# Patient Record
Sex: FEMALE | Race: WHITE | Employment: OTHER | URBAN - METROPOLITAN AREA
[De-identification: names, ages, dates, MRNs, and addresses within clinical notes are randomized per-mention and may not be internally consistent; named-entity substitution may affect disease eponyms.]

---

## 2017-01-20 ENCOUNTER — OFFICE VISIT (OUTPATIENT)
Dept: INTERNAL MEDICINE CLINIC | Age: 77
End: 2017-01-20

## 2017-01-20 VITALS
DIASTOLIC BLOOD PRESSURE: 82 MMHG | BODY MASS INDEX: 36.65 KG/M2 | TEMPERATURE: 97.7 F | HEART RATE: 89 BPM | SYSTOLIC BLOOD PRESSURE: 144 MMHG | WEIGHT: 220 LBS | OXYGEN SATURATION: 92 % | HEIGHT: 65 IN

## 2017-01-20 DIAGNOSIS — E78.5 HYPERLIPIDEMIA LDL GOAL <70: ICD-10-CM

## 2017-01-20 DIAGNOSIS — I27.20 PULMONARY HYPERTENSION (HCC): ICD-10-CM

## 2017-01-20 DIAGNOSIS — E66.01 MORBID OBESITY DUE TO EXCESS CALORIES (HCC): ICD-10-CM

## 2017-01-20 DIAGNOSIS — I34.0 MITRAL VALVE INSUFFICIENCY, UNSPECIFIED ETIOLOGY: ICD-10-CM

## 2017-01-20 DIAGNOSIS — M1A.0790 IDIOPATHIC CHRONIC GOUT OF FOOT WITHOUT TOPHUS, UNSPECIFIED LATERALITY: ICD-10-CM

## 2017-01-20 DIAGNOSIS — E03.9 ACQUIRED HYPOTHYROIDISM: ICD-10-CM

## 2017-01-20 DIAGNOSIS — I21.4 NSTEMI (NON-ST ELEVATED MYOCARDIAL INFARCTION) (HCC): ICD-10-CM

## 2017-01-20 DIAGNOSIS — I10 ESSENTIAL HYPERTENSION: ICD-10-CM

## 2017-01-20 DIAGNOSIS — Z76.89 ENCOUNTER TO ESTABLISH CARE WITH NEW DOCTOR: Primary | ICD-10-CM

## 2017-01-20 DIAGNOSIS — E11.65 TYPE 2 DIABETES MELLITUS WITH HYPERGLYCEMIA, WITHOUT LONG-TERM CURRENT USE OF INSULIN (HCC): ICD-10-CM

## 2017-01-20 DIAGNOSIS — I73.9 PVD (PERIPHERAL VASCULAR DISEASE) (HCC): ICD-10-CM

## 2017-01-20 DIAGNOSIS — I50.32 CHRONIC DIASTOLIC CHF (CONGESTIVE HEART FAILURE), NYHA CLASS 2 (HCC): ICD-10-CM

## 2017-01-20 LAB
A/G RATIO: 1.3 (ref 1.1–2.2)
ALBUMIN SERPL-MCNC: 3.9 G/DL (ref 3.4–5)
ALP BLD-CCNC: 137 U/L (ref 40–129)
ALT SERPL-CCNC: 16 U/L (ref 10–40)
ANION GAP SERPL CALCULATED.3IONS-SCNC: 15 MMOL/L (ref 3–16)
AST SERPL-CCNC: 12 U/L (ref 15–37)
BILIRUB SERPL-MCNC: 0.5 MG/DL (ref 0–1)
BUN BLDV-MCNC: 27 MG/DL (ref 7–20)
CALCIUM SERPL-MCNC: 9.3 MG/DL (ref 8.3–10.6)
CHLORIDE BLD-SCNC: 100 MMOL/L (ref 99–110)
CO2: 29 MMOL/L (ref 21–32)
CREAT SERPL-MCNC: 1.3 MG/DL (ref 0.6–1.2)
CREATININE URINE POCT: 173.4
GFR AFRICAN AMERICAN: 48
GFR NON-AFRICAN AMERICAN: 40
GLOBULIN: 3.1 G/DL
GLUCOSE BLD-MCNC: 219 MG/DL (ref 70–99)
HBA1C MFR BLD: 8 %
MICROALBUMIN/CREAT 24H UR: 72.8 MG/G{CREAT}
POTASSIUM SERPL-SCNC: 4.3 MMOL/L (ref 3.5–5.1)
SODIUM BLD-SCNC: 144 MMOL/L (ref 136–145)
T4 FREE: 1.8 NG/DL (ref 0.9–1.8)
TOTAL PROTEIN: 7 G/DL (ref 6.4–8.2)
TSH REFLEX FT4: 0.1 UIU/ML (ref 0.27–4.2)

## 2017-01-20 PROCEDURE — 82044 UR ALBUMIN SEMIQUANTITATIVE: CPT | Performed by: INTERNAL MEDICINE

## 2017-01-20 PROCEDURE — G8400 PT W/DXA NO RESULTS DOC: HCPCS | Performed by: INTERNAL MEDICINE

## 2017-01-20 PROCEDURE — 4040F PNEUMOC VAC/ADMIN/RCVD: CPT | Performed by: INTERNAL MEDICINE

## 2017-01-20 PROCEDURE — G0008 ADMIN INFLUENZA VIRUS VAC: HCPCS | Performed by: INTERNAL MEDICINE

## 2017-01-20 PROCEDURE — 1090F PRES/ABSN URINE INCON ASSESS: CPT | Performed by: INTERNAL MEDICINE

## 2017-01-20 PROCEDURE — 90662 IIV NO PRSV INCREASED AG IM: CPT | Performed by: INTERNAL MEDICINE

## 2017-01-20 PROCEDURE — G8484 FLU IMMUNIZE NO ADMIN: HCPCS | Performed by: INTERNAL MEDICINE

## 2017-01-20 PROCEDURE — G8419 CALC BMI OUT NRM PARAM NOF/U: HCPCS | Performed by: INTERNAL MEDICINE

## 2017-01-20 PROCEDURE — 83036 HEMOGLOBIN GLYCOSYLATED A1C: CPT | Performed by: INTERNAL MEDICINE

## 2017-01-20 PROCEDURE — 99215 OFFICE O/P EST HI 40 MIN: CPT | Performed by: INTERNAL MEDICINE

## 2017-01-20 PROCEDURE — 90670 PCV13 VACCINE IM: CPT | Performed by: INTERNAL MEDICINE

## 2017-01-20 PROCEDURE — 1123F ACP DISCUSS/DSCN MKR DOCD: CPT | Performed by: INTERNAL MEDICINE

## 2017-01-20 PROCEDURE — G0009 ADMIN PNEUMOCOCCAL VACCINE: HCPCS | Performed by: INTERNAL MEDICINE

## 2017-01-20 PROCEDURE — 1036F TOBACCO NON-USER: CPT | Performed by: INTERNAL MEDICINE

## 2017-01-20 PROCEDURE — G8427 DOCREV CUR MEDS BY ELIG CLIN: HCPCS | Performed by: INTERNAL MEDICINE

## 2017-01-20 RX ORDER — LISINOPRIL 5 MG/1
5 TABLET ORAL DAILY
Qty: 30 TABLET | Refills: 3 | Status: SHIPPED | OUTPATIENT
Start: 2017-01-20 | End: 2017-07-06 | Stop reason: SDUPTHER

## 2017-01-24 DIAGNOSIS — E03.9 ACQUIRED HYPOTHYROIDISM: Primary | ICD-10-CM

## 2017-01-24 RX ORDER — LEVOTHYROXINE SODIUM 0.12 MG/1
125 TABLET ORAL DAILY
Qty: 90 TABLET | Refills: 3 | Status: SHIPPED | OUTPATIENT
Start: 2017-01-24 | End: 2018-04-23 | Stop reason: SDUPTHER

## 2017-06-14 RX ORDER — ALLOPURINOL 100 MG/1
TABLET ORAL
Qty: 10 TABLET | Refills: 0 | Status: SHIPPED | OUTPATIENT
Start: 2017-06-14 | End: 2017-08-03 | Stop reason: SDUPTHER

## 2017-06-14 RX ORDER — GLIMEPIRIDE 4 MG/1
TABLET ORAL
Qty: 20 TABLET | Refills: 0 | Status: SHIPPED | OUTPATIENT
Start: 2017-06-14 | End: 2017-08-03 | Stop reason: SDUPTHER

## 2017-07-06 ENCOUNTER — OFFICE VISIT (OUTPATIENT)
Dept: FAMILY MEDICINE CLINIC | Age: 77
End: 2017-07-06

## 2017-07-06 VITALS
DIASTOLIC BLOOD PRESSURE: 80 MMHG | WEIGHT: 214 LBS | SYSTOLIC BLOOD PRESSURE: 140 MMHG | BODY MASS INDEX: 34.39 KG/M2 | HEART RATE: 57 BPM | HEIGHT: 66 IN

## 2017-07-06 DIAGNOSIS — E11.65 TYPE 2 DIABETES MELLITUS WITH HYPERGLYCEMIA, WITHOUT LONG-TERM CURRENT USE OF INSULIN (HCC): ICD-10-CM

## 2017-07-06 DIAGNOSIS — I21.4 NSTEMI (NON-ST ELEVATED MYOCARDIAL INFARCTION) (HCC): ICD-10-CM

## 2017-07-06 DIAGNOSIS — E78.5 HYPERLIPIDEMIA LDL GOAL <70: ICD-10-CM

## 2017-07-06 DIAGNOSIS — I50.32 CHRONIC DIASTOLIC CHF (CONGESTIVE HEART FAILURE), NYHA CLASS 2 (HCC): ICD-10-CM

## 2017-07-06 DIAGNOSIS — E03.9 ACQUIRED HYPOTHYROIDISM: ICD-10-CM

## 2017-07-06 DIAGNOSIS — I10 ESSENTIAL HYPERTENSION: Primary | ICD-10-CM

## 2017-07-06 LAB
A/G RATIO: 1.4 (ref 1.1–2.2)
ALBUMIN SERPL-MCNC: 4.1 G/DL (ref 3.4–5)
ALP BLD-CCNC: 131 U/L (ref 40–129)
ALT SERPL-CCNC: 13 U/L (ref 10–40)
ANION GAP SERPL CALCULATED.3IONS-SCNC: 17 MMOL/L (ref 3–16)
AST SERPL-CCNC: 12 U/L (ref 15–37)
BILIRUB SERPL-MCNC: 0.5 MG/DL (ref 0–1)
BUN BLDV-MCNC: 34 MG/DL (ref 7–20)
CALCIUM SERPL-MCNC: 9.3 MG/DL (ref 8.3–10.6)
CHLORIDE BLD-SCNC: 100 MMOL/L (ref 99–110)
CHOLESTEROL, TOTAL: 165 MG/DL (ref 0–199)
CO2: 30 MMOL/L (ref 21–32)
CREAT SERPL-MCNC: 1.4 MG/DL (ref 0.6–1.2)
GFR AFRICAN AMERICAN: 44
GFR NON-AFRICAN AMERICAN: 36
GLOBULIN: 2.9 G/DL
GLUCOSE BLD-MCNC: 150 MG/DL (ref 70–99)
HDLC SERPL-MCNC: 59 MG/DL (ref 40–60)
LDL CHOLESTEROL CALCULATED: 83 MG/DL
POTASSIUM SERPL-SCNC: 4.7 MMOL/L (ref 3.5–5.1)
SODIUM BLD-SCNC: 147 MMOL/L (ref 136–145)
TOTAL PROTEIN: 7 G/DL (ref 6.4–8.2)
TRIGL SERPL-MCNC: 116 MG/DL (ref 0–150)
VLDLC SERPL CALC-MCNC: 23 MG/DL

## 2017-07-06 PROCEDURE — 1036F TOBACCO NON-USER: CPT | Performed by: FAMILY MEDICINE

## 2017-07-06 PROCEDURE — 36415 COLL VENOUS BLD VENIPUNCTURE: CPT | Performed by: FAMILY MEDICINE

## 2017-07-06 PROCEDURE — 4040F PNEUMOC VAC/ADMIN/RCVD: CPT | Performed by: FAMILY MEDICINE

## 2017-07-06 PROCEDURE — G8427 DOCREV CUR MEDS BY ELIG CLIN: HCPCS | Performed by: FAMILY MEDICINE

## 2017-07-06 PROCEDURE — G8417 CALC BMI ABV UP PARAM F/U: HCPCS | Performed by: FAMILY MEDICINE

## 2017-07-06 PROCEDURE — G8400 PT W/DXA NO RESULTS DOC: HCPCS | Performed by: FAMILY MEDICINE

## 2017-07-06 PROCEDURE — 99214 OFFICE O/P EST MOD 30 MIN: CPT | Performed by: FAMILY MEDICINE

## 2017-07-06 PROCEDURE — 1090F PRES/ABSN URINE INCON ASSESS: CPT | Performed by: FAMILY MEDICINE

## 2017-07-06 PROCEDURE — 1123F ACP DISCUSS/DSCN MKR DOCD: CPT | Performed by: FAMILY MEDICINE

## 2017-07-06 RX ORDER — ALLOPURINOL 100 MG/1
TABLET ORAL
Qty: 90 TABLET | Refills: 1 | Status: SHIPPED | OUTPATIENT
Start: 2017-07-06 | End: 2018-01-29 | Stop reason: SDUPTHER

## 2017-07-06 RX ORDER — LISINOPRIL 5 MG/1
5 TABLET ORAL DAILY
Qty: 90 TABLET | Refills: 1 | Status: SHIPPED | OUTPATIENT
Start: 2017-07-06 | End: 2017-12-08 | Stop reason: SDUPTHER

## 2017-07-06 RX ORDER — GLIMEPIRIDE 4 MG/1
TABLET ORAL
Qty: 180 TABLET | Refills: 1 | Status: ON HOLD | OUTPATIENT
Start: 2017-07-06 | End: 2018-01-10 | Stop reason: ALTCHOICE

## 2017-07-06 ASSESSMENT — ENCOUNTER SYMPTOMS
DIARRHEA: 0
TROUBLE SWALLOWING: 0
WHEEZING: 0
PHOTOPHOBIA: 0
SINUS PRESSURE: 0
SHORTNESS OF BREATH: 0
BACK PAIN: 0
CONSTIPATION: 0

## 2017-07-06 ASSESSMENT — PATIENT HEALTH QUESTIONNAIRE - PHQ9
SUM OF ALL RESPONSES TO PHQ9 QUESTIONS 1 & 2: 2
SUM OF ALL RESPONSES TO PHQ QUESTIONS 1-9: 2
2. FEELING DOWN, DEPRESSED OR HOPELESS: 1
1. LITTLE INTEREST OR PLEASURE IN DOING THINGS: 1

## 2017-07-07 LAB
ESTIMATED AVERAGE GLUCOSE: 194.4 MG/DL
HBA1C MFR BLD: 8.4 %

## 2017-07-18 ENCOUNTER — OFFICE VISIT (OUTPATIENT)
Dept: CARDIOLOGY CLINIC | Age: 77
End: 2017-07-18

## 2017-07-18 VITALS
WEIGHT: 215 LBS | DIASTOLIC BLOOD PRESSURE: 60 MMHG | HEIGHT: 66 IN | OXYGEN SATURATION: 97 % | SYSTOLIC BLOOD PRESSURE: 126 MMHG | BODY MASS INDEX: 34.55 KG/M2 | HEART RATE: 78 BPM

## 2017-07-18 DIAGNOSIS — I25.10 CORONARY ARTERY DISEASE INVOLVING NATIVE CORONARY ARTERY OF NATIVE HEART WITHOUT ANGINA PECTORIS: Primary | ICD-10-CM

## 2017-07-18 DIAGNOSIS — I27.20 PULMONARY HYPERTENSION (HCC): ICD-10-CM

## 2017-07-18 DIAGNOSIS — I50.32 CHRONIC DIASTOLIC HEART FAILURE, NYHA CLASS 2 (HCC): ICD-10-CM

## 2017-07-18 DIAGNOSIS — E78.5 HYPERLIPIDEMIA LDL GOAL <70: ICD-10-CM

## 2017-07-18 PROCEDURE — 1090F PRES/ABSN URINE INCON ASSESS: CPT | Performed by: INTERNAL MEDICINE

## 2017-07-18 PROCEDURE — G8427 DOCREV CUR MEDS BY ELIG CLIN: HCPCS | Performed by: INTERNAL MEDICINE

## 2017-07-18 PROCEDURE — 99214 OFFICE O/P EST MOD 30 MIN: CPT | Performed by: INTERNAL MEDICINE

## 2017-07-18 PROCEDURE — 1036F TOBACCO NON-USER: CPT | Performed by: INTERNAL MEDICINE

## 2017-07-18 PROCEDURE — 4040F PNEUMOC VAC/ADMIN/RCVD: CPT | Performed by: INTERNAL MEDICINE

## 2017-07-18 PROCEDURE — G8598 ASA/ANTIPLAT THER USED: HCPCS | Performed by: INTERNAL MEDICINE

## 2017-07-18 PROCEDURE — 1123F ACP DISCUSS/DSCN MKR DOCD: CPT | Performed by: INTERNAL MEDICINE

## 2017-07-18 PROCEDURE — G8400 PT W/DXA NO RESULTS DOC: HCPCS | Performed by: INTERNAL MEDICINE

## 2017-07-18 PROCEDURE — G8417 CALC BMI ABV UP PARAM F/U: HCPCS | Performed by: INTERNAL MEDICINE

## 2017-09-25 ENCOUNTER — OFFICE VISIT (OUTPATIENT)
Dept: FAMILY MEDICINE CLINIC | Age: 77
End: 2017-09-25

## 2017-09-25 VITALS
DIASTOLIC BLOOD PRESSURE: 72 MMHG | HEIGHT: 67 IN | BODY MASS INDEX: 33.68 KG/M2 | TEMPERATURE: 98.6 F | WEIGHT: 214.6 LBS | SYSTOLIC BLOOD PRESSURE: 122 MMHG

## 2017-09-25 DIAGNOSIS — E03.9 ACQUIRED HYPOTHYROIDISM: Primary | ICD-10-CM

## 2017-09-25 DIAGNOSIS — I10 ESSENTIAL HYPERTENSION: ICD-10-CM

## 2017-09-25 DIAGNOSIS — E11.65 TYPE 2 DIABETES MELLITUS WITH HYPERGLYCEMIA, WITHOUT LONG-TERM CURRENT USE OF INSULIN (HCC): ICD-10-CM

## 2017-09-25 DIAGNOSIS — Z23 FLU VACCINE NEED: ICD-10-CM

## 2017-09-25 PROCEDURE — 1036F TOBACCO NON-USER: CPT | Performed by: INTERNAL MEDICINE

## 2017-09-25 PROCEDURE — G8400 PT W/DXA NO RESULTS DOC: HCPCS | Performed by: INTERNAL MEDICINE

## 2017-09-25 PROCEDURE — 99214 OFFICE O/P EST MOD 30 MIN: CPT | Performed by: INTERNAL MEDICINE

## 2017-09-25 PROCEDURE — 90662 IIV NO PRSV INCREASED AG IM: CPT | Performed by: INTERNAL MEDICINE

## 2017-09-25 PROCEDURE — 4040F PNEUMOC VAC/ADMIN/RCVD: CPT | Performed by: INTERNAL MEDICINE

## 2017-09-25 PROCEDURE — 1123F ACP DISCUSS/DSCN MKR DOCD: CPT | Performed by: INTERNAL MEDICINE

## 2017-09-25 PROCEDURE — 1090F PRES/ABSN URINE INCON ASSESS: CPT | Performed by: INTERNAL MEDICINE

## 2017-09-25 PROCEDURE — G0008 ADMIN INFLUENZA VIRUS VAC: HCPCS | Performed by: INTERNAL MEDICINE

## 2017-09-25 PROCEDURE — G8417 CALC BMI ABV UP PARAM F/U: HCPCS | Performed by: INTERNAL MEDICINE

## 2017-09-25 PROCEDURE — G8598 ASA/ANTIPLAT THER USED: HCPCS | Performed by: INTERNAL MEDICINE

## 2017-09-25 PROCEDURE — G8427 DOCREV CUR MEDS BY ELIG CLIN: HCPCS | Performed by: INTERNAL MEDICINE

## 2017-09-25 ASSESSMENT — ENCOUNTER SYMPTOMS
RHINORRHEA: 0
ABDOMINAL PAIN: 0
SHORTNESS OF BREATH: 0
SINUS PRESSURE: 0
COUGH: 0
SORE THROAT: 0
BLOOD IN STOOL: 0
WHEEZING: 0
VOMITING: 0
APNEA: 0
NAUSEA: 0
CONSTIPATION: 0
DIARRHEA: 0

## 2017-11-20 RX ORDER — CLOPIDOGREL BISULFATE 75 MG/1
TABLET ORAL
Qty: 90 TABLET | Refills: 3 | Status: ON HOLD | OUTPATIENT
Start: 2017-11-20 | End: 2018-01-13 | Stop reason: HOSPADM

## 2017-12-08 ENCOUNTER — TELEPHONE (OUTPATIENT)
Dept: FAMILY MEDICINE CLINIC | Age: 77
End: 2017-12-08

## 2017-12-08 DIAGNOSIS — E11.65 TYPE 2 DIABETES MELLITUS WITH HYPERGLYCEMIA, WITHOUT LONG-TERM CURRENT USE OF INSULIN (HCC): ICD-10-CM

## 2017-12-08 RX ORDER — CARVEDILOL 3.12 MG/1
3.12 TABLET ORAL 2 TIMES DAILY WITH MEALS
Qty: 60 TABLET | Refills: 0 | Status: SHIPPED | OUTPATIENT
Start: 2017-12-08 | End: 2018-01-31 | Stop reason: SDUPTHER

## 2017-12-08 RX ORDER — LISINOPRIL 5 MG/1
5 TABLET ORAL DAILY
Qty: 90 TABLET | Refills: 0 | Status: SHIPPED | OUTPATIENT
Start: 2017-12-08 | End: 2018-03-12 | Stop reason: SDUPTHER

## 2017-12-08 RX ORDER — FUROSEMIDE 80 MG
80 TABLET ORAL 2 TIMES DAILY
Qty: 60 TABLET | Refills: 0 | Status: ON HOLD | OUTPATIENT
Start: 2017-12-08 | End: 2018-01-13

## 2017-12-12 ENCOUNTER — OFFICE VISIT (OUTPATIENT)
Dept: FAMILY MEDICINE CLINIC | Age: 77
End: 2017-12-12

## 2017-12-12 VITALS
DIASTOLIC BLOOD PRESSURE: 74 MMHG | TEMPERATURE: 97.8 F | HEIGHT: 67 IN | WEIGHT: 216.2 LBS | SYSTOLIC BLOOD PRESSURE: 118 MMHG | BODY MASS INDEX: 33.93 KG/M2

## 2017-12-12 DIAGNOSIS — B96.89 ACUTE BACTERIAL SINUSITIS: ICD-10-CM

## 2017-12-12 DIAGNOSIS — J01.90 ACUTE BACTERIAL SINUSITIS: ICD-10-CM

## 2017-12-12 PROCEDURE — G8417 CALC BMI ABV UP PARAM F/U: HCPCS | Performed by: INTERNAL MEDICINE

## 2017-12-12 PROCEDURE — 1036F TOBACCO NON-USER: CPT | Performed by: INTERNAL MEDICINE

## 2017-12-12 PROCEDURE — 99213 OFFICE O/P EST LOW 20 MIN: CPT | Performed by: INTERNAL MEDICINE

## 2017-12-12 PROCEDURE — G8484 FLU IMMUNIZE NO ADMIN: HCPCS | Performed by: INTERNAL MEDICINE

## 2017-12-12 PROCEDURE — 1123F ACP DISCUSS/DSCN MKR DOCD: CPT | Performed by: INTERNAL MEDICINE

## 2017-12-12 PROCEDURE — 4040F PNEUMOC VAC/ADMIN/RCVD: CPT | Performed by: INTERNAL MEDICINE

## 2017-12-12 PROCEDURE — G8598 ASA/ANTIPLAT THER USED: HCPCS | Performed by: INTERNAL MEDICINE

## 2017-12-12 PROCEDURE — G8427 DOCREV CUR MEDS BY ELIG CLIN: HCPCS | Performed by: INTERNAL MEDICINE

## 2017-12-12 PROCEDURE — G8400 PT W/DXA NO RESULTS DOC: HCPCS | Performed by: INTERNAL MEDICINE

## 2017-12-12 PROCEDURE — 1090F PRES/ABSN URINE INCON ASSESS: CPT | Performed by: INTERNAL MEDICINE

## 2017-12-12 RX ORDER — CEFUROXIME AXETIL 250 MG/1
250 TABLET ORAL 2 TIMES DAILY
Qty: 20 TABLET | Refills: 0 | Status: SHIPPED | OUTPATIENT
Start: 2017-12-12 | End: 2017-12-22

## 2017-12-12 ASSESSMENT — ENCOUNTER SYMPTOMS
SHORTNESS OF BREATH: 0
SINUS PRESSURE: 1
APNEA: 0
ABDOMINAL PAIN: 0
BLOOD IN STOOL: 0
WHEEZING: 0
COUGH: 0

## 2018-01-09 PROBLEM — R07.9 CHEST PAIN: Status: ACTIVE | Noted: 2018-01-09

## 2018-01-11 PROBLEM — N18.30 CKD (CHRONIC KIDNEY DISEASE) STAGE 3, GFR 30-59 ML/MIN (HCC): Status: ACTIVE | Noted: 2018-01-11

## 2018-01-11 PROBLEM — N17.9 ACUTE KIDNEY INJURY (HCC): Status: ACTIVE | Noted: 2018-01-11

## 2018-01-15 ENCOUNTER — CARE COORDINATION (OUTPATIENT)
Dept: CASE MANAGEMENT | Age: 78
End: 2018-01-15

## 2018-01-15 DIAGNOSIS — I20.0 UNSTABLE ANGINA PECTORIS (HCC): Primary | ICD-10-CM

## 2018-01-15 PROCEDURE — 1111F DSCHRG MED/CURRENT MED MERGE: CPT | Performed by: INTERNAL MEDICINE

## 2018-01-24 ENCOUNTER — CARE COORDINATION (OUTPATIENT)
Dept: CASE MANAGEMENT | Age: 78
End: 2018-01-24

## 2018-01-24 ENCOUNTER — OFFICE VISIT (OUTPATIENT)
Dept: CARDIOLOGY CLINIC | Age: 78
End: 2018-01-24

## 2018-01-24 VITALS
SYSTOLIC BLOOD PRESSURE: 128 MMHG | BODY MASS INDEX: 35.82 KG/M2 | HEART RATE: 61 BPM | HEIGHT: 65 IN | WEIGHT: 215 LBS | OXYGEN SATURATION: 97 % | DIASTOLIC BLOOD PRESSURE: 78 MMHG

## 2018-01-24 DIAGNOSIS — I10 ESSENTIAL HYPERTENSION: ICD-10-CM

## 2018-01-24 DIAGNOSIS — E78.5 HYPERLIPIDEMIA LDL GOAL <70: ICD-10-CM

## 2018-01-24 DIAGNOSIS — N18.30 CKD (CHRONIC KIDNEY DISEASE) STAGE 3, GFR 30-59 ML/MIN (HCC): ICD-10-CM

## 2018-01-24 DIAGNOSIS — I50.32 CHRONIC DIASTOLIC HF (HEART FAILURE) (HCC): ICD-10-CM

## 2018-01-24 DIAGNOSIS — I25.10 CAD, MULTIPLE VESSEL: Primary | ICD-10-CM

## 2018-01-24 PROCEDURE — 99214 OFFICE O/P EST MOD 30 MIN: CPT | Performed by: NURSE PRACTITIONER

## 2018-01-24 PROCEDURE — G8484 FLU IMMUNIZE NO ADMIN: HCPCS | Performed by: NURSE PRACTITIONER

## 2018-01-24 PROCEDURE — G8400 PT W/DXA NO RESULTS DOC: HCPCS | Performed by: NURSE PRACTITIONER

## 2018-01-24 PROCEDURE — G8417 CALC BMI ABV UP PARAM F/U: HCPCS | Performed by: NURSE PRACTITIONER

## 2018-01-24 PROCEDURE — G8598 ASA/ANTIPLAT THER USED: HCPCS | Performed by: NURSE PRACTITIONER

## 2018-01-24 PROCEDURE — G8427 DOCREV CUR MEDS BY ELIG CLIN: HCPCS | Performed by: NURSE PRACTITIONER

## 2018-01-24 PROCEDURE — 1090F PRES/ABSN URINE INCON ASSESS: CPT | Performed by: NURSE PRACTITIONER

## 2018-01-24 PROCEDURE — 4040F PNEUMOC VAC/ADMIN/RCVD: CPT | Performed by: NURSE PRACTITIONER

## 2018-01-24 PROCEDURE — 1036F TOBACCO NON-USER: CPT | Performed by: NURSE PRACTITIONER

## 2018-01-24 PROCEDURE — 1111F DSCHRG MED/CURRENT MED MERGE: CPT | Performed by: NURSE PRACTITIONER

## 2018-01-24 PROCEDURE — 1123F ACP DISCUSS/DSCN MKR DOCD: CPT | Performed by: NURSE PRACTITIONER

## 2018-01-24 NOTE — CARE COORDINATION
[facility] Transitions Follow Up Call    2018    Patient: Terri Evans  Patient : 1940   MRN: [id number]  Reason for Admission: There are no discharge diagnoses documented for the most recent discharge. Discharge Date: 18 RARS: Risk Score: 14.75       Spoke with: no one    Care Transitions Subsequent and Final Call    Subsequent and Final Calls  Are you currently active with any services?:  Home Health  Care Transitions Interventions  Other Interventions: Follow Up: Unable to reach patient. Unable to leave message. No answer - no voicemail available.    Future Appointments  Date Time Provider Isidoro Black   2018 1:20 PM Ana Chaudhry CNP [facility]       Elma Gutierrez RN

## 2018-01-24 NOTE — PROGRESS NOTES
[address]  Office Visit    Anita Roach  1940 January 24, 2018    CC:   Chief Complaint   Patient presents with    Follow-Up from Hospital     s/p PCI/CHF, CAD, HTN, HLD, PVD    Other     no cardiac complaints     HPI:  The patient is 68 y.o. female with a past medical history significant for  CAD/prior stents, CHF, CKD and diabetes mellitus who is here for hospital follow up. Hospitalized 1/10/2018-1/14/2018 with unstable angina, CORDELL on CKD, HTN and hyperlipidemia. Cr was elevated and hydrated and once improved she was taken for cardiac cath. Angiogram on 1/12/2018 with LAD + Dg ISR with POBA and stents x 3 to the RCA. Overall feeling better and improving daily. She does not want to pursue cardiac rehab at this time. Has noted weakness and fatigue which continues to improve gradually. Walks through the house but not on a regular exercise regimen. Has chronic SOBOE. Has chronic mild edema in ankles. Denies chest pain/discomfort, orthopnea/PND, cough, palpitations, dizziness, syncope, weight change or claudication. Review of Systems:  Constitutional: Denies night sweats or fever. + fatigue/weakness  HEENT: Denies new visual changes, ringing in ears, nosebleeds. + nasal congestion at times  Respiratory: Denies new or change in SOB, PND, orthopnea or cough. Cardiovascular: see HPI  GI: Denies N/V, diarrhea, constipation, abdominal pain, change in bowel habits, melena or hematochezia  : Denies urinary frequency, urgency, incontinence, hematuria or dysuria. Skin: Denies rash, hives, or cyanosis  Musculoskeletal: Denies joint or muscle aches/pain  Neurological: Denies syncope or TIA-like symptoms.   Psychiatric: Denies anxiety, insomnia or depression     Past Medical History:   Diagnosis Date    CAD (coronary artery disease)     [facility]est[facility] [address]     DM2 (diabetes mellitus, type 2) (HCC)     HTN (hypertension)     Hypothyroidism     MI (mitral modification with use of DAPT, carvedilol, lisinopril and statin    2. Essential hypertension  -controlled  -continue medical management    3. Hyperlipidemia LDL goal <70  -continue statin    4. CKD (chronic kidney disease) stage 3, GFR 30-59 ml/min  -last Cr 1.7  -follows nephrology    5. Chronic diastolic HF (heart failure) (HCC)  -currently compensated; NYHA class II  -continue BB, ACE-I and diuretic    6. Diabetes Mellitus      Plan:  Continue ASA, Brilinta, carvedilol, furosemide, lisinopril, and prvastatin  Emphasized low-fat/low sodium diet, monitoring of daily weights, fluid restriction, worsening signs and symptoms of heart failure and when to call, and the importance of regular exercise and activity. Check CBC in 2 weeks(follow up plts given DAPT)  Emphasized smoking cessation and discussed strategies for smoking cessation (> 5 minutes of counseling given)  Refer to cardiac rehab  Approximately 30 minutes spent with pt and her daughter and > 50% of time spent on pt education and answering questions regarding her meds, condition, etc  Follow up with D. Enzweiler, CNP in 6 weeks or sooner if needed    Return in about 6 weeks (around 3/7/2018) for with D. Enzweiler, CNP. Thanks for allowing me to participate in the care of this patient.     Karoline Moreno, [address], [address] [place] [address] [address] [facility] [address], [address] [facility]  Office: [phone]  Fax: [phone]

## 2018-01-25 NOTE — COMMUNICATION BODY
HPI:  The patient is 68 y.o. female with a past medical history significant for  CAD/prior stents, CHF, CKD and diabetes mellitus who is here for hospital follow up. Hospitalized 1/10/2018-1/14/2018 with unstable angina, CORDELL on CKD, HTN and hyperlipidemia. Cr was elevated and hydrated and once improved she was taken for cardiac cath. Angiogram on 1/12/2018 with LAD + Dg ISR with POBA and stents x 3 to the RCA. Overall feeling better and improving daily. She does not want to pursue cardiac rehab at this time. Has noted weakness and fatigue which continues to improve gradually. Walks through the house but not on a regular exercise regimen. Has chronic SOBOE. Has chronic mild edema in ankles. Denies chest pain/discomfort, orthopnea/PND, cough, palpitations, dizziness, syncope, weight change or claudication. Assessment:    1. CAD, multiple vessel  -presented in 1/2018 with unstable angina: POBA LAD + Dg for ISR and stents x 3 to RCA  -LVEF 50-55%  -no recurrent angina  -continue medical management and risk factor modification with use of DAPT, carvedilol, lisinopril and statin    2. Essential hypertension  -controlled  -continue medical management    3. Hyperlipidemia LDL goal <70  -continue statin    4. CKD (chronic kidney disease) stage 3, GFR 30-59 ml/min  -last Cr 1.7  -follows nephrology    5. Chronic diastolic HF (heart failure) (HCC)  -currently compensated; NYHA class II  -continue BB, ACE-I and diuretic    6. Diabetes Mellitus    Plan:    Continue ASA, Brilinta, carvedilol, furosemide, lisinopril, and prvastatin  Emphasized low-fat/low sodium diet, monitoring of daily weights, fluid restriction, worsening signs and symptoms of heart failure and when to call, and the importance of regular exercise and activity.   Check CBC in 2 weeks(follow up plts given DAPT)  Emphasized smoking cessation and discussed strategies for smoking cessation (> 5 minutes of counseling given)  Refer to cardiac rehab  Approximately 30 minutes spent with pt and her daughter and > 50% of time spent on pt education and answering questions regarding her meds, condition, etc  Follow up with D. Enzweiler, CNP in 6 weeks or sooner if needed    Return in about 6 weeks (around 3/7/2018) for with D. Enzweiler, CNP. Thanks for allowing me to participate in the care of this patient.

## 2018-01-26 ENCOUNTER — CARE COORDINATION (OUTPATIENT)
Dept: CASE MANAGEMENT | Age: 78
End: 2018-01-26

## 2018-01-30 RX ORDER — ALLOPURINOL 100 MG/1
TABLET ORAL
Qty: 90 TABLET | Refills: 1 | Status: SHIPPED | OUTPATIENT
Start: 2018-01-30 | End: 2018-08-06 | Stop reason: SDUPTHER

## 2018-01-31 ENCOUNTER — OFFICE VISIT (OUTPATIENT)
Dept: FAMILY MEDICINE CLINIC | Age: 78
End: 2018-01-31

## 2018-01-31 VITALS
HEIGHT: 65 IN | WEIGHT: 217.4 LBS | TEMPERATURE: 97.5 F | DIASTOLIC BLOOD PRESSURE: 66 MMHG | BODY MASS INDEX: 36.22 KG/M2 | SYSTOLIC BLOOD PRESSURE: 128 MMHG

## 2018-01-31 DIAGNOSIS — I25.110 CORONARY ARTERY DISEASE INVOLVING NATIVE CORONARY ARTERY OF NATIVE HEART WITH UNSTABLE ANGINA PECTORIS (HCC): Primary | ICD-10-CM

## 2018-01-31 PROCEDURE — 1123F ACP DISCUSS/DSCN MKR DOCD: CPT | Performed by: INTERNAL MEDICINE

## 2018-01-31 PROCEDURE — 4040F PNEUMOC VAC/ADMIN/RCVD: CPT | Performed by: INTERNAL MEDICINE

## 2018-01-31 PROCEDURE — 1111F DSCHRG MED/CURRENT MED MERGE: CPT | Performed by: INTERNAL MEDICINE

## 2018-01-31 PROCEDURE — G8427 DOCREV CUR MEDS BY ELIG CLIN: HCPCS | Performed by: INTERNAL MEDICINE

## 2018-01-31 PROCEDURE — G8417 CALC BMI ABV UP PARAM F/U: HCPCS | Performed by: INTERNAL MEDICINE

## 2018-01-31 PROCEDURE — 1090F PRES/ABSN URINE INCON ASSESS: CPT | Performed by: INTERNAL MEDICINE

## 2018-01-31 PROCEDURE — 1036F TOBACCO NON-USER: CPT | Performed by: INTERNAL MEDICINE

## 2018-01-31 PROCEDURE — 99213 OFFICE O/P EST LOW 20 MIN: CPT | Performed by: INTERNAL MEDICINE

## 2018-01-31 PROCEDURE — G8598 ASA/ANTIPLAT THER USED: HCPCS | Performed by: INTERNAL MEDICINE

## 2018-01-31 PROCEDURE — G8400 PT W/DXA NO RESULTS DOC: HCPCS | Performed by: INTERNAL MEDICINE

## 2018-01-31 PROCEDURE — G8484 FLU IMMUNIZE NO ADMIN: HCPCS | Performed by: INTERNAL MEDICINE

## 2018-01-31 RX ORDER — CARVEDILOL 3.12 MG/1
3.12 TABLET ORAL 2 TIMES DAILY WITH MEALS
Qty: 60 TABLET | Refills: 5 | Status: SHIPPED | OUTPATIENT
Start: 2018-01-31 | End: 2018-03-12 | Stop reason: SDUPTHER

## 2018-01-31 ASSESSMENT — ENCOUNTER SYMPTOMS
COUGH: 0
RHINORRHEA: 0
SHORTNESS OF BREATH: 0
APNEA: 0
ABDOMINAL PAIN: 0

## 2018-01-31 NOTE — PATIENT INSTRUCTIONS
Thank you for choosing [facility]. Please bring a current list of medications to every appointment. Please contact your pharmacy for any prescription refill(s) that you are requesting.

## 2018-01-31 NOTE — PROGRESS NOTES
Subjective:      Patient ID: Sonia Ferris is a 68 y.o. female. HPI   Chief Complaint   Patient presents with    Follow-Up from Hospital     hospital follow up- chest pain. patient has consulted with CNP at her Cardiologist's office.   patient states that she is feeling better     Sonia Ferris is a 68 y.o. female with the following history as recorded in [facility]:  Patient Active Problem List    Diagnosis Date Noted    CKD (chronic kidney disease) stage 3, GFR 30-59 ml/min 01/11/2018    Acute kidney injury ([facility] [address] 01/11/2018    Unstable angina pectoris ([facility] [address]     Coronary artery disease involving native coronary artery of native heart with unstable angina pectoris ([facility] [address]     Chest pain 01/09/2018    Acute bacterial sinusitis 12/12/2017    Morbid obesity due to excess calories ([facility] [address] 01/20/2017    NSTEMI (non-ST elevated myocardial infarction) ([facility] [address] [facility]     Hyperlipidemia LDL goal <70     Essential hypertension 01/14/2016    Diabetes mellitus ([facility] [address] 10/10/2013    Hypothyroidism     PVD (peripheral vascular disease) ([facility] [address] [facility]     MI (mitral incompetence)     Pulmonary hypertension     Chronic diastolic CHF (congestive heart failure), NYHA class 2 ([facility]) 09/06/2012     Current Outpatient Prescriptions   Medication Sig Dispense Refill    allopurinol (ZYLOPRIM) 100 MG tablet Take one tablet by mouth once daily 90 tablet 1    furosemide (LASIX) 80 MG tablet Take 0.5 tablets by mouth daily (Patient taking differently: Take 80 mg by mouth daily ) 60 tablet 0    pravastatin (PRAVACHOL) 10 MG tablet Take 4 tablets by mouth daily (Patient taking differently: Take 10 mg by mouth daily ) 30 tablet 3    glimepiride (AMARYL) 4 MG tablet Take 4 mg by mouth 2 times daily (before meals)      lisinopril (PRINIVIL;ZESTRIL) 5 MG tablet Take 1 tablet by mouth daily 90 tablet 0    carvedilol (COREG) 3.125 MG tablet Take 1 tablet by mouth 2 times daily (with meals) 60 tablet 0    metFORMIN (GLUCOPHAGE) 850 MG tablet Take of breath. Cardiovascular: Negative for chest pain and palpitations. Gastrointestinal: Negative for abdominal pain. Objective:   Physical Exam   Constitutional: She appears well-developed and well-nourished. HENT:   Head: Normocephalic and atraumatic. Eyes: Conjunctivae are normal. Pupils are equal, round, and reactive to light. Cardiovascular: Normal rate. No murmur heard. Pulmonary/Chest: Effort normal and breath sounds normal. No respiratory distress. She has no wheezes. Abdominal: She exhibits no distension. There is no tenderness.        Assessment:      Chest pain resolved       Plan:      Cardiology follow up as directed

## 2018-02-28 ENCOUNTER — OFFICE VISIT (OUTPATIENT)
Dept: FAMILY MEDICINE CLINIC | Age: 78
End: 2018-02-28

## 2018-02-28 VITALS
HEIGHT: 65 IN | BODY MASS INDEX: 36.22 KG/M2 | DIASTOLIC BLOOD PRESSURE: 70 MMHG | WEIGHT: 217.4 LBS | TEMPERATURE: 98.4 F | SYSTOLIC BLOOD PRESSURE: 138 MMHG

## 2018-02-28 DIAGNOSIS — B96.89 ACUTE BACTERIAL SINUSITIS: ICD-10-CM

## 2018-02-28 DIAGNOSIS — J01.90 ACUTE BACTERIAL SINUSITIS: ICD-10-CM

## 2018-02-28 PROCEDURE — 1123F ACP DISCUSS/DSCN MKR DOCD: CPT | Performed by: INTERNAL MEDICINE

## 2018-02-28 PROCEDURE — G8400 PT W/DXA NO RESULTS DOC: HCPCS | Performed by: INTERNAL MEDICINE

## 2018-02-28 PROCEDURE — 99213 OFFICE O/P EST LOW 20 MIN: CPT | Performed by: INTERNAL MEDICINE

## 2018-02-28 PROCEDURE — G8427 DOCREV CUR MEDS BY ELIG CLIN: HCPCS | Performed by: INTERNAL MEDICINE

## 2018-02-28 PROCEDURE — 1036F TOBACCO NON-USER: CPT | Performed by: INTERNAL MEDICINE

## 2018-02-28 PROCEDURE — G8484 FLU IMMUNIZE NO ADMIN: HCPCS | Performed by: INTERNAL MEDICINE

## 2018-02-28 PROCEDURE — 4040F PNEUMOC VAC/ADMIN/RCVD: CPT | Performed by: INTERNAL MEDICINE

## 2018-02-28 PROCEDURE — G8598 ASA/ANTIPLAT THER USED: HCPCS | Performed by: INTERNAL MEDICINE

## 2018-02-28 PROCEDURE — G8417 CALC BMI ABV UP PARAM F/U: HCPCS | Performed by: INTERNAL MEDICINE

## 2018-02-28 PROCEDURE — 1090F PRES/ABSN URINE INCON ASSESS: CPT | Performed by: INTERNAL MEDICINE

## 2018-02-28 RX ORDER — CEFUROXIME AXETIL 250 MG/1
250 TABLET ORAL 2 TIMES DAILY
Qty: 20 TABLET | Refills: 0 | Status: SHIPPED | OUTPATIENT
Start: 2018-02-28 | End: 2018-03-10

## 2018-02-28 ASSESSMENT — ENCOUNTER SYMPTOMS
SHORTNESS OF BREATH: 0
RHINORRHEA: 1
COUGH: 1
SINUS PAIN: 1
APNEA: 0
ABDOMINAL DISTENTION: 0
SINUS PRESSURE: 1

## 2018-03-12 ENCOUNTER — OFFICE VISIT (OUTPATIENT)
Dept: CARDIOLOGY CLINIC | Age: 78
End: 2018-03-12

## 2018-03-12 VITALS
WEIGHT: 216 LBS | SYSTOLIC BLOOD PRESSURE: 132 MMHG | OXYGEN SATURATION: 97 % | BODY MASS INDEX: 35.99 KG/M2 | HEIGHT: 65 IN | HEART RATE: 57 BPM | DIASTOLIC BLOOD PRESSURE: 70 MMHG

## 2018-03-12 DIAGNOSIS — N18.30 CKD (CHRONIC KIDNEY DISEASE) STAGE 3, GFR 30-59 ML/MIN (HCC): ICD-10-CM

## 2018-03-12 DIAGNOSIS — I10 ESSENTIAL HYPERTENSION: ICD-10-CM

## 2018-03-12 DIAGNOSIS — I25.10 CAD, MULTIPLE VESSEL: Primary | ICD-10-CM

## 2018-03-12 DIAGNOSIS — E11.65 TYPE 2 DIABETES MELLITUS WITH HYPERGLYCEMIA, WITHOUT LONG-TERM CURRENT USE OF INSULIN (HCC): ICD-10-CM

## 2018-03-12 DIAGNOSIS — E78.5 HYPERLIPIDEMIA LDL GOAL <70: ICD-10-CM

## 2018-03-12 DIAGNOSIS — I50.32 CHRONIC DIASTOLIC HF (HEART FAILURE) (HCC): ICD-10-CM

## 2018-03-12 PROCEDURE — 4040F PNEUMOC VAC/ADMIN/RCVD: CPT | Performed by: NURSE PRACTITIONER

## 2018-03-12 PROCEDURE — 1090F PRES/ABSN URINE INCON ASSESS: CPT | Performed by: NURSE PRACTITIONER

## 2018-03-12 PROCEDURE — 99214 OFFICE O/P EST MOD 30 MIN: CPT | Performed by: NURSE PRACTITIONER

## 2018-03-12 PROCEDURE — G8482 FLU IMMUNIZE ORDER/ADMIN: HCPCS | Performed by: NURSE PRACTITIONER

## 2018-03-12 PROCEDURE — 1123F ACP DISCUSS/DSCN MKR DOCD: CPT | Performed by: NURSE PRACTITIONER

## 2018-03-12 PROCEDURE — G8598 ASA/ANTIPLAT THER USED: HCPCS | Performed by: NURSE PRACTITIONER

## 2018-03-12 PROCEDURE — 1036F TOBACCO NON-USER: CPT | Performed by: NURSE PRACTITIONER

## 2018-03-12 PROCEDURE — G8417 CALC BMI ABV UP PARAM F/U: HCPCS | Performed by: NURSE PRACTITIONER

## 2018-03-12 PROCEDURE — G8427 DOCREV CUR MEDS BY ELIG CLIN: HCPCS | Performed by: NURSE PRACTITIONER

## 2018-03-12 PROCEDURE — G8400 PT W/DXA NO RESULTS DOC: HCPCS | Performed by: NURSE PRACTITIONER

## 2018-03-12 RX ORDER — CARVEDILOL 3.12 MG/1
3.12 TABLET ORAL 2 TIMES DAILY WITH MEALS
Qty: 180 TABLET | Refills: 2 | Status: ON HOLD | OUTPATIENT
Start: 2018-03-12 | End: 2018-09-23 | Stop reason: HOSPADM

## 2018-03-12 RX ORDER — LISINOPRIL 5 MG/1
5 TABLET ORAL DAILY
Qty: 90 TABLET | Refills: 2 | Status: SHIPPED | OUTPATIENT
Start: 2018-03-12 | End: 2018-08-16 | Stop reason: SINTOL

## 2018-03-12 RX ORDER — PRAVASTATIN SODIUM 10 MG
10 TABLET ORAL DAILY
Qty: 90 TABLET | Refills: 2 | Status: SHIPPED | OUTPATIENT
Start: 2018-03-12 | End: 2018-08-22

## 2018-03-12 NOTE — PATIENT INSTRUCTIONS
Resume Pravastatin 10 mg daily    Resume Lisinopril 5 mg daily    Continue other medications    Lab work in 7-10 days: BMP to follow up kidneys (This is a nonfasting lab)

## 2018-03-12 NOTE — LETTER
[facility] HE[facility] [place] [address]  Phone: [phone]  Fax: [phone]    Werner Baldwin CNP        March 12, 2018     Eve Heller, [address] [place] [address]    Patient: Oneil Vuong  MR Number: [id number]  YOB: 1940  Date of Visit: 3/12/2018    Dear Dr. Edna Rhodes:    Thank you for the request for consultation for [facility]. HPI:  The patient is 68 y.o. female with a past medical history significant for  CAD/prior stents, CHF, CKD and diabetes mellitus who is here for hospital follow up. Hospitalized 1/10/2018-1/14/2018 with unstable angina, CORDELL on CKD, HTN and hyperlipidemia. Cr was elevated and hydrated and once improved she was taken for cardiac cath. Angiogram on 1/12/2018 with LAD + Dg ISR with POBA and stents x 3 to the RCA. Last seen in office on 1/24/2018 and no med changes made. Overall feeling well. Stopped her pravastatin d/t myalgias and muscle aches. Has had issues with myalgias on all of the statins (has tried simvastatin, crestor, atorvastatin). Has not bee taking Lisinopril. After discussing options for her cholesterol , she has agreed to resume Pravastatin at 10 mg daily. No regular exercise but has increased her activity around the house. Has chronic SOBOE which is stable and unchanged. Denies chest pain/discomfort,  orthopnea/PND, cough, palpitations, dizziness, syncope, edema , weight change or claudication. Assessment:    1. CAD, multiple vessel  -1/2018 presented with unstable angina: POBA LAD + Dg for ISR and stents x 3 to RCA  -LVEF 50-55%  -angina has been quiet  -continue medical management and risk factor modification with use of DAPT, carvedilol, lisinopril  -she is agreeable to resuming low dose statin (add low dose Pravastatin)    2. Essential hypertension  -controlled  -continue medical management    3.  Hyperlipidemia LDL goal <70 -continue statin (only able to tolerate very low dose pravastatin)  -declines PCKS9 or zetia for treatment    4. CKD (chronic kidney disease) stage 3, GFR 30-59 ml/min  -last Cr 1.7  -follows nephrology  -will resume low dose ACE-I and follow up Cr    5. Chronic diastolic HF (heart failure) (HCC)  -currently compensated; NYHA class II  -continue BB and diuretic    6. Diabetes Mellitus    Plan:    Continue ASA, Brilinta, carvedilol, furosemide  Resume low dose Pravastatin (10 mg daily) and Lisinopril 5 mg daily  Reinforced low-fat/low sodium diet, monitoring of daily weights, fluid restriction, worsening signs and symptoms of heart failure and when to call, and stressed regular exercise and activity. Remains off cigarettes  Check BMP to follow up kidney function in 7-10 days after resumption of ACE-I  Follow up with Dr. Julius Zhu or D. Enzweiler, CNP in  3 months or sooner if needed    Return in about 3 months (around 6/12/2018) for with Dr. Julius Zhu or D. Enzweiler, CNP. Thanks for allowing me to participate in the care of this patient. If you have questions, please do not hesitate to call me. I look forward to following Marylu Share along with you.     Sincerely,        ALONDRA Rangel CNP

## 2018-03-12 NOTE — PROGRESS NOTES
Diagnosis Date    CAD (coronary artery disease)     [facility]     DM2 (diabetes mellitus, type 2) ([facility])     HTN (hypertension)     Hypothyroidism     MI (mitral incompetence)     Over weight     Pulmonary HTN     PVD [facility]peripheral vascular disease) ([facility] [address]     Uterine cancer ([facility] [address]      Past Surgical History:   Procedure Laterality Date    CATARACT REMOVAL WITH IMPLANT Bilateral     CORONARY ANGIOPLASTY WITH STENT PLACEMENT Right 9/9/2015    At [address] [place] [address] WITH STENT PLACEMENT  01/2018    ILANA to RCA and POBA on ISR of LAD    HERNIA REPAIR       Family History   Problem Relation Age of Onset    Diabetes Mother     Heart Disease Father     Cancer Sister      uterine    Heart Disease Maternal Grandmother     Heart Disease Maternal Grandfather     Heart Disease Paternal Grandmother     Heart Disease Paternal Grandfather      Social History   Substance Use Topics    Smoking status: Former Smoker     Packs/day: 0.70     Years: 40.00     Types: Cigarettes     Quit date: 2/1/2004    Smokeless tobacco: Never Used    Alcohol use No       Allergies   Allergen Reactions    Morphine     Rosuvastatin      Leg cramps      Vicodin [Hydrocodone-Acetaminophen]      Current Outpatient Prescriptions   Medication Sig Dispense Refill    lisinopril (PRINIVIL;ZESTRIL) 5 MG tablet Take 1 tablet by mouth daily 90 tablet 2    pravastatin (PRAVACHOL) 10 MG tablet Take 1 tablet by mouth daily 90 tablet 2    carvedilol (COREG) 3.125 MG tablet Take 1 tablet by mouth 2 times daily (with meals) 180 tablet 2    allopurinol (ZYLOPRIM) 100 MG tablet Take one tablet by mouth once daily 90 tablet 1    ticagrelor (BRILINTA) 90 MG TABS tablet Take 1 tablet by mouth 2 times daily 60 tablet 5    furosemide (LASIX) 80 MG tablet Take 0.5 tablets by mouth daily (Patient taking differently: Take 80 mg by mouth daily ) 60 tablet 0    glimepiride (AMARYL) 4 MG tablet Take 4 mg by mouth 2

## 2018-04-18 DIAGNOSIS — E78.5 HYPERLIPIDEMIA LDL GOAL <70: ICD-10-CM

## 2018-04-18 DIAGNOSIS — N18.30 CKD (CHRONIC KIDNEY DISEASE) STAGE 3, GFR 30-59 ML/MIN (HCC): ICD-10-CM

## 2018-04-18 DIAGNOSIS — I50.32 CHRONIC DIASTOLIC HF (HEART FAILURE) (HCC): ICD-10-CM

## 2018-04-19 DIAGNOSIS — I50.32 CHRONIC DIASTOLIC CHF (CONGESTIVE HEART FAILURE), NYHA CLASS 2 (HCC): Primary | ICD-10-CM

## 2018-04-19 LAB
ANION GAP SERPL CALCULATED.3IONS-SCNC: 19 MMOL/L (ref 3–16)
BUN BLDV-MCNC: 38 MG/DL (ref 7–20)
CALCIUM SERPL-MCNC: 9.1 MG/DL (ref 8.3–10.6)
CHLORIDE BLD-SCNC: 94 MMOL/L (ref 99–110)
CO2: 26 MMOL/L (ref 21–32)
CREAT SERPL-MCNC: 1.6 MG/DL (ref 0.6–1.2)
GFR AFRICAN AMERICAN: 38
GFR NON-AFRICAN AMERICAN: 31
GLUCOSE BLD-MCNC: 185 MG/DL (ref 70–99)
POTASSIUM SERPL-SCNC: 4.6 MMOL/L (ref 3.5–5.1)
SODIUM BLD-SCNC: 139 MMOL/L (ref 136–145)

## 2018-04-19 RX ORDER — FUROSEMIDE 80 MG
80 TABLET ORAL DAILY
Qty: 30 TABLET | Refills: 2
Start: 2018-04-19 | End: 2018-05-15 | Stop reason: SDUPTHER

## 2018-04-23 DIAGNOSIS — E03.9 ACQUIRED HYPOTHYROIDISM: ICD-10-CM

## 2018-04-23 RX ORDER — LEVOTHYROXINE SODIUM 0.12 MG/1
125 TABLET ORAL DAILY
Qty: 90 TABLET | Refills: 0 | Status: SHIPPED | OUTPATIENT
Start: 2018-04-23 | End: 2018-08-06 | Stop reason: SDUPTHER

## 2018-04-23 RX ORDER — GLIMEPIRIDE 4 MG/1
4 TABLET ORAL
Qty: 180 TABLET | Refills: 0 | Status: SHIPPED | OUTPATIENT
Start: 2018-04-23 | End: 2018-08-06 | Stop reason: SDUPTHER

## 2018-04-25 RX ORDER — FUROSEMIDE 80 MG
TABLET ORAL
Qty: 60 TABLET | Refills: 0 | Status: SHIPPED | OUTPATIENT
Start: 2018-04-25 | End: 2018-08-22 | Stop reason: CLARIF

## 2018-05-14 ENCOUNTER — TELEPHONE (OUTPATIENT)
Dept: CARDIOLOGY CLINIC | Age: 78
End: 2018-05-14

## 2018-05-16 RX ORDER — FUROSEMIDE 80 MG
80 TABLET ORAL DAILY
Qty: 30 TABLET | Refills: 2 | Status: SHIPPED | OUTPATIENT
Start: 2018-05-16 | End: 2018-08-28 | Stop reason: SDUPTHER

## 2018-06-21 RX ORDER — CLOPIDOGREL BISULFATE 75 MG/1
TABLET ORAL
Qty: 38 TABLET | Refills: 3 | Status: SHIPPED | OUTPATIENT
Start: 2018-06-21 | End: 2019-01-25 | Stop reason: SDUPTHER

## 2018-08-02 DIAGNOSIS — E03.9 ACQUIRED HYPOTHYROIDISM: ICD-10-CM

## 2018-08-03 RX ORDER — GLIMEPIRIDE 4 MG/1
TABLET ORAL
Qty: 180 TABLET | Refills: 0 | OUTPATIENT
Start: 2018-08-03

## 2018-08-03 RX ORDER — ALLOPURINOL 100 MG/1
TABLET ORAL
Qty: 90 TABLET | Refills: 1 | OUTPATIENT
Start: 2018-08-03

## 2018-08-03 RX ORDER — LEVOTHYROXINE SODIUM 0.12 MG/1
TABLET ORAL
Qty: 90 TABLET | Refills: 0 | OUTPATIENT
Start: 2018-08-03

## 2018-08-06 ENCOUNTER — OFFICE VISIT (OUTPATIENT)
Dept: FAMILY MEDICINE CLINIC | Age: 78
End: 2018-08-06

## 2018-08-06 VITALS
DIASTOLIC BLOOD PRESSURE: 78 MMHG | WEIGHT: 218 LBS | BODY MASS INDEX: 36.32 KG/M2 | TEMPERATURE: 97 F | HEIGHT: 65 IN | SYSTOLIC BLOOD PRESSURE: 130 MMHG

## 2018-08-06 DIAGNOSIS — E03.9 ACQUIRED HYPOTHYROIDISM: ICD-10-CM

## 2018-08-06 DIAGNOSIS — E78.5 HYPERLIPIDEMIA LDL GOAL <70: ICD-10-CM

## 2018-08-06 DIAGNOSIS — I10 ESSENTIAL HYPERTENSION: ICD-10-CM

## 2018-08-06 DIAGNOSIS — E11.65 TYPE 2 DIABETES MELLITUS WITH HYPERGLYCEMIA, WITHOUT LONG-TERM CURRENT USE OF INSULIN (HCC): Primary | ICD-10-CM

## 2018-08-06 PROCEDURE — 1036F TOBACCO NON-USER: CPT | Performed by: INTERNAL MEDICINE

## 2018-08-06 PROCEDURE — 4040F PNEUMOC VAC/ADMIN/RCVD: CPT | Performed by: INTERNAL MEDICINE

## 2018-08-06 PROCEDURE — 99214 OFFICE O/P EST MOD 30 MIN: CPT | Performed by: INTERNAL MEDICINE

## 2018-08-06 PROCEDURE — G8417 CALC BMI ABV UP PARAM F/U: HCPCS | Performed by: INTERNAL MEDICINE

## 2018-08-06 PROCEDURE — 1090F PRES/ABSN URINE INCON ASSESS: CPT | Performed by: INTERNAL MEDICINE

## 2018-08-06 PROCEDURE — G8400 PT W/DXA NO RESULTS DOC: HCPCS | Performed by: INTERNAL MEDICINE

## 2018-08-06 PROCEDURE — G8598 ASA/ANTIPLAT THER USED: HCPCS | Performed by: INTERNAL MEDICINE

## 2018-08-06 PROCEDURE — 1123F ACP DISCUSS/DSCN MKR DOCD: CPT | Performed by: INTERNAL MEDICINE

## 2018-08-06 PROCEDURE — 1101F PT FALLS ASSESS-DOCD LE1/YR: CPT | Performed by: INTERNAL MEDICINE

## 2018-08-06 PROCEDURE — G8427 DOCREV CUR MEDS BY ELIG CLIN: HCPCS | Performed by: INTERNAL MEDICINE

## 2018-08-06 RX ORDER — ALLOPURINOL 100 MG/1
TABLET ORAL
Qty: 90 TABLET | Refills: 1 | Status: SHIPPED | OUTPATIENT
Start: 2018-08-06 | End: 2019-02-20 | Stop reason: SDUPTHER

## 2018-08-06 RX ORDER — GLIMEPIRIDE 4 MG/1
4 TABLET ORAL
Qty: 180 TABLET | Refills: 0 | Status: SHIPPED | OUTPATIENT
Start: 2018-08-06 | End: 2019-01-25 | Stop reason: SDUPTHER

## 2018-08-06 RX ORDER — LEVOTHYROXINE SODIUM 0.12 MG/1
125 TABLET ORAL DAILY
Qty: 90 TABLET | Refills: 0 | Status: SHIPPED | OUTPATIENT
Start: 2018-08-06 | End: 2018-08-28 | Stop reason: DRUGHIGH

## 2018-08-06 ASSESSMENT — PATIENT HEALTH QUESTIONNAIRE - PHQ9
SUM OF ALL RESPONSES TO PHQ9 QUESTIONS 1 & 2: 0
SUM OF ALL RESPONSES TO PHQ QUESTIONS 1-9: 0
1. LITTLE INTEREST OR PLEASURE IN DOING THINGS: 0
2. FEELING DOWN, DEPRESSED OR HOPELESS: 0

## 2018-08-06 ASSESSMENT — ENCOUNTER SYMPTOMS
SHORTNESS OF BREATH: 0
APNEA: 0
VOMITING: 0
SINUS PAIN: 0
RHINORRHEA: 0
DIARRHEA: 0
CONSTIPATION: 0
WHEEZING: 0
NAUSEA: 0
COUGH: 0
SINUS PRESSURE: 0
SORE THROAT: 0
ABDOMINAL PAIN: 0

## 2018-08-06 NOTE — PROGRESS NOTES
above stable      Plan:      Outpatient Encounter Prescriptions as of 8/6/2018   Medication Sig Dispense Refill    levothyroxine (SYNTHROID) 125 MCG tablet Take 1 tablet by mouth Daily 90 tablet 0    allopurinol (ZYLOPRIM) 100 MG tablet Take one tablet by mouth once daily 90 tablet 1    glimepiride (AMARYL) 4 MG tablet Take 1 tablet by mouth 2 times daily (before meals) 180 tablet 0    metFORMIN (GLUCOPHAGE) 850 MG tablet Take 1 tablet by mouth 2 times daily (with meals) 60 tablet 5    clopidogrel (PLAVIX) 75 MG tablet Take loading dose of 600mg (8 tabs) then one tab daily after 38 tablet 3    furosemide (LASIX) 80 MG tablet Take 1 tablet by mouth daily 30 tablet 2    furosemide (LASIX) 80 MG tablet TAKE ONE-HALF TABLET BY MOUTH ONCE DAILY 60 tablet 0    lisinopril (PRINIVIL;ZESTRIL) 5 MG tablet Take 1 tablet by mouth daily 90 tablet 2    carvedilol (COREG) 3.125 MG tablet Take 1 tablet by mouth 2 times daily (with meals) 180 tablet 2    aspirin EC 81 MG EC tablet Take 1 tablet by mouth daily 30 tablet 5    [DISCONTINUED] glimepiride (AMARYL) 4 MG tablet Take 1 tablet by mouth 2 times daily (before meals) 180 tablet 0    [DISCONTINUED] levothyroxine (SYNTHROID) 125 MCG tablet Take 1 tablet by mouth Daily 90 tablet 0    pravastatin (PRAVACHOL) 10 MG tablet Take 1 tablet by mouth daily 90 tablet 2    [DISCONTINUED] allopurinol (ZYLOPRIM) 100 MG tablet Take one tablet by mouth once daily 90 tablet 1    [DISCONTINUED] metFORMIN (GLUCOPHAGE) 850 MG tablet Take 850 mg by mouth 2 times daily (with meals)       glucose blood VI test strips (ASCENSIA AUTODISC VI;ONE TOUCH ULTRA TEST VI) strip 1 each by In Vitro route daily As needed. 100 each 3     No facility-administered encounter medications on file as of 8/6/2018.       Orders Placed This Encounter   Procedures    Lipid Panel     Standing Status:   Future     Standing Expiration Date:   8/6/2019     Order Specific Question:   Is Patient Fasting?/# of Hours     Answer:   12    AST     Standing Status:   Future     Standing Expiration Date:   8/6/2019    ALT     Standing Status:   Future     Standing Expiration Date:   8/6/2019    Hemoglobin A1C     Standing Status:   Future     Standing Expiration Date:   8/6/2019    Basic Metabolic Panel     Standing Status:   Future     Standing Expiration Date:   8/6/2019    TSH without Reflex     Standing Status:   Future     Standing Expiration Date:   8/6/2019

## 2018-08-15 LAB
ALT SERPL-CCNC: 14 U/L (ref 10–40)
ANION GAP SERPL CALCULATED.3IONS-SCNC: 15 MMOL/L (ref 3–16)
AST SERPL-CCNC: 13 U/L (ref 15–37)
BUN BLDV-MCNC: 44 MG/DL (ref 7–20)
CALCIUM SERPL-MCNC: 9.1 MG/DL (ref 8.3–10.6)
CHLORIDE BLD-SCNC: 103 MMOL/L (ref 99–110)
CHOLESTEROL, TOTAL: 208 MG/DL (ref 0–199)
CO2: 25 MMOL/L (ref 21–32)
CREAT SERPL-MCNC: 2.1 MG/DL (ref 0.6–1.2)
GFR AFRICAN AMERICAN: 28
GFR NON-AFRICAN AMERICAN: 23
GLUCOSE BLD-MCNC: 133 MG/DL (ref 70–99)
HDLC SERPL-MCNC: 57 MG/DL (ref 40–60)
LDL CHOLESTEROL CALCULATED: 129 MG/DL
POTASSIUM SERPL-SCNC: 5.6 MMOL/L (ref 3.5–5.1)
SODIUM BLD-SCNC: 143 MMOL/L (ref 136–145)
TRIGL SERPL-MCNC: 112 MG/DL (ref 0–150)
TSH SERPL DL<=0.05 MIU/L-ACNC: 18.67 UIU/ML (ref 0.27–4.2)
VLDLC SERPL CALC-MCNC: 22 MG/DL

## 2018-08-16 LAB
ESTIMATED AVERAGE GLUCOSE: 180 MG/DL
HBA1C MFR BLD: 7.9 %

## 2018-08-22 ENCOUNTER — OFFICE VISIT (OUTPATIENT)
Dept: CARDIOLOGY CLINIC | Age: 78
End: 2018-08-22

## 2018-08-22 VITALS
HEIGHT: 65 IN | OXYGEN SATURATION: 98 % | WEIGHT: 217 LBS | SYSTOLIC BLOOD PRESSURE: 132 MMHG | BODY MASS INDEX: 36.15 KG/M2 | DIASTOLIC BLOOD PRESSURE: 72 MMHG | HEART RATE: 58 BPM

## 2018-08-22 DIAGNOSIS — I10 ESSENTIAL HYPERTENSION: ICD-10-CM

## 2018-08-22 DIAGNOSIS — N18.30 CKD (CHRONIC KIDNEY DISEASE) STAGE 3, GFR 30-59 ML/MIN (HCC): ICD-10-CM

## 2018-08-22 DIAGNOSIS — E87.5 HYPERKALEMIA: ICD-10-CM

## 2018-08-22 DIAGNOSIS — I25.10 CORONARY ARTERY DISEASE INVOLVING NATIVE CORONARY ARTERY OF NATIVE HEART WITHOUT ANGINA PECTORIS: Primary | ICD-10-CM

## 2018-08-22 DIAGNOSIS — E78.5 HYPERLIPIDEMIA LDL GOAL <70: ICD-10-CM

## 2018-08-22 DIAGNOSIS — I50.32 CHRONIC DIASTOLIC HF (HEART FAILURE) (HCC): ICD-10-CM

## 2018-08-22 PROCEDURE — 1101F PT FALLS ASSESS-DOCD LE1/YR: CPT | Performed by: NURSE PRACTITIONER

## 2018-08-22 PROCEDURE — 1123F ACP DISCUSS/DSCN MKR DOCD: CPT | Performed by: NURSE PRACTITIONER

## 2018-08-22 PROCEDURE — 99214 OFFICE O/P EST MOD 30 MIN: CPT | Performed by: NURSE PRACTITIONER

## 2018-08-22 PROCEDURE — G8427 DOCREV CUR MEDS BY ELIG CLIN: HCPCS | Performed by: NURSE PRACTITIONER

## 2018-08-22 PROCEDURE — 1090F PRES/ABSN URINE INCON ASSESS: CPT | Performed by: NURSE PRACTITIONER

## 2018-08-22 PROCEDURE — G8417 CALC BMI ABV UP PARAM F/U: HCPCS | Performed by: NURSE PRACTITIONER

## 2018-08-22 PROCEDURE — 1036F TOBACCO NON-USER: CPT | Performed by: NURSE PRACTITIONER

## 2018-08-22 PROCEDURE — G8400 PT W/DXA NO RESULTS DOC: HCPCS | Performed by: NURSE PRACTITIONER

## 2018-08-22 PROCEDURE — 4040F PNEUMOC VAC/ADMIN/RCVD: CPT | Performed by: NURSE PRACTITIONER

## 2018-08-22 PROCEDURE — G8598 ASA/ANTIPLAT THER USED: HCPCS | Performed by: NURSE PRACTITIONER

## 2018-08-22 NOTE — PROGRESS NOTES
[address]  Office Visit    Ethyl Holiday  1940 August 22, 2018    CC:   Chief Complaint   Patient presents with    3 Month Follow-Up     dm,chf,cad,hld/ prt has no cardiac complaints at this time     HPI:  The patient is 66 y.o. female with a past medical history significant for  CAD/prior stents, CHF, CKD and diabetes mellitus who is here for follow up. Hospitalized 1/10/2018-1/14/2018 with unstable angina, CORDELL on CKD, HTN and hyperlipidemia. Cr was elevated and hydrated and once improved she was taken for cardiac cath. Angiogram on 1/12/2018 with LAD + Dg ISR with POBA and stents x 3 to the RCA. Last seen in office on 1/24/2018 and no med changes made. She was last seen in  The office in 3/2018 and no med changes made at that time. Here for follow up of her CAD. Recent labs performed and noted to have elevated Cr and K+ and lisinopril discontinued. Stopped  Pravastatin d/t myalgias and she does not want any other treatment. Had been contacted by Dr. Genaro Okeefe regarding her thyroid level. Chronic SOBOE which remains unchanged. Walks on occasion for exercise. Denies chest pain/discomfort, orthopnea/PND, cough, palpitations, dizziness, syncope, edema , weight change or claudication. Review of Systems:  Constitutional: Denies weakness, night sweats or fever. + fatigue  HEENT: Denies new visual changes, ringing in ears, nosebleeds. + nasal congestion   Respiratory: Denies new or change in SOB, PND, orthopnea or cough. Cardiovascular: see HPI  GI: Denies N/V, diarrhea, constipation, abdominal pain, change in bowel habits, melena or hematochezia  : Denies urinary frequency, urgency, incontinence, hematuria or dysuria. Skin: Denies rash, hives, or cyanosis  Musculoskeletal: + chronic myalgias (improved off statin)  Neurological: Denies syncope or TIA-like symptoms.   Psychiatric: Denies anxiety, insomnia or depression     Past Medical History:   Diagnosis Date    CAD (coronary artery disease)     [facility]     DM2 (diabetes mellitus, type 2) ([facility]     HTN (hypertension)     Hypothyroidism     MI (mitral incompetence)     Over weight     [facility]N [facility] [address]     PVD (peripheral vascular disease) ([facility] [address] [facility]     Uterine cancer ([facility] [address]      Past Surgical History:   Procedure Laterality Date    CATARACT REMOVAL WITH IMPLANT Bilateral     CORONARY ANGIOPLASTY WITH STENT PLACEMENT Right 9/9/2015    At [address] [place] [address] WITH STENT PLACEMENT  01/2018    ILANA to RCA and POBA on ISR of LAD    HERNIA REPAIR       Family History   Problem Relation Age of Onset    Diabetes Mother     Heart Disease Father     Cancer Sister         uterine    Heart Disease Maternal Grandmother     Heart Disease Maternal Grandfather     Heart Disease Paternal Grandmother     Heart Disease Paternal Grandfather      Social History   Substance Use Topics    Smoking status: Former Smoker     Packs/day: 0.70     Years: 40.00     Types: Cigarettes     Quit date: 2/1/2004    Smokeless tobacco: Never Used    Alcohol use No       Allergies   Allergen Reactions    Morphine     Rosuvastatin      Leg cramps      Vicodin [Hydrocodone-Acetaminophen]      Current Outpatient Prescriptions   Medication Sig Dispense Refill    levothyroxine (SYNTHROID) 125 MCG tablet Take 1 tablet by mouth Daily 90 tablet 0    allopurinol (ZYLOPRIM) 100 MG tablet Take one tablet by mouth once daily 90 tablet 1    glimepiride (AMARYL) 4 MG tablet Take 1 tablet by mouth 2 times daily (before meals) 180 tablet 0    metFORMIN (GLUCOPHAGE) 850 MG tablet Take 1 tablet by mouth 2 times daily (with meals) 60 tablet 5    clopidogrel (PLAVIX) 75 MG tablet Take loading dose of 600mg (8 tabs) then one tab daily after 38 tablet 3    furosemide (LASIX) 80 MG tablet Take 1 tablet by mouth daily 30 tablet 2    carvedilol (COREG) 3.125 MG tablet Take 1 tablet by mouth 2 times daily (with meals) 180 Cardiac cath  ANGIOGRAPHIC FINDINGS:  1. Right coronary artery comes from the right coronary cusp giving rise to right posterior descending artery and posterolateral branch, is a right dominant system with 80% in-stent restenosis of the right coronary artery stent and distally, there was an 80% stenosis present. 2.  The left main comes from the left coronary cusp giving rise to left anterior descending and left circumflex arteries, has no significant stenosis. 3.  Left anterior descending artery comes from left main, in the proximal portion has stent present which has 90% in-stent restenosis and 99%;stenosis of the ostium of the first diagonal.  4.  This left circumflex is small caliber nondominant, has 80% stenosis present. Obtuse marginal was patent. 5.  LVEDP was 15 mmHg.     INTERVENTION PERFORMED:  1. We intervened on the left anterior descending artery. We only  performed balloon angioplasty of the left anterior descending artery in-stent restenosis using a 3-mm noncompliant balloon catheter and at the ostium of the first diagonal using a 2.5-mm noncompliant balloon. We decided not to further JL the diagonal branch. 2.  We performed stent placement in the right coronary. Unfortunately, we had to place three stents. The first one in the distal was 3.0 x 10 mm, in the proximal midportion was 3.0 x 28 mm and right distal to the proximal stent was 3.0 x 8 mm secondary to missing the lesion distal to the stent. Echo 1/12/2018:  Normal left ventricle size, wall thickness and systolic function with an  estimated ejection fraction of 50-55%.   No regional wall motion abnormalities are seen.   Mild aortic valve sclerosis without any evidence of aortic stenosis. Assessment:    1.  Coronary artery disease involving native coronary artery of native heart without angina pectoris  -1/2018 presented with unstable angina: POBA LAD + Dg for ISR and stents x 3 to RCA  -LVEF 50-55%  -no recurrent

## 2018-08-22 NOTE — LETTER
[address] [place]. [address]  Phone: [phone]  Fax: [phone]    ESTELLE Waggoner CNP        August 22, 2018     [facility], [address] [address]    Patient: Hermila Sanchez  MR Number: [id number]  YOB: 1940  Date of Visit: 8/22/2018    Dear  [facility]:    Thank you for the request for consultation for [facility]. HPI:  The patient is 66 y.o. female with a past medical history significant for  CAD/prior stents, CHF, CKD and diabetes mellitus who is here for follow up. Hospitalized 1/10/2018-1/14/2018 with unstable angina, CORDELL on CKD, HTN and hyperlipidemia. Cr was elevated and hydrated and once improved she was taken for cardiac cath. Angiogram on 1/12/2018 with LAD + Dg ISR with POBA and stents x 3 to the RCA. Last seen in office on 1/24/2018 and no med changes made. She was last seen in  The office in 3/2018 and no med changes made at that time. Here for follow up of her CAD. Recent labs performed and noted to have elevated Cr and K+ and lisinopril discontinued. Stopped  Pravastatin d/t myalgias and she does not want any other treatment. Had been contacted by Dr. Michelle Johnston regarding her thyroid level. Chronic SOBOE which remains unchanged. Walks on occasion for exercise. Denies chest pain/discomfort, orthopnea/PND, cough, palpitations, dizziness, syncope, edema , weight change or claudication. Assessment:    1. Coronary artery disease involving native coronary artery of native heart without angina pectoris  -1/2018 presented with unstable angina: POBA LAD + Dg for ISR and stents x 3 to RCA  -LVEF 50-55%  -no recurrent angina  -continue medical management and risk factor modification with use of DAPT, carvedilol  -she declines statin or any other type of antihyperlipidemic agent    2.  Essential hypertension  -controlled  -continue medical management 3. Hyperlipidemia LDL goal <70  -she declines pharmacologic treatment    4. CKD (chronic kidney disease) stage 3, GFR 30-59 ml/min  -last Cr 2.1  -follows nephrology  -recently discontinued lisinopril d/t elevated K+ and Cr    5. Chronic diastolic HF (heart failure) (HCC)  -compensated; NYHA class II  -continue BB and diuretic  -low sodium diet and fluid restriction    6. Hyperkalemia  -ACE-I discontinued    Plan:    Continue ASA, plavix, carvedilol, furosemide  Will continue off Lisinopril d/t hyperkalemia and increasing Cr  Emphasized and reinforced low-fat/low sodium diet, monitoring of daily weights, fluid restriction, worsening signs and symptoms of heart failure and when to call, and stressed regular exercise and activity. Check BMP next week  Follow up with Dr. Anabelle Monsivais in 4-6 months or sooner     Return in about 4 months (around 12/22/2018) for 4-6 months with Dr. Anabelle Monsivais. Thanks for allowing me to participate in the care of this patient. If you have questions, please do not hesitate to call me. I look forward to following Ntia Pinedo along with you.     Sincerely,        ESTELLE Singh - CNP

## 2018-08-22 NOTE — PATIENT INSTRUCTIONS
Stay off Lisinopril    Continue other medications    Lab work: BMP next week (nonfasting lab)        Patient Education        Learning About Chronic Kidney Disease and Potassium  What is potassium? Potassium is a mineral. It helps keep the right mix of fluids in your body. It also helps your nerves, muscles, and heart work properly. Most people get enough potassium from the foods they eat. How does chronic kidney disease affect potassium levels? Healthy kidneys keep the right balance of minerals in your blood. This includes potassium. If you have long-term (chronic) kidney disease, it is hard for your kidneys to control the amount of potassium in your blood. You may get too much potassium. This can be harmful. In some cases, other medicines may make your body get rid of too much potassium. If this happens, you may need to take a potassium supplement. How can you manage your potassium level? You can learn how much potassium is in certain foods. Then you can control how much potassium you get in your diet. Your doctor or dietitian can help you plan a diet that gives you the right amount of potassium. There is no diet that is right for everyone. Your diet will be based on how well your kidneys are working and whether you are on dialysis. Your diet may change as your disease changes. See your doctor for regular testing. Testing helps you know when to change your diet. Your doctor or dietitian can help you do this. Changing your diet can be hard. You may have to give up many foods you like. But it is very important to make the recommended changes. They will help you stay healthy for as long as possible. What foods and products have potassium? You can control the amount of potassium in your diet if you know which foods are low or high in potassium.   Foods low in potassium  · Blueberries and raspberries  · White or brown rice, spaghetti, and macaroni  · Cucumbers, radishes, and hummus  Foods high in

## 2018-08-28 ENCOUNTER — TELEPHONE (OUTPATIENT)
Dept: FAMILY MEDICINE CLINIC | Age: 78
End: 2018-08-28

## 2018-08-28 DIAGNOSIS — E03.9 ACQUIRED HYPOTHYROIDISM: Primary | ICD-10-CM

## 2018-08-28 RX ORDER — LEVOTHYROXINE SODIUM 137 UG/1
137 TABLET ORAL DAILY
Qty: 30 TABLET | Refills: 2 | Status: SHIPPED | OUTPATIENT
Start: 2018-08-28 | End: 2018-10-10 | Stop reason: ALTCHOICE

## 2018-08-29 DIAGNOSIS — E03.9 ACQUIRED HYPOTHYROIDISM: ICD-10-CM

## 2018-08-29 LAB — TSH SERPL DL<=0.05 MIU/L-ACNC: 3.08 UIU/ML (ref 0.27–4.2)

## 2018-08-29 RX ORDER — FUROSEMIDE 80 MG
80 TABLET ORAL DAILY
Qty: 30 TABLET | Refills: 2 | Status: ON HOLD | OUTPATIENT
Start: 2018-08-29 | End: 2018-09-23 | Stop reason: HOSPADM

## 2018-09-20 ENCOUNTER — HOSPITAL ENCOUNTER (INPATIENT)
Dept: INPATIENT UNIT | Age: 78
LOS: 3 days | Discharge: HOME | DRG: 251 | End: 2018-09-23
Attending: EMERGENCY MEDICINE | Admitting: INTERNAL MEDICINE
Payer: MEDICARE

## 2018-09-20 DIAGNOSIS — M54.9 MUSCULOSKELETAL BACK PAIN: ICD-10-CM

## 2018-09-20 DIAGNOSIS — R07.9 CHEST PAIN, UNSPECIFIED TYPE: Primary | ICD-10-CM

## 2018-09-20 LAB
A/G RATIO: 1 (ref 1.1–2.2)
ALBUMIN SERPL-MCNC: 3.1 G/DL (ref 3.4–5)
ALP BLD-CCNC: 110 U/L (ref 40–129)
ALT SERPL-CCNC: 8 U/L (ref 10–40)
ANION GAP SERPL CALCULATED.3IONS-SCNC: 13 MMOL/L (ref 3–16)
AST SERPL-CCNC: 10 U/L (ref 15–37)
BASOPHILS ABSOLUTE: 0.1 K/UL (ref 0–0.2)
BASOPHILS RELATIVE PERCENT: 0.5 %
BILIRUB SERPL-MCNC: 0.4 MG/DL (ref 0–1)
BUN BLDV-MCNC: 39 MG/DL (ref 7–20)
CALCIUM SERPL-MCNC: 7.6 MG/DL (ref 8.3–10.6)
CHLORIDE BLD-SCNC: 106 MMOL/L (ref 99–110)
CO2: 22 MMOL/L (ref 21–32)
CREAT SERPL-MCNC: 1.6 MG/DL (ref 0.6–1.2)
EOSINOPHILS ABSOLUTE: 0.4 K/UL (ref 0–0.6)
EOSINOPHILS RELATIVE PERCENT: 3.2 %
GFR AFRICAN AMERICAN: 38
GFR NON-AFRICAN AMERICAN: 31
GLOBULIN: 3.2 G/DL
GLUCOSE BLD-MCNC: 146 MG/DL (ref 70–99)
GLUCOSE BLD-MCNC: 152 MG/DL (ref 70–99)
GLUCOSE BLD-MCNC: 161 MG/DL (ref 70–99)
GLUCOSE BLD-MCNC: 163 MG/DL (ref 70–99)
GLUCOSE BLD-MCNC: 172 MG/DL (ref 70–99)
HCT VFR BLD CALC: 36.4 % (ref 36–48)
HEMOGLOBIN: 12.1 G/DL (ref 12–16)
INR BLD: 1.08 (ref 0.86–1.14)
LYMPHOCYTES ABSOLUTE: 1.7 K/UL (ref 1–5.1)
LYMPHOCYTES RELATIVE PERCENT: 13.2 %
MCH RBC QN AUTO: 33.1 PG (ref 26–34)
MCHC RBC AUTO-ENTMCNC: 33.1 G/DL (ref 31–36)
MCV RBC AUTO: 99.9 FL (ref 80–100)
MONOCYTES ABSOLUTE: 1.2 K/UL (ref 0–1.3)
MONOCYTES RELATIVE PERCENT: 9.6 %
NEUTROPHILS ABSOLUTE: 9.3 K/UL (ref 1.7–7.7)
NEUTROPHILS RELATIVE PERCENT: 73.5 %
PDW BLD-RTO: 13.9 % (ref 12.4–15.4)
PERFORMED ON: ABNORMAL
PLATELET # BLD: 223 K/UL (ref 135–450)
PMV BLD AUTO: 7.4 FL (ref 5–10.5)
POTASSIUM SERPL-SCNC: 3.8 MMOL/L (ref 3.5–5.1)
PRO-BNP: 526 PG/ML (ref 0–449)
PROTHROMBIN TIME: 12.3 SEC (ref 9.8–13)
RBC # BLD: 3.65 M/UL (ref 4–5.2)
SODIUM BLD-SCNC: 141 MMOL/L (ref 136–145)
TOTAL PROTEIN: 6.3 G/DL (ref 6.4–8.2)
TROPONIN: <0.01 NG/ML
WBC # BLD: 12.7 K/UL (ref 4–11)

## 2018-09-20 PROCEDURE — 93458 L HRT ARTERY/VENTRICLE ANGIO: CPT | Performed by: INTERNAL MEDICINE

## 2018-09-20 PROCEDURE — 71045 X-RAY EXAM CHEST 1 VIEW: CPT

## 2018-09-20 PROCEDURE — 92920 PRQ TRLUML C ANGIOP 1ART&/BR: CPT | Performed by: INTERNAL MEDICINE

## 2018-09-20 PROCEDURE — C1894 INTRO/SHEATH, NON-LASER: HCPCS

## 2018-09-20 PROCEDURE — 99153 MOD SED SAME PHYS/QHP EA: CPT

## 2018-09-20 PROCEDURE — 99285 EMERGENCY DEPT VISIT HI MDM: CPT

## 2018-09-20 PROCEDURE — 9990 CHARGE CONVERSION

## 2018-09-20 PROCEDURE — C1725 CATH, TRANSLUMIN NON-LASER: HCPCS

## 2018-09-20 PROCEDURE — 83880 ASSAY OF NATRIURETIC PEPTIDE: CPT

## 2018-09-20 PROCEDURE — 36415 COLL VENOUS BLD VENIPUNCTURE: CPT

## 2018-09-20 PROCEDURE — 85025 COMPLETE CBC W/AUTO DIFF WBC: CPT

## 2018-09-20 PROCEDURE — 99152 MOD SED SAME PHYS/QHP 5/>YRS: CPT

## 2018-09-20 PROCEDURE — 80053 COMPREHEN METABOLIC PANEL: CPT

## 2018-09-20 PROCEDURE — 92920 PRQ TRLUML C ANGIOP 1ART&/BR: CPT

## 2018-09-20 PROCEDURE — 93571 IV DOP VEL&/PRESS C FLO 1ST: CPT

## 2018-09-20 PROCEDURE — 4A023N7 MEASUREMENT OF CARDIAC SAMPLING AND PRESSURE, LEFT HEART, PERCUTANEOUS APPROACH: ICD-10-PCS | Performed by: INTERNAL MEDICINE

## 2018-09-20 PROCEDURE — 93458 L HRT ARTERY/VENTRICLE ANGIO: CPT

## 2018-09-20 PROCEDURE — C1760 CLOSURE DEV, VASC: HCPCS

## 2018-09-20 PROCEDURE — 85610 PROTHROMBIN TIME: CPT

## 2018-09-20 PROCEDURE — 84484 ASSAY OF TROPONIN QUANT: CPT

## 2018-09-20 PROCEDURE — 99152 MOD SED SAME PHYS/QHP 5/>YRS: CPT | Performed by: INTERNAL MEDICINE

## 2018-09-20 PROCEDURE — 94760 N-INVAS EAR/PLS OXIMETRY 1: CPT

## 2018-09-20 PROCEDURE — C1887 CATHETER, GUIDING: HCPCS

## 2018-09-20 PROCEDURE — 02703ZZ DILATION OF CORONARY ARTERY, ONE ARTERY, PERCUTANEOUS APPROACH: ICD-10-PCS | Performed by: INTERNAL MEDICINE

## 2018-09-20 PROCEDURE — 99223 1ST HOSP IP/OBS HIGH 75: CPT | Performed by: INTERNAL MEDICINE

## 2018-09-20 PROCEDURE — 93571 IV DOP VEL&/PRESS C FLO 1ST: CPT | Performed by: INTERNAL MEDICINE

## 2018-09-20 PROCEDURE — 85347 COAGULATION TIME ACTIVATED: CPT

## 2018-09-20 PROCEDURE — C1769 GUIDE WIRE: HCPCS

## 2018-09-20 PROCEDURE — B2111ZZ FLUOROSCOPY OF MULTIPLE CORONARY ARTERIES USING LOW OSMOLAR CONTRAST: ICD-10-PCS | Performed by: INTERNAL MEDICINE

## 2018-09-20 PROCEDURE — 93005 ELECTROCARDIOGRAM TRACING: CPT

## 2018-09-20 RX ORDER — IBUPROFEN 200 MG
200 TABLET ORAL EVERY 6 HOURS PRN
Status: ON HOLD | COMMUNITY
End: 2018-09-23 | Stop reason: HOSPADM

## 2018-09-20 RX ORDER — FUROSEMIDE 40 MG/1
80 TABLET ORAL DAILY
Status: DISCONTINUED | OUTPATIENT
Start: 2018-09-21 | End: 2018-09-21

## 2018-09-20 RX ORDER — CARVEDILOL 3.12 MG/1
3.12 TABLET ORAL 2 TIMES DAILY WITH MEALS
Status: DISCONTINUED | OUTPATIENT
Start: 2018-09-20 | End: 2018-09-20

## 2018-09-20 RX ORDER — NITROGLYCERIN 0.4 MG/1
0.4 TABLET SUBLINGUAL EVERY 5 MIN PRN
Status: DISCONTINUED | OUTPATIENT
Start: 2018-09-20 | End: 2018-09-20

## 2018-09-20 RX ORDER — HYDROCODONE BITARTRATE AND ACETAMINOPHEN 5; 325 MG/1; MG/1
1 TABLET ORAL EVERY 8 HOURS PRN
Status: DISCONTINUED | OUTPATIENT
Start: 2018-09-20 | End: 2018-09-23 | Stop reason: HOSPADM

## 2018-09-20 RX ORDER — SODIUM CHLORIDE 0.9 % (FLUSH) 0.9 %
10 SYRINGE (ML) INJECTION PRN
Status: DISCONTINUED | OUTPATIENT
Start: 2018-09-20 | End: 2018-09-20

## 2018-09-20 RX ORDER — SODIUM CHLORIDE 0.9 % (FLUSH) 0.9 %
10 SYRINGE (ML) INJECTION EVERY 12 HOURS SCHEDULED
Status: DISCONTINUED | OUTPATIENT
Start: 2018-09-20 | End: 2018-09-20

## 2018-09-20 RX ORDER — NITROGLYCERIN 0.4 MG/1
0.4 TABLET SUBLINGUAL ONCE
Status: DISCONTINUED | OUTPATIENT
Start: 2018-09-20 | End: 2018-09-20

## 2018-09-20 RX ORDER — DEXTROSE MONOHYDRATE 50 MG/ML
100 INJECTION, SOLUTION INTRAVENOUS PRN
Status: DISCONTINUED | OUTPATIENT
Start: 2018-09-20 | End: 2018-09-23 | Stop reason: HOSPADM

## 2018-09-20 RX ORDER — GLIMEPIRIDE 1 MG/1
4 TABLET ORAL
Status: DISCONTINUED | OUTPATIENT
Start: 2018-09-20 | End: 2018-09-23 | Stop reason: HOSPADM

## 2018-09-20 RX ORDER — SODIUM CHLORIDE 0.9 % (FLUSH) 0.9 %
10 SYRINGE (ML) INJECTION EVERY 12 HOURS SCHEDULED
Status: DISCONTINUED | OUTPATIENT
Start: 2018-09-20 | End: 2018-09-23 | Stop reason: HOSPADM

## 2018-09-20 RX ORDER — ALLOPURINOL 100 MG/1
100 TABLET ORAL DAILY
Status: DISCONTINUED | OUTPATIENT
Start: 2018-09-21 | End: 2018-09-23 | Stop reason: HOSPADM

## 2018-09-20 RX ORDER — NICOTINE POLACRILEX 4 MG
15 LOZENGE BUCCAL PRN
Status: DISCONTINUED | OUTPATIENT
Start: 2018-09-20 | End: 2018-09-23 | Stop reason: HOSPADM

## 2018-09-20 RX ORDER — CLOPIDOGREL BISULFATE 75 MG/1
75 TABLET ORAL DAILY
Status: DISCONTINUED | OUTPATIENT
Start: 2018-09-21 | End: 2018-09-23 | Stop reason: HOSPADM

## 2018-09-20 RX ORDER — ASPIRIN 81 MG/1
81 TABLET ORAL DAILY
Status: DISCONTINUED | OUTPATIENT
Start: 2018-09-21 | End: 2018-09-23 | Stop reason: HOSPADM

## 2018-09-20 RX ORDER — SODIUM CHLORIDE 0.9 % (FLUSH) 0.9 %
10 SYRINGE (ML) INJECTION PRN
Status: DISCONTINUED | OUTPATIENT
Start: 2018-09-20 | End: 2018-09-23 | Stop reason: HOSPADM

## 2018-09-20 RX ORDER — ONDANSETRON 2 MG/ML
4 INJECTION INTRAMUSCULAR; INTRAVENOUS EVERY 6 HOURS PRN
Status: DISCONTINUED | OUTPATIENT
Start: 2018-09-20 | End: 2018-09-23 | Stop reason: HOSPADM

## 2018-09-20 RX ORDER — DEXTROSE MONOHYDRATE 25 G/50ML
12.5 INJECTION, SOLUTION INTRAVENOUS PRN
Status: DISCONTINUED | OUTPATIENT
Start: 2018-09-20 | End: 2018-09-23 | Stop reason: HOSPADM

## 2018-09-20 RX ORDER — SODIUM CHLORIDE 9 MG/ML
INJECTION, SOLUTION INTRAVENOUS CONTINUOUS
Status: DISPENSED | OUTPATIENT
Start: 2018-09-20 | End: 2018-09-21

## 2018-09-20 RX ADMIN — HYDROCODONE BITARTRATE AND ACETAMINOPHEN 1 TABLET: 5; 325 TABLET ORAL at 14:29

## 2018-09-20 RX ADMIN — NITROGLYCERIN 0.4 MG: 0.4 TABLET SUBLINGUAL at 13:52

## 2018-09-20 ASSESSMENT — ENCOUNTER SYMPTOMS
APNEA: 0
COUGH: 0
CHEST TIGHTNESS: 0
WHEEZING: 0
EYE ITCHING: 0
EYE REDNESS: 0
EYE PAIN: 0
SHORTNESS OF BREATH: 0
PHOTOPHOBIA: 0
ABDOMINAL DISTENTION: 0
EYE DISCHARGE: 0
STRIDOR: 0
CHOKING: 0

## 2018-09-20 ASSESSMENT — PAIN DESCRIPTION - LOCATION
LOCATION: CHEST;RIB CAGE;BACK
LOCATION: CHEST

## 2018-09-20 ASSESSMENT — PAIN DESCRIPTION - FREQUENCY
FREQUENCY: INTERMITTENT
FREQUENCY: CONTINUOUS

## 2018-09-20 ASSESSMENT — PAIN SCALES - GENERAL
PAINLEVEL_OUTOF10: 0
PAINLEVEL_OUTOF10: 0
PAINLEVEL_OUTOF10: 8
PAINLEVEL_OUTOF10: 0
PAINLEVEL_OUTOF10: 0
PAINLEVEL_OUTOF10: 10
PAINLEVEL_OUTOF10: 0
PAINLEVEL_OUTOF10: 10

## 2018-09-20 ASSESSMENT — PAIN SCALES - WONG BAKER: WONGBAKER_NUMERICALRESPONSE: 0

## 2018-09-20 ASSESSMENT — PAIN DESCRIPTION - PAIN TYPE
TYPE: ACUTE PAIN
TYPE: ACUTE PAIN

## 2018-09-20 ASSESSMENT — PAIN DESCRIPTION - ORIENTATION
ORIENTATION: RIGHT
ORIENTATION: RIGHT

## 2018-09-20 ASSESSMENT — PAIN DESCRIPTION - DESCRIPTORS
DESCRIPTORS: RADIATING
DESCRIPTORS: RADIATING

## 2018-09-20 ASSESSMENT — PAIN DESCRIPTION - DIRECTION: RADIATING_TOWARDS: BACK

## 2018-09-20 NOTE — ED PROVIDER NOTES
[facility]  [address] [place] [address]   Phone [phone]   PROTIME-INR    Narrative:     Performed at:  [facility]  [address] [place], [place] [address]   Phone [phone]     All other labs were within normal range or not returned as of this dictation. EMERGENCY DEPARTMENT COURSE and DIFFERENTIAL DIAGNOSIS/MDM:     Patient with history of coronary disease with multiple cardiac risk factors. Patient given aspirin and nitro. Pain relieved with nitro. Concerned there may be a form of unstable angina/ACS. Troponin and EKG reassuring. Cardiology notified. Recommended inpatient stress test.  Admit to the hospitalist.    CRITICAL CARE TIME   Total Critical Care time was 40 minutes, excluding separately reportable procedures. There was a high probability of clinically significant/life threatening deterioration in the patient's condition which required my urgent intervention. PROCEDURES:  Unless otherwise noted below, none    FINAL IMPRESSION      1.  Chest pain, unspecified type          DISPOSITION/PLAN   DISPOSITION      ADMIT    (Please note that portions of this note were completed with a voice recognition program.  Efforts were made to edit the dictations but occasionally words are mis-transcribed.)    Maxime Alamo MD (electronically signed)  Attending Emergency Physician       Maxime Alamo MD  09/20/18 0857

## 2018-09-20 NOTE — PROGRESS NOTES
Medication Reconciliation     List of medications patient is currently taking is complete. Source of information: 1. Conversation with patient and family at bedside                                       2. EPIC records      Allergies  Morphine; Rosuvastatin; and Vicodin [hydrocodone-acetaminophen]     Notes regarding home medications:   1. Patient received all of her morning home medications prior to arrival to the emergency department. 2. Patient stated that due to the chest pain today she took her baby Aspirin this morning as well as 2 regular strength aspirin and 400 mg of ibuprofen.     Amaya Martins, 2019 PharmD Candidate  9/20/2018 10:08 AM

## 2018-09-20 NOTE — ED TRIAGE NOTES
Pt is a 66year old female who presents in the ED today via [address] [place] [address] for chest pain. Pt describes the pain as being on the right side of her chest just below her breast and radiates through to her back. Pt has 4 stents placed and last one was placed 7 months ago. Pt was placed on the monitor, SV Shawn on the monitor. Breathing is uneasy and labored. Pt O2 is 96%. Pt skin is warm and dry. Pt is alert and oriented x4. Call light in reach. Daughter at bedside.

## 2018-09-20 NOTE — PRE SEDATION
Facility-Administered Medications   Medication Dose Route Frequency Provider Last Rate Last Dose    [START ON 9/21/2018] allopurinol (ZYLOPRIM) tablet 100 mg  100 mg Oral Daily Rin Zambrano MD        [START ON 9/21/2018] aspirin EC tablet 81 mg  81 mg Oral Daily Rin Zambrano MD        carvedilol (COREG) tablet 3.125 mg  3.125 mg Oral BID WC Rin Zambrano MD        [START ON 9/21/2018] clopidogrel (PLAVIX) tablet 75 mg  75 mg Oral Daily Rin Zambrano MD       Ho [START ON 9/21/2018] furosemide (LASIX) tablet 80 mg  80 mg Oral Daily Rin Zambrano MD        glimepiride (AMARYL) tablet 4 mg  4 mg Oral BID AC Rin Zambrano MD        [START ON 9/21/2018] levothyroxine (SYNTHROID) tablet 137 mcg  137 mcg Oral QAM AC Rin Zambrano MD        sodium chloride flush 0.9 % injection 10 mL  10 mL Intravenous 2 times per day Marian Santos MD        sodium chloride flush 0.9 % injection 10 mL  10 mL Intravenous PRN Rin Zambrano MD        magnesium hydroxide (MILK OF MAGNESIA) 400 MG/5ML suspension 30 mL  30 mL Oral Daily PRN Rin Zambrano MD        ondansetron (ZOFRAN) injection 4 mg  4 mg Intravenous Q6H PRN Rin Zambrano MD        enoxaparin (LOVENOX) injection 40 mg  40 mg Subcutaneous Nightly Rin Zambrano MD        [START ON 9/21/2018] influenza quadrivalent split vaccine (FLUZONE;FLUARIX;FLULAVAL;AFLURIA) injection 0.5 mL  0.5 mL Intramuscular Once Rin Zambrano MD        HYDROcodone-acetaminophen (NORCO) 5-325 MG per tablet 1 tablet  1 tablet Oral Q8H PRN Marian Santso MD   1 tablet at 09/20/18 7345           Pre-Sedation:    Pre-Sedation Documentation and Exam:  I have personally completed a history, physical exam & review of systems for this patient (see notes).     Prior History of Anesthesia Complications:   none    Modified Mallampati:  II (soft palate, uvula, fauces visible)    ASA Classification:  Class 2 - A normal

## 2018-09-20 NOTE — PROGRESS NOTES
accelerated idioventricular rhythm change from sinus rhythm  EKG  obtained dr Samantha Christiansen called  hold beta blocker  Pt has no negative  symptoms at this time

## 2018-09-20 NOTE — PROCEDURES
[address] [address]                              CARDIAC CATHETERIZATION    PATIENT NAME: Gordon Hashimoto                    :        1940  MED REC NO:   [id number]                          ROOM:       4107  ACCOUNT NO:   [de-identified]                          ADMIT DATE: 2018  PROVIDER:     Jori Frank MD    DATE OF PROCEDURE:  2018    PROCEDURE PERFORMED:  Left heart catheterization with percutaneous coronary  intervention with balloon angioplasty only of mid left circumflex artery. INDICATION FOR PROCEDURE:  Unstable angina. Informed consent obtained. PROCEDURE IN DETAIL:  The patient brought to cardiac cath laboratory after  informed consent obtained. The left groin prepped and draped in sterile  fashion. Accessed left common femoral artery, inserted a 6-Cameroonian sheath. Dilators and wires were removed. Sheath was flushed. JL4 diagnostic  catheter advanced, used to select and engage the left main coronary ostium,  performed angiography of the left system. JL4 removed, JR4 advanced, used  to select and engage the right coronary ostium, performed angiography of  the right system. JR4 used to cross the aortic valve into LV cavity,  performed pressures and hemodynamics. LV gram was not done secondary to  renal dysfunction. After the cath removed, an XB 3.5 guide catheter  advanced, used to select and engage the left main coronary ostium, advanced  a Runthrough wire into the LAD, performed fractional flow reserve in the  mid LAD which was 0.84. Wire then removed from the LAD, advanced in the  circumflex, crossed the 99% mid portion lesion, and then we advanced a  balloon angioplasty catheter, 2.0 x 13-mm balloon catheter, performed  balloon angioplasty only. Balloon catheter removed, final angiography  performed. We reduced the lesion from 99% to 30%. Wire removed.   Guide  catheter

## 2018-09-20 NOTE — PRE-PROCEDURE INSTRUCTIONS
Prep for stress test on 9/21/2018:  No caffeine or decaffeinated products after 20:00 this evening. Hold solid food after 06:00 on 9/21/18; may have water as desired / ordered.   Electronically signed by Roma Gardner RN on 9/20/2018 at 1:51 PM

## 2018-09-21 PROBLEM — I44.2 ACCELERATED VENTRICULAR RHYTHM (HCC): Status: ACTIVE | Noted: 2018-09-21

## 2018-09-21 LAB
ANION GAP SERPL CALCULATED.3IONS-SCNC: 12 MMOL/L (ref 3–16)
BASOPHILS ABSOLUTE: 0.1 K/UL (ref 0–0.2)
BASOPHILS RELATIVE PERCENT: 0.6 %
BILIRUBIN URINE: NEGATIVE
BLOOD, URINE: NEGATIVE
BUN BLDV-MCNC: 45 MG/DL (ref 7–20)
CALCIUM SERPL-MCNC: 8.6 MG/DL (ref 8.3–10.6)
CHLORIDE BLD-SCNC: 101 MMOL/L (ref 99–110)
CLARITY: CLEAR
CO2: 24 MMOL/L (ref 21–32)
COLOR: YELLOW
CREAT SERPL-MCNC: 1.7 MG/DL (ref 0.6–1.2)
EKG ATRIAL RATE: 52 BPM
EKG ATRIAL RATE: 58 BPM
EKG ATRIAL RATE: 60 BPM
EKG DIAGNOSIS: NORMAL
EKG P AXIS: 22 DEGREES
EKG P AXIS: 60 DEGREES
EKG P-R INTERVAL: 178 MS
EKG P-R INTERVAL: 180 MS
EKG Q-T INTERVAL: 440 MS
EKG Q-T INTERVAL: 460 MS
EKG Q-T INTERVAL: 464 MS
EKG QRS DURATION: 142 MS
EKG QRS DURATION: 86 MS
EKG QRS DURATION: 94 MS
EKG QTC CALCULATION (BAZETT): 455 MS
EKG QTC CALCULATION (BAZETT): 460 MS
EKG QTC CALCULATION (BAZETT): 497 MS
EKG R AXIS: -27 DEGREES
EKG R AXIS: -33 DEGREES
EKG R AXIS: -79 DEGREES
EKG T AXIS: 56 DEGREES
EKG T AXIS: 62 DEGREES
EKG T AXIS: 64 DEGREES
EKG VENTRICULAR RATE: 58 BPM
EKG VENTRICULAR RATE: 60 BPM
EKG VENTRICULAR RATE: 77 BPM
EOSINOPHILS ABSOLUTE: 0.1 K/UL (ref 0–0.6)
EOSINOPHILS RELATIVE PERCENT: 1.2 %
GFR AFRICAN AMERICAN: 35
GFR NON-AFRICAN AMERICAN: 29
GLUCOSE BLD-MCNC: 114 MG/DL (ref 70–99)
GLUCOSE BLD-MCNC: 125 MG/DL (ref 70–99)
GLUCOSE BLD-MCNC: 128 MG/DL (ref 70–99)
GLUCOSE BLD-MCNC: 149 MG/DL (ref 70–99)
GLUCOSE BLD-MCNC: 156 MG/DL (ref 70–99)
GLUCOSE URINE: NEGATIVE MG/DL
HCT VFR BLD CALC: 37.1 % (ref 36–48)
HEMOGLOBIN: 12.5 G/DL (ref 12–16)
KETONES, URINE: NEGATIVE MG/DL
LEUKOCYTE ESTERASE, URINE: NEGATIVE
LYMPHOCYTES ABSOLUTE: 1.9 K/UL (ref 1–5.1)
LYMPHOCYTES RELATIVE PERCENT: 17.4 %
MAGNESIUM: 2.2 MG/DL (ref 1.8–2.4)
MCH RBC QN AUTO: 33.2 PG (ref 26–34)
MCHC RBC AUTO-ENTMCNC: 33.5 G/DL (ref 31–36)
MCV RBC AUTO: 99 FL (ref 80–100)
MICROSCOPIC EXAMINATION: NORMAL
MONOCYTES ABSOLUTE: 1.3 K/UL (ref 0–1.3)
MONOCYTES RELATIVE PERCENT: 11.5 %
NEUTROPHILS ABSOLUTE: 7.6 K/UL (ref 1.7–7.7)
NEUTROPHILS RELATIVE PERCENT: 69.3 %
NITRITE, URINE: NEGATIVE
PDW BLD-RTO: 13.2 % (ref 12.4–15.4)
PERFORMED ON: ABNORMAL
PH UA: 5.5
PLATELET # BLD: 217 K/UL (ref 135–450)
PMV BLD AUTO: 7.3 FL (ref 5–10.5)
POC ACT LR: 264 SEC
POTASSIUM REFLEX MAGNESIUM: 4.4 MMOL/L (ref 3.5–5.1)
PROTEIN PROTEIN: 0.01 G/DL
PROTEIN PROTEIN: 11 MG/DL
PROTEIN UA: NEGATIVE MG/DL
RBC # BLD: 3.75 M/UL (ref 4–5.2)
SODIUM BLD-SCNC: 137 MMOL/L (ref 136–145)
SPECIFIC GRAVITY UA: 1.01
TROPONIN: 0.02 NG/ML
TROPONIN: <0.01 NG/ML
URINE TYPE: NORMAL
UROBILINOGEN, URINE: 0.2 E.U./DL
WBC # BLD: 11 K/UL (ref 4–11)

## 2018-09-21 PROCEDURE — 80048 BASIC METABOLIC PNL TOTAL CA: CPT

## 2018-09-21 PROCEDURE — 84484 ASSAY OF TROPONIN QUANT: CPT

## 2018-09-21 PROCEDURE — 84166 PROTEIN E-PHORESIS/URINE/CSF: CPT

## 2018-09-21 PROCEDURE — 93005 ELECTROCARDIOGRAM TRACING: CPT

## 2018-09-21 PROCEDURE — 99233 SBSQ HOSP IP/OBS HIGH 50: CPT | Performed by: NURSE PRACTITIONER

## 2018-09-21 PROCEDURE — 76770 US EXAM ABDO BACK WALL COMP: CPT

## 2018-09-21 PROCEDURE — 36415 COLL VENOUS BLD VENIPUNCTURE: CPT

## 2018-09-21 PROCEDURE — 85025 COMPLETE CBC W/AUTO DIFF WBC: CPT

## 2018-09-21 PROCEDURE — G0008 ADMIN INFLUENZA VIRUS VAC: HCPCS

## 2018-09-21 PROCEDURE — 83735 ASSAY OF MAGNESIUM: CPT

## 2018-09-21 PROCEDURE — 84156 ASSAY OF PROTEIN URINE: CPT

## 2018-09-21 PROCEDURE — 81003 URINALYSIS AUTO W/O SCOPE: CPT

## 2018-09-21 PROCEDURE — 9990 CHARGE CONVERSION

## 2018-09-21 PROCEDURE — 90686 IIV4 VACC NO PRSV 0.5 ML IM: CPT

## 2018-09-21 PROCEDURE — 94761 N-INVAS EAR/PLS OXIMETRY MLT: CPT

## 2018-09-21 PROCEDURE — 99221 1ST HOSP IP/OBS SF/LOW 40: CPT | Performed by: INTERNAL MEDICINE

## 2018-09-21 RX ORDER — SODIUM CHLORIDE 9 MG/ML
INJECTION, SOLUTION INTRAVENOUS CONTINUOUS
Status: DISCONTINUED | OUTPATIENT
Start: 2018-09-21 | End: 2018-09-22

## 2018-09-21 RX ORDER — CARVEDILOL 3.12 MG/1
3.12 TABLET ORAL 2 TIMES DAILY WITH MEALS
Status: DISCONTINUED | OUTPATIENT
Start: 2018-09-21 | End: 2018-09-21

## 2018-09-21 RX ORDER — CARVEDILOL 6.25 MG/1
6.25 TABLET ORAL 2 TIMES DAILY WITH MEALS
Status: DISCONTINUED | OUTPATIENT
Start: 2018-09-21 | End: 2018-09-22

## 2018-09-21 RX ORDER — METHOCARBAMOL 500 MG/1
500 TABLET, FILM COATED ORAL 4 TIMES DAILY PRN
Status: DISCONTINUED | OUTPATIENT
Start: 2018-09-21 | End: 2018-09-23 | Stop reason: HOSPADM

## 2018-09-21 RX ADMIN — CLOPIDOGREL BISULFATE 75 MG: 75 TABLET ORAL at 10:00

## 2018-09-21 RX ADMIN — LEVOTHYROXINE SODIUM 137 MCG: 25 TABLET ORAL at 06:22

## 2018-09-21 RX ADMIN — METHOCARBAMOL 500 MG: 500 TABLET, FILM COATED ORAL at 17:34

## 2018-09-21 RX ADMIN — FUROSEMIDE 80 MG: 40 TABLET ORAL at 10:00

## 2018-09-21 RX ADMIN — ASPIRIN 81 MG: 81 TABLET, COATED ORAL at 10:00

## 2018-09-21 RX ADMIN — SODIUM CHLORIDE: 9 INJECTION, SOLUTION INTRAVENOUS at 12:45

## 2018-09-21 RX ADMIN — HYDROCODONE BITARTRATE AND ACETAMINOPHEN 1 TABLET: 5; 325 TABLET ORAL at 01:03

## 2018-09-21 RX ADMIN — SODIUM CHLORIDE: 9 INJECTION, SOLUTION INTRAVENOUS at 22:16

## 2018-09-21 RX ADMIN — CARVEDILOL 6.25 MG: 6.25 TABLET, FILM COATED ORAL at 17:34

## 2018-09-21 RX ADMIN — GLIMEPIRIDE 4 MG: 1 TABLET ORAL at 09:59

## 2018-09-21 RX ADMIN — INSULIN LISPRO 1 UNITS: 100 INJECTION, SOLUTION INTRAVENOUS; SUBCUTANEOUS at 22:16

## 2018-09-21 RX ADMIN — SODIUM CHLORIDE: 9 INJECTION, SOLUTION INTRAVENOUS at 06:04

## 2018-09-21 RX ADMIN — Medication 10 ML: at 10:00

## 2018-09-21 RX ADMIN — GLIMEPIRIDE 4 MG: 1 TABLET ORAL at 18:57

## 2018-09-21 RX ADMIN — ALLOPURINOL 100 MG: 100 TABLET ORAL at 10:00

## 2018-09-21 RX ADMIN — HYDROCODONE BITARTRATE AND ACETAMINOPHEN 1 TABLET: 5; 325 TABLET ORAL at 23:36

## 2018-09-21 ASSESSMENT — PAIN SCALES - GENERAL
PAINLEVEL_OUTOF10: 6
PAINLEVEL_OUTOF10: 8
PAINLEVEL_OUTOF10: 0

## 2018-09-21 ASSESSMENT — PAIN SCALES - WONG BAKER
WONGBAKER_NUMERICALRESPONSE: 0
WONGBAKER_NUMERICALRESPONSE: 6

## 2018-09-21 ASSESSMENT — PAIN DESCRIPTION - PAIN TYPE: TYPE: ACUTE PAIN

## 2018-09-21 ASSESSMENT — PAIN DESCRIPTION - LOCATION: LOCATION: CHEST;BACK

## 2018-09-21 NOTE — PROGRESS NOTES
ECG 9/20/2018 (PM): personally reviewed  AIVR with RBBB; L axis deviation; bifasicular block  (prior ECG's show SR)    ECG 9/21/2018: Sinus rhythm; no ischemic changes    Angiogram 9/20/2018:  1. Left main is normal.  CHEMO 3 flow. No stenosis. 2.  Left anterior descending artery comes from the left main coronary. Mid portion has 50% stenosis with a fractional flow reserve of 0.84. Diagonal branch has a 75% stenosis. 3.  Left circumflex comes from the left main coronary. Obtuse marginal branches are patent. Mid portion has 99% stenosis. 4.  Right coronary comes from the right coronary cuff, patent stents, and is a right dominant system.     LVEDP was 20 mmHg.     In summary, balloon angioplasty of the mid left circumflex only. CXR 9/20/2018: No significant finding or interval change. Echo 1/12/2018:  Summary   Normal left ventricle size, wall thickness and systolic function with an  estimated ejection fraction of 50-55%.   No regional wall motion abnormalities are seen.   Mild aortic valve sclerosis without any evidence of aortic stenosis. Telemetry: Sinus rhythm without ectopy    Assessment/Plan:    1. Coronary artery disease of native coronary artery  -S/P POBA mid LCX (2.0 mm)  -mid LAD 50% (FFR 0.84); Dg 75% stenosis  -1/2018: POBA LAD + Dg for ISR and stents x 3 to RCA  -angina quiet  -continue medical management with DAPT  -resume BB (low dose)  -discussed statin therapy with pt: she declines at this time as she has had issues with it in the past  -risk factor modification    2. Chest pain (noncardiac)  -continues with chest pain; pleuritic and inflammatory in nature  -noncardiac pain  -defer further evaluation to Primary team    3. Chronic kidney disease  -Cr stable at 1.7  -IVF given post procedure    4. Hyperlipidemia, goal LDL < 70  -will trial low dose statin (has had leg cramps in past on Rosuvastatin)    5. Diabetes mellitus, type II  -per Primary team    6.  Essential

## 2018-09-22 PROBLEM — M54.9 MUSCULOSKELETAL BACK PAIN: Status: ACTIVE | Noted: 2018-09-22

## 2018-09-22 PROBLEM — I10 UNCONTROLLED HYPERTENSION: Status: ACTIVE | Noted: 2018-09-22

## 2018-09-22 LAB
ALBUMIN SERPL-MCNC: 3.2 G/DL (ref 3.4–5)
ALP BLD-CCNC: 102 U/L (ref 40–129)
ALT SERPL-CCNC: 7 U/L (ref 10–40)
ANION GAP SERPL CALCULATED.3IONS-SCNC: 12 MMOL/L (ref 3–16)
AST SERPL-CCNC: 9 U/L (ref 15–37)
BILIRUB SERPL-MCNC: 0.4 MG/DL (ref 0–1)
BILIRUBIN DIRECT: <0.2 MG/DL (ref 0–0.3)
BILIRUBIN, INDIRECT: ABNORMAL MG/DL (ref 0–1)
BUN BLDV-MCNC: 38 MG/DL (ref 7–20)
CALCIUM SERPL-MCNC: 8.4 MG/DL (ref 8.3–10.6)
CHLORIDE BLD-SCNC: 106 MMOL/L (ref 99–110)
CO2: 24 MMOL/L (ref 21–32)
CREAT SERPL-MCNC: 1.6 MG/DL (ref 0.6–1.2)
GFR AFRICAN AMERICAN: 38
GFR NON-AFRICAN AMERICAN: 31
GLUCOSE BLD-MCNC: 195 MG/DL (ref 70–99)
GLUCOSE BLD-MCNC: 235 MG/DL (ref 70–99)
GLUCOSE BLD-MCNC: 268 MG/DL (ref 70–99)
GLUCOSE BLD-MCNC: 75 MG/DL (ref 70–99)
GLUCOSE BLD-MCNC: 83 MG/DL (ref 70–99)
HCT VFR BLD CALC: 36.2 % (ref 36–48)
HEMOGLOBIN: 12.1 G/DL (ref 12–16)
MCH RBC QN AUTO: 33.3 PG (ref 26–34)
MCHC RBC AUTO-ENTMCNC: 33.4 G/DL (ref 31–36)
MCV RBC AUTO: 99.7 FL (ref 80–100)
PARATHYROID HORMONE INTACT: 129.2 PG/ML (ref 14–72)
PDW BLD-RTO: 13.6 % (ref 12.4–15.4)
PERFORMED ON: ABNORMAL
PERFORMED ON: NORMAL
PHOSPHORUS: 2.9 MG/DL (ref 2.5–4.9)
PLATELET # BLD: 202 K/UL (ref 135–450)
PMV BLD AUTO: 7.3 FL (ref 5–10.5)
POTASSIUM SERPL-SCNC: 4 MMOL/L (ref 3.5–5.1)
RBC # BLD: 3.63 M/UL (ref 4–5.2)
SODIUM BLD-SCNC: 142 MMOL/L (ref 136–145)
WBC # BLD: 8.5 K/UL (ref 4–11)

## 2018-09-22 PROCEDURE — 84165 PROTEIN E-PHORESIS SERUM: CPT

## 2018-09-22 PROCEDURE — 83970 ASSAY OF PARATHORMONE: CPT

## 2018-09-22 PROCEDURE — 97530 THERAPEUTIC ACTIVITIES: CPT

## 2018-09-22 PROCEDURE — 84100 ASSAY OF PHOSPHORUS: CPT

## 2018-09-22 PROCEDURE — 1200000000 HC SEMI PRIVATE

## 2018-09-22 PROCEDURE — 97116 GAIT TRAINING THERAPY: CPT

## 2018-09-22 PROCEDURE — 97535 SELF CARE MNGMENT TRAINING: CPT

## 2018-09-22 PROCEDURE — 2580000003 HC RX 258: Performed by: INTERNAL MEDICINE

## 2018-09-22 PROCEDURE — 97162 PT EVAL MOD COMPLEX 30 MIN: CPT

## 2018-09-22 PROCEDURE — 97166 OT EVAL MOD COMPLEX 45 MIN: CPT

## 2018-09-22 PROCEDURE — G8978 MOBILITY CURRENT STATUS: HCPCS

## 2018-09-22 PROCEDURE — G8979 MOBILITY GOAL STATUS: HCPCS

## 2018-09-22 PROCEDURE — 85027 COMPLETE CBC AUTOMATED: CPT

## 2018-09-22 PROCEDURE — 80048 BASIC METABOLIC PNL TOTAL CA: CPT

## 2018-09-22 PROCEDURE — 6370000000 HC RX 637 (ALT 250 FOR IP): Performed by: INTERNAL MEDICINE

## 2018-09-22 PROCEDURE — 80076 HEPATIC FUNCTION PANEL: CPT

## 2018-09-22 PROCEDURE — G8987 SELF CARE CURRENT STATUS: HCPCS

## 2018-09-22 PROCEDURE — 84155 ASSAY OF PROTEIN SERUM: CPT

## 2018-09-22 PROCEDURE — 9990 CHARGE CONVERSION

## 2018-09-22 PROCEDURE — 94760 N-INVAS EAR/PLS OXIMETRY 1: CPT

## 2018-09-22 PROCEDURE — G8988 SELF CARE GOAL STATUS: HCPCS

## 2018-09-22 PROCEDURE — 6360000002 HC RX W HCPCS: Performed by: INTERNAL MEDICINE

## 2018-09-22 RX ORDER — CARVEDILOL 12.5 MG/1
12.5 TABLET ORAL 2 TIMES DAILY WITH MEALS
Status: DISCONTINUED | OUTPATIENT
Start: 2018-09-22 | End: 2018-09-22

## 2018-09-22 RX ORDER — CARVEDILOL 6.25 MG/1
6.25 TABLET ORAL 2 TIMES DAILY WITH MEALS
Status: DISCONTINUED | OUTPATIENT
Start: 2018-09-23 | End: 2018-09-22

## 2018-09-22 RX ORDER — NIFEDIPINE 30 MG/1
30 TABLET, EXTENDED RELEASE ORAL DAILY
Status: DISCONTINUED | OUTPATIENT
Start: 2018-09-22 | End: 2018-09-23 | Stop reason: HOSPADM

## 2018-09-22 RX ORDER — HEPARIN SODIUM 5000 [USP'U]/ML
5000 INJECTION, SOLUTION INTRAVENOUS; SUBCUTANEOUS ONCE
Status: COMPLETED | OUTPATIENT
Start: 2018-09-22 | End: 2018-09-22

## 2018-09-22 RX ORDER — CARVEDILOL 3.12 MG/1
3.12 TABLET ORAL 2 TIMES DAILY WITH MEALS
Status: DISCONTINUED | OUTPATIENT
Start: 2018-09-23 | End: 2018-09-22

## 2018-09-22 RX ORDER — CARVEDILOL 6.25 MG/1
6.25 TABLET ORAL EVERY 12 HOURS
Status: DISCONTINUED | OUTPATIENT
Start: 2018-09-22 | End: 2018-09-23 | Stop reason: HOSPADM

## 2018-09-22 RX ADMIN — HEPARIN SODIUM 5000 UNITS: 5000 INJECTION INTRAVENOUS; SUBCUTANEOUS at 19:39

## 2018-09-22 RX ADMIN — NIFEDIPINE 30 MG: 30 TABLET, FILM COATED, EXTENDED RELEASE ORAL at 10:30

## 2018-09-22 RX ADMIN — CLOPIDOGREL BISULFATE 75 MG: 75 TABLET ORAL at 10:22

## 2018-09-22 RX ADMIN — HYDROCODONE BITARTRATE AND ACETAMINOPHEN 1 TABLET: 5; 325 TABLET ORAL at 20:49

## 2018-09-22 RX ADMIN — LEVOTHYROXINE SODIUM 137 MCG: 25 TABLET ORAL at 07:03

## 2018-09-22 RX ADMIN — CARVEDILOL 6.25 MG: 6.25 TABLET, FILM COATED ORAL at 11:50

## 2018-09-22 RX ADMIN — HYDROCODONE BITARTRATE AND ACETAMINOPHEN 1 TABLET: 5; 325 TABLET ORAL at 10:21

## 2018-09-22 RX ADMIN — Medication 10 ML: at 10:22

## 2018-09-22 RX ADMIN — ALLOPURINOL 100 MG: 100 TABLET ORAL at 10:22

## 2018-09-22 RX ADMIN — GLIMEPIRIDE 4 MG: 1 TABLET ORAL at 17:33

## 2018-09-22 RX ADMIN — ASPIRIN 81 MG: 81 TABLET, COATED ORAL at 10:21

## 2018-09-22 RX ADMIN — GLIMEPIRIDE 4 MG: 1 TABLET ORAL at 10:20

## 2018-09-22 RX ADMIN — CARVEDILOL 6.25 MG: 6.25 TABLET, FILM COATED ORAL at 20:49

## 2018-09-22 RX ADMIN — INSULIN LISPRO 2 UNITS: 100 INJECTION, SOLUTION INTRAVENOUS; SUBCUTANEOUS at 20:52

## 2018-09-22 RX ADMIN — Medication 10 ML: at 20:49

## 2018-09-22 ASSESSMENT — PAIN SCALES - GENERAL
PAINLEVEL_OUTOF10: 3
PAINLEVEL_OUTOF10: 7
PAINLEVEL_OUTOF10: 3
PAINLEVEL_OUTOF10: 3
PAINLEVEL_OUTOF10: 5
PAINLEVEL_OUTOF10: 7
PAINLEVEL_OUTOF10: 3

## 2018-09-22 ASSESSMENT — PAIN DESCRIPTION - DESCRIPTORS: DESCRIPTORS: CONSTANT;DISCOMFORT

## 2018-09-22 ASSESSMENT — PAIN DESCRIPTION - ONSET: ONSET: ON-GOING

## 2018-09-22 ASSESSMENT — PAIN DESCRIPTION - ORIENTATION
ORIENTATION: RIGHT
ORIENTATION: RIGHT;UPPER

## 2018-09-22 ASSESSMENT — PAIN DESCRIPTION - LOCATION
LOCATION: BACK;CHEST
LOCATION: BACK
LOCATION: BACK
LOCATION: CHEST;BACK

## 2018-09-22 ASSESSMENT — PAIN DESCRIPTION - FREQUENCY: FREQUENCY: CONTINUOUS

## 2018-09-22 ASSESSMENT — PAIN DESCRIPTION - DIRECTION: RADIATING_TOWARDS: RIGHT CHEST

## 2018-09-22 ASSESSMENT — PAIN DESCRIPTION - PROGRESSION: CLINICAL_PROGRESSION: GRADUALLY IMPROVING

## 2018-09-22 ASSESSMENT — ACTIVITIES OF DAILY LIVING (ADL): EFFECT OF PAIN ON DAILY ACTIVITIES: IMPAIRED MOBILITY

## 2018-09-22 ASSESSMENT — PAIN DESCRIPTION - PAIN TYPE
TYPE: ACUTE PAIN

## 2018-09-22 NOTE — CONSULTS
[address] [place] [address] [facility]                                   [facility]SULTATION    PATIENT NAME: Margaret Haddad                    :        1940  MED REC NO:   [id number]                          ROOM:       7732  ACCOUNT NO:   [de-identified]                          ADMIT DATE: 2018  PROVIDER:     Sujata Hernandez MD    CONSULT DATE:  2018    REASON FOR CONSULTATION:  Acute kidney injury. HISTORY OF PRESENTING ILLNESS:  She is a 49-year-old  female with  past medical history significant for diabetes mellitus type 2,  hypertension, coronary artery disease, chronic kidney disease stage IIIB,  diastolic heart failure, admitted with chest pain, shortness of breath. The patient underwent cardiac catheterization and angioplasty of left  circumflex. Postprocedure course complicated by further chest pain which  Cardiology thinks is probably atypical and also accelerated idioventricular  rhythm. EP has been consulted. Renal consultation has been called because  of a slight increase in her creatinine. At the time of consultation, the patient is still complaining of  right-sided chest pain and also has some shortness of breath and dyspnea on  exertion. Denies any fever, chills, rigors, hematuria, dysuria, or any  other urinary symptoms. Admits poor diabetes as well as blood pressure  control as an outpatient. PAST MEDICAL HISTORY:  1. Longstanding history of diabetes mellitus type 2.  2.  Hypertension. 3.  Coronary artery disease. 4.  Diastolic heart failure. 5.  Anemia of chronic disease. 6.  Peripheral vascular disease. 7.  Uterine cancer. 8.  Obesity. 9.  Hypercholesterolemia. 10.  Chronic kidney disease stage III. 11.  Hypothyroidism. PAST SURGICAL HISTORY:  1.  Status post cataract removal.  2.  Status post coronary stent placement in the past in RCA and LAD.   3.  Status post stent
0.9 % injection 10 mL 2 times per day   sodium chloride flush 0.9 % injection 10 mL PRN   magnesium hydroxide (MILK OF MAGNESIA) 400 MG/5ML suspension 30 mL Daily PRN   ondansetron (ZOFRAN) injection 4 mg Q6H PRN   enoxaparin (LOVENOX) injection 40 mg Nightly   [START ON 9/21/2018] influenza quadrivalent split vaccine (FLUZONE;FLUARIX;FLULAVAL;AFLURIA) injection 0.5 mL Once   HYDROcodone-acetaminophen (NORCO) 5-325 MG per tablet 1 tablet Q8H PRN       Allergies:  Rosuvastatin     Review of Systems:     Constitutional: negative  Eyes: negative  Ears, nose, mouth, throat, and face: negative  Respiratory: negative  Cardiovascular: positive for chest pain  Gastrointestinal: negative  Genitourinary:negative  Integument/breast: negative  Hematologic/lymphatic: negative  Musculoskeletal:negative  Neurological: negative  Behavioral/Psych: negative  Endocrine: negative  Allergic/Immunologic: negative          Objective:     PHYSICAL EXAM:      Vitals:    09/20/18 1430   BP: (!) 157/69   Pulse:    Resp:    Temp:    SpO2:     Weight: 216 lb 14.9 oz (98.4 kg)       General Appearance:  Alert, cooperative, no distress, appears stated age. Head:  Normocephalic, without obvious abnormality, atraumatic. Eyes:  Pupils equal and round. No scleral icterus. Mouth: Moist mucosa, no pharyngeal erythema. Nose: Nares normal. No drainage or sinus tenderness. Neck: Supple, symmetrical, trachea midline. No adenopathy. No tenderness/mass/nodules. No carotid bruit or elevated JVD. Lungs:   Clear to auscultation bilaterally, respirations unlabored. No wheeze, rales, or rhonchi. Chest Wall:  No tenderness or deformity. Heart:  Regular rate, S1/ S2 normal. No murmur, rub, or gallop. Abdomen:   Soft, non-tender, bowel sounds active. Musculoskeletal: No muscle wasting or digital clubbing. Extremities: Extremities normal, atraumatic. No cyanosis or edema. Pulses: 2+ radial and pedal pulses, symmetric.    Skin: No rashes or

## 2018-09-22 NOTE — PROGRESS NOTES
Occupational Therapy   Occupational Therapy Initial Assessment  See last progress note for discharge status. Date: 2018   Patient Name: Susanne Zarco  MRN: [id number]     : 1940    Date of Service: 2018    Discharge Recommendations:  Home with Home health OT, S Level 1         Patient Diagnosis(es): The encounter diagnosis was Chest pain, unspecified type. has a past medical history of CAD (coronary artery disease); CHF (congestive heart failure) ([facility] [address] DM2 (diabetes mellitus, type 2) ([facility] [address] HTN (hypertension); Hypothyroidism; MI (mitral incompetence); Over weight; [facility] PVD (peripheral vascular disease) [facility] and Uterine cancer ([facility] has a past surgical history that includes Cataract removal with implant (Bilateral); Coronary angioplasty with stent (Right, 2015); hernia repair; and Coronary angioplasty with stent (2018). Restrictions  Restrictions/Precautions  Restrictions/Precautions: Fall Risk  Position Activity Restriction  Other position/activity restrictions: IV    Subjective   General  Chart Reviewed: Yes  Additional Pertinent Hx:  cardiac stents, and 2018 RCA stent placed  Family / Caregiver Present: Yes (daughter, son-in-law)  Diagnosis: admitted chest pain, angioplasty with stent placement, and CORDELL  Subjective  Subjective: \"I'm not great\"  General Comment  Comments: Pt in recliner, agreeable to therapy.   Pain Assessment  Pain Level: 3  Pain Type: Acute pain  Pain Location: Chest, Back  Pain Orientation: middle Right  Pain Radiating Towards: back  Pain Descriptors: Radiating    Oxygen Therapy  SpO2: 94 %  O2 Device: None (Room air)  Social/Functional History  Social/Functional History  Lives With: Alone  Type of Home: House  Home Layout: One level  Home Access: Stairs to enter with rails  Entrance Stairs - Number of Steps: 4  Entrance Stairs - Rails: Both  Bathroom Shower/Tub: Walk-in shower  Bathroom Toilet: Handicap height  Bathroom

## 2018-09-22 NOTE — PROGRESS NOTES
Hospitalist Progress Note      PCP: Keaton Cowan DO    Date of Admission: 9/20/2018      Subjective: patient  seen and examined. Still c/o right upper back pain, and pain on right side of chest, worse with certain movements. Improved with pain meds, will trial warm compresses/ heating blanket tonight. Discussed with RN. Medications:  Reviewed    Infusion Medications    dextrose       Scheduled Medications    NIFEdipine  30 mg Oral Daily    carvedilol  6.25 mg Oral Q12H    allopurinol  100 mg Oral Daily    aspirin EC  81 mg Oral Daily    clopidogrel  75 mg Oral Daily    glimepiride  4 mg Oral BID AC    levothyroxine  137 mcg Oral QAM AC    sodium chloride flush  10 mL Intravenous 2 times per day    enoxaparin  40 mg Subcutaneous Nightly    insulin lispro  0-6 Units Subcutaneous TID WC    insulin lispro  0-3 Units Subcutaneous Nightly     PRN Meds: methocarbamol, sodium chloride flush, magnesium hydroxide, ondansetron, HYDROcodone 5 mg - acetaminophen, glucose, dextrose, glucagon (rDNA), dextrose      Intake/Output Summary (Last 24 hours) at 09/22/18 1811  Last data filed at 09/22/18 1138   Gross per 24 hour   Intake             1500 ml   Output             1525 ml   Net              -25 ml       Physical Exam Performed:    /67   Pulse 68   Temp 97.2 °F (36.2 °C) (Oral)   Resp 20   Ht 5' 5.5\" (1.664 m)   Wt 221 lb 12.5 oz (100.6 kg)   LMP  (LMP Unknown)   SpO2 97%   BMI 36.35 kg/m²     General appearance: No apparent distress, appears stated age and cooperative. HEENT: Pupils equal, round, and reactive to light. Conjunctivae/corneas clear. Neck: Supple, with full range of motion. No jugular venous distention. Trachea midline. Respiratory:  Normal respiratory effort. Clear to auscultation, bilaterally without Rales/Wheezes/Rhonchi. Cardiovascular: Regular rate and rhythm with normal S1/S2 without murmurs, rubs or gallops.   Abdomen: Soft, non-tender, non-distended with normal bowel sounds. Musculoskeletal: area on upper right side of back tendeer to deep palpation. No clubbing, cyanosis or edema bilaterally. Full range of motion without deformity. Skin: Skin color, texture, turgor normal.  No rashes or lesions. Neurologic:  Neurovascularly intact without any focal sensory/motor deficits. Cranial nerves: II-XII intact, grossly non-focal.  Psychiatric: Alert and oriented, thought content appropriate, normal insight  Capillary Refill: Brisk,< 3 seconds   Peripheral Pulses: +2 palpable, equal bilaterally       Labs:   Recent Labs      09/20/18   0844  09/21/18   0553  09/22/18   0505   WBC  12.7*  11.0  8.5   HGB  12.1  12.5  12.1   HCT  36.4  37.1  36.2   PLT  223  217  202     Recent Labs      09/20/18   0844  09/21/18   0553  09/22/18   0505   NA  141  137  142   K  3.8  4.4  4.0   CL  106  101  106   CO2  22  24  24   BUN  39*  45*  38*   CREATININE  1.6*  1.7*  1.6*   CALCIUM  7.6*  8.6  8.4   PHOS   --    --   2.9     Recent Labs      09/20/18   0844  09/22/18   0505   AST  10*  9*   ALT  8*  7*   BILIDIR   --   <0.2   BILITOT  0.4  0.4   ALKPHOS  110  102     Recent Labs      09/20/18   0844   INR  1.08     Recent Labs      09/20/18   2019  09/21/18   0113  09/21/18   1106   TROPONINI  <0.01  <0.01  0.02*       Urinalysis:    Lab Results   Component Value Date    NITRU Negative 09/21/2018    WBCUA 6-10 08/23/2016    RBCUA None seen 08/23/2016    BLOODU Negative 09/21/2018    SPECGRAV 1.014 09/21/2018    GLUCOSEU Negative 09/21/2018       Radiology:  US RENAL COMPLETE   Final Result   No hydronephrosis. XR CHEST PORTABLE   Final Result   No significant finding or interval change.                  Assessment/Plan:    Active Hospital Problems    Diagnosis Date Noted    Accelerated ventricular rhythm ([address] [I44.2] 09/21/2018     Priority: High    Musculoskeletal back pain [M54.9] 09/22/2018    Uncontrolled hypertension [I10] 09/22/2018    CKD (chronic kidney

## 2018-09-22 NOTE — PROGRESS NOTES
Juan Daniel Rausch MD: The pt came to the ED with c/o of L sided chest pain radiation to her back for 2 days, slightly relieved with nitro. The pt had a heart cath done 9-20-18 \"with balloon angioplasty mid circumflex, suspect chest pain noncardiac, non reproducible\" Nephrology has also been consulted for CORDELL following the heart cath and dye usage. PMHx: CAD, CHF, DM, HTN, hypothyroidism, MI, pulm HTN, PVD, uterine Ca, Cardiac stents (2015 and 2018), hernia repair  Response To Previous Treatment: Not applicable  Family / Caregiver Present: Yes (dgt, s-i-l and gr-son)  Referring Practitioner: Princess Burton MD  Referral Date : 09/21/18  Diagnosis: chest pain, CRODELL, heart cath with stent placement  Follows Commands: Within Functional Limits  Subjective  Subjective: The pt was found to be seated in the chair with family in the room. The pt was willing to work with therapy. Talked with dgt in the room and she reported that the pt could come home with her again if needed. Pain Screening  Patient Currently in Pain: No  Pain Assessment  Pain Assessment: 0-10  Pain Level: 3  Pain Type: Acute pain  Pain Location: Back; Chest  Pain Orientation: Right  Vital Signs  Patient Currently in Pain: Yes       Orientation  Orientation  Overall Orientation Status: Within Functional Limits    Social/Functional History  Social/Functional History  Lives With: Alone  Type of Home: House  Home Layout: One level  Home Access: Stairs to enter with rails  Entrance Stairs - Number of Steps: 4  Entrance Stairs - Rails: Both  Bathroom Shower/Tub: Walk-in shower  Bathroom Toilet: Handicap height  Bathroom Equipment: Built-in shower seat  Bathroom Accessibility: Walker accessible  ADL Assistance: Independent  Homemaking Assistance: Independent  Ambulation Assistance: Independent  Active :  Yes  Additional Comments: Pt gets groceries and does laundry; no recent falls     Objective     Observation/Palpation  Palpation: tender middle/lower traps Co-treatment   Time In 1055         Time Out 1151         Minutes 56         Timed Code Treatment Minutes: 41 Minutes       Electronically signed by Devorah Keenan, PT [id number] on 9/22/2018 at 12:02 PM

## 2018-09-23 VITALS
BODY MASS INDEX: 36.32 KG/M2 | SYSTOLIC BLOOD PRESSURE: 132 MMHG | DIASTOLIC BLOOD PRESSURE: 73 MMHG | HEART RATE: 64 BPM | WEIGHT: 225.97 LBS | HEIGHT: 66 IN | TEMPERATURE: 97.9 F | RESPIRATION RATE: 20 BRPM | OXYGEN SATURATION: 98 %

## 2018-09-23 LAB
GLUCOSE BLD-MCNC: 149 MG/DL (ref 70–99)
GLUCOSE BLD-MCNC: 303 MG/DL (ref 70–99)
PERFORMED ON: ABNORMAL
PERFORMED ON: ABNORMAL

## 2018-09-23 PROCEDURE — 2580000003 HC RX 258: Performed by: INTERNAL MEDICINE

## 2018-09-23 PROCEDURE — 6370000000 HC RX 637 (ALT 250 FOR IP): Performed by: INTERNAL MEDICINE

## 2018-09-23 PROCEDURE — 94760 N-INVAS EAR/PLS OXIMETRY 1: CPT

## 2018-09-23 RX ORDER — NIFEDIPINE 30 MG/1
30 TABLET, FILM COATED, EXTENDED RELEASE ORAL DAILY
Qty: 30 TABLET | Refills: 0 | Status: SHIPPED | OUTPATIENT
Start: 2018-09-24 | End: 2018-10-10 | Stop reason: SDUPTHER

## 2018-09-23 RX ORDER — CARVEDILOL 6.25 MG/1
6.25 TABLET ORAL EVERY 12 HOURS
Qty: 60 TABLET | Refills: 0 | Status: SHIPPED | OUTPATIENT
Start: 2018-09-23 | End: 2018-11-20 | Stop reason: DRUGHIGH

## 2018-09-23 RX ORDER — HYDROCODONE BITARTRATE AND ACETAMINOPHEN 5; 325 MG/1; MG/1
1 TABLET ORAL EVERY 8 HOURS PRN
Qty: 10 TABLET | Refills: 0 | Status: SHIPPED | OUTPATIENT
Start: 2018-09-23 | End: 2018-09-26

## 2018-09-23 RX ADMIN — GLIMEPIRIDE 4 MG: 1 TABLET ORAL at 09:48

## 2018-09-23 RX ADMIN — CLOPIDOGREL BISULFATE 75 MG: 75 TABLET ORAL at 09:48

## 2018-09-23 RX ADMIN — LEVOTHYROXINE SODIUM 137 MCG: 25 TABLET ORAL at 06:39

## 2018-09-23 RX ADMIN — NIFEDIPINE 30 MG: 30 TABLET, FILM COATED, EXTENDED RELEASE ORAL at 09:49

## 2018-09-23 RX ADMIN — ASPIRIN 81 MG: 81 TABLET, COATED ORAL at 09:48

## 2018-09-23 RX ADMIN — HYDROCODONE BITARTRATE AND ACETAMINOPHEN 1 TABLET: 5; 325 TABLET ORAL at 09:48

## 2018-09-23 RX ADMIN — INSULIN LISPRO 1 UNITS: 100 INJECTION, SOLUTION INTRAVENOUS; SUBCUTANEOUS at 09:46

## 2018-09-23 RX ADMIN — Medication 10 ML: at 09:47

## 2018-09-23 RX ADMIN — ALLOPURINOL 100 MG: 100 TABLET ORAL at 09:48

## 2018-09-23 RX ADMIN — INSULIN LISPRO 4 UNITS: 100 INJECTION, SOLUTION INTRAVENOUS; SUBCUTANEOUS at 11:19

## 2018-09-23 RX ADMIN — CARVEDILOL 6.25 MG: 6.25 TABLET, FILM COATED ORAL at 09:48

## 2018-09-23 ASSESSMENT — PAIN SCALES - GENERAL
PAINLEVEL_OUTOF10: 2
PAINLEVEL_OUTOF10: 7

## 2018-09-23 ASSESSMENT — PAIN DESCRIPTION - ORIENTATION: ORIENTATION: RIGHT;UPPER

## 2018-09-23 ASSESSMENT — PAIN DESCRIPTION - FREQUENCY: FREQUENCY: CONTINUOUS

## 2018-09-23 ASSESSMENT — PAIN DESCRIPTION - PAIN TYPE: TYPE: ACUTE PAIN

## 2018-09-23 ASSESSMENT — PAIN DESCRIPTION - LOCATION: LOCATION: BACK

## 2018-09-23 ASSESSMENT — ACTIVITIES OF DAILY LIVING (ADL): EFFECT OF PAIN ON DAILY ACTIVITIES: IMPAIRED MOBILITY

## 2018-09-23 ASSESSMENT — PAIN DESCRIPTION - PROGRESSION: CLINICAL_PROGRESSION: GRADUALLY IMPROVING

## 2018-09-23 ASSESSMENT — PAIN DESCRIPTION - DESCRIPTORS: DESCRIPTORS: ACHING;CONSTANT;DISCOMFORT

## 2018-09-23 ASSESSMENT — PAIN DESCRIPTION - ONSET: ONSET: ON-GOING

## 2018-09-23 NOTE — DISCHARGE SUMMARY
tablet Take 1 tablet by mouth daily  Qty: 30 tablet, Refills: 0              Details   carvedilol (COREG) 6.25 MG tablet Take 1 tablet by mouth every 12 hours  Qty: 60 tablet, Refills: 0              Details   levothyroxine (SYNTHROID) 137 MCG tablet Take 1 tablet by mouth Daily  Qty: 30 tablet, Refills: 2      allopurinol (ZYLOPRIM) 100 MG tablet Take one tablet by mouth once daily  Qty: 90 tablet, Refills: 1      glimepiride (AMARYL) 4 MG tablet Take 1 tablet by mouth 2 times daily (before meals)  Qty: 180 tablet, Refills: 0      metFORMIN (GLUCOPHAGE) 850 MG tablet Take 1 tablet by mouth 2 times daily (with meals)  Qty: 60 tablet, Refills: 5      clopidogrel (PLAVIX) 75 MG tablet Take loading dose of 600mg (8 tabs) then one tab daily after  Qty: 38 tablet, Refills: 3      aspirin EC 81 MG EC tablet Take 1 tablet by mouth daily  Qty: 30 tablet, Refills: 5      glucose blood VI test strips (ASCENSIA AUTODISC VI;ONE TOUCH ULTRA TEST VI) strip 1 each by In Vitro route daily As needed. Qty: 100 each, Refills: 3    Comments: Needs meter as well             Time Spent on discharge is more than 33 in the examination, evaluation, counseling and review of medications and discharge plan. Signed:    Kendrick Nielsen MD   9/23/2018      Thank you Alina Lau DO for the opportunity to be involved in this patient's care. If you have any questions or concerns please feel free to contact me at [phone].

## 2018-09-24 ENCOUNTER — CARE COORDINATION (OUTPATIENT)
Dept: CASE MANAGEMENT | Age: 78
End: 2018-09-24

## 2018-09-24 DIAGNOSIS — I25.119 CORONARY ARTERY DISEASE INVOLVING NATIVE CORONARY ARTERY OF NATIVE HEART WITH ANGINA PECTORIS (HCC): Primary | ICD-10-CM

## 2018-09-24 DIAGNOSIS — I44.2: Primary | ICD-10-CM

## 2018-09-24 LAB
ALBUMIN SERPL-MCNC: 2.7 G/DL (ref 3.1–4.9)
ALPHA-1-GLOBULIN: 0.3 G/DL (ref 0.2–0.4)
ALPHA-2-GLOBULIN: 0.8 G/DL (ref 0.4–1.1)
BETA GLOBULIN: 1.2 G/DL (ref 0.9–1.6)
GAMMA GLOBULIN: 1.8 G/DL (ref 0.6–1.8)
TOTAL PROTEIN: 6.7 G/DL (ref 6.4–8.2)
URINE ELECTROPHORESIS INTERP: NORMAL

## 2018-09-24 NOTE — CARE COORDINATION
weight recorded on the Heart Failure discharge paper was 225 15.5 ounces. Pt said her breathing is still not back to normal and she is concerned about the swelling getting worse because her lasix was discontinued at the hospital Pt said I know they are concerned about my Kidneys but I have always taken the lasix to keep the water off. Pt said she is still feeling tired and is worried about not making it to her grand son's wedding. We discussed her follow up appt is not until 10/10/18 with NP cardiology D. Enzweiler. I offered to call to get her appt moved up but patient said she wants to call her and will do it as soon as we hang up because she wants to discuss her lasix being stopped . Pt said her groin site is fine and there is no bleeding or drainage from site. Pt denies any chest pain today. Pt is agreeable to follow up transition calls.    Future Appointments  Date Time Provider Isidoro Black   10/10/2018 1:00 PM [address], APRN - CNP [facility]       Maria Esther Hooker RN

## 2018-09-25 ENCOUNTER — CARE COORDINATION (OUTPATIENT)
Dept: CASE MANAGEMENT | Age: 78
End: 2018-09-25

## 2018-09-26 ENCOUNTER — CARE COORDINATION (OUTPATIENT)
Dept: CASE MANAGEMENT | Age: 78
End: 2018-09-26

## 2018-09-26 NOTE — CARE COORDINATION
[facility] Transitions Follow Up Call    2018    Patient: Elvia Parish  Patient : 1940   MRN: <[id number]>  Reason for Admission: There are no discharge diagnoses documented for the most recent discharge. Discharge Date: 18 RARS: Readmission Risk Score: 14      Care Transitions Subsequent and Final Call    Subsequent and Final Calls  Care Transitions Interventions  Other Interventions: Follow Up Unable to contact No VM available.  Will follow up at another time    Future Appointments  Date Time Provider Isidoro Black   10/10/2018 1:00 PM ETSELLE Mccann - [facility]       Larry Grace RN

## 2018-09-27 ENCOUNTER — CARE COORDINATION (OUTPATIENT)
Dept: CASE MANAGEMENT | Age: 78
End: 2018-09-27

## 2018-10-01 ENCOUNTER — CARE COORDINATION (OUTPATIENT)
Dept: CARE COORDINATION | Age: 78
End: 2018-10-01

## 2018-10-10 ENCOUNTER — OFFICE VISIT (OUTPATIENT)
Dept: CARDIOLOGY CLINIC | Age: 78
End: 2018-10-10
Payer: MEDICARE

## 2018-10-10 VITALS
HEIGHT: 65 IN | DIASTOLIC BLOOD PRESSURE: 88 MMHG | BODY MASS INDEX: 35.65 KG/M2 | OXYGEN SATURATION: 99 % | HEART RATE: 56 BPM | SYSTOLIC BLOOD PRESSURE: 150 MMHG | WEIGHT: 214 LBS

## 2018-10-10 DIAGNOSIS — I10 ESSENTIAL HYPERTENSION: ICD-10-CM

## 2018-10-10 DIAGNOSIS — I50.32 CHRONIC DIASTOLIC HF (HEART FAILURE) (HCC): ICD-10-CM

## 2018-10-10 DIAGNOSIS — N18.30 CKD (CHRONIC KIDNEY DISEASE) STAGE 3, GFR 30-59 ML/MIN (HCC): ICD-10-CM

## 2018-10-10 DIAGNOSIS — I44.2: ICD-10-CM

## 2018-10-10 DIAGNOSIS — I25.10 CORONARY ARTERY DISEASE INVOLVING NATIVE CORONARY ARTERY OF NATIVE HEART WITHOUT ANGINA PECTORIS: Primary | ICD-10-CM

## 2018-10-10 DIAGNOSIS — E78.5 HYPERLIPIDEMIA LDL GOAL <70: ICD-10-CM

## 2018-10-10 PROCEDURE — G8482 FLU IMMUNIZE ORDER/ADMIN: HCPCS | Performed by: NURSE PRACTITIONER

## 2018-10-10 PROCEDURE — G8427 DOCREV CUR MEDS BY ELIG CLIN: HCPCS | Performed by: NURSE PRACTITIONER

## 2018-10-10 PROCEDURE — 1111F DSCHRG MED/CURRENT MED MERGE: CPT | Performed by: NURSE PRACTITIONER

## 2018-10-10 PROCEDURE — G8417 CALC BMI ABV UP PARAM F/U: HCPCS | Performed by: NURSE PRACTITIONER

## 2018-10-10 PROCEDURE — 4040F PNEUMOC VAC/ADMIN/RCVD: CPT | Performed by: NURSE PRACTITIONER

## 2018-10-10 PROCEDURE — 1101F PT FALLS ASSESS-DOCD LE1/YR: CPT | Performed by: NURSE PRACTITIONER

## 2018-10-10 PROCEDURE — 1123F ACP DISCUSS/DSCN MKR DOCD: CPT | Performed by: NURSE PRACTITIONER

## 2018-10-10 PROCEDURE — 1090F PRES/ABSN URINE INCON ASSESS: CPT | Performed by: NURSE PRACTITIONER

## 2018-10-10 PROCEDURE — 93000 ELECTROCARDIOGRAM COMPLETE: CPT | Performed by: NURSE PRACTITIONER

## 2018-10-10 PROCEDURE — G8598 ASA/ANTIPLAT THER USED: HCPCS | Performed by: NURSE PRACTITIONER

## 2018-10-10 PROCEDURE — 99214 OFFICE O/P EST MOD 30 MIN: CPT | Performed by: NURSE PRACTITIONER

## 2018-10-10 PROCEDURE — G8400 PT W/DXA NO RESULTS DOC: HCPCS | Performed by: NURSE PRACTITIONER

## 2018-10-10 PROCEDURE — 1036F TOBACCO NON-USER: CPT | Performed by: NURSE PRACTITIONER

## 2018-10-10 RX ORDER — FUROSEMIDE 80 MG
40 TABLET ORAL DAILY
Status: ON HOLD | COMMUNITY
End: 2018-11-22

## 2018-10-10 RX ORDER — NIFEDIPINE 30 MG/1
30 TABLET, FILM COATED, EXTENDED RELEASE ORAL DAILY
Qty: 30 TABLET | Refills: 3 | Status: SHIPPED | OUTPATIENT
Start: 2018-10-10 | End: 2018-12-12 | Stop reason: SDUPTHER

## 2018-10-12 ENCOUNTER — CARE COORDINATION (OUTPATIENT)
Dept: CARE COORDINATION | Age: 78
End: 2018-10-12

## 2018-10-12 NOTE — CARE COORDINATION
Second outreach call attempted. Unable to reach pt or leave message due to phone continually ringing. Will attempt to reach pt in next 1-2 weeks.     Charissa GENAON, RN Care Coordinator  [address] [facility]   [phone]

## 2018-10-15 DIAGNOSIS — I10 ESSENTIAL HYPERTENSION: ICD-10-CM

## 2018-10-15 DIAGNOSIS — I25.10 CORONARY ARTERY DISEASE INVOLVING NATIVE CORONARY ARTERY OF NATIVE HEART WITHOUT ANGINA PECTORIS: ICD-10-CM

## 2018-10-16 LAB
ANION GAP SERPL CALCULATED.3IONS-SCNC: 15 MMOL/L (ref 3–16)
BUN BLDV-MCNC: 33 MG/DL (ref 7–20)
CALCIUM SERPL-MCNC: 8.9 MG/DL (ref 8.3–10.6)
CHLORIDE BLD-SCNC: 104 MMOL/L (ref 99–110)
CO2: 25 MMOL/L (ref 21–32)
CREAT SERPL-MCNC: 2.3 MG/DL (ref 0.6–1.2)
GFR AFRICAN AMERICAN: 25
GFR NON-AFRICAN AMERICAN: 20
GLUCOSE BLD-MCNC: 214 MG/DL (ref 70–99)
POTASSIUM SERPL-SCNC: 5 MMOL/L (ref 3.5–5.1)
SODIUM BLD-SCNC: 144 MMOL/L (ref 136–145)

## 2018-10-23 ENCOUNTER — CARE COORDINATION (OUTPATIENT)
Dept: CARE COORDINATION | Age: 78
End: 2018-10-23

## 2018-11-08 ENCOUNTER — TELEPHONE (OUTPATIENT)
Dept: FAMILY MEDICINE CLINIC | Age: 78
End: 2018-11-08

## 2018-11-20 ENCOUNTER — APPOINTMENT (OUTPATIENT)
Dept: GENERAL RADIOLOGY | Age: 78
DRG: 291 | End: 2018-11-20
Payer: MEDICARE

## 2018-11-20 ENCOUNTER — APPOINTMENT (OUTPATIENT)
Dept: NUCLEAR MEDICINE | Age: 78
DRG: 291 | End: 2018-11-20
Payer: MEDICARE

## 2018-11-20 ENCOUNTER — HOSPITAL ENCOUNTER (INPATIENT)
Age: 78
LOS: 2 days | Discharge: HOME | DRG: 291 | End: 2018-11-22
Attending: EMERGENCY MEDICINE | Admitting: INTERNAL MEDICINE
Payer: MEDICARE

## 2018-11-20 DIAGNOSIS — R06.02 SHORTNESS OF BREATH: ICD-10-CM

## 2018-11-20 DIAGNOSIS — R09.02 HYPOXIA: ICD-10-CM

## 2018-11-20 DIAGNOSIS — I50.32 CHRONIC DIASTOLIC HF (HEART FAILURE) (HCC): ICD-10-CM

## 2018-11-20 DIAGNOSIS — R09.89 SUSPECTED PULMONARY EMBOLISM: Primary | ICD-10-CM

## 2018-11-20 PROBLEM — I50.31 ACUTE DIASTOLIC CHF (CONGESTIVE HEART FAILURE) (HCC): Status: ACTIVE | Noted: 2018-11-20

## 2018-11-20 LAB
A/G RATIO: 1.1 (ref 1.1–2.2)
ALBUMIN SERPL-MCNC: 4.2 G/DL (ref 3.4–5)
ALP BLD-CCNC: 136 U/L (ref 40–129)
ALT SERPL-CCNC: 13 U/L (ref 10–40)
ANION GAP SERPL CALCULATED.3IONS-SCNC: 14 MMOL/L (ref 3–16)
APTT: 60.2 SEC (ref 26–36)
AST SERPL-CCNC: 14 U/L (ref 15–37)
BASE EXCESS VENOUS: -2.6 MMOL/L
BILIRUB SERPL-MCNC: 0.5 MG/DL (ref 0–1)
BILIRUBIN URINE: NEGATIVE
BLOOD, URINE: ABNORMAL
BUN BLDV-MCNC: 40 MG/DL (ref 7–20)
CALCIUM SERPL-MCNC: 9 MG/DL (ref 8.3–10.6)
CARBOXYHEMOGLOBIN: 1.4 %
CHLORIDE BLD-SCNC: 102 MMOL/L (ref 99–110)
CLARITY: ABNORMAL
CO2: 20 MMOL/L (ref 21–32)
COLOR: YELLOW
CREAT SERPL-MCNC: 2 MG/DL (ref 0.6–1.2)
D DIMER: 970 NG/ML DDU (ref 0–229)
EPITHELIAL CELLS, UA: 1 /HPF (ref 0–5)
GFR AFRICAN AMERICAN: 29
GFR NON-AFRICAN AMERICAN: 24
GLOBULIN: 3.7 G/DL
GLUCOSE BLD-MCNC: 142 MG/DL (ref 70–99)
GLUCOSE BLD-MCNC: 174 MG/DL (ref 70–99)
GLUCOSE BLD-MCNC: 66 MG/DL (ref 70–99)
GLUCOSE BLD-MCNC: 81 MG/DL (ref 70–99)
GLUCOSE URINE: NEGATIVE MG/DL
HCO3 VENOUS: 22 MMOL/L (ref 23–29)
HCT VFR BLD CALC: 38.9 % (ref 36–48)
HEMOGLOBIN: 12.7 G/DL (ref 12–16)
HYALINE CASTS: 3 /LPF (ref 0–8)
KETONES, URINE: ABNORMAL MG/DL
LACTIC ACID: 0.9 MMOL/L (ref 0.4–2)
LEUKOCYTE ESTERASE, URINE: NEGATIVE
MCH RBC QN AUTO: 31.5 PG (ref 26–34)
MCHC RBC AUTO-ENTMCNC: 32.6 G/DL (ref 31–36)
MCV RBC AUTO: 96.6 FL (ref 80–100)
METHEMOGLOBIN VENOUS: 1 %
MICROSCOPIC EXAMINATION: YES
NITRITE, URINE: NEGATIVE
O2 CONTENT, VEN: 16 ML/DL
O2 SAT, VEN: 94 %
O2 THERAPY: ABNORMAL
PCO2, VEN: 40.6 MMHG (ref 40–50)
PDW BLD-RTO: 14 % (ref 12.4–15.4)
PERFORMED ON: ABNORMAL
PERFORMED ON: ABNORMAL
PERFORMED ON: NORMAL
PH UA: 5
PH VENOUS: 7.36 (ref 7.35–7.45)
PLATELET # BLD: 204 K/UL (ref 135–450)
PMV BLD AUTO: 7.8 FL (ref 5–10.5)
PO2, VEN: 73 MMHG
POTASSIUM REFLEX MAGNESIUM: 4.5 MMOL/L (ref 3.5–5.1)
PRO-BNP: 847 PG/ML (ref 0–449)
PROTEIN UA: 100 MG/DL
RBC # BLD: 4.02 M/UL (ref 4–5.2)
RBC UA: 1 /HPF (ref 0–4)
REPORT: NORMAL
RESPIRATORY PANEL PCR: NORMAL
SODIUM BLD-SCNC: 136 MMOL/L (ref 136–145)
SPECIFIC GRAVITY UA: 1.02
TCO2 CALC VENOUS: 23 MMOL/L
TOTAL PROTEIN: 7.9 G/DL (ref 6.4–8.2)
TROPONIN: <0.01 NG/ML
URINE REFLEX TO CULTURE: ABNORMAL
URINE TYPE: ABNORMAL
UROBILINOGEN, URINE: 0.2 E.U./DL
WBC # BLD: 12.5 K/UL (ref 4–11)
WBC UA: 0 /HPF (ref 0–5)

## 2018-11-20 PROCEDURE — 80053 COMPREHEN METABOLIC PANEL: CPT

## 2018-11-20 PROCEDURE — 6370000000 HC RX 637 (ALT 250 FOR IP): Performed by: INTERNAL MEDICINE

## 2018-11-20 PROCEDURE — 96375 TX/PRO/DX INJ NEW DRUG ADDON: CPT

## 2018-11-20 PROCEDURE — 87633 RESP VIRUS 12-25 TARGETS: CPT

## 2018-11-20 PROCEDURE — 71045 X-RAY EXAM CHEST 1 VIEW: CPT

## 2018-11-20 PROCEDURE — 6360000002 HC RX W HCPCS: Performed by: INTERNAL MEDICINE

## 2018-11-20 PROCEDURE — 85730 THROMBOPLASTIN TIME PARTIAL: CPT

## 2018-11-20 PROCEDURE — 36415 COLL VENOUS BLD VENIPUNCTURE: CPT

## 2018-11-20 PROCEDURE — 3430000000 HC RX DIAGNOSTIC RADIOPHARMACEUTICAL: Performed by: PHYSICIAN ASSISTANT

## 2018-11-20 PROCEDURE — A9558 XE133 XENON 10MCI: HCPCS | Performed by: PHYSICIAN ASSISTANT

## 2018-11-20 PROCEDURE — 6360000002 HC RX W HCPCS: Performed by: PHYSICIAN ASSISTANT

## 2018-11-20 PROCEDURE — 1200000000 HC SEMI PRIVATE

## 2018-11-20 PROCEDURE — 83605 ASSAY OF LACTIC ACID: CPT

## 2018-11-20 PROCEDURE — A9540 TC99M MAA: HCPCS | Performed by: PHYSICIAN ASSISTANT

## 2018-11-20 PROCEDURE — 85379 FIBRIN DEGRADATION QUANT: CPT

## 2018-11-20 PROCEDURE — 93005 ELECTROCARDIOGRAM TRACING: CPT | Performed by: PHYSICIAN ASSISTANT

## 2018-11-20 PROCEDURE — 87486 CHLMYD PNEUM DNA AMP PROBE: CPT

## 2018-11-20 PROCEDURE — 51702 INSERT TEMP BLADDER CATH: CPT

## 2018-11-20 PROCEDURE — 2700000000 HC OXYGEN THERAPY PER DAY

## 2018-11-20 PROCEDURE — 82803 BLOOD GASES ANY COMBINATION: CPT

## 2018-11-20 PROCEDURE — 85027 COMPLETE CBC AUTOMATED: CPT

## 2018-11-20 PROCEDURE — 96366 THER/PROPH/DIAG IV INF ADDON: CPT

## 2018-11-20 PROCEDURE — 87798 DETECT AGENT NOS DNA AMP: CPT

## 2018-11-20 PROCEDURE — 94760 N-INVAS EAR/PLS OXIMETRY 1: CPT

## 2018-11-20 PROCEDURE — 81001 URINALYSIS AUTO W/SCOPE: CPT

## 2018-11-20 PROCEDURE — 36600 WITHDRAWAL OF ARTERIAL BLOOD: CPT

## 2018-11-20 PROCEDURE — 84484 ASSAY OF TROPONIN QUANT: CPT

## 2018-11-20 PROCEDURE — 96365 THER/PROPH/DIAG IV INF INIT: CPT

## 2018-11-20 PROCEDURE — 87581 M.PNEUMON DNA AMP PROBE: CPT

## 2018-11-20 PROCEDURE — 83880 ASSAY OF NATRIURETIC PEPTIDE: CPT

## 2018-11-20 PROCEDURE — 78582 LUNG VENTILAT&PERFUS IMAGING: CPT

## 2018-11-20 PROCEDURE — 99285 EMERGENCY DEPT VISIT HI MDM: CPT

## 2018-11-20 RX ORDER — ONDANSETRON 2 MG/ML
4 INJECTION INTRAMUSCULAR; INTRAVENOUS EVERY 6 HOURS PRN
Status: DISCONTINUED | OUTPATIENT
Start: 2018-11-20 | End: 2018-11-22 | Stop reason: HOSPADM

## 2018-11-20 RX ORDER — NICOTINE POLACRILEX 4 MG
15 LOZENGE BUCCAL PRN
Status: DISCONTINUED | OUTPATIENT
Start: 2018-11-20 | End: 2018-11-22 | Stop reason: HOSPADM

## 2018-11-20 RX ORDER — INSULIN GLARGINE 100 [IU]/ML
10 INJECTION, SOLUTION SUBCUTANEOUS NIGHTLY
Status: DISCONTINUED | OUTPATIENT
Start: 2018-11-20 | End: 2018-11-22 | Stop reason: HOSPADM

## 2018-11-20 RX ORDER — HEPARIN SODIUM 1000 [USP'U]/ML
6000 INJECTION, SOLUTION INTRAVENOUS; SUBCUTANEOUS ONCE
Status: COMPLETED | OUTPATIENT
Start: 2018-11-20 | End: 2018-11-20

## 2018-11-20 RX ORDER — HEPARIN SODIUM 5000 [USP'U]/ML
5000 INJECTION, SOLUTION INTRAVENOUS; SUBCUTANEOUS EVERY 8 HOURS SCHEDULED
Status: DISCONTINUED | OUTPATIENT
Start: 2018-11-20 | End: 2018-11-20 | Stop reason: SDUPTHER

## 2018-11-20 RX ORDER — FUROSEMIDE 10 MG/ML
80 INJECTION INTRAMUSCULAR; INTRAVENOUS ONCE
Status: COMPLETED | OUTPATIENT
Start: 2018-11-20 | End: 2018-11-20

## 2018-11-20 RX ORDER — XENON XE-133 10 MCI/1
10 GAS RESPIRATORY (INHALATION)
Status: COMPLETED | OUTPATIENT
Start: 2018-11-20 | End: 2018-11-20

## 2018-11-20 RX ORDER — HEPARIN SODIUM 1000 [USP'U]/ML
80 INJECTION, SOLUTION INTRAVENOUS; SUBCUTANEOUS ONCE
Status: DISCONTINUED | OUTPATIENT
Start: 2018-11-20 | End: 2018-11-20

## 2018-11-20 RX ORDER — CLOPIDOGREL BISULFATE 75 MG/1
75 TABLET ORAL DAILY
Status: DISCONTINUED | OUTPATIENT
Start: 2018-11-20 | End: 2018-11-22 | Stop reason: HOSPADM

## 2018-11-20 RX ORDER — SODIUM CHLORIDE 0.9 % (FLUSH) 0.9 %
10 SYRINGE (ML) INJECTION PRN
Status: DISCONTINUED | OUTPATIENT
Start: 2018-11-20 | End: 2018-11-22 | Stop reason: HOSPADM

## 2018-11-20 RX ORDER — HEPARIN SODIUM 10000 [USP'U]/100ML
15 INJECTION, SOLUTION INTRAVENOUS CONTINUOUS
Status: DISCONTINUED | OUTPATIENT
Start: 2018-11-20 | End: 2018-11-21

## 2018-11-20 RX ORDER — NIFEDIPINE 30 MG/1
30 TABLET, EXTENDED RELEASE ORAL DAILY
Status: DISCONTINUED | OUTPATIENT
Start: 2018-11-20 | End: 2018-11-22 | Stop reason: HOSPADM

## 2018-11-20 RX ORDER — CARVEDILOL 12.5 MG/1
12.5 TABLET ORAL 2 TIMES DAILY WITH MEALS
COMMUNITY
End: 2019-03-18 | Stop reason: SDUPTHER

## 2018-11-20 RX ORDER — FUROSEMIDE 10 MG/ML
40 INJECTION INTRAMUSCULAR; INTRAVENOUS 2 TIMES DAILY
Status: DISCONTINUED | OUTPATIENT
Start: 2018-11-20 | End: 2018-11-21

## 2018-11-20 RX ORDER — DEXTROSE MONOHYDRATE 25 G/50ML
12.5 INJECTION, SOLUTION INTRAVENOUS PRN
Status: DISCONTINUED | OUTPATIENT
Start: 2018-11-20 | End: 2018-11-22 | Stop reason: HOSPADM

## 2018-11-20 RX ORDER — ALLOPURINOL 100 MG/1
100 TABLET ORAL DAILY
Status: DISCONTINUED | OUTPATIENT
Start: 2018-11-20 | End: 2018-11-22 | Stop reason: HOSPADM

## 2018-11-20 RX ORDER — ASPIRIN 81 MG/1
81 TABLET ORAL DAILY
Status: DISCONTINUED | OUTPATIENT
Start: 2018-11-20 | End: 2018-11-22 | Stop reason: HOSPADM

## 2018-11-20 RX ORDER — CARVEDILOL 6.25 MG/1
12.5 TABLET ORAL 2 TIMES DAILY WITH MEALS
Status: DISCONTINUED | OUTPATIENT
Start: 2018-11-20 | End: 2018-11-22 | Stop reason: HOSPADM

## 2018-11-20 RX ORDER — SODIUM CHLORIDE 0.9 % (FLUSH) 0.9 %
10 SYRINGE (ML) INJECTION EVERY 12 HOURS SCHEDULED
Status: DISCONTINUED | OUTPATIENT
Start: 2018-11-20 | End: 2018-11-22 | Stop reason: HOSPADM

## 2018-11-20 RX ORDER — DEXTROSE MONOHYDRATE 50 MG/ML
100 INJECTION, SOLUTION INTRAVENOUS PRN
Status: DISCONTINUED | OUTPATIENT
Start: 2018-11-20 | End: 2018-11-22 | Stop reason: HOSPADM

## 2018-11-20 RX ADMIN — ALLOPURINOL 100 MG: 100 TABLET ORAL at 17:07

## 2018-11-20 RX ADMIN — NIFEDIPINE 30 MG: 30 TABLET, FILM COATED, EXTENDED RELEASE ORAL at 17:07

## 2018-11-20 RX ADMIN — ASPIRIN 81 MG: 81 TABLET, COATED ORAL at 17:07

## 2018-11-20 RX ADMIN — FUROSEMIDE 40 MG: 10 INJECTION, SOLUTION INTRAMUSCULAR; INTRAVENOUS at 17:07

## 2018-11-20 RX ADMIN — CARVEDILOL 12.5 MG: 6.25 TABLET, FILM COATED ORAL at 17:07

## 2018-11-20 RX ADMIN — HEPARIN SODIUM 13.5 ML/HR: 10000 INJECTION, SOLUTION INTRAVENOUS at 12:43

## 2018-11-20 RX ADMIN — XENON XE-133 10 MILLICURIE: 10 GAS RESPIRATORY (INHALATION) at 10:03

## 2018-11-20 RX ADMIN — HEPARIN SODIUM 6000 UNITS: 1000 INJECTION INTRAVENOUS; SUBCUTANEOUS at 12:42

## 2018-11-20 RX ADMIN — INSULIN LISPRO 1 UNITS: 100 INJECTION, SOLUTION INTRAVENOUS; SUBCUTANEOUS at 22:51

## 2018-11-20 RX ADMIN — FUROSEMIDE 80 MG: 10 INJECTION, SOLUTION INTRAMUSCULAR; INTRAVENOUS at 07:50

## 2018-11-20 RX ADMIN — CLOPIDOGREL BISULFATE 75 MG: 75 TABLET ORAL at 17:07

## 2018-11-20 RX ADMIN — INSULIN GLARGINE 10 UNITS: 100 INJECTION, SOLUTION SUBCUTANEOUS at 22:49

## 2018-11-20 RX ADMIN — Medication 6 MILLICURIE: at 10:03

## 2018-11-20 ASSESSMENT — PAIN SCALES - GENERAL
PAINLEVEL_OUTOF10: 0
PAINLEVEL_OUTOF10: 0

## 2018-11-20 NOTE — FLOWSHEET NOTE
4 Eyes Skin Assessment     The patient is being assess for  Admission    I agree that 2 RN's have performed a thorough Head to Toe Skin Assessment on the patient. ALL assessment sites listed below have been assessed. Areas assessed by both nurses:   [x]   Head, Face, and Ears   [x]   Shoulders, Back, and Chest  [x]   Arms, Elbows, and Hands   [x]   Coccyx, Sacrum, and IschIum  [x]   Legs, Feet, and Heels        Does the Patient have Skin Breakdown?   No         Marcus Prevention initiated:  NA   Wound Care Orders initiated:  NA      [facility] nurse consulted for Pressure Injury (Stage 3,4, Unstageable, DTI, NWPT, and Complex wounds), New and Established Ostomies:  NA      Nurse 1 eSignature: Electronically signed by Jackelyn Medina RN on 11/20/18 at 5:24 PM    **SHARE this note so that the co-signing nurse is able to place an eSignature**    Nurse 2 eSignature: {Esignature:[id number]}

## 2018-11-20 NOTE — H&P
Hospital Medicine History & Physical      PCP: Pat Cee DO    Date of Admission: 11/20/2018    Date of Service: Pt seen/examined on 11/20/18 and Admitted to Inpatient     Chief Complaint:  SOB    History Of Present Illness: The patient is a 66 y.o. female who presents to [facility] with shortness of breath. Patient states the shortness breath started this past Saturday. She was gotten progressively worse as time is gone on. She says she feels better when she sits up and when she stops exerting herself. Patient admits to recently decreasing her Lasix dose from 80 mg a day to 40 mg a day. She also notes increasing weight gain. Her last known weight was 214 pounds and today she weighs 222 pounds. All the emergency department a VQ scan was performed which showed intermediate probability for pulmonary embolism. She was started on a heparin drip. We are waiting a d-dimer that I ordered over 2 hours ago. Past Medical History:        Diagnosis Date    CAD (coronary artery disease)     [facility]     DM2 (diabetes mellitus, type 2) [facility]     HTN (hypertension)     Hypothyroidism     MI (mitral incompetence)     Over weight     Pulmonary HTN ([address]     PVD (peripheral vascular disease) ([address]     Uterine cancer ([address]        Past Surgical History:        Procedure Laterality Date    CATARACT REMOVAL WITH IMPLANT Bilateral     CORONARY ANGIOPLASTY WITH STENT PLACEMENT Right 9/9/2015    At [facility] WITH STENT PLACEMENT  01/2018    ILANA to RCA and POBA on ISR of LAD    HERNIA REPAIR         Medications Prior to Admission:    Prior to Admission medications    Medication Sig Start Date End Date Taking?  Authorizing Provider   carvedilol (COREG) 12.5 MG tablet Take 12.5 mg by mouth 2 times daily (with meals)

## 2018-11-20 NOTE — ED PROVIDER NOTES
[address]  eMERGENCY dEPARTMENT eNCOUnter      Pt Name: Laura Hurtado  MRN: [id number]  [place] 1940  Date of evaluation: 11/20/2018  Provider: Nando Lacey, [address] [place] [address]       Chief Complaint   Patient presents with    Shortness of Breath     pt came in with daughter for sob pt recently had her lasix cut in half because of kidney function         HISTORY OF PRESENT ILLNESS  (Location/Symptom, Timing/Onset, Context/Setting, Quality, Duration, Modifying Factors, Severity.)   Laura Hurtado is a 66 y.o. female who presents to the emergency department With a complaint of worsening shortness of breath. Patient says she has chronic kidney disease, was hospitalized here around the end of September, and she also has a history of heart failure. She says she had her Lasix dose reduced at that time, along with some minor medication changes, and she thinks she is not tolerating it well, especially according to the patient's daughter at bedside. Patient says her shortness of breath has been especially bad in the last couple of days, this morning it just felt like she couldn't breathe. Denies any significant cough. Denies chest pain. She says she does not use supplemental oxygen at home. Denies abdominal pain or nausea. Denies lightheadedness, headache, focal weakness or numbness. She says she takes Plavix regularly. No other complaints. Nursing Notes were reviewed and I agree. REVIEW OF SYSTEMS    (2-9 systems for level 4, 10 or more for level 5)     Constitutional:  Negative for fever, chills, appetite change, fatigue and unexpected weight change. HENT:  Negative for congestion, ear pain, facial swelling, rhinorrhea, sinus pressure, sneezing, sore throat and trouble swallowing. Eyes:  Negative for photophobia, pain and visual disturbance. Respiratory:  Positive shortness of breath. Negative for cough, wheezing and stridor.     Cardiovascular:  Negative for chest attending physician Dr. Donny Loving evaluated the patient personally and agrees with this plan of care. PROCEDURES:  None    FINAL IMPRESSION      1. Suspected pulmonary embolism    2. Shortness of breath    3.  Hypoxia          DISPOSITION/PLAN   DISPOSITION Admitted 11/20/2018 12:34:44 PM      PATIENT REFERRED TO:  Jina Jackman DO  [address] [facility] [address]  [phone]            DISCHARGE MEDICATIONS:  New Prescriptions    No medications on file       (Please note that portions of this note were completed with a voice recognition program.  Efforts were made to edit the dictations but occasionally words are mis-transcribed.)    Charli Ramirez, [id number] [place] [address]  11/20/18 1564

## 2018-11-21 LAB
ANION GAP SERPL CALCULATED.3IONS-SCNC: 14 MMOL/L (ref 3–16)
APTT: 30.1 SEC (ref 26–36)
APTT: 43 SEC (ref 26–36)
APTT: 55.9 SEC (ref 26–36)
BASE EXCESS ARTERIAL: 1.4 MMOL/L (ref -3–3)
BUN BLDV-MCNC: 39 MG/DL (ref 7–20)
CALCIUM SERPL-MCNC: 9.1 MG/DL (ref 8.3–10.6)
CARBOXYHEMOGLOBIN ARTERIAL: 1.5 % (ref 0–1.5)
CHLORIDE BLD-SCNC: 102 MMOL/L (ref 99–110)
CO2: 25 MMOL/L (ref 21–32)
CREAT SERPL-MCNC: 1.9 MG/DL (ref 0.6–1.2)
GFR AFRICAN AMERICAN: 31
GFR NON-AFRICAN AMERICAN: 26
GLUCOSE BLD-MCNC: 109 MG/DL (ref 70–99)
GLUCOSE BLD-MCNC: 114 MG/DL (ref 70–99)
GLUCOSE BLD-MCNC: 146 MG/DL (ref 70–99)
GLUCOSE BLD-MCNC: 86 MG/DL (ref 70–99)
GLUCOSE BLD-MCNC: 87 MG/DL (ref 70–99)
HCO3 ARTERIAL: 25.3 MMOL/L (ref 21–29)
HEMOGLOBIN, ART, EXTENDED: 12.1 G/DL (ref 12–16)
LV EF: 50 %
LVEF MODALITY: NORMAL
MAGNESIUM: 2.3 MG/DL (ref 1.8–2.4)
METHEMOGLOBIN ARTERIAL: 1.2 %
O2 CONTENT ARTERIAL: 16 ML/DL
O2 SAT, ARTERIAL: 97.5 %
O2 THERAPY: NORMAL
PCO2 ARTERIAL: 39.6 MMHG (ref 35–45)
PERFORMED ON: ABNORMAL
PERFORMED ON: NORMAL
PH ARTERIAL: 7.42 (ref 7.35–7.45)
PO2 ARTERIAL: 82.1 MMHG (ref 75–108)
POTASSIUM SERPL-SCNC: 4 MMOL/L (ref 3.5–5.1)
SODIUM BLD-SCNC: 141 MMOL/L (ref 136–145)
TCO2 ARTERIAL: 26.5 MMOL/L

## 2018-11-21 PROCEDURE — 93306 TTE W/DOPPLER COMPLETE: CPT

## 2018-11-21 PROCEDURE — 36415 COLL VENOUS BLD VENIPUNCTURE: CPT

## 2018-11-21 PROCEDURE — 99223 1ST HOSP IP/OBS HIGH 75: CPT | Performed by: INTERNAL MEDICINE

## 2018-11-21 PROCEDURE — 83735 ASSAY OF MAGNESIUM: CPT

## 2018-11-21 PROCEDURE — 51702 INSERT TEMP BLADDER CATH: CPT

## 2018-11-21 PROCEDURE — 2580000003 HC RX 258: Performed by: INTERNAL MEDICINE

## 2018-11-21 PROCEDURE — 94640 AIRWAY INHALATION TREATMENT: CPT

## 2018-11-21 PROCEDURE — 6370000000 HC RX 637 (ALT 250 FOR IP): Performed by: HOSPITALIST

## 2018-11-21 PROCEDURE — 93010 ELECTROCARDIOGRAM REPORT: CPT | Performed by: INTERNAL MEDICINE

## 2018-11-21 PROCEDURE — 6370000000 HC RX 637 (ALT 250 FOR IP): Performed by: INTERNAL MEDICINE

## 2018-11-21 PROCEDURE — 94760 N-INVAS EAR/PLS OXIMETRY 1: CPT

## 2018-11-21 PROCEDURE — 85730 THROMBOPLASTIN TIME PARTIAL: CPT

## 2018-11-21 PROCEDURE — 6360000002 HC RX W HCPCS: Performed by: INTERNAL MEDICINE

## 2018-11-21 PROCEDURE — 1200000000 HC SEMI PRIVATE

## 2018-11-21 PROCEDURE — 80048 BASIC METABOLIC PNL TOTAL CA: CPT

## 2018-11-21 PROCEDURE — 94664 DEMO&/EVAL PT USE INHALER: CPT

## 2018-11-21 PROCEDURE — 2700000000 HC OXYGEN THERAPY PER DAY

## 2018-11-21 PROCEDURE — 6360000002 HC RX W HCPCS: Performed by: PHYSICIAN ASSISTANT

## 2018-11-21 RX ORDER — FUROSEMIDE 10 MG/ML
60 INJECTION INTRAMUSCULAR; INTRAVENOUS 2 TIMES DAILY
Status: DISCONTINUED | OUTPATIENT
Start: 2018-11-21 | End: 2018-11-22 | Stop reason: HOSPADM

## 2018-11-21 RX ORDER — IPRATROPIUM BROMIDE AND ALBUTEROL SULFATE 2.5; .5 MG/3ML; MG/3ML
1 SOLUTION RESPIRATORY (INHALATION)
Status: DISCONTINUED | OUTPATIENT
Start: 2018-11-21 | End: 2018-11-22 | Stop reason: HOSPADM

## 2018-11-21 RX ORDER — HEPARIN SODIUM 1000 [USP'U]/ML
3000 INJECTION, SOLUTION INTRAVENOUS; SUBCUTANEOUS ONCE
Status: COMPLETED | OUTPATIENT
Start: 2018-11-21 | End: 2018-11-21

## 2018-11-21 RX ADMIN — ALLOPURINOL 100 MG: 100 TABLET ORAL at 09:00

## 2018-11-21 RX ADMIN — CARVEDILOL 12.5 MG: 6.25 TABLET, FILM COATED ORAL at 09:00

## 2018-11-21 RX ADMIN — HEPARIN SODIUM 3000 UNITS: 1000 INJECTION INTRAVENOUS; SUBCUTANEOUS at 02:33

## 2018-11-21 RX ADMIN — INSULIN LISPRO 1 UNITS: 100 INJECTION, SOLUTION INTRAVENOUS; SUBCUTANEOUS at 12:16

## 2018-11-21 RX ADMIN — FUROSEMIDE 40 MG: 10 INJECTION, SOLUTION INTRAMUSCULAR; INTRAVENOUS at 09:00

## 2018-11-21 RX ADMIN — CARVEDILOL 12.5 MG: 6.25 TABLET, FILM COATED ORAL at 17:45

## 2018-11-21 RX ADMIN — CLOPIDOGREL BISULFATE 75 MG: 75 TABLET ORAL at 09:00

## 2018-11-21 RX ADMIN — FUROSEMIDE 60 MG: 10 INJECTION, SOLUTION INTRAMUSCULAR; INTRAVENOUS at 17:45

## 2018-11-21 RX ADMIN — ENOXAPARIN SODIUM 30 MG: 30 INJECTION SUBCUTANEOUS at 20:47

## 2018-11-21 RX ADMIN — HEPARIN SODIUM 15 ML/HR: 10000 INJECTION, SOLUTION INTRAVENOUS at 11:15

## 2018-11-21 RX ADMIN — NIFEDIPINE 30 MG: 30 TABLET, FILM COATED, EXTENDED RELEASE ORAL at 09:00

## 2018-11-21 RX ADMIN — Medication 10 ML: at 20:44

## 2018-11-21 RX ADMIN — ASPIRIN 81 MG: 81 TABLET, COATED ORAL at 09:00

## 2018-11-21 RX ADMIN — IPRATROPIUM BROMIDE AND ALBUTEROL SULFATE 1 AMPULE: .5; 3 SOLUTION RESPIRATORY (INHALATION) at 11:58

## 2018-11-21 RX ADMIN — IPRATROPIUM BROMIDE AND ALBUTEROL SULFATE 1 AMPULE: .5; 3 SOLUTION RESPIRATORY (INHALATION) at 20:16

## 2018-11-21 RX ADMIN — INSULIN GLARGINE 10 UNITS: 100 INJECTION, SOLUTION SUBCUTANEOUS at 20:47

## 2018-11-21 RX ADMIN — IPRATROPIUM BROMIDE AND ALBUTEROL SULFATE 1 AMPULE: .5; 3 SOLUTION RESPIRATORY (INHALATION) at 09:10

## 2018-11-21 ASSESSMENT — PAIN SCALES - GENERAL: PAINLEVEL_OUTOF10: 0

## 2018-11-21 NOTE — PROGRESS NOTES
Clinical Pharmacy Note  Heparin Dosing       Lab Results   Component Value Date    APTT 43.0 11/21/2018     Lab Results   Component Value Date    HGB 12.7 11/20/2018    HCT 38.9 11/20/2018     11/20/2018    INR 1.08 09/20/2018       Current Infusion Rate: 13.5 mL/hr    Plan:  Bolus: 3000 units  Rate: 15 mL/hr  Next aPTT: 0730 11/21/18    Pharmacy will continue to monitor and adjust based on aPTT results.

## 2018-11-21 NOTE — CONSULTS
Facility-Administered Medications: ipratropium-albuterol (DUONEB) nebulizer solution 1 ampule, 1 ampule, Inhalation, Q4H WA  perflutren lipid microspheres (DEFINITY) injection 1.65 mg, 1.5 mL, Intravenous, ONCE PRN  furosemide (LASIX) injection 60 mg, 60 mg, Intravenous, BID  heparin 25,000 units in dextrose 5% 250 mL infusion, 15 mL/hr, Intravenous, Continuous  allopurinol (ZYLOPRIM) tablet 100 mg, 100 mg, Oral, Daily  aspirin EC tablet 81 mg, 81 mg, Oral, Daily  carvedilol (COREG) tablet 12.5 mg, 12.5 mg, Oral, BID WC  clopidogrel (PLAVIX) tablet 75 mg, 75 mg, Oral, Daily  NIFEdipine (PROCARDIA XL) extended release tablet 30 mg, 30 mg, Oral, Daily  sodium chloride flush 0.9 % injection 10 mL, 10 mL, Intravenous, 2 times per day  sodium chloride flush 0.9 % injection 10 mL, 10 mL, Intravenous, PRN  ondansetron (ZOFRAN) injection 4 mg, 4 mg, Intravenous, Q6H PRN  insulin glargine (LANTUS) injection vial 10 Units, 10 Units, Subcutaneous, Nightly  insulin lispro (HUMALOG) injection vial 0-6 Units, 0-6 Units, Subcutaneous, TID WC  insulin lispro (HUMALOG) injection vial 0-3 Units, 0-3 Units, Subcutaneous, Nightly  glucose (GLUTOSE) 40 % oral gel 15 g, 15 g, Oral, PRN  dextrose 50 % solution 12.5 g, 12.5 g, Intravenous, PRN  glucagon (rDNA) injection 1 mg, 1 mg, Intramuscular, PRN  dextrose 5 % solution, 100 mL/hr, Intravenous, PRN      Labs:   Recent Labs      11/20/18   0707   WBC  12.5*   HGB  12.7   HCT  38.9   PLT  204     Recent Labs      11/20/18   0707  11/21/18   0733   NA  136  141   K  4.5  4.0   CO2  20*  25   BUN  40*  39*   CREATININE  2.0*  1.9*   GLUCOSE  174*  86     Recent Labs      11/21/18   0017  11/21/18   0733   APTT  43.0*  55.9*     Recent Labs      11/20/18   0707   TROPONINI  <0.01     Lab Results   Component Value Date    HDL 57 08/15/2018    HDL 47 03/18/2010    LDLCALC 129 08/15/2018    TRIG 112 08/15/2018     Recent Labs      11/20/18   0707   AST  14*   ALT  13   LABALBU  4.2         EKG: Probably sinus rhythm    1/14/2018: Cardiac cath  1.  Right coronary artery comes from the right coronary cusp giving rise to right posterior descending artery and posterolateral branch, is a right dominant system with 80% in-stent restenosis of the right coronary artery stent and distally, there was an 80% stenosis present. 2.  The left main comes from the left coronary cusp giving rise to left anterior descending and left circumflex arteries, has no significant stenosis. 3.  Left anterior descending artery comes from left main, in the proximal portion has stent present which has 90% in-stent restenosis and 99%;stenosis of the ostium of the first diagonal.  4.  This left circumflex is small caliber nondominant, has 80% stenosis present.  Obtuse marginal was patent. 5.  LVEDP was 15 mmHg.     INTERVENTION PERFORMED:  1.  We intervened on the left anterior descending artery.  We only  performed balloon angioplasty of the left anterior descending artery in-stent restenosis using a 3-mm noncompliant balloon catheter and at the ostium of the first diagonal using a 2.5-mm noncompliant balloon.  We decided not to further JL the diagonal branch.   2.  We performed stent placement in the right coronary.  Unfortunately, we had to place three stents. Nickie Turpin first one in the distal was 3.0 x 10 mm, in the proximal midportion was 3.0 x 28 mm and right distal to the proximal stent was 3.0 x 8 mm secondary to missing the lesion distal to the stent.     Echo 1/12/2018:  Normal left ventricle size, wall thickness and systolic function with an  estimated ejection fraction of 50-55%.   No regional wall motion abnormalities are seen.   Mild aortic valve sclerosis without any evidence of aortic stenosis.         Assessment and plan:      Acute on Chronic Diastolic HF  Also has CKD  Patient's most likely has fluid overload versus diastolic heart failure  Would treat with IV Lasix 60 mg twice a day    Elevated D dimer and intermediate risk V/Q scan. Hx more consistent with CHF  Had orthopnea the night before admission  Is better with diuresis  Has hx of CHF & CKI     Coronary artery disease involving native coronary artery of native heart without angina pectoris  1/2018 presented with unstable angina: POBA LAD + Dg for ISR and stents x 3 to RCA-LVEF 50-55%  no recurrent angina  continue medical management and risk factor modification with use of DAPT, carvedilol  she declines statin or any other type of antihyperlipidemic agent     Essential hypertension  controlled  continue medical management     Hyperlipidemia LDL goal <70  she declines pharmacologic treatment     CKD (chronic kidney disease) stage 3, GFR 30-59 ml/min  last Cr 2.1  follows nephrology  recently discontinued lisinopril d/t elevated K+ and Cr          Will MAK  11/21/2018

## 2018-11-21 NOTE — PROGRESS NOTES
Clinical Pharmacy Note  Heparin Dosing       Lab Results   Component Value Date    APTT 55.9 11/21/2018     Lab Results   Component Value Date    HGB 12.7 11/20/2018    HCT 38.9 11/20/2018     11/20/2018    INR 1.08 09/20/2018       Current Infusion Rate: 15 mL/hr    Plan:  Bolus: none  Rate: 15 mL/hr  Next aPTT: 1330 11-21    Pharmacy will continue to monitor and adjust based on aPTT results.   Nicole Mantilla RPh 11/21/2018 9:07 AM

## 2018-11-21 NOTE — CONSULTS
11/20/2018 07:07 AM    HGB 12.7 11/20/2018 07:07 AM    HCT 38.9 11/20/2018 07:07 AM     11/20/2018 07:07 AM       Assessment/Plan:  Reviewed old records and labs.     1) CORDELL on CKD   - baseline 09/22/18 Cr 1.6   - supposed to follow-up with Dr. Fozia Bullard   - slightly worse, so likely secondary to CRS   -     2) ACDHF   - O > I   - I would split the difference and discharge patient with lasix 60 mg PO qd

## 2018-11-21 NOTE — PLAN OF CARE
Problem: Falls - Risk of:  Goal: Will remain free from falls  Will remain free from falls   Outcome: Ongoing  Pt free from falls this shift. Fall precautions in place at all times. Call light within reach. Pt able to and agreeable to contact for safety appropriately.         Problem: OXYGENATION/RESPIRATORY FUNCTION  Goal: Patient will maintain patent airway  Outcome: Ongoing      Problem: FLUID AND ELECTROLYTE IMBALANCE  Goal: Fluid and electrolyte balance are achieved/maintained  Outcome: Ongoing      Problem: ACTIVITY INTOLERANCE/IMPAIRED MOBILITY  Goal: Mobility/activity is maintained at optimum level for patient  Outcome: Ongoing

## 2018-11-21 NOTE — PROGRESS NOTES
Increased SOB noted with wheezing noted. Pt states she has used albuterol  At home for SOB. N. O. Per Dr Mikel Justin Q 4 hours PRN for SOB.

## 2018-11-22 VITALS
HEART RATE: 77 BPM | RESPIRATION RATE: 18 BRPM | WEIGHT: 213.19 LBS | SYSTOLIC BLOOD PRESSURE: 147 MMHG | OXYGEN SATURATION: 95 % | TEMPERATURE: 98.2 F | HEIGHT: 65 IN | BODY MASS INDEX: 35.52 KG/M2 | DIASTOLIC BLOOD PRESSURE: 70 MMHG

## 2018-11-22 LAB
ANION GAP SERPL CALCULATED.3IONS-SCNC: 14 MMOL/L (ref 3–16)
BUN BLDV-MCNC: 47 MG/DL (ref 7–20)
CALCIUM SERPL-MCNC: 8.9 MG/DL (ref 8.3–10.6)
CHLORIDE BLD-SCNC: 103 MMOL/L (ref 99–110)
CO2: 25 MMOL/L (ref 21–32)
CREAT SERPL-MCNC: 2 MG/DL (ref 0.6–1.2)
GFR AFRICAN AMERICAN: 29
GFR NON-AFRICAN AMERICAN: 24
GLUCOSE BLD-MCNC: 159 MG/DL (ref 70–99)
GLUCOSE BLD-MCNC: 87 MG/DL (ref 70–99)
GLUCOSE BLD-MCNC: 88 MG/DL (ref 70–99)
MAGNESIUM: 2.3 MG/DL (ref 1.8–2.4)
PERFORMED ON: ABNORMAL
PERFORMED ON: NORMAL
POTASSIUM SERPL-SCNC: 3.9 MMOL/L (ref 3.5–5.1)
PRO-BNP: 803 PG/ML (ref 0–449)
SODIUM BLD-SCNC: 142 MMOL/L (ref 136–145)

## 2018-11-22 PROCEDURE — 83880 ASSAY OF NATRIURETIC PEPTIDE: CPT

## 2018-11-22 PROCEDURE — 83735 ASSAY OF MAGNESIUM: CPT

## 2018-11-22 PROCEDURE — 36415 COLL VENOUS BLD VENIPUNCTURE: CPT

## 2018-11-22 PROCEDURE — 80048 BASIC METABOLIC PNL TOTAL CA: CPT

## 2018-11-22 PROCEDURE — 6360000002 HC RX W HCPCS: Performed by: INTERNAL MEDICINE

## 2018-11-22 PROCEDURE — 6370000000 HC RX 637 (ALT 250 FOR IP): Performed by: INTERNAL MEDICINE

## 2018-11-22 PROCEDURE — 2580000003 HC RX 258: Performed by: INTERNAL MEDICINE

## 2018-11-22 PROCEDURE — 94760 N-INVAS EAR/PLS OXIMETRY 1: CPT

## 2018-11-22 PROCEDURE — 94640 AIRWAY INHALATION TREATMENT: CPT

## 2018-11-22 PROCEDURE — 6370000000 HC RX 637 (ALT 250 FOR IP): Performed by: HOSPITALIST

## 2018-11-22 RX ORDER — FUROSEMIDE 20 MG/1
60 TABLET ORAL DAILY
Qty: 90 TABLET | Refills: 2 | Status: SHIPPED | OUTPATIENT
Start: 2018-11-22 | End: 2018-11-22

## 2018-11-22 RX ORDER — FUROSEMIDE 20 MG/1
60 TABLET ORAL DAILY
Qty: 90 TABLET | Refills: 2 | Status: SHIPPED | OUTPATIENT
Start: 2018-11-22 | End: 2019-03-18 | Stop reason: SDUPTHER

## 2018-11-22 RX ADMIN — NIFEDIPINE 30 MG: 30 TABLET, FILM COATED, EXTENDED RELEASE ORAL at 08:41

## 2018-11-22 RX ADMIN — CLOPIDOGREL BISULFATE 75 MG: 75 TABLET ORAL at 08:41

## 2018-11-22 RX ADMIN — ALLOPURINOL 100 MG: 100 TABLET ORAL at 08:41

## 2018-11-22 RX ADMIN — ASPIRIN 81 MG: 81 TABLET, COATED ORAL at 08:41

## 2018-11-22 RX ADMIN — FUROSEMIDE 60 MG: 10 INJECTION, SOLUTION INTRAMUSCULAR; INTRAVENOUS at 08:41

## 2018-11-22 RX ADMIN — Medication 10 ML: at 08:41

## 2018-11-22 RX ADMIN — INSULIN LISPRO 1 UNITS: 100 INJECTION, SOLUTION INTRAVENOUS; SUBCUTANEOUS at 12:54

## 2018-11-22 RX ADMIN — CARVEDILOL 12.5 MG: 6.25 TABLET, FILM COATED ORAL at 08:40

## 2018-11-22 RX ADMIN — IPRATROPIUM BROMIDE AND ALBUTEROL SULFATE 1 AMPULE: .5; 3 SOLUTION RESPIRATORY (INHALATION) at 16:01

## 2018-11-22 RX ADMIN — IPRATROPIUM BROMIDE AND ALBUTEROL SULFATE 1 AMPULE: .5; 3 SOLUTION RESPIRATORY (INHALATION) at 07:59

## 2018-11-22 NOTE — DISCHARGE INSTR - COC
filed at 18 1336   Gross per 24 hour   Intake              900 ml   Output             3250 ml   Net            -2350 ml     I/O last 3 completed shifts:   In: 900 [P.O.:900]  Out: 0 [Urine:3250]    Safety Concerns:     [address] Safety Concerns:755474580}    Impairments/Disabilities:      [address] Impairments/Disabilities:151040055}    Nutrition Therapy:  Current Nutrition Therapy:   [address] [facility] Diet List:670903822}    Routes of Feeding: {CHP DME Other Feedings:236781440}  Liquids: {Slp liquid thickness:37418}  Daily Fluid Restriction: {CHP DME Yes amt example:085256094}  Last Modified Barium Swallow with Video (Video Swallowing Test): {Done Not Done ZJXS:979497902}    Treatments at the Time of Hospital Discharge:   Respiratory Treatments: ***  Oxygen Therapy:  {Therapy; copd oxygen:36716}  Ventilator:    { CC Vent LCGM:299005556}    Rehab Therapies: {THERAPEUTIC INTERVENTION:9750486656}  Weight Bearing Status/Restrictions: [address]  Weight Bearin}  Other Medical Equipment (for information only, NOT a DME order):  {EQUIPMENT:322583732}  Other Treatments: ***    Patient's personal belongings (please select all that are sent with patient):  {P DME Belongings:670062744}    RN SIGNATURE:  {Esignature:162585364}    CASE MANAGEMENT/SOCIAL WORK SECTION    Inpatient Status Date: ***    Readmission Risk Assessment Score:  Readmission Risk              Risk of Unplanned Readmission:        16           Discharging to Facility/ Agency   · Name:   · Address:  · Phone:  · Fax:    Dialysis Facility (if applicable)   · Name:  · Address:  · Dialysis Schedule:  · Phone:  · Fax:    / signature: {Esignature:[id number]}    PHYSICIAN SECTION    Prognosis: {Prognosis:2637577460}    Condition at Discharge: [address] Patient Condition:343096114}    Rehab Potential (if transferring to Rehab): {Prognosis:6710165109}    Recommended Labs or Other Treatments After Discharge: ***    Physician Certification: I

## 2018-11-23 ENCOUNTER — CARE COORDINATION (OUTPATIENT)
Dept: CASE MANAGEMENT | Age: 78
End: 2018-11-23

## 2018-11-23 ENCOUNTER — TELEPHONE (OUTPATIENT)
Dept: PHARMACY | Facility: CLINIC | Age: 78
End: 2018-11-23

## 2018-11-23 DIAGNOSIS — I50.32 CHRONIC DIASTOLIC CHF (CONGESTIVE HEART FAILURE), NYHA CLASS 2 (HCC): Primary | ICD-10-CM

## 2018-11-23 PROCEDURE — 1111F DSCHRG MED/CURRENT MED MERGE: CPT

## 2018-11-23 NOTE — PROGRESS NOTES
Patient was able to urinate after removal of melvin catheter without any difficulty. Patient given verbal and written discharged instructions including follow-up appointments, med changes, and when to call the doctor. Patient verbalized understanding of instructions. Patient being discharged via wheelchair with all personal belongings with family member.

## 2018-11-27 ENCOUNTER — CARE COORDINATION (OUTPATIENT)
Dept: CASE MANAGEMENT | Age: 78
End: 2018-11-27

## 2018-11-27 NOTE — CARE COORDINATION
[facility] Transitions Follow Up Call    2018    Patient: Jesús Rogers  Patient : 1940   MRN: [id number]  Reason for Admission: There are no discharge diagnoses documented for the most recent discharge. Discharge Date: 18 RARS: Readmission Risk Score: 16       Spoke with: [address] Paramjit [facility] Transitions Subsequent and Final Call    Subsequent and Final Calls  Do you have any ongoing symptoms?:  No  Do you have any questions related to your medications?:  No  Do you currently have any active services?:  No  Are you currently active with any services?:  Home Health  Do you have any needs or concerns that I can assist you with?:  No  Identified Barriers:  None  Care Transitions Interventions  Other Interventions: Follow Up: Pt does not have any home care services and is not in need of that support at this time. Pt said her weight is still the same at #210. Pt denies any swelling in her feet or ankles and said her breathing is at its baseline and says she is getting around the house fine without any increase in shortness of breath. Pt has a follow up tomorrow and has a ride there. Pt says she has no questions or needs today and was appreciative of the call today.    Future Appointments  Date Time Provider Isidoro Black   2018 11:30 AM [facility]   2018 12:40 PM Suzette Bartholomew, APRN - CNP [facility]       Esther Jiménez RN

## 2018-11-28 ENCOUNTER — OFFICE VISIT (OUTPATIENT)
Dept: FAMILY MEDICINE CLINIC | Age: 78
End: 2018-11-28
Payer: MEDICARE

## 2018-11-28 VITALS
WEIGHT: 214.8 LBS | HEIGHT: 65 IN | DIASTOLIC BLOOD PRESSURE: 70 MMHG | SYSTOLIC BLOOD PRESSURE: 118 MMHG | BODY MASS INDEX: 35.79 KG/M2 | TEMPERATURE: 98 F

## 2018-11-28 DIAGNOSIS — I50.32 CHRONIC DIASTOLIC CHF (CONGESTIVE HEART FAILURE), NYHA CLASS 2 (HCC): Primary | ICD-10-CM

## 2018-11-28 PROCEDURE — G8598 ASA/ANTIPLAT THER USED: HCPCS | Performed by: INTERNAL MEDICINE

## 2018-11-28 PROCEDURE — 1090F PRES/ABSN URINE INCON ASSESS: CPT | Performed by: INTERNAL MEDICINE

## 2018-11-28 PROCEDURE — 1101F PT FALLS ASSESS-DOCD LE1/YR: CPT | Performed by: INTERNAL MEDICINE

## 2018-11-28 PROCEDURE — 1036F TOBACCO NON-USER: CPT | Performed by: INTERNAL MEDICINE

## 2018-11-28 PROCEDURE — G8510 SCR DEP NEG, NO PLAN REQD: HCPCS | Performed by: INTERNAL MEDICINE

## 2018-11-28 PROCEDURE — 1111F DSCHRG MED/CURRENT MED MERGE: CPT | Performed by: INTERNAL MEDICINE

## 2018-11-28 PROCEDURE — G8427 DOCREV CUR MEDS BY ELIG CLIN: HCPCS | Performed by: INTERNAL MEDICINE

## 2018-11-28 PROCEDURE — 4040F PNEUMOC VAC/ADMIN/RCVD: CPT | Performed by: INTERNAL MEDICINE

## 2018-11-28 PROCEDURE — G8482 FLU IMMUNIZE ORDER/ADMIN: HCPCS | Performed by: INTERNAL MEDICINE

## 2018-11-28 PROCEDURE — G8400 PT W/DXA NO RESULTS DOC: HCPCS | Performed by: INTERNAL MEDICINE

## 2018-11-28 PROCEDURE — 99213 OFFICE O/P EST LOW 20 MIN: CPT | Performed by: INTERNAL MEDICINE

## 2018-11-28 PROCEDURE — G8417 CALC BMI ABV UP PARAM F/U: HCPCS | Performed by: INTERNAL MEDICINE

## 2018-11-28 PROCEDURE — 1123F ACP DISCUSS/DSCN MKR DOCD: CPT | Performed by: INTERNAL MEDICINE

## 2018-11-28 ASSESSMENT — ENCOUNTER SYMPTOMS
SHORTNESS OF BREATH: 0
ABDOMINAL PAIN: 0
COUGH: 0
RHINORRHEA: 0
WHEEZING: 0
APNEA: 0
CONSTIPATION: 0
BLOOD IN STOOL: 0

## 2018-11-28 ASSESSMENT — PATIENT HEALTH QUESTIONNAIRE - PHQ9
2. FEELING DOWN, DEPRESSED OR HOPELESS: 0
SUM OF ALL RESPONSES TO PHQ QUESTIONS 1-9: 0
SUM OF ALL RESPONSES TO PHQ QUESTIONS 1-9: 0
SUM OF ALL RESPONSES TO PHQ9 QUESTIONS 1 & 2: 0
1. LITTLE INTEREST OR PLEASURE IN DOING THINGS: 0

## 2018-11-28 NOTE — PATIENT INSTRUCTIONS
Thank you for choosing [facility]. Please bring a current list of medications to every appointment. Please contact your pharmacy for any prescription refill(s) that you are requesting.

## 2018-11-28 NOTE — PROGRESS NOTES
Genitourinary: Negative for dysuria and frequency. Objective:   Physical Exam   Constitutional: She appears well-developed and well-nourished. HENT:   Head: Normocephalic and atraumatic. Right Ear: External ear normal.   Left Ear: External ear normal.   Eyes: Pupils are equal, round, and reactive to light. EOM are normal. Right eye exhibits no discharge. Left eye exhibits no discharge. Neck: No tracheal deviation present. No thyromegaly present. Cardiovascular: Normal rate. No murmur heard. Pulmonary/Chest: No respiratory distress. She has no wheezes. She has no rales. Abdominal: She exhibits no distension and no mass. There is no tenderness. There is no guarding. Assessment:        Diagnosis Orders   1.  Chronic diastolic CHF (congestive heart failure), NYHA class 2 (HCC)             Plan:      Cont present [facility] DO ISAC

## 2018-12-04 ENCOUNTER — CARE COORDINATION (OUTPATIENT)
Dept: CASE MANAGEMENT | Age: 78
End: 2018-12-04

## 2018-12-04 NOTE — CARE COORDINATION
[facility] Transitions Follow Up Call    2018    Patient: Juliane Night  Patient : 1940   MRN: [id number]  Reason for Admission: There are no discharge diagnoses documented for the most recent discharge. Discharge Date: 18 RARS: Readmission Risk Score: 16           Care Transitions Subsequent and Final Call    Subsequent and Final Calls  Care Transitions Interventions  Other Interventions: Follow Up : Final transition call attempted today, phone rang for an extended period of time without an answer, unable to leave a message. Will sign off for care transitions at this time. Pt had follow up visit with pcp last week.    Future Appointments  Date Time Provider Isidoro Black   2018 12:40 PM ESTELLE Ley - CNP [facility]       Cathy Santos RN

## 2018-12-12 ENCOUNTER — OFFICE VISIT (OUTPATIENT)
Dept: CARDIOLOGY CLINIC | Age: 78
End: 2018-12-12
Payer: MEDICARE

## 2018-12-12 VITALS
BODY MASS INDEX: 35.16 KG/M2 | WEIGHT: 211 LBS | OXYGEN SATURATION: 96 % | SYSTOLIC BLOOD PRESSURE: 136 MMHG | HEIGHT: 65 IN | DIASTOLIC BLOOD PRESSURE: 84 MMHG | HEART RATE: 60 BPM

## 2018-12-12 DIAGNOSIS — I10 ESSENTIAL HYPERTENSION: ICD-10-CM

## 2018-12-12 DIAGNOSIS — I25.10 CORONARY ARTERY DISEASE INVOLVING NATIVE CORONARY ARTERY OF NATIVE HEART WITHOUT ANGINA PECTORIS: ICD-10-CM

## 2018-12-12 DIAGNOSIS — E78.5 HYPERLIPIDEMIA LDL GOAL <70: ICD-10-CM

## 2018-12-12 DIAGNOSIS — N18.30 CKD (CHRONIC KIDNEY DISEASE) STAGE 3, GFR 30-59 ML/MIN (HCC): ICD-10-CM

## 2018-12-12 DIAGNOSIS — I50.32 CHRONIC DIASTOLIC HF (HEART FAILURE) (HCC): Primary | ICD-10-CM

## 2018-12-12 LAB
EKG ATRIAL RATE: 74 BPM
EKG DIAGNOSIS: NORMAL
EKG Q-T INTERVAL: 412 MS
EKG QRS DURATION: 78 MS
EKG QTC CALCULATION (BAZETT): 451 MS
EKG R AXIS: -22 DEGREES
EKG T AXIS: 80 DEGREES
EKG VENTRICULAR RATE: 72 BPM

## 2018-12-12 PROCEDURE — 1101F PT FALLS ASSESS-DOCD LE1/YR: CPT | Performed by: NURSE PRACTITIONER

## 2018-12-12 PROCEDURE — 99214 OFFICE O/P EST MOD 30 MIN: CPT | Performed by: NURSE PRACTITIONER

## 2018-12-12 PROCEDURE — 1036F TOBACCO NON-USER: CPT | Performed by: NURSE PRACTITIONER

## 2018-12-12 PROCEDURE — G8482 FLU IMMUNIZE ORDER/ADMIN: HCPCS | Performed by: NURSE PRACTITIONER

## 2018-12-12 PROCEDURE — G8598 ASA/ANTIPLAT THER USED: HCPCS | Performed by: NURSE PRACTITIONER

## 2018-12-12 PROCEDURE — G8417 CALC BMI ABV UP PARAM F/U: HCPCS | Performed by: NURSE PRACTITIONER

## 2018-12-12 PROCEDURE — 1123F ACP DISCUSS/DSCN MKR DOCD: CPT | Performed by: NURSE PRACTITIONER

## 2018-12-12 PROCEDURE — G8427 DOCREV CUR MEDS BY ELIG CLIN: HCPCS | Performed by: NURSE PRACTITIONER

## 2018-12-12 PROCEDURE — 4040F PNEUMOC VAC/ADMIN/RCVD: CPT | Performed by: NURSE PRACTITIONER

## 2018-12-12 PROCEDURE — 1090F PRES/ABSN URINE INCON ASSESS: CPT | Performed by: NURSE PRACTITIONER

## 2018-12-12 PROCEDURE — G8400 PT W/DXA NO RESULTS DOC: HCPCS | Performed by: NURSE PRACTITIONER

## 2018-12-12 PROCEDURE — 1111F DSCHRG MED/CURRENT MED MERGE: CPT | Performed by: NURSE PRACTITIONER

## 2018-12-12 RX ORDER — LEVOTHYROXINE SODIUM 137 UG/1
137 TABLET ORAL DAILY
COMMUNITY
End: 2018-12-18 | Stop reason: SDUPTHER

## 2018-12-12 RX ORDER — LEVOTHYROXINE SODIUM 137 UG/1
TABLET ORAL
Qty: 30 TABLET | Refills: 2 | Status: SHIPPED | OUTPATIENT
Start: 2018-12-12 | End: 2019-04-17 | Stop reason: SDUPTHER

## 2018-12-12 RX ORDER — NIFEDIPINE 30 MG/1
30 TABLET, FILM COATED, EXTENDED RELEASE ORAL DAILY
Qty: 90 TABLET | Refills: 2 | Status: SHIPPED | OUTPATIENT
Start: 2018-12-12 | End: 2019-03-18 | Stop reason: SDUPTHER

## 2018-12-12 NOTE — PROGRESS NOTES
hematuria or dysuria. Skin: Denies rash, hives, or cyanosis  Musculoskeletal: Denies joint or muscle aches/pain  Neurological: Denies syncope or TIA-like symptoms.   Psychiatric: Denies anxiety, insomnia or depression     Past Medical History:   Diagnosis Date    CAD (coronary artery disease)     [facility]est[facility]) [facility])     DM2 (diabetes mellitus, type 2) ([facility])     HTN (hypertension)     Hypothyroidism     MI (mitral incompetence)     Over weight     [facility]     PVD (peripheral vascular disease) ([facility]     Uterine cancer ([facility]      Past Surgical History:   Procedure Laterality Date    CATARACT REMOVAL WITH IMPLANT Bilateral     CORONARY ANGIOPLASTY WITH STENT PLACEMENT Right 9/9/2015    At [facility] WITH STENT PLACEMENT  01/2018    ILANA to RCA and POBA on ISR of LAD    HERNIA REPAIR       Family History   Problem Relation Age of Onset    Diabetes Mother     Heart Disease Father     Cancer Sister         uterine    Heart Disease Maternal Grandmother     Heart Disease Maternal Grandfather     Heart Disease Paternal Grandmother     Heart Disease Paternal Grandfather      Social History   Substance Use Topics    Smoking status: Former Smoker     Packs/day: 0.70     Years: 40.00     Types: Cigarettes     Quit date: 2/1/2004    Smokeless tobacco: Never Used    Alcohol use No       Allergies   Allergen Reactions    Rosuvastatin      Leg cramps       Current Outpatient Prescriptions   Medication Sig Dispense Refill    levothyroxine (SYNTHROID) 137 MCG tablet Take 137 mcg by mouth Daily      furosemide (LASIX) 20 MG tablet Take 3 tablets by mouth daily 90 tablet 2    carvedilol (COREG) 12.5 MG tablet Take 12.5 mg by mouth 2 times daily (with meals)      NIFEdipine (ADALAT CC) 30 MG extended release tablet Take 1 tablet by mouth daily 30 tablet 3    allopurinol (ZYLOPRIM) 100 MG tablet Take one tablet by mouth once daily 90 tablet 1    glimepiride 25 11/22/2018    BUN 47 11/22/2018    CREATININE 2.0 11/22/2018    GLUCOSE 87 11/22/2018    CALCIUM 8.9 11/22/2018      Lab Results   Component Value Date    WBC 12.5 (H) 11/20/2018    HGB 12.7 11/20/2018    HCT 38.9 11/20/2018    MCV 96.6 11/20/2018     11/20/2018     Echo 11/21/2018:  Suboptimal image quality.   Left ventricular cavity size is normal.   Normal left ventricular wall thickness.   Regional wall motion abnormalities cannot be determined on this study.   Ejection fraction is visually estimated to be 50%.   Indeterminate diastolic function.   Mitral valve leaflets appear mildly thickened.   Mild mitral annular calcification.   No evidence of mitral regurgitation nor stenosis.   Aortic valve leaflets not well visualized.   No evidence of aortic valve regurgitation.   Normal right ventricular size and function.   TAPSE 1.9 cm    ECG 9/20/2018 (PM):   AIVR with RBBB; L axis deviation; bifasicular block  (prior ECG's show SR)     Angiogram 9/20/2018:  1.  Left main is normal.  CHEMO 3 flow.  No stenosis. 2.  Left anterior descending artery comes from the left main coronary.  Mid portion has 50% stenosis with a fractional flow reserve of 0.84.  Diagonal branch has a 75% stenosis. 3.  Left circumflex comes from the left main coronary.  Obtuse marginal branches are patent.  Mid portion has 99% stenosis. 4.  Right coronary comes from the right coronary cuff, patent stents, and is a right dominant system.     LVEDP was 20 mmHg.     In summary, balloon angioplasty of the mid left circumflex only. 1/14/2018 Cardiac cath/PCI:  POBA of ISR to LAD and ostium of Dg; ILANA x 3 to RCA     Echo 1/12/2018:  Summary  Normal left ventricle size, wall thickness and systolic function with an  estimated ejection fraction of 50-55%. No regional wall motion abnormalities are seen. Mild aortic valve sclerosis without any evidence of aortic stenosis. Assessment:    1.  Chronic diastolic HF (heart failure)

## 2018-12-18 ENCOUNTER — OFFICE VISIT (OUTPATIENT)
Dept: FAMILY MEDICINE CLINIC | Age: 78
End: 2018-12-18
Payer: MEDICARE

## 2018-12-18 VITALS
TEMPERATURE: 98.3 F | DIASTOLIC BLOOD PRESSURE: 72 MMHG | WEIGHT: 207.2 LBS | BODY MASS INDEX: 34.52 KG/M2 | HEIGHT: 65 IN | SYSTOLIC BLOOD PRESSURE: 126 MMHG

## 2018-12-18 DIAGNOSIS — J01.90 ACUTE BACTERIAL SINUSITIS: ICD-10-CM

## 2018-12-18 DIAGNOSIS — B96.89 ACUTE BACTERIAL SINUSITIS: ICD-10-CM

## 2018-12-18 PROCEDURE — G8400 PT W/DXA NO RESULTS DOC: HCPCS | Performed by: INTERNAL MEDICINE

## 2018-12-18 PROCEDURE — G8598 ASA/ANTIPLAT THER USED: HCPCS | Performed by: INTERNAL MEDICINE

## 2018-12-18 PROCEDURE — 1111F DSCHRG MED/CURRENT MED MERGE: CPT | Performed by: INTERNAL MEDICINE

## 2018-12-18 PROCEDURE — 1101F PT FALLS ASSESS-DOCD LE1/YR: CPT | Performed by: INTERNAL MEDICINE

## 2018-12-18 PROCEDURE — G8482 FLU IMMUNIZE ORDER/ADMIN: HCPCS | Performed by: INTERNAL MEDICINE

## 2018-12-18 PROCEDURE — G8427 DOCREV CUR MEDS BY ELIG CLIN: HCPCS | Performed by: INTERNAL MEDICINE

## 2018-12-18 PROCEDURE — G8417 CALC BMI ABV UP PARAM F/U: HCPCS | Performed by: INTERNAL MEDICINE

## 2018-12-18 PROCEDURE — 4040F PNEUMOC VAC/ADMIN/RCVD: CPT | Performed by: INTERNAL MEDICINE

## 2018-12-18 PROCEDURE — 99213 OFFICE O/P EST LOW 20 MIN: CPT | Performed by: INTERNAL MEDICINE

## 2018-12-18 PROCEDURE — 1090F PRES/ABSN URINE INCON ASSESS: CPT | Performed by: INTERNAL MEDICINE

## 2018-12-18 PROCEDURE — 1036F TOBACCO NON-USER: CPT | Performed by: INTERNAL MEDICINE

## 2018-12-18 PROCEDURE — 1123F ACP DISCUSS/DSCN MKR DOCD: CPT | Performed by: INTERNAL MEDICINE

## 2018-12-18 RX ORDER — CEFUROXIME AXETIL 250 MG/1
250 TABLET ORAL 2 TIMES DAILY
Qty: 20 TABLET | Refills: 0 | Status: SHIPPED | OUTPATIENT
Start: 2018-12-18 | End: 2018-12-28

## 2018-12-18 ASSESSMENT — PATIENT HEALTH QUESTIONNAIRE - PHQ9
1. LITTLE INTEREST OR PLEASURE IN DOING THINGS: 0
SUM OF ALL RESPONSES TO PHQ QUESTIONS 1-9: 0
SUM OF ALL RESPONSES TO PHQ9 QUESTIONS 1 & 2: 0
2. FEELING DOWN, DEPRESSED OR HOPELESS: 0
SUM OF ALL RESPONSES TO PHQ QUESTIONS 1-9: 0

## 2018-12-18 ASSESSMENT — ENCOUNTER SYMPTOMS
ABDOMINAL PAIN: 0
COUGH: 1
SINUS PRESSURE: 1
SINUS PAIN: 1
APNEA: 0

## 2018-12-18 NOTE — PROGRESS NOTES
Subjective:      Patient ID: Daina Menendez is a 66 y.o. female. HPI   Chief Complaint   Patient presents with    Sinusitis     patient c/o sore throat, head congestion, sinus drainage, productive cough since 12/13/2018.   patient denies fever     Daina Menendez is a 66 y.o. female with the following history as recorded in [facility]:  Patient Active Problem List    Diagnosis Date Noted    Accelerated ventricular rhythm ([facility] 09/21/2018     Priority: High    Acute diastolic CHF (congestive heart failure) ([facility] 11/20/2018    Musculoskeletal back pain 09/22/2018    Uncontrolled hypertension 09/22/2018    Unstable angina ([facility])     Acute bacterial sinusitis 02/28/2018    CKD (chronic kidney disease) stage 3, GFR 30-59 ml/min ([facility]) 01/11/2018    Acute kidney injury ([facility] 01/11/2018    Unstable angina pectoris ([facility]     Coronary artery disease involving native coronary artery of native heart with unstable angina pectoris ([facility]     Chest pain 01/09/2018    Acute bacterial sinusitis 12/12/2017    NSTEMI (non-ST elevated myocardial infarction) ([facility]     Hyperlipidemia LDL goal <70     Essential hypertension 01/14/2016    Diabetes mellitus ([facility] 10/10/2013    Hypothyroidism     MI (mitral incompetence)     Pulmonary hypertension ([facility])     Chronic diastolic CHF (congestive heart failure), NYHA class 2 ([facility]) 09/06/2012     Current Outpatient Prescriptions   Medication Sig Dispense Refill    levothyroxine (SYNTHROID) 137 MCG tablet TAKE 1 TABLET BY MOUTH ONCE DAILY 30 tablet 2    NIFEdipine (ADALAT CC) 30 MG extended release tablet Take 1 tablet by mouth daily 90 tablet 2    furosemide (LASIX) 20 MG tablet Take 3 tablets by mouth daily 90 tablet 2    carvedilol (COREG) 12.5 MG tablet Take 12.5 mg by mouth 2 times daily (with meals)      allopurinol (ZYLOPRIM) 100 MG tablet Take one tablet by mouth once daily 90 tablet 1    glimepiride (AMARYL) 4 MG tablet Take 1 tablet by mouth 2 times daily (before

## 2019-01-25 RX ORDER — GLIMEPIRIDE 4 MG/1
TABLET ORAL
Qty: 180 TABLET | Refills: 0 | Status: SHIPPED | OUTPATIENT
Start: 2019-01-25 | End: 2019-05-13 | Stop reason: SDUPTHER

## 2019-01-28 RX ORDER — CLOPIDOGREL BISULFATE 75 MG/1
75 TABLET ORAL DAILY
Qty: 90 TABLET | Refills: 3 | Status: SHIPPED | OUTPATIENT
Start: 2019-01-28 | End: 2019-03-18 | Stop reason: SDUPTHER

## 2019-02-04 ENCOUNTER — HOSPITAL ENCOUNTER (EMERGENCY)
Age: 79
Discharge: HOME | End: 2019-02-04
Attending: EMERGENCY MEDICINE
Payer: MEDICARE

## 2019-02-04 ENCOUNTER — APPOINTMENT (OUTPATIENT)
Dept: GENERAL RADIOLOGY | Age: 79
End: 2019-02-04
Payer: MEDICARE

## 2019-02-04 VITALS
OXYGEN SATURATION: 99 % | TEMPERATURE: 98 F | HEIGHT: 66 IN | RESPIRATION RATE: 19 BRPM | HEART RATE: 52 BPM | DIASTOLIC BLOOD PRESSURE: 68 MMHG | BODY MASS INDEX: 33.41 KG/M2 | SYSTOLIC BLOOD PRESSURE: 162 MMHG | WEIGHT: 207.89 LBS

## 2019-02-04 DIAGNOSIS — S63.92XA SPRAIN OF LEFT HAND, INITIAL ENCOUNTER: Primary | ICD-10-CM

## 2019-02-04 PROCEDURE — 73130 X-RAY EXAM OF HAND: CPT

## 2019-02-04 PROCEDURE — 99283 EMERGENCY DEPT VISIT LOW MDM: CPT

## 2019-02-04 ASSESSMENT — PAIN DESCRIPTION - LOCATION: LOCATION: HAND

## 2019-02-04 ASSESSMENT — ENCOUNTER SYMPTOMS
NAUSEA: 0
DIARRHEA: 0
VOMITING: 0
EYE PAIN: 0
SORE THROAT: 0
RHINORRHEA: 0
SHORTNESS OF BREATH: 0
WHEEZING: 0
BACK PAIN: 0
COUGH: 0
EYE DISCHARGE: 0
ABDOMINAL PAIN: 0

## 2019-02-04 ASSESSMENT — PAIN SCALES - GENERAL: PAINLEVEL_OUTOF10: 1

## 2019-02-04 ASSESSMENT — PAIN DESCRIPTION - ORIENTATION: ORIENTATION: LEFT

## 2019-02-20 ENCOUNTER — TELEPHONE (OUTPATIENT)
Dept: FAMILY MEDICINE CLINIC | Age: 79
End: 2019-02-20

## 2019-02-20 RX ORDER — ALLOPURINOL 100 MG/1
TABLET ORAL
Qty: 90 TABLET | Refills: 1 | Status: SHIPPED | OUTPATIENT
Start: 2019-02-20 | End: 2019-08-29 | Stop reason: SDUPTHER

## 2019-03-18 ENCOUNTER — OFFICE VISIT (OUTPATIENT)
Dept: CARDIOLOGY CLINIC | Age: 79
End: 2019-03-18
Payer: MEDICARE

## 2019-03-18 VITALS
WEIGHT: 207 LBS | OXYGEN SATURATION: 94 % | DIASTOLIC BLOOD PRESSURE: 64 MMHG | BODY MASS INDEX: 34.49 KG/M2 | HEART RATE: 54 BPM | HEIGHT: 65 IN | SYSTOLIC BLOOD PRESSURE: 138 MMHG

## 2019-03-18 DIAGNOSIS — I10 ESSENTIAL HYPERTENSION: ICD-10-CM

## 2019-03-18 DIAGNOSIS — E78.5 HYPERLIPIDEMIA LDL GOAL <70: ICD-10-CM

## 2019-03-18 DIAGNOSIS — I25.10 CORONARY ARTERY DISEASE INVOLVING NATIVE CORONARY ARTERY OF NATIVE HEART WITHOUT ANGINA PECTORIS: ICD-10-CM

## 2019-03-18 DIAGNOSIS — I50.32 CHRONIC DIASTOLIC HF (HEART FAILURE) (HCC): Primary | ICD-10-CM

## 2019-03-18 DIAGNOSIS — N18.30 CKD (CHRONIC KIDNEY DISEASE) STAGE 3, GFR 30-59 ML/MIN (HCC): ICD-10-CM

## 2019-03-18 PROCEDURE — 4040F PNEUMOC VAC/ADMIN/RCVD: CPT | Performed by: NURSE PRACTITIONER

## 2019-03-18 PROCEDURE — G8400 PT W/DXA NO RESULTS DOC: HCPCS | Performed by: NURSE PRACTITIONER

## 2019-03-18 PROCEDURE — G8598 ASA/ANTIPLAT THER USED: HCPCS | Performed by: NURSE PRACTITIONER

## 2019-03-18 PROCEDURE — G8482 FLU IMMUNIZE ORDER/ADMIN: HCPCS | Performed by: NURSE PRACTITIONER

## 2019-03-18 PROCEDURE — G8417 CALC BMI ABV UP PARAM F/U: HCPCS | Performed by: NURSE PRACTITIONER

## 2019-03-18 PROCEDURE — 1036F TOBACCO NON-USER: CPT | Performed by: NURSE PRACTITIONER

## 2019-03-18 PROCEDURE — 1123F ACP DISCUSS/DSCN MKR DOCD: CPT | Performed by: NURSE PRACTITIONER

## 2019-03-18 PROCEDURE — 1090F PRES/ABSN URINE INCON ASSESS: CPT | Performed by: NURSE PRACTITIONER

## 2019-03-18 PROCEDURE — G8427 DOCREV CUR MEDS BY ELIG CLIN: HCPCS | Performed by: NURSE PRACTITIONER

## 2019-03-18 PROCEDURE — 99214 OFFICE O/P EST MOD 30 MIN: CPT | Performed by: NURSE PRACTITIONER

## 2019-03-18 PROCEDURE — 1101F PT FALLS ASSESS-DOCD LE1/YR: CPT | Performed by: NURSE PRACTITIONER

## 2019-03-18 RX ORDER — CLOPIDOGREL BISULFATE 75 MG/1
75 TABLET ORAL DAILY
Qty: 90 TABLET | Refills: 1 | Status: SHIPPED | OUTPATIENT
Start: 2019-03-18 | End: 2019-11-04 | Stop reason: SDUPTHER

## 2019-03-18 RX ORDER — CARVEDILOL 12.5 MG/1
12.5 TABLET ORAL 2 TIMES DAILY WITH MEALS
Qty: 180 TABLET | Refills: 1 | Status: SHIPPED | OUTPATIENT
Start: 2019-03-18 | End: 2019-11-10 | Stop reason: SDUPTHER

## 2019-03-18 RX ORDER — FUROSEMIDE 20 MG/1
60 TABLET ORAL DAILY
Qty: 270 TABLET | Refills: 1 | Status: ON HOLD | OUTPATIENT
Start: 2019-03-18 | End: 2019-04-15 | Stop reason: SDUPTHER

## 2019-03-18 RX ORDER — NIFEDIPINE 30 MG/1
30 TABLET, FILM COATED, EXTENDED RELEASE ORAL DAILY
Qty: 90 TABLET | Refills: 1 | Status: SHIPPED
Start: 2019-03-18 | End: 2020-05-11 | Stop reason: SDUPTHER

## 2019-04-11 ENCOUNTER — APPOINTMENT (OUTPATIENT)
Dept: GENERAL RADIOLOGY | Age: 79
DRG: 291 | End: 2019-04-11
Payer: MEDICARE

## 2019-04-11 ENCOUNTER — HOSPITAL ENCOUNTER (INPATIENT)
Age: 79
LOS: 4 days | Discharge: HOME | DRG: 291 | End: 2019-04-15
Attending: EMERGENCY MEDICINE | Admitting: INTERNAL MEDICINE
Payer: MEDICARE

## 2019-04-11 DIAGNOSIS — N17.9 ACUTE KIDNEY INJURY (HCC): ICD-10-CM

## 2019-04-11 DIAGNOSIS — R09.02 HYPOXIA: ICD-10-CM

## 2019-04-11 DIAGNOSIS — J81.0 ACUTE PULMONARY EDEMA (HCC): Primary | ICD-10-CM

## 2019-04-11 DIAGNOSIS — I50.32 CHRONIC DIASTOLIC HF (HEART FAILURE) (HCC): ICD-10-CM

## 2019-04-11 DIAGNOSIS — N18.9 CHRONIC KIDNEY DISEASE, UNSPECIFIED CKD STAGE: ICD-10-CM

## 2019-04-11 PROBLEM — I50.9 ADMITTED FOR ACUTE CONGESTIVE HEART FAILURE (HCC): Status: ACTIVE | Noted: 2019-04-11

## 2019-04-11 PROBLEM — I50.30 DIASTOLIC CHF (HCC): Status: ACTIVE | Noted: 2019-04-11

## 2019-04-11 LAB
A/G RATIO: 0.9 (ref 1.1–2.2)
ALBUMIN SERPL-MCNC: 3.7 G/DL (ref 3.4–5)
ALP BLD-CCNC: 136 U/L (ref 40–129)
ALT SERPL-CCNC: 9 U/L (ref 10–40)
ANION GAP SERPL CALCULATED.3IONS-SCNC: 13 MMOL/L (ref 3–16)
AST SERPL-CCNC: 9 U/L (ref 15–37)
BASOPHILS ABSOLUTE: 0.1 K/UL (ref 0–0.2)
BASOPHILS RELATIVE PERCENT: 0.8 %
BILIRUB SERPL-MCNC: 0.6 MG/DL (ref 0–1)
BUN BLDV-MCNC: 36 MG/DL (ref 7–20)
CALCIUM SERPL-MCNC: 9.2 MG/DL (ref 8.3–10.6)
CHLORIDE BLD-SCNC: 103 MMOL/L (ref 99–110)
CO2: 25 MMOL/L (ref 21–32)
CREAT SERPL-MCNC: 1.8 MG/DL (ref 0.6–1.2)
EOSINOPHILS ABSOLUTE: 0.1 K/UL (ref 0–0.6)
EOSINOPHILS RELATIVE PERCENT: 1 %
GFR AFRICAN AMERICAN: 33
GFR NON-AFRICAN AMERICAN: 27
GLOBULIN: 3.9 G/DL
GLUCOSE BLD-MCNC: 167 MG/DL (ref 70–99)
HCT VFR BLD CALC: 38.8 % (ref 36–48)
HEMOGLOBIN: 12.6 G/DL (ref 12–16)
LYMPHOCYTES ABSOLUTE: 1.4 K/UL (ref 1–5.1)
LYMPHOCYTES RELATIVE PERCENT: 12.6 %
MCH RBC QN AUTO: 30.3 PG (ref 26–34)
MCHC RBC AUTO-ENTMCNC: 32.5 G/DL (ref 31–36)
MCV RBC AUTO: 93.3 FL (ref 80–100)
MONOCYTES ABSOLUTE: 1.2 K/UL (ref 0–1.3)
MONOCYTES RELATIVE PERCENT: 10.7 %
NEUTROPHILS ABSOLUTE: 8.2 K/UL (ref 1.7–7.7)
NEUTROPHILS RELATIVE PERCENT: 74.9 %
PDW BLD-RTO: 14.3 % (ref 12.4–15.4)
PLATELET # BLD: 242 K/UL (ref 135–450)
PMV BLD AUTO: 7.7 FL (ref 5–10.5)
POTASSIUM REFLEX MAGNESIUM: 4.4 MMOL/L (ref 3.5–5.1)
PRO-BNP: 1468 PG/ML (ref 0–449)
RBC # BLD: 4.16 M/UL (ref 4–5.2)
SODIUM BLD-SCNC: 141 MMOL/L (ref 136–145)
TOTAL PROTEIN: 7.6 G/DL (ref 6.4–8.2)
TROPONIN: <0.01 NG/ML
TROPONIN: <0.01 NG/ML
WBC # BLD: 11 K/UL (ref 4–11)

## 2019-04-11 PROCEDURE — 36415 COLL VENOUS BLD VENIPUNCTURE: CPT

## 2019-04-11 PROCEDURE — 1200000000 HC SEMI PRIVATE

## 2019-04-11 PROCEDURE — 96374 THER/PROPH/DIAG INJ IV PUSH: CPT

## 2019-04-11 PROCEDURE — 83880 ASSAY OF NATRIURETIC PEPTIDE: CPT

## 2019-04-11 PROCEDURE — G0378 HOSPITAL OBSERVATION PER HR: HCPCS

## 2019-04-11 PROCEDURE — 93005 ELECTROCARDIOGRAM TRACING: CPT | Performed by: EMERGENCY MEDICINE

## 2019-04-11 PROCEDURE — 84484 ASSAY OF TROPONIN QUANT: CPT

## 2019-04-11 PROCEDURE — 6370000000 HC RX 637 (ALT 250 FOR IP): Performed by: EMERGENCY MEDICINE

## 2019-04-11 PROCEDURE — 6360000002 HC RX W HCPCS: Performed by: EMERGENCY MEDICINE

## 2019-04-11 PROCEDURE — 99285 EMERGENCY DEPT VISIT HI MDM: CPT

## 2019-04-11 PROCEDURE — 2580000003 HC RX 258: Performed by: INTERNAL MEDICINE

## 2019-04-11 PROCEDURE — 93010 ELECTROCARDIOGRAM REPORT: CPT | Performed by: INTERNAL MEDICINE

## 2019-04-11 PROCEDURE — 85025 COMPLETE CBC W/AUTO DIFF WBC: CPT

## 2019-04-11 PROCEDURE — 6360000002 HC RX W HCPCS: Performed by: INTERNAL MEDICINE

## 2019-04-11 PROCEDURE — 71045 X-RAY EXAM CHEST 1 VIEW: CPT

## 2019-04-11 PROCEDURE — 80053 COMPREHEN METABOLIC PANEL: CPT

## 2019-04-11 RX ORDER — CLOPIDOGREL BISULFATE 75 MG/1
75 TABLET ORAL DAILY
Status: DISCONTINUED | OUTPATIENT
Start: 2019-04-12 | End: 2019-04-15 | Stop reason: HOSPADM

## 2019-04-11 RX ORDER — LEVOTHYROXINE SODIUM 137 UG/1
1 TABLET ORAL DAILY
Status: DISCONTINUED | OUTPATIENT
Start: 2019-04-11 | End: 2019-04-11 | Stop reason: RX

## 2019-04-11 RX ORDER — SODIUM CHLORIDE 0.9 % (FLUSH) 0.9 %
10 SYRINGE (ML) INJECTION EVERY 12 HOURS SCHEDULED
Status: DISCONTINUED | OUTPATIENT
Start: 2019-04-11 | End: 2019-04-15 | Stop reason: HOSPADM

## 2019-04-11 RX ORDER — ASPIRIN 81 MG/1
81 TABLET ORAL DAILY
Status: DISCONTINUED | OUTPATIENT
Start: 2019-04-12 | End: 2019-04-15 | Stop reason: HOSPADM

## 2019-04-11 RX ORDER — CARVEDILOL 12.5 MG/1
12.5 TABLET ORAL 2 TIMES DAILY WITH MEALS
Status: DISCONTINUED | OUTPATIENT
Start: 2019-04-11 | End: 2019-04-15 | Stop reason: HOSPADM

## 2019-04-11 RX ORDER — IPRATROPIUM BROMIDE AND ALBUTEROL SULFATE 2.5; .5 MG/3ML; MG/3ML
1 SOLUTION RESPIRATORY (INHALATION) ONCE
Status: COMPLETED | OUTPATIENT
Start: 2019-04-11 | End: 2019-04-11

## 2019-04-11 RX ORDER — SODIUM CHLORIDE 0.9 % (FLUSH) 0.9 %
10 SYRINGE (ML) INJECTION PRN
Status: DISCONTINUED | OUTPATIENT
Start: 2019-04-11 | End: 2019-04-15 | Stop reason: HOSPADM

## 2019-04-11 RX ORDER — ACETAMINOPHEN 325 MG/1
650 TABLET ORAL EVERY 4 HOURS PRN
Status: DISCONTINUED | OUTPATIENT
Start: 2019-04-11 | End: 2019-04-15 | Stop reason: HOSPADM

## 2019-04-11 RX ORDER — HEPARIN SODIUM 5000 [USP'U]/ML
5000 INJECTION, SOLUTION INTRAVENOUS; SUBCUTANEOUS EVERY 8 HOURS SCHEDULED
Status: DISCONTINUED | OUTPATIENT
Start: 2019-04-11 | End: 2019-04-15 | Stop reason: HOSPADM

## 2019-04-11 RX ORDER — LEVOTHYROXINE SODIUM 112 UG/1
112 TABLET ORAL DAILY
Status: DISCONTINUED | OUTPATIENT
Start: 2019-04-12 | End: 2019-04-15 | Stop reason: HOSPADM

## 2019-04-11 RX ORDER — FUROSEMIDE 10 MG/ML
80 INJECTION INTRAMUSCULAR; INTRAVENOUS ONCE
Status: COMPLETED | OUTPATIENT
Start: 2019-04-11 | End: 2019-04-11

## 2019-04-11 RX ORDER — LEVOTHYROXINE SODIUM 0.03 MG/1
25 TABLET ORAL DAILY
Status: DISCONTINUED | OUTPATIENT
Start: 2019-04-12 | End: 2019-04-15 | Stop reason: HOSPADM

## 2019-04-11 RX ORDER — FUROSEMIDE 10 MG/ML
60 INJECTION INTRAMUSCULAR; INTRAVENOUS 2 TIMES DAILY
Status: DISCONTINUED | OUTPATIENT
Start: 2019-04-11 | End: 2019-04-13

## 2019-04-11 RX ORDER — ONDANSETRON 2 MG/ML
4 INJECTION INTRAMUSCULAR; INTRAVENOUS EVERY 6 HOURS PRN
Status: DISCONTINUED | OUTPATIENT
Start: 2019-04-11 | End: 2019-04-15 | Stop reason: HOSPADM

## 2019-04-11 RX ORDER — NIFEDIPINE 30 MG/1
30 TABLET, EXTENDED RELEASE ORAL DAILY
Status: DISCONTINUED | OUTPATIENT
Start: 2019-04-12 | End: 2019-04-15 | Stop reason: HOSPADM

## 2019-04-11 RX ADMIN — FUROSEMIDE 80 MG: 10 INJECTION, SOLUTION INTRAMUSCULAR; INTRAVENOUS at 12:17

## 2019-04-11 RX ADMIN — Medication 10 ML: at 20:28

## 2019-04-11 RX ADMIN — HEPARIN SODIUM 5000 UNITS: 5000 INJECTION INTRAVENOUS; SUBCUTANEOUS at 20:28

## 2019-04-11 RX ADMIN — FUROSEMIDE 60 MG: 10 INJECTION, SOLUTION INTRAMUSCULAR; INTRAVENOUS at 20:27

## 2019-04-11 RX ADMIN — IPRATROPIUM BROMIDE AND ALBUTEROL SULFATE 1 AMPULE: .5; 3 SOLUTION RESPIRATORY (INHALATION) at 12:09

## 2019-04-11 RX ADMIN — NITROGLYCERIN 2 INCH: 20 OINTMENT TOPICAL at 12:16

## 2019-04-11 ASSESSMENT — PAIN SCALES - GENERAL
PAINLEVEL_OUTOF10: 0

## 2019-04-11 NOTE — ED NOTES
Fall risk screening completed. Fall risk bracelet applied to patient. Non-skid socks provided and placed on patient. The fall risk is indicated using  fall sign . Based on score, a bed alarm was not indicated. The call light is within the patient's reach, and instructions for use were provided. The bed is in the lowest position with wheels locked. The patient has been advised to notify staff, using the call light, if there is a need to get up or use restroom. The patient verbalized understanding of safety precautions and how to contact staff for assistance.         Natasha Maloney RN  04/11/19 [id number]

## 2019-04-11 NOTE — ED NOTES
Transfer center contacted , spoke with Bienvenido Swann.  And she said room is dirty and will be ready in 1 hour     Nevaeh Leonardo RN  04/11/19 0163

## 2019-04-11 NOTE — ED NOTES
Patient placed on 2 liters of Oxygen per nasal cannula due to SpO2 staying at 89 % while she is asleep. Respiration is easy and full , denies c/o, no chest pain no SHORTNESS OF BREATH.  Awaits transport     Masoud Sampson RN  04/11/19 5776

## 2019-04-11 NOTE — H&P
Hospital Medicine History & Physical      PCP: Kari Valadez DO    Date of Admission: 4/11/2019    Date of Service: Pt seen/examined on 4/11 and Admitted to Inpatient with expected LOS greater than two midnights due to medical therapy. Chief Complaint:  Dyspnea      History Of Present Illness:     66 y.o. female with PMH of diastolic CHF, CKD, pulmonary htn, CAD, DM, htn, hypothyroidism, PVD who presents with dyspnea. Has been having progressively worsening symptoms over the past 3-4 days. Dyspnea exacerbated with exertion. Denies chest pain, fever, chills, cough, LE calf pain or erythema, gross bleeding. Lasix medication recently titrated down as outpt from lasix 80 bid to 60 qday for worsening Cr. Past Medical History:          Diagnosis Date    CAD (coronary artery disease)     CHF (congestive heart failure) (HCC)     DM2 (diabetes mellitus, type 2) (HCC)     HTN (hypertension)     Hypothyroidism     MI (mitral incompetence)     Over weight     Pulmonary HTN ([address]     PVD (peripheral vascular disease) ([address]     Uterine cancer ([address]        Past Surgical History:          Procedure Laterality Date    CATARACT REMOVAL WITH IMPLANT Bilateral     CORONARY ANGIOPLASTY WITH STENT PLACEMENT Right 9/9/2015    At [facility] WITH STENT PLACEMENT  01/2018    ILANA to RCA and POBA on ISR of LAD    HERNIA REPAIR      HYSTERECTOMY         Medications Prior to Admission:      Prior to Admission medications    Medication Sig Start Date End Date Taking?  Authorizing Provider   NIFEdipine (ADALAT CC) 30 MG extended release tablet Take 1 tablet by mouth daily 3/18/19  Yes Angle Fails, APRN - CNP   furosemide (LASIX) 20 MG tablet Take 3 tablets by mouth daily 3/18/19  Yes Angle Fails, APRN - CNP   clopidogrel (PLAVIX) 75 MG tablet Take 1 tablet by mouth daily 3/18/19  Yes Noemi Emmanuel, APRN - CNP   carvedilol (COREG) 12.5 MG tablet Take 1 tablet by mouth 2 times daily (with meals) 3/18/19  Yes Noemi Corrigan APRN - CNP   allopurinol (ZYLOPRIM) 100 MG tablet Take one tablet by mouth once daily 2/20/19  Yes Marlee Grooms, DO   glimepiride (AMARYL) 4 MG tablet TAKE 1 TABLET BY MOUTH TWICE DAILY (BEFORE MEALS) 1/25/19  Yes Marlee Grooms, DO   levothyroxine (SYNTHROID) 137 MCG tablet TAKE 1 TABLET BY MOUTH ONCE DAILY 12/12/18  Yes Marlee Grooms, DO   aspirin EC 81 MG EC tablet Take 1 tablet by mouth daily 8/25/16  Yes Rustam Saab MD   glucose blood VI test strips (ASCENSIA AUTODISC VI;ONE TOUCH ULTRA TEST VI) strip 1 each by In Vitro route daily As needed. 7/6/17   Brayan Hall MD       Allergies:  Hydrocodone-acetaminophen; Rosuvastatin; and Morphine    Social History:      TOBACCO:   reports that she quit smoking about 15 years ago. Her smoking use included cigarettes. She has a 28.00 pack-year smoking history. She has never used smokeless tobacco.  ETOH:   reports that she does not drink alcohol. Family History:          Problem Relation Age of Onset    Diabetes Mother     Heart Disease Father     Cancer Sister         uterine    Heart Disease Maternal Grandmother     Heart Disease Maternal Grandfather     Heart Disease Paternal Grandmother     Heart Disease Paternal Grandfather        REVIEW OF SYSTEMS:   Pertinent positives as noted in the HPI. All other systems reviewed and negative. PHYSICAL EXAM:    BP (!) 152/67   Pulse 52   Temp 97.9 °F (36.6 °C) (Oral)   Resp 18   Ht 5' 5\" (1.651 m)   Wt 216 lb 14.9 oz (98.4 kg)   LMP  (LMP Unknown)   SpO2 93%   BMI 36.10 kg/m²     Gen/overall appearance: Not in acute distress. Alert. Head: Normocephalic, atraumatic  Eyes: EOMI, no scleral icterus  CVS: regular rate and rhythm, Normal S1S2  Pulm: Diminished, Clear to auscultation bilaterally. No crackles/wheezes  Gastrointestinal: Soft, nontender, nondistended, no guarding or rebound  Extremities: trace LE edema.  No erythema or warmth  Neuro: No gross focal deficits noted  Skin: Warm, dry    Labs:     Recent Labs     04/11/19  1210   WBC 11.0   HGB 12.6   HCT 38.8        Recent Labs     04/11/19  1210      K 4.4      CO2 25   BUN 36*   CREATININE 1.8*   CALCIUM 9.2     Recent Labs     04/11/19  1210   AST 9*   ALT 9*   BILITOT 0.6   ALKPHOS 136*     No results for input(s): INR in the last 72 hours. Recent Labs     04/11/19  1210   TROPONINI <0.01       Urinalysis:      Lab Results   Component Value Date    NITRU Negative 11/20/2018    WBCUA 0 11/20/2018    RBCUA 1 11/20/2018    BLOODU TRACE 11/20/2018    SPECGRAV 1.018 11/20/2018    GLUCOSEU Negative 11/20/2018         ASSESSMENT:    Active Hospital Problems    Diagnosis Date Noted    Diastolic CHF ([facility] [B47.64] 04/11/2019    Admitted for acute congestive heart failure ([facility] [I50.9] 04/11/2019    Chest pain [R07.9] 01/09/2018     Acute decompensated diastolic CHF exacerbation  - lasix 60 IV BID for now  - ECHO  - monitor strict ins/outs  - daily wts  - trend lytes and monitor Cr closely  - O2 per protocol  - CHF protocol    Chronic kidney dz. Baseline cr appears 1.5-2.0  - monitor Cr closely with diuresis    PMH of pulmonary htn, CAD, DM, htn, hypothyroidism, PVD    DVT Prophylaxis: heparin  Diet: DIET RENAL;  Code Status: Full Code    Dispo - Inpt, 2-3 days       Alla Woodall MD    Thank you Tan Dominguez DO for the opportunity to be involved in this patient's care.

## 2019-04-11 NOTE — ED NOTES
Admitted to [address] [place] [address] [address] per [facility], patient is awake, alert and oriented, denies pain. SHORTNESS OF BREATH only with exertion, pleasant and cooperative. Voided per [facility] with standby assist prior to transfer. Saline lock to right ac intact. Remains on cardiac monitor Atrial Fib rates of 50-80.  Denies chest pain , denies c/o     Danya Rosen RN  04/11/19 [id number]

## 2019-04-11 NOTE — ED PROVIDER NOTES
[facility] Emergency Department      Pt Name: Magdalene Mace  MRN: [id number]  [place] 1940  Date of evaluation: 4/11/2019  Provider: Estelita Aranda MD  CHIEF COMPLAINT  Chief Complaint   Patient presents with    Shortness of Breath     H/O CHF, ONSET OF SX. 2 DAYS AGO     HPI  Magdalene Mace is a 66 y.o. female who presents because of difficulty breathing. Symptoms have been worsening for 2-3 days. She has a history of congestive heart failure. She says because her kidneys started to fail, her doctor's decreased her daily Lasix dose in September. She feels like this might have caught up with her. Her urination is decreased. She denies any chest pain. She does not believe her weight is significantly increased, but she does have increased edema in her legs. She denies any cough or fever. She also has a history of pulmonary hypertension and coronary artery disease. REVIEW OF SYSTEMS:  No fever, no dysuria, no abdominal pain, leg swelling Pertinent positives and negatives as per the HPI. All other review of systems reviewed and negative. Nursing notes reviewed. PAST MEDICAL HISTORY  Past Medical History:   Diagnosis Date    CAD (coronary artery disease)     [facility]) [facility]     DM2 (diabetes mellitus, type 2) ([facility])     HTN (hypertension)     Hypothyroidism     MI (mitral incompetence)     Over weight     Pulmonary HTN ([address]     PVD (peripheral vascular disease) ([address]     Uterine cancer ([address]      SURGICAL HISTORY  Past Surgical History:   Procedure Laterality Date    CATARACT REMOVAL WITH IMPLANT Bilateral     CORONARY ANGIOPLASTY WITH STENT PLACEMENT Right 9/9/2015    At [address] WITH STENT PLACEMENT  01/2018    ILANA to RCA and POBA on ISR of LAD    HERNIA REPAIR      HYSTERECTOMY       MEDICATIONS:  No current facility-administered medications on file prior to encounter.       Current Outpatient Medications on File Prior to Encounter Medication Sig Dispense Refill    NIFEdipine (ADALAT CC) 30 MG extended release tablet Take 1 tablet by mouth daily 90 tablet 1    furosemide (LASIX) 20 MG tablet Take 3 tablets by mouth daily 270 tablet 1    clopidogrel (PLAVIX) 75 MG tablet Take 1 tablet by mouth daily 90 tablet 1    carvedilol (COREG) 12.5 MG tablet Take 1 tablet by mouth 2 times daily (with meals) 180 tablet 1    allopurinol (ZYLOPRIM) 100 MG tablet Take one tablet by mouth once daily 90 tablet 1    glimepiride (AMARYL) 4 MG tablet TAKE 1 TABLET BY MOUTH TWICE DAILY (BEFORE MEALS) 180 tablet 0    levothyroxine (SYNTHROID) 137 MCG tablet TAKE 1 TABLET BY MOUTH ONCE DAILY 30 tablet 2    aspirin EC 81 MG EC tablet Take 1 tablet by mouth daily 30 tablet 5    glucose blood VI test strips (ASCENSIA AUTODISC VI;ONE TOUCH ULTRA TEST VI) strip 1 each by In Vitro route daily As needed.  100 each 3     ALLERGIES  Hydrocodone-acetaminophen; Rosuvastatin; and Morphine  FAMILY HISTORY:  Family History   Problem Relation Age of Onset    Diabetes Mother     Heart Disease Father     Cancer Sister         uterine    Heart Disease Maternal Grandmother     Heart Disease Maternal Grandfather     Heart Disease Paternal Grandmother     Heart Disease Paternal Grandfather      SOCIAL HISTORY:  Social History     Tobacco Use    Smoking status: Former Smoker     Packs/day: 0.70     Years: 40.00     Pack years: 28.00     Types: Cigarettes     Last attempt to quit: 2/1/2004     Years since quitting: 15.2    Smokeless tobacco: Never Used   Substance Use Topics    Alcohol use: No    Drug use: No     IMMUNIZATIONS:    Immunization History   Administered Date(s) Administered    Influenza Virus Vaccine 10/10/2013    Influenza, High Dose (Fluzone 65 yrs and older) 10/10/2014, 01/14/2016, 01/20/2017, 09/25/2017    Influenza, Genetta Kiang, 6 mo and older, IM, PF (Flulaval, Fluarix) 09/21/2018    Pneumococcal 13-valent Conjugate (Isidro Miners) 01/20/2017 [facility]  [address] [facility] [facility]   Phone [phone]   TROPONIN    Narrative:     Performed at:  [address] [place] [address]  [address] [facility]   Phone [phone]     EKG:  Read by me in the absence of a cardiologist shows:  Atrial fibrillation, rate 58, QRS duration normal, axis -24°, nonspecific ST-T wave abnormality, prior EKG not available    RADIOLOGY:  Plain x-rays were viewed by me: Xr Chest Portable    Result Date: 4/11/2019  EXAMINATION: SINGLE XRAY VIEW OF THE CHEST 4/11/2019 12:11 pm COMPARISON: November 20, 2018 HISTORY: ORDERING SYSTEM PROVIDED HISTORY: sob TECHNOLOGIST PROVIDED HISTORY: Reason for exam:->sob Ordering Physician Provided Reason for Exam: SOB for a few days-leg swelling Acuity: Acute Type of Exam: Initial FINDINGS: Cardiac silhouette is normal in size. Lungs appear clear. No acute bony abnormality. No acute findings. ED COURSE:    Medications administered:  Medications   furosemide (LASIX) injection 80 mg (80 mg Intravenous Given 4/11/19 1217)   ipratropium-albuterol (DUONEB) nebulizer solution 1 ampule (1 ampule Inhalation Given 4/11/19 1209)   nitroglycerin (NITRO-BID) 2 % ointment 2 inch (2 inches Topical Given 4/11/19 1216)     Vitals:    04/11/19 1421 04/11/19 1422 04/11/19 1430 04/11/19 1500   BP:   (!) 152/61 (!) 151/63   Pulse: 56 56 52 57   Resp: 28 29 27 27   Temp:       TempSrc:       SpO2: 91% 91% (!) 89% 90%   Weight:       Height:         PROCEDURES:  None    CRITICAL CARE:  Total critical care time of 31 minutes (excludes any time for procedures). This includes but not limited to vital sign monitoring, medication, clinical response to medications, interpretation of diagnostics, review of nursing notes, pertinent record review, discussions about patient condition, consultation time, documentation time.   Critical care due to the patient's respiratory distress. CONSULTATIONS:  Dr. Cinthia Mullen: Josefa Rodney is a 66 y.o. female who presented because of breathing difficulty. She has clinical findings and history strongly suggestive of CHF exacerbation with hypoxia. X-ray is not currently showing pulmonary edema, but this is high in the differential.  She has chronic kidney disease so I will not perform a CT chest with contrast and other advanced imaging not available at this facility. She received a dose of topical nitroglycerin but she had subsequent hypotension so this was promptly removed from her chest wall and her blood pressures have been improved. I discussed the case in full with the admitting physician. Differential Diagnosis: pneumonia, pneumothorax, PE, ACS, CHF, aspiration, other    FINAL IMPRESSION:    1. Acute pulmonary edema (HCC)    2. Chronic kidney disease, unspecified CKD stage    3. Hypoxia      (Please note that I used voice recognition software to generate this note.   Occasionally words are mistranscribed despite my efforts to edit errors.)        Tommy King MD  04/11/19 4467

## 2019-04-11 NOTE — ED NOTES
Voided 150 ml per BSC, tolerated well.  Breathing a lot easier     Tisha Camacho, TRICE  04/11/19 4328

## 2019-04-11 NOTE — ED NOTES
BP 83/63. NTG removed. Patient requesting to go to BR. Voided 150 ml per bedpan. States \"feeling a little better\". MD at bedside to eval due to low BP.  Patient remain alert and oriented, pleasant and cooperative     Adebayo Garcia RN  04/11/19 5887

## 2019-04-11 NOTE — ED NOTES
Transfer Center called for follow up on transport, spoke with Ariana Xie. , she will initiate transport call     Cinda Horton RN  04/11/19 0822

## 2019-04-11 NOTE — ED TRIAGE NOTES
Drove herself to ED for shortness of breath x 2-3 days. Denies chest pain,noted  Bilateral lower extremity edema. + pedal pulses. Alert and oriented. Placed on cardiac monitor,and SpO2. Respiration is 36 /minute, able to speak in short phrases, gasping for breath with ambulation. Skin is warm and dry, color appropriate for ethnicity. 12 lead ekg done. now has IV access. Lungs with wheezing and scattered inspiratory rales.  MD alerted of patient arrival

## 2019-04-11 NOTE — ED NOTES
Voided 200 ml per bedside commode, tolerated well. SpO2 dips down to 89% with exertion.  Lungs with no wheezing, heard inspiratory rales both bases     Samantha Vickers RN  04/11/19 4806

## 2019-04-12 LAB
ANION GAP SERPL CALCULATED.3IONS-SCNC: 10 MMOL/L (ref 3–16)
BUN BLDV-MCNC: 37 MG/DL (ref 7–20)
CALCIUM SERPL-MCNC: 8.7 MG/DL (ref 8.3–10.6)
CHLORIDE BLD-SCNC: 100 MMOL/L (ref 99–110)
CHOLESTEROL, TOTAL: 168 MG/DL (ref 0–199)
CO2: 28 MMOL/L (ref 21–32)
CREAT SERPL-MCNC: 1.8 MG/DL (ref 0.6–1.2)
EKG ATRIAL RATE: 55 BPM
EKG DIAGNOSIS: NORMAL
EKG P-R INTERVAL: 180 MS
EKG Q-T INTERVAL: 468 MS
EKG QRS DURATION: 78 MS
EKG QTC CALCULATION (BAZETT): 459 MS
EKG R AXIS: -24 DEGREES
EKG T AXIS: -23 DEGREES
EKG VENTRICULAR RATE: 58 BPM
GFR AFRICAN AMERICAN: 33
GFR NON-AFRICAN AMERICAN: 27
GLUCOSE BLD-MCNC: 98 MG/DL (ref 70–99)
HDLC SERPL-MCNC: 54 MG/DL (ref 40–60)
LDL CHOLESTEROL CALCULATED: 96 MG/DL
LV EF: 53 %
LVEF MODALITY: NORMAL
MAGNESIUM: 2 MG/DL (ref 1.8–2.4)
POTASSIUM SERPL-SCNC: 4 MMOL/L (ref 3.5–5.1)
SODIUM BLD-SCNC: 138 MMOL/L (ref 136–145)
TRIGL SERPL-MCNC: 88 MG/DL (ref 0–150)
TROPONIN: <0.01 NG/ML
VLDLC SERPL CALC-MCNC: 18 MG/DL

## 2019-04-12 PROCEDURE — G0378 HOSPITAL OBSERVATION PER HR: HCPCS

## 2019-04-12 PROCEDURE — C8929 TTE W OR WO FOL WCON,DOPPLER: HCPCS

## 2019-04-12 PROCEDURE — 80048 BASIC METABOLIC PNL TOTAL CA: CPT

## 2019-04-12 PROCEDURE — 36415 COLL VENOUS BLD VENIPUNCTURE: CPT

## 2019-04-12 PROCEDURE — 6360000002 HC RX W HCPCS: Performed by: INTERNAL MEDICINE

## 2019-04-12 PROCEDURE — 6370000000 HC RX 637 (ALT 250 FOR IP): Performed by: INTERNAL MEDICINE

## 2019-04-12 PROCEDURE — 2580000003 HC RX 258: Performed by: INTERNAL MEDICINE

## 2019-04-12 PROCEDURE — 1200000000 HC SEMI PRIVATE

## 2019-04-12 PROCEDURE — 83036 HEMOGLOBIN GLYCOSYLATED A1C: CPT

## 2019-04-12 PROCEDURE — 83735 ASSAY OF MAGNESIUM: CPT

## 2019-04-12 PROCEDURE — 80061 LIPID PANEL: CPT

## 2019-04-12 PROCEDURE — 6360000004 HC RX CONTRAST MEDICATION: Performed by: INTERNAL MEDICINE

## 2019-04-12 RX ADMIN — HEPARIN SODIUM 5000 UNITS: 5000 INJECTION INTRAVENOUS; SUBCUTANEOUS at 05:11

## 2019-04-12 RX ADMIN — CARVEDILOL 12.5 MG: 12.5 TABLET, FILM COATED ORAL at 17:26

## 2019-04-12 RX ADMIN — PERFLUTREN 1.5 ML: 6.52 INJECTION, SUSPENSION INTRAVENOUS at 14:24

## 2019-04-12 RX ADMIN — HEPARIN SODIUM 5000 UNITS: 5000 INJECTION INTRAVENOUS; SUBCUTANEOUS at 21:27

## 2019-04-12 RX ADMIN — HEPARIN SODIUM 5000 UNITS: 5000 INJECTION INTRAVENOUS; SUBCUTANEOUS at 15:03

## 2019-04-12 RX ADMIN — CARVEDILOL 12.5 MG: 12.5 TABLET, FILM COATED ORAL at 08:20

## 2019-04-12 RX ADMIN — LEVOTHYROXINE SODIUM 25 MCG: 25 TABLET ORAL at 05:11

## 2019-04-12 RX ADMIN — ASPIRIN 81 MG: 81 TABLET, COATED ORAL at 08:20

## 2019-04-12 RX ADMIN — CLOPIDOGREL BISULFATE 75 MG: 75 TABLET ORAL at 08:20

## 2019-04-12 RX ADMIN — FUROSEMIDE 60 MG: 10 INJECTION, SOLUTION INTRAMUSCULAR; INTRAVENOUS at 17:27

## 2019-04-12 RX ADMIN — LEVOTHYROXINE SODIUM 112 MCG: 0.11 TABLET ORAL at 05:10

## 2019-04-12 RX ADMIN — NIFEDIPINE 30 MG: 30 TABLET, FILM COATED, EXTENDED RELEASE ORAL at 08:20

## 2019-04-12 RX ADMIN — FUROSEMIDE 60 MG: 10 INJECTION, SOLUTION INTRAMUSCULAR; INTRAVENOUS at 08:20

## 2019-04-12 RX ADMIN — Medication 10 ML: at 21:27

## 2019-04-12 RX ADMIN — Medication 10 ML: at 08:21

## 2019-04-12 ASSESSMENT — PAIN SCALES - GENERAL
PAINLEVEL_OUTOF10: 0

## 2019-04-12 NOTE — PLAN OF CARE
Problem: Falls - Risk of:  Goal: Will remain free from falls  Description  Will remain free from falls  Outcome: Ongoing  Goal: Absence of physical injury  Description  Absence of physical injury  Outcome: Ongoing     Problem: OXYGENATION/RESPIRATORY FUNCTION  Goal: Patient will maintain patent airway  4/12/2019 1054 by Pily Castillo RN  Outcome: Ongoing  4/12/2019 0029 by Kenny Salter RN  Outcome: Ongoing  Goal: Patient will achieve/maintain normal respiratory rate/effort  Description  Respiratory rate and effort will be within normal limits for the patient  4/12/2019 1054 by Pily Castillo RN  Outcome: Ongoing  4/12/2019 0029 by Kenny Salter RN  Outcome: Ongoing     Problem: HEMODYNAMIC STATUS  Goal: Patient has stable vital signs and fluid balance  4/12/2019 1054 by Pily Castillo RN  Outcome: Ongoing  4/12/2019 0029 by Kenny Salter RN  Outcome: Ongoing     Problem: FLUID AND ELECTROLYTE IMBALANCE  Goal: Fluid and electrolyte balance are achieved/maintained  4/12/2019 1054 by Pily Castillo RN  Outcome: Ongoing  4/12/2019 0029 by Kenny Salter RN  Outcome: Ongoing     Problem: ACTIVITY INTOLERANCE/IMPAIRED MOBILITY  Goal: Mobility/activity is maintained at optimum level for patient  4/12/2019 1054 by Pily Castillo RN  Outcome: Ongoing  4/12/2019 0029 by Kenny Salter RN  Outcome: Ongoing

## 2019-04-12 NOTE — PROGRESS NOTES
Pt alert and oriented. Morning medications given. Denies pain at this time. Resting in bed with call light in reach.

## 2019-04-12 NOTE — PLAN OF CARE
Problem: OXYGENATION/RESPIRATORY FUNCTION  Goal: Patient will maintain patent airway  Outcome: Ongoing  Goal: Patient will achieve/maintain normal respiratory rate/effort  Description  Respiratory rate and effort will be within normal limits for the patient  Outcome: Ongoing     Problem: HEMODYNAMIC STATUS  Goal: Patient has stable vital signs and fluid balance  Outcome: Ongoing     Problem: FLUID AND ELECTROLYTE IMBALANCE  Goal: Fluid and electrolyte balance are achieved/maintained  Outcome: Ongoing     Problem: ACTIVITY INTOLERANCE/IMPAIRED MOBILITY  Goal: Mobility/activity is maintained at optimum level for patient  Outcome: Ongoing

## 2019-04-12 NOTE — PROGRESS NOTES
04/11/19  1810 04/11/19  2359   TROPONINI <0.01 <0.01 <0.01       Assessment/Plan:    Active Hospital Problems    Diagnosis Date Noted    Diastolic CHF ([facility] [V40.16] 04/11/2019    Admitted for acute congestive heart failure ([facility] [I50.9] 04/11/2019    Chest pain [R07.9] 01/09/2018     Acute decompensated diastolic CHF exacerbation  - lasix 60 IV BID for now  - ECHO pending  - monitor strict ins/outs  - daily wts  - trend lytes and monitor Cr closely  - O2 per protocol  - CHF protocol     Chronic kidney dz.   Baseline cr appears 1.5-2.0  - monitor Cr closely with diuresis     PMH of pulmonary htn, CAD, DM, htn, hypothyroidism, PVD     DVT Prophylaxis: heparin  Diet: DIET RENAL; Daily Fluid Restriction: 1500 ml  Code Status: Full Code    Dispo - INpt 1-3 days    Shabana Mathew MD

## 2019-04-12 NOTE — CONSULTS
Nutrition Education    Type and Reason for Visit: Initial, Consult(heart failure education)    Patient stated that she does not follow any special diet at home. Agreed to meet with this writer to discuss nutrition therapy. Recall revealed higher sodium choices both at home & at restaurants. Renal modification new to pt per feedback. Rationale as well as principles of renal diet discussed. Encouraged pt to watch higher potassium choices with examples given. Encouraged pt to limit protein to good quality with examples of serving size suggested. Pt has had education on CHF in 2018 per documentation and it was believed at that time that compliance would be limited. Reinforced the rationale as well as the principles of a low sodium, fluid modified diet. Discussed dining out & what healthier options would be. Comprehended per feedback. Pt very appreciative of session. Praise given for improved blood sugar control with last A1C at 7.9. Time 10 minutes. · Verbally reviewed following information with Patient:   · Written educational materials provided. · Contact name and number provided. · Refer to Patient Education activity for more details.     Electronically signed by Josue Rizzo RD, LD on 4/12/19 at 3:18 PM    Contact Number: [phone]

## 2019-04-12 NOTE — CARE COORDINATION
INITIAL CASE MANAGEMENT ASSESSMENT    Reviewed chart, met with patient to assess possible discharge needs. Explained Case Management role/services. Living Situation: lives alone, has 4 steps to enter her home    ADLs: independent     DME: none    PT/OT Recs: not currently ordered, will need evals when medically atable     Active Services: none     Transportation: drives     Medications: confirmed that she has medicare as primary and [address] [facility] as supplement, rx are covered and obtained at [facility]    PCP: confirmed Hermann Garcia      HD/PD: n/a    PLAN/COMMENTS:   Requested PT and OT evals when able    SW/CM provided contact information for patient or family to call with any questions. SW/CM will follow and assist as needed.     Electronically signed by Cristobal Garcia RN on 4/12/2019 at 4:26 PM

## 2019-04-13 LAB
ANION GAP SERPL CALCULATED.3IONS-SCNC: 13 MMOL/L (ref 3–16)
BUN BLDV-MCNC: 40 MG/DL (ref 7–20)
CALCIUM SERPL-MCNC: 8.7 MG/DL (ref 8.3–10.6)
CHLORIDE BLD-SCNC: 101 MMOL/L (ref 99–110)
CO2: 27 MMOL/L (ref 21–32)
CREAT SERPL-MCNC: 1.8 MG/DL (ref 0.6–1.2)
GFR AFRICAN AMERICAN: 33
GFR NON-AFRICAN AMERICAN: 27
GLUCOSE BLD-MCNC: 147 MG/DL (ref 70–99)
GLUCOSE BLD-MCNC: 153 MG/DL (ref 70–99)
GLUCOSE BLD-MCNC: 93 MG/DL (ref 70–99)
MAGNESIUM: 2 MG/DL (ref 1.8–2.4)
PERFORMED ON: ABNORMAL
PERFORMED ON: ABNORMAL
POTASSIUM SERPL-SCNC: 3.7 MMOL/L (ref 3.5–5.1)
PRO-BNP: 1066 PG/ML (ref 0–449)
REPORT: NORMAL
RESPIRATORY PANEL PCR: NORMAL
SODIUM BLD-SCNC: 141 MMOL/L (ref 136–145)

## 2019-04-13 PROCEDURE — 87798 DETECT AGENT NOS DNA AMP: CPT

## 2019-04-13 PROCEDURE — 80048 BASIC METABOLIC PNL TOTAL CA: CPT

## 2019-04-13 PROCEDURE — 6370000000 HC RX 637 (ALT 250 FOR IP): Performed by: INTERNAL MEDICINE

## 2019-04-13 PROCEDURE — 6360000002 HC RX W HCPCS: Performed by: INTERNAL MEDICINE

## 2019-04-13 PROCEDURE — 6370000000 HC RX 637 (ALT 250 FOR IP): Performed by: HOSPITALIST

## 2019-04-13 PROCEDURE — 97535 SELF CARE MNGMENT TRAINING: CPT

## 2019-04-13 PROCEDURE — 97166 OT EVAL MOD COMPLEX 45 MIN: CPT

## 2019-04-13 PROCEDURE — 83735 ASSAY OF MAGNESIUM: CPT

## 2019-04-13 PROCEDURE — 97530 THERAPEUTIC ACTIVITIES: CPT

## 2019-04-13 PROCEDURE — 6360000002 HC RX W HCPCS: Performed by: HOSPITALIST

## 2019-04-13 PROCEDURE — 97161 PT EVAL LOW COMPLEX 20 MIN: CPT

## 2019-04-13 PROCEDURE — 83880 ASSAY OF NATRIURETIC PEPTIDE: CPT

## 2019-04-13 PROCEDURE — 94760 N-INVAS EAR/PLS OXIMETRY 1: CPT

## 2019-04-13 PROCEDURE — 1200000000 HC SEMI PRIVATE

## 2019-04-13 PROCEDURE — 87581 M.PNEUMON DNA AMP PROBE: CPT

## 2019-04-13 PROCEDURE — 2580000003 HC RX 258: Performed by: INTERNAL MEDICINE

## 2019-04-13 PROCEDURE — 36415 COLL VENOUS BLD VENIPUNCTURE: CPT

## 2019-04-13 PROCEDURE — 87486 CHLMYD PNEUM DNA AMP PROBE: CPT

## 2019-04-13 PROCEDURE — 87633 RESP VIRUS 12-25 TARGETS: CPT

## 2019-04-13 RX ORDER — NICOTINE POLACRILEX 4 MG
15 LOZENGE BUCCAL PRN
Status: DISCONTINUED | OUTPATIENT
Start: 2019-04-13 | End: 2019-04-15 | Stop reason: HOSPADM

## 2019-04-13 RX ORDER — DEXTROSE MONOHYDRATE 50 MG/ML
100 INJECTION, SOLUTION INTRAVENOUS PRN
Status: DISCONTINUED | OUTPATIENT
Start: 2019-04-13 | End: 2019-04-15 | Stop reason: HOSPADM

## 2019-04-13 RX ORDER — DEXTROSE MONOHYDRATE 25 G/50ML
12.5 INJECTION, SOLUTION INTRAVENOUS PRN
Status: DISCONTINUED | OUTPATIENT
Start: 2019-04-13 | End: 2019-04-15 | Stop reason: HOSPADM

## 2019-04-13 RX ORDER — FUROSEMIDE 10 MG/ML
40 INJECTION INTRAMUSCULAR; INTRAVENOUS 2 TIMES DAILY
Status: COMPLETED | OUTPATIENT
Start: 2019-04-13 | End: 2019-04-13

## 2019-04-13 RX ADMIN — ASPIRIN 81 MG: 81 TABLET, COATED ORAL at 09:15

## 2019-04-13 RX ADMIN — HEPARIN SODIUM 5000 UNITS: 5000 INJECTION INTRAVENOUS; SUBCUTANEOUS at 15:46

## 2019-04-13 RX ADMIN — LEVOTHYROXINE SODIUM 25 MCG: 25 TABLET ORAL at 06:04

## 2019-04-13 RX ADMIN — CARVEDILOL 12.5 MG: 12.5 TABLET, FILM COATED ORAL at 16:37

## 2019-04-13 RX ADMIN — CARVEDILOL 12.5 MG: 12.5 TABLET, FILM COATED ORAL at 09:16

## 2019-04-13 RX ADMIN — HEPARIN SODIUM 5000 UNITS: 5000 INJECTION INTRAVENOUS; SUBCUTANEOUS at 06:04

## 2019-04-13 RX ADMIN — LEVOTHYROXINE SODIUM 112 MCG: 0.11 TABLET ORAL at 06:04

## 2019-04-13 RX ADMIN — FUROSEMIDE 60 MG: 10 INJECTION, SOLUTION INTRAMUSCULAR; INTRAVENOUS at 09:16

## 2019-04-13 RX ADMIN — Medication 10 ML: at 21:26

## 2019-04-13 RX ADMIN — FUROSEMIDE 40 MG: 10 INJECTION, SOLUTION INTRAMUSCULAR; INTRAVENOUS at 18:09

## 2019-04-13 RX ADMIN — NIFEDIPINE 30 MG: 30 TABLET, FILM COATED, EXTENDED RELEASE ORAL at 09:15

## 2019-04-13 RX ADMIN — CLOPIDOGREL BISULFATE 75 MG: 75 TABLET ORAL at 09:16

## 2019-04-13 RX ADMIN — HEPARIN SODIUM 5000 UNITS: 5000 INJECTION INTRAVENOUS; SUBCUTANEOUS at 21:23

## 2019-04-13 RX ADMIN — Medication 10 ML: at 09:21

## 2019-04-13 RX ADMIN — INSULIN LISPRO 1 UNITS: 100 INJECTION, SOLUTION INTRAVENOUS; SUBCUTANEOUS at 21:23

## 2019-04-13 ASSESSMENT — PAIN SCALES - GENERAL: PAINLEVEL_OUTOF10: 0

## 2019-04-13 NOTE — PLAN OF CARE
Problem: Falls - Risk of:  Goal: Will remain free from falls  Description  Will remain free from falls  Outcome: Ongoing  Goal: Absence of physical injury  Description  Absence of physical injury  Outcome: Ongoing     Problem: OXYGENATION/RESPIRATORY FUNCTION  Goal: Patient will maintain patent airway  4/12/2019 1054 by James Salazar RN  Outcome: Ongoing  4/12/2019 0029 by Kade Hernandez RN  Outcome: Ongoing  Goal: Patient will achieve/maintain normal respiratory rate/effort  Description  Respiratory rate and effort will be within normal limits for the patient  4/12/2019 1054 by James Salazar RN  Outcome: Ongoing  4/12/2019 0029 by Kade Hernandez RN  Outcome: Ongoing     Problem: HEMODYNAMIC STATUS  Goal: Patient has stable vital signs and fluid balance  4/12/2019 1054 by James Salazar RN  Outcome: Ongoing  4/12/2019 0029 by Kade Hernandez RN  Outcome: Ongoing     Problem: FLUID AND ELECTROLYTE IMBALANCE  Goal: Fluid and electrolyte balance are achieved/maintained  4/12/2019 1054 by James Salazar RN  Outcome: Ongoing  4/12/2019 0029 by Kade Hernandez RN  Outcome: Ongoing     Problem: ACTIVITY INTOLERANCE/IMPAIRED MOBILITY  Goal: Mobility/activity is maintained at optimum level for patient  4/12/2019 1054 by James Salazar RN  Outcome: Ongoing  4/12/2019 0029 by Kade Hernandez RN  Outcome: Ongoing

## 2019-04-13 NOTE — PROGRESS NOTES
Pt awake alert oriented resting in bed denies pain denies shortness of breath at rest no needs a t this time will continue to monitor.

## 2019-04-13 NOTE — PROGRESS NOTES
Hospitalist Progress Note    CC:     Shortness of Breath (H/O CHF, ONSET OF SX. 2 DAYS AGO)      Admit date: 4/11/2019  Days in hospital:  2    Subjective:     Patient reports that she is a much better since admission. Denies any cough or shortness of breath. ROS:   Review of Systems   Constitutional: Negative for chills and fever. Respiratory: Negative for cough and shortness of breath. Cardiovascular: Negative for chest pain and palpitations. Gastrointestinal: Negative for abdominal pain, constipation and diarrhea. Genitourinary: Negative for dysuria and urgency. Neurological: Negative for headaches. Objective:    /61   Pulse 53   Temp 98.1 °F (36.7 °C) (Oral)   Resp 17   Ht 5' 5\" (1.651 m)   Wt 205 lb 7.5 oz (93.2 kg)   LMP  (LMP Unknown)   SpO2 90%   BMI 34.19 kg/m²     Gen: alert, NAD  HEENT: NC/AT, moist mucous membranes, no oropharyngeal erythema or exudate  Neck: supple, trachea midline, no anterior cervical or SC LAD  Heart: Normal s1/s2, RRR, no murmurs, gallops, or rubs. Lungs: Clear to auscultation bilaterally, no wheezing, no rales, no rhonchi, no use of accessory muscles  Abd: bowel sounds present, soft, nondistended, nontender  Extrem: No clubbing, cyanosis, trace edema  Psych: A & O x3  , affect appropriate  Neuro: grossly intact, moves all four extremities spontaneously.       Medications:  Scheduled Meds:   furosemide  40 mg Intravenous BID    sodium chloride flush  10 mL Intravenous 2 times per day    heparin (porcine)  5,000 Units Subcutaneous 3 times per day    aspirin EC  81 mg Oral Daily    carvedilol  12.5 mg Oral BID WC    clopidogrel  75 mg Oral Daily    NIFEdipine  30 mg Oral Daily    levothyroxine  112 mcg Oral Daily    And    levothyroxine  25 mcg Oral Daily       PRN Meds:  sodium chloride flush, ondansetron, acetaminophen    IV:        Intake/Output Summary (Last 24 hours) at 4/13/2019 1304  Last data filed at 4/13/2019 1027  Gross per 24 hour   Intake 900 ml   Output 2000 ml   Net -1100 ml       Results:  CBC:   Recent Labs     04/11/19  1210   WBC 11.0   HGB 12.6   HCT 38.8   MCV 93.3        BMP:   Recent Labs     04/11/19  1210 04/12/19  0912 04/13/19  0811    138 141   K 4.4 4.0 3.7    100 101   CO2 25 28 27   BUN 36* 37* 40*   CREATININE 1.8* 1.8* 1.8*     Mag: No results for input(s): MAG in the last 72 hours. Phos:   Lab Results   Component Value Date    PHOS 2.9 09/22/2018     No components found for: GLU    LIVER PROFILE:   Recent Labs     04/11/19  1210   AST 9*   ALT 9*   BILITOT 0.6   ALKPHOS 136*     PT/INR: No results for input(s): PROTIME, INR in the last 72 hours. APTT: No results for input(s): APTT in the last 72 hours. UA:No results for input(s): NITRITE, COLORU, PHUR, LABCAST, WBCUA, RBCUA, MUCUS, TRICHOMONAS, YEAST, BACTERIA, CLARITYU, SPECGRAV, LEUKOCYTESUR, UROBILINOGEN, BILIRUBINUR, BLOODU, GLUCOSEU, AMORPHOUS in the last 72 hours. Invalid input(s): [place] [employer] input(s): ABG  Lab Results   Component Value Date    CALCIUM 8.7 04/13/2019    PHOS 2.9 09/22/2018       Assessment and Plan:      Acute on chronic diastolic CHF:  Patient is feeling much better. Weight down from 216-->205  Net Negative 3.6L  Continue with IV Lasix for one more day and switch to by mouth tomorrow. Strict input and output monitoring  Low-salt diet  Fluid restriction  Continue with carvedilol. Not on a ACEI/ARBS secondary to CKD      Hypothyroidism  Continue Synthroid    Hypertension  Continue with nifedipine    . Coronary disease  Continue with aspirin, Plavix, and beta blocker    Chronic kidney disease:  Creatinine is at baseline: 1.5-2.   Avoid nephrotoxic medications  She follows with nephrology as an outpatient    Type 2 diabetes mellitus  Hold glimepiride  Start Sliding scale insulin while in the hospital    Code status:  Full code  DVT prophylaxis: Heparin  Disposition: Pending clinical improvement.     Electronically signed by Leigh Reynolds MD on 4/13/2019 at 1:04 PM

## 2019-04-13 NOTE — PROGRESS NOTES
Occupational Therapy   Occupational Therapy Initial Assessment  Date: 2019   Patient Name: Patrick Perkins  MRN: [id number]     : 1940    Date of Service: 2019    Discharge Recommendations:  Home with assist PRN, Home with Home health OT, S Level 1, Patient would benefit from continued therapy after discharge     Patrick Perkins scored a 19/24 on the [address] form. Current research shows that an AM-PAC score of 18 or greater is typically associated with a discharge to the patient's home setting. Based on the patients AM-PAC score and their current ADL deficits, it is recommended that the patient have 2-3 sessions per week of Occupational Therapy at d/c to increase the patients independence. Assessment   Performance deficits / Impairments: Decreased functional mobility ; Decreased ADL status; Decreased balance;Decreased endurance;Decreased high-level IADLs  Assessment: Pt is a 66 y.o. female admitted with CHF. At baseline, pt lives alone and independent ADLs, IADLs, and fxl mobility. Pt currently functioning below baseline d/t decreased endurance and decreased fxl mobility. Anticipate pt would require overall CGA/min A for ADLs. Will cont to see on acute to address the above limitations and maximize pt's independence. Anticipate pt will be safe to return home with assist prn and home OT. Prognosis: Good  Decision Making: Medium Complexity  History: see above  Exam: decreased ADL status, endurance, balance/fxl mobility  Assistance / Modification: anticipate overall CGA/min A ADLs  Patient Education: Pt educated on the role of OT, POC, safety/transfers  REQUIRES OT FOLLOW UP: Yes  Activity Tolerance  Activity Tolerance: Patient limited by fatigue  Safety Devices  Safety Devices in place: Yes  Type of devices: Chair alarm in place;Call light within reach; Left in chair;Nurse notified;Gait belt;Patient at risk for falls(TRICE Goldsmith notified)           Patient Diagnosis(es): The primary encounter diagnosis was Acute pulmonary edema ([facility] Diagnoses of Chronic kidney disease, unspecified CKD stage and Hypoxia were also pertinent to this visit. has a past medical history of CAD (coronary artery disease), CHF (congestive heart failure) ([facility] DM2 (diabetes mellitus, type 2) ([facility] HTN (hypertension), Hypothyroidism, MI (mitral incompetence), Over weight, Pulmonary HTN ([facility] PVD (peripheral vascular disease) ([facility] and Uterine cancer ([facility] has a past surgical history that includes Cataract removal with implant (Bilateral); Coronary angioplasty with stent (Right, 9/9/2015); hernia repair; Coronary angioplasty with stent (01/2018); and Hysterectomy. Restrictions  Restrictions/Precautions  Restrictions/Precautions: Fall Risk    Subjective   General  Chart Reviewed: Yes  Additional Pertinent Hx: Pt is a 66 y.o. female admitted with Acute on chronic diastolic CHF. PMHx includes: CAD, CHF, DM II, HTN, hypothyroidism, HTN, PVD, charbel cataract removal with implant, hysterectomy  Family / Caregiver Present: No  Referring Practitioner: Inder Alvarez  Diagnosis: Acute on chronic diastolic CHF  Subjective  Subjective: Pt met b/s for OT eval/tx with PT. Pt supine in bed on arrival, agreeable to participate in therapy. Pt denies pain.      Social/Functional History  Social/Functional History  Lives With: Alone  Type of Home: House  Home Layout: One level(pt doesn't use basement)  Home Access: Stairs to enter with rails  Entrance Stairs - Number of Steps: 4  Entrance Stairs - Rails: Both  Bathroom Shower/Tub: Walk-in shower  Bathroom Toilet: Handicap height  Bathroom Equipment: Built-in shower seat, Hand-held shower  Bathroom Accessibility: Walker accessible  Home Equipment: Rolling walker  ADL Assistance: Independent  Homemaking Assistance: Independent(pt does own cooking, cleaning, laundry, grocery shopping)  Ambulation Assistance: Independent  Transfer Assistance: Independent  Active : Yes  Occupation: Retired  Additional Comments: Pt denies recent falls. Objective   Vision: Impaired  Vision Exceptions: Wears glasses for reading  Hearing: Exceptions to [facility]  Hearing Exceptions: Hard of hearing/hearing concerns; No hearing aid      Orientation  Overall Orientation Status: Within Functional Limits  Orientation Level: Oriented to person;Oriented to time;Oriented to place;Oriented to situation     Balance  Sitting Balance: Stand by assistance  Standing Balance: Contact guard assistance  Standing Balance  Functional Mobility  Functional - Mobility Device: No device  Activity: To/from bathroom; Other  Assist Level: Contact guard assistance  Functional Mobility Comments: Pt completed fxl mobility to/from BR with no AD and CGA. Pt slow, guarded, reaching for items around room for balance. ADL  Feeding: Independent  Grooming: Stand by assistance(standing at sink to wash hands)  LE Dressing: Contact guard assistance(SBA to don/doff socks, anticipate CGA for complete LB dressing)  Toileting: Contact guard assistance  Additional Comments: Anticipate pt is supervision seated UB dressing/bathing, min A LB bathing based on ROM/strength, balance, endurance.       Bed mobility  Supine to Sit: Contact guard assistance     Transfers  Sit to stand: Contact guard assistance  Stand to sit: Contact guard assistance   Toilet Transfers  Toilet - Technique: Ambulating  Equipment Used: Grab bars  Toilet Transfer: Contact guard assistance    Vision - Basic Assessment  Prior Vision: Wears glasses only for reading  Visual History: Cataracts;Surgical intervention(pt reports had cataracts removed)     Cognition  Overall Cognitive Status: WFL     Sensation  Overall Sensation Status: Impaired(pt reports burning sensation on bottoms of charbel feet d/t peripheral neuropathy)       LUE AROM (degrees)  LUE AROM : WFL  RUE AROM (degrees)  RUE AROM : WFL     LUE Strength  Gross LUE Strength: WFL  RUE Strength  Gross RUE Strength: [facility]                   Plan   Plan  Times per week: 3-5  Times per day: Daily  Current Treatment Recommendations: Balance Training, Functional Mobility Training, Endurance Training, Self-Care / ADL, Home Management Training, Safety Education & Training         OutComes Score    How much help for putting on and taking off regular lower body clothing?: A Little  How much help for Bathing?: A Little  How much help for Toileting?: A Little  How much help for putting on and taking off regular upper body clothing?: A Little  How much help for taking care of personal grooming?: A Little  How much help for eating meals?: None  AM-PAC Inpatient Daily Activity Raw Score: 19  AM-PAC Inpatient ADL T-Scale Score : 40.22  ADL Inpatient CMS 0-100% Score: 42.8  ADL Inpatient CMS G-Code Modifier : CK                                               AM-PAC Score        AM-PAC Inpatient Daily Activity Raw Score: 19  AM-PAC Inpatient ADL T-Scale Score : 40.22  ADL Inpatient CMS 0-100% Score: 42.8  ADL Inpatient CMS G-Code Modifier : CK    Goals  Short term goals  Time Frame for Short term goals: Prior to d/c:  Short term goal 1: Pt will bathe with mod I. Short term goal 2: Pt will dress with mod I. Short term goal 3: Pt will toilet with mod I. Short term goal 4: Pt will complete fxl mobility and fxl transfers to/from ADL surfaces with mod I using AD prn. Short term goal 5: Pt will increase activity tolerance to achieve the above goals. Long term goals  Time Frame for Long term goals : STG=LTG   Patient Goals   Patient goals : to return home. Therapy Time   Individual Concurrent Group Co-treatment   Time In 5470         Time Out 1330         Minutes 25         Timed Code Treatment Minutes: 10 Minutes     This note to serve as OT d/c summary if pt is d/c-ed prior to next therapy session.     Ange Gunn, OTR/L [id number]

## 2019-04-13 NOTE — PROGRESS NOTES
PT  Barriers to Learning: Fatigue  REQUIRES PT FOLLOW UP: Yes  Activity Tolerance  Activity Tolerance: Patient limited by fatigue;Patient limited by endurance; Patient Tolerated treatment well       Patient Diagnosis(es): The primary encounter diagnosis was Acute pulmonary edema ([address] Diagnoses of Chronic kidney disease, unspecified CKD stage and Hypoxia were also pertinent to this visit. has a past medical history of CAD (coronary artery disease), CHF (congestive heart failure) ([address] DM2 (diabetes mellitus, type 2) ([address] HTN (hypertension), Hypothyroidism, MI (mitral incompetence), Over weight, Pulmonary HTN ([address] PVD (peripheral vascular disease) ([address] and Uterine cancer ([address] has a past surgical history that includes Cataract removal with implant (Bilateral); Coronary angioplasty with stent (Right, 9/9/2015); hernia repair; Coronary angioplasty with stent (01/2018); and Hysterectomy. Restrictions  Restrictions/Precautions  Restrictions/Precautions: Fall Risk     Vision/Hearing  Vision: Impaired  Vision Exceptions: Wears glasses for reading  Hearing: Exceptions to [facility]  Hearing Exceptions: Hard of hearing/hearing concerns; No hearing aid       Subjective  General  Chart Reviewed: Yes  Additional Pertinent Hx: Per Dr. Raya Zaman, 4/11, \"78 y.o. female with PMH of diastolic CHF, CKD, pulmonary htn, CAD, DM, htn, hypothyroidism, PVD who presents with dyspnea. Has been having progressively worsening symptoms over the past 3-4 days. \"      Response To Previous Treatment: Not applicable  Referring Practitioner: Dr. Jose Morrow  Referral Date : 04/13/19  Diagnosis: CHF; CKD  Follows Commands: Within Functional Limits  Subjective  Subjective: Pt c/o fatigue, but is agreeable to therapy evaluation.      Orientation  Orientation  Overall Orientation Status: Within Functional Limits     Social/Functional History  Social/Functional History  Lives With: Alone  Type of Home: House  Home Layout: One level(pt doesn't use basement)  Home Access: Stairs to enter with rails  Entrance Stairs - Number of Steps: 4  Entrance Stairs - Rails: Both  Bathroom Shower/Tub: Walk-in shower  Bathroom Toilet: Handicap height  Bathroom Equipment: Built-in shower seat, Hand-held shower  Bathroom Accessibility: Walker accessible  Home Equipment: Rolling walker  ADL Assistance: Independent  Homemaking Assistance: Independent(pt does own cooking, cleaning, laundry, grocery shopping)  Ambulation Assistance: Independent  Transfer Assistance: Independent  Active : Yes  Occupation: Retired  Additional Comments: Pt denies recent falls. Objective    AROM RLE (degrees)  RLE AROM: WFL  RLE General AROM: Hip Flex, Knee Flex/Ext, and Ankle PF/DF WFL  AROM LLE (degrees)  LLE AROM : WFL  LLE General AROM: Hip Flex, Knee Flex/Ext, and Ankle PF/DF WFL  AROM RUE (degrees)  RUE AROM : WFL  RUE General AROM: Shoulder FLex, Elbow Flex/Ext WFL  AROM LUE (degrees)  LUE AROM : WFL  LUE General AROM: Shoulder FLex, Elbow Flex/Ext WFL    Strength RLE  Strength RLE: WFL  Comment: Hip FLex, knee Ext, and Ankle DF grossly 4/5  Strength LLE  Strength LLE: WFL  Comment: Hip FLex, knee Ext, and Ankle DF grossly 4/5  Strength RUE  Strength RUE: WFL  Strength LUE  Strength LUE: WFL     Tone RLE  RLE Tone: Normotonic  Tone LLE  LLE Tone: Normotonic  Motor Control  Gross Motor?: WFL     Sensation  Overall Sensation Status: WFL     Bed mobility  Supine to Sit: Contact guard assistance     Transfers  Sit to Stand: Contact guard assistance  Stand to sit: Contact guard assistance     Ambulation  Ambulation?: Yes  Ambulation 1  Surface: level tile  Device: No Device  Assistance: Contact guard assistance  Quality of Gait: Pt ambulated short distance in room and holland without AD support, HHA, moving slowly with decreased step length, moderate instability without AD, no overt LOB.   Distance: 10' x 2, 60'    Stairs/Curb  Stairs?: No     Balance  Comments: Pt able to complete toilet transfer with CGA. Plan   Plan  Times per week: 2-3x  Specific instructions for Next Treatment: Progress endurance; improve balance  Current Treatment Recommendations: Balance Training, Functional Mobility Training, Transfer Training, Endurance Training, Gait Training, Neuromuscular Re-education, Patient/Caregiver Education & Training, Equipment Evaluation, Education, & procurement, Home Exercise Program, Safety Education & Training  Safety Devices  Type of devices: All fall risk precautions in place, Call light within reach, Chair alarm in place, Gait belt, Left in chair, Nurse notified  Restraints  Initially in place: No    AM-PAC Score  AM-PAC Inpatient Mobility Raw Score : 18  AM-PAC Inpatient T-Scale Score : 43.63  Mobility Inpatient CMS 0-100% Score: 46.58  Mobility Inpatient CMS G-Code Modifier : CK          Goals  Short term goals  Time Frame for Short term goals: In 2-3 days pt will perform  Short term goal 1: Bed Mobility Mod I  Short term goal 2: Transfers Mod I  Short term goal 3: Ambulation 48' with RW, Mod I  Long term goals  Time Frame for Long term goals : LTG = STG  Patient Goals   Patient goals : To return home as soon as possible       Therapy Time   Individual Concurrent Group Co-treatment   Time In       1305   Time Out       1330   Minutes       25   Timed Code Treatment Minutes: 10 Minutes   Evaluation time: 15 min.     Trell Mohr PT    Electronically signed by Trell Mohr PT [id number] on 4/13/2019 at 1:38 PM

## 2019-04-13 NOTE — PLAN OF CARE
Pt remained free from falls and injury during shift. Pt expresses no needs and states she is feeling much better today.

## 2019-04-14 LAB
ANION GAP SERPL CALCULATED.3IONS-SCNC: 14 MMOL/L (ref 3–16)
ANION GAP SERPL CALCULATED.3IONS-SCNC: 14 MMOL/L (ref 3–16)
BASOPHILS ABSOLUTE: 0.1 K/UL (ref 0–0.2)
BASOPHILS ABSOLUTE: 0.1 K/UL (ref 0–0.2)
BASOPHILS RELATIVE PERCENT: 0.6 %
BASOPHILS RELATIVE PERCENT: 0.8 %
BUN BLDV-MCNC: 46 MG/DL (ref 7–20)
BUN BLDV-MCNC: 46 MG/DL (ref 7–20)
CALCIUM SERPL-MCNC: 8.8 MG/DL (ref 8.3–10.6)
CALCIUM SERPL-MCNC: 8.9 MG/DL (ref 8.3–10.6)
CHLORIDE BLD-SCNC: 100 MMOL/L (ref 99–110)
CHLORIDE BLD-SCNC: 101 MMOL/L (ref 99–110)
CO2: 29 MMOL/L (ref 21–32)
CO2: 29 MMOL/L (ref 21–32)
CREAT SERPL-MCNC: 1.8 MG/DL (ref 0.6–1.2)
CREAT SERPL-MCNC: 1.8 MG/DL (ref 0.6–1.2)
EOSINOPHILS ABSOLUTE: 0.5 K/UL (ref 0–0.6)
EOSINOPHILS ABSOLUTE: 0.5 K/UL (ref 0–0.6)
EOSINOPHILS RELATIVE PERCENT: 5.8 %
EOSINOPHILS RELATIVE PERCENT: 6 %
ESTIMATED AVERAGE GLUCOSE: 162.8 MG/DL
GFR AFRICAN AMERICAN: 33
GFR AFRICAN AMERICAN: 33
GFR NON-AFRICAN AMERICAN: 27
GFR NON-AFRICAN AMERICAN: 27
GLUCOSE BLD-MCNC: 100 MG/DL (ref 70–99)
GLUCOSE BLD-MCNC: 108 MG/DL (ref 70–99)
GLUCOSE BLD-MCNC: 108 MG/DL (ref 70–99)
GLUCOSE BLD-MCNC: 159 MG/DL (ref 70–99)
GLUCOSE BLD-MCNC: 282 MG/DL (ref 70–99)
GLUCOSE BLD-MCNC: 82 MG/DL (ref 70–99)
HBA1C MFR BLD: 7.3 %
HCT VFR BLD CALC: 40.7 % (ref 36–48)
HCT VFR BLD CALC: 41 % (ref 36–48)
HEMOGLOBIN: 13.5 G/DL (ref 12–16)
HEMOGLOBIN: 13.5 G/DL (ref 12–16)
LYMPHOCYTES ABSOLUTE: 2.2 K/UL (ref 1–5.1)
LYMPHOCYTES ABSOLUTE: 2.2 K/UL (ref 1–5.1)
LYMPHOCYTES RELATIVE PERCENT: 27 %
LYMPHOCYTES RELATIVE PERCENT: 27.7 %
MAGNESIUM: 2.2 MG/DL (ref 1.8–2.4)
MCH RBC QN AUTO: 31.1 PG (ref 26–34)
MCH RBC QN AUTO: 31.3 PG (ref 26–34)
MCHC RBC AUTO-ENTMCNC: 33 G/DL (ref 31–36)
MCHC RBC AUTO-ENTMCNC: 33.2 G/DL (ref 31–36)
MCV RBC AUTO: 94.2 FL (ref 80–100)
MCV RBC AUTO: 94.3 FL (ref 80–100)
MONOCYTES ABSOLUTE: 0.6 K/UL (ref 0–1.3)
MONOCYTES ABSOLUTE: 0.6 K/UL (ref 0–1.3)
MONOCYTES RELATIVE PERCENT: 7.8 %
MONOCYTES RELATIVE PERCENT: 8.2 %
NEUTROPHILS ABSOLUTE: 4.6 K/UL (ref 1.7–7.7)
NEUTROPHILS ABSOLUTE: 4.7 K/UL (ref 1.7–7.7)
NEUTROPHILS RELATIVE PERCENT: 57.5 %
NEUTROPHILS RELATIVE PERCENT: 58.6 %
PDW BLD-RTO: 14.2 % (ref 12.4–15.4)
PDW BLD-RTO: 14.4 % (ref 12.4–15.4)
PERFORMED ON: ABNORMAL
PERFORMED ON: NORMAL
PLATELET # BLD: 280 K/UL (ref 135–450)
PLATELET # BLD: 286 K/UL (ref 135–450)
PMV BLD AUTO: 7.4 FL (ref 5–10.5)
PMV BLD AUTO: 7.4 FL (ref 5–10.5)
POTASSIUM REFLEX MAGNESIUM: 3.9 MMOL/L (ref 3.5–5.1)
POTASSIUM SERPL-SCNC: 3.9 MMOL/L (ref 3.5–5.1)
POTASSIUM SERPL-SCNC: 3.9 MMOL/L (ref 3.5–5.1)
RBC # BLD: 4.31 M/UL (ref 4–5.2)
RBC # BLD: 4.35 M/UL (ref 4–5.2)
SODIUM BLD-SCNC: 143 MMOL/L (ref 136–145)
SODIUM BLD-SCNC: 144 MMOL/L (ref 136–145)
WBC # BLD: 8 K/UL (ref 4–11)
WBC # BLD: 8.1 K/UL (ref 4–11)

## 2019-04-14 PROCEDURE — 36415 COLL VENOUS BLD VENIPUNCTURE: CPT

## 2019-04-14 PROCEDURE — 6370000000 HC RX 637 (ALT 250 FOR IP): Performed by: INTERNAL MEDICINE

## 2019-04-14 PROCEDURE — 1200000000 HC SEMI PRIVATE

## 2019-04-14 PROCEDURE — 6360000002 HC RX W HCPCS: Performed by: INTERNAL MEDICINE

## 2019-04-14 PROCEDURE — 6370000000 HC RX 637 (ALT 250 FOR IP): Performed by: HOSPITALIST

## 2019-04-14 PROCEDURE — 83735 ASSAY OF MAGNESIUM: CPT

## 2019-04-14 PROCEDURE — 85025 COMPLETE CBC W/AUTO DIFF WBC: CPT

## 2019-04-14 PROCEDURE — 6360000002 HC RX W HCPCS: Performed by: HOSPITALIST

## 2019-04-14 PROCEDURE — 2580000003 HC RX 258: Performed by: INTERNAL MEDICINE

## 2019-04-14 PROCEDURE — 94760 N-INVAS EAR/PLS OXIMETRY 1: CPT

## 2019-04-14 PROCEDURE — 80048 BASIC METABOLIC PNL TOTAL CA: CPT

## 2019-04-14 RX ORDER — FUROSEMIDE 10 MG/ML
40 INJECTION INTRAMUSCULAR; INTRAVENOUS 2 TIMES DAILY
Status: COMPLETED | OUTPATIENT
Start: 2019-04-14 | End: 2019-04-14

## 2019-04-14 RX ADMIN — HEPARIN SODIUM 5000 UNITS: 5000 INJECTION INTRAVENOUS; SUBCUTANEOUS at 13:55

## 2019-04-14 RX ADMIN — HEPARIN SODIUM 5000 UNITS: 5000 INJECTION INTRAVENOUS; SUBCUTANEOUS at 20:17

## 2019-04-14 RX ADMIN — HEPARIN SODIUM 5000 UNITS: 5000 INJECTION INTRAVENOUS; SUBCUTANEOUS at 06:08

## 2019-04-14 RX ADMIN — ASPIRIN 81 MG: 81 TABLET, COATED ORAL at 08:54

## 2019-04-14 RX ADMIN — CARVEDILOL 12.5 MG: 12.5 TABLET, FILM COATED ORAL at 08:54

## 2019-04-14 RX ADMIN — CLOPIDOGREL BISULFATE 75 MG: 75 TABLET ORAL at 08:54

## 2019-04-14 RX ADMIN — INSULIN LISPRO 1 UNITS: 100 INJECTION, SOLUTION INTRAVENOUS; SUBCUTANEOUS at 20:17

## 2019-04-14 RX ADMIN — LEVOTHYROXINE SODIUM 112 MCG: 0.11 TABLET ORAL at 06:08

## 2019-04-14 RX ADMIN — FUROSEMIDE 40 MG: 10 INJECTION, SOLUTION INTRAMUSCULAR; INTRAVENOUS at 11:08

## 2019-04-14 RX ADMIN — NIFEDIPINE 30 MG: 30 TABLET, FILM COATED, EXTENDED RELEASE ORAL at 08:54

## 2019-04-14 RX ADMIN — INSULIN LISPRO 3 UNITS: 100 INJECTION, SOLUTION INTRAVENOUS; SUBCUTANEOUS at 13:55

## 2019-04-14 RX ADMIN — LEVOTHYROXINE SODIUM 25 MCG: 25 TABLET ORAL at 06:08

## 2019-04-14 RX ADMIN — Medication 10 ML: at 20:17

## 2019-04-14 RX ADMIN — Medication 10 ML: at 08:56

## 2019-04-14 RX ADMIN — CARVEDILOL 12.5 MG: 12.5 TABLET, FILM COATED ORAL at 17:34

## 2019-04-14 ASSESSMENT — PAIN SCALES - GENERAL: PAINLEVEL_OUTOF10: 0

## 2019-04-14 NOTE — PROGRESS NOTES
Hospitalist Progress Note    CC:     Shortness of Breath (H/O CHF, ONSET OF SX. 2 DAYS AGO)      Admit date: 4/11/2019  Days in hospital:  3    Subjective:     She was feeling short of breath on ambulation. No acute events. No other complaints. ROS:   Review of Systems   Constitutional: Negative for chills and fever. Cardiovascular: Negative for chest pain and palpitations. Gastrointestinal: Negative for abdominal pain, constipation and diarrhea. Genitourinary: Negative for dysuria and urgency. Neurological: Negative for headaches. Objective:    BP (!) 145/68   Pulse 50   Temp 97.6 °F (36.4 °C) (Oral)   Resp 16   Ht 5' 5\" (1.651 m)   Wt 206 lb 5.6 oz (93.6 kg)   LMP  (LMP Unknown)   SpO2 93%   BMI 34.34 kg/m²     Gen: alert, NAD  HEENT: NC/AT, moist mucous membranes, no oropharyngeal erythema or exudate  Neck: supple, trachea midline, no anterior cervical or SC LAD  Heart: Normal s1/s2, RRR, no murmurs, gallops, or rubs. Lungs: Bibasilar crackles, no use of accessory muscles  Abd: bowel sounds present, soft, nondistended, none tender  Extrem: No clubbing, cyanosis, no edema  Psych: A & O x3 , affect appropriate  Neuro: grossly intact, moves all four extremities spontaneously.       Medications:  Scheduled Meds:   insulin lispro  0-6 Units Subcutaneous TID WC    insulin lispro  0-3 Units Subcutaneous Nightly    sodium chloride flush  10 mL Intravenous 2 times per day    heparin (porcine)  5,000 Units Subcutaneous 3 times per day    aspirin EC  81 mg Oral Daily    carvedilol  12.5 mg Oral BID WC    clopidogrel  75 mg Oral Daily    NIFEdipine  30 mg Oral Daily    levothyroxine  112 mcg Oral Daily    And    levothyroxine  25 mcg Oral Daily       PRN Meds:  glucose, dextrose, glucagon (rDNA), dextrose, sodium chloride flush, ondansetron, acetaminophen    IV:   dextrose           Intake/Output Summary (Last 24 hours) at 4/14/2019 1326  Last data filed at 4/14/2019 1114  Gross per 24 hour   Intake 600 ml   Output 200 ml   Net 400 ml       Results:  CBC:   Recent Labs     04/14/19  0857   WBC 8.1  8.0   HGB 13.5  13.5   HCT 40.7  41.0   MCV 94.2  94.3     286     BMP:   Recent Labs     04/12/19  0912 04/13/19  0811 04/14/19  0857    141 144  143   K 4.0 3.7 3.9  3.9  3.9    101 101  100   CO2 28 27 29  29   BUN 37* 40* 46*  46*   CREATININE 1.8* 1.8* 1.8*  1.8*     Mag: No results for input(s): MAG in the last 72 hours. Phos:   Lab Results   Component Value Date    PHOS 2.9 09/22/2018     No components found for: GLU    LIVER PROFILE: No results for input(s): AST, ALT, LIPASE, BILIDIR, BILITOT, ALKPHOS in the last 72 hours. Invalid input(s): AMYLASE,  ALB  PT/INR: No results for input(s): PROTIME, INR in the last 72 hours. APTT: No results for input(s): APTT in the last 72 hours. UA:No results for input(s): NITRITE, COLORU, PHUR, LABCAST, WBCUA, RBCUA, MUCUS, TRICHOMONAS, YEAST, BACTERIA, CLARITYU, SPECGRAV, LEUKOCYTESUR, UROBILINOGEN, BILIRUBINUR, BLOODU, GLUCOSEU, AMORPHOUS in the last 72 hours. Invalid input(s): Aneudy Quintana input(s): ABG  Lab Results   Component Value Date    CALCIUM 8.9 04/14/2019    CALCIUM 8.8 04/14/2019    PHOS 2.9 09/22/2018       Assessment and Plan:    Acute on chronic diastolic CHF:  Patient is feeling much better. Weight down from 216-->205-->206  Will continue with IV Lasix for one more day  Strict input and output monitoring  Low-salt diet  Fluid restriction  Continue with carvedilol. Not on a ACEI/ARBS secondary to CKD        Hypothyroidism  Continue Synthroid     Hypertension  Continue with nifedipine     .  Coronary disease  Continue with aspirin, Plavix, and beta blocker     Chronic kidney disease:  Creatinine is at baseline: 1.5-2.   Avoid nephrotoxic medications  She follows with nephrology as an outpatient     Type 2 diabetes mellitus  Hold glimepiride  Ct Sliding scale insulin while in the hospital     Code status:  Full code  DVT prophylaxis: Heparin  Disposition: Pending clinical improvement.     Electronically signed by Uday Ford MD on 4/14/2019 at 1:26 PM

## 2019-04-15 VITALS
SYSTOLIC BLOOD PRESSURE: 137 MMHG | HEART RATE: 50 BPM | BODY MASS INDEX: 34.16 KG/M2 | WEIGHT: 205.03 LBS | DIASTOLIC BLOOD PRESSURE: 62 MMHG | HEIGHT: 65 IN | TEMPERATURE: 97.4 F | RESPIRATION RATE: 16 BRPM | OXYGEN SATURATION: 96 %

## 2019-04-15 PROBLEM — R07.9 CHEST PAIN: Status: RESOLVED | Noted: 2018-01-09 | Resolved: 2019-04-15

## 2019-04-15 LAB
ANION GAP SERPL CALCULATED.3IONS-SCNC: 11 MMOL/L (ref 3–16)
ANION GAP SERPL CALCULATED.3IONS-SCNC: 13 MMOL/L (ref 3–16)
BASOPHILS ABSOLUTE: 0.1 K/UL (ref 0–0.2)
BASOPHILS RELATIVE PERCENT: 0.7 %
BUN BLDV-MCNC: 45 MG/DL (ref 7–20)
BUN BLDV-MCNC: 46 MG/DL (ref 7–20)
CALCIUM SERPL-MCNC: 9 MG/DL (ref 8.3–10.6)
CALCIUM SERPL-MCNC: 9 MG/DL (ref 8.3–10.6)
CHLORIDE BLD-SCNC: 102 MMOL/L (ref 99–110)
CHLORIDE BLD-SCNC: 102 MMOL/L (ref 99–110)
CO2: 28 MMOL/L (ref 21–32)
CO2: 28 MMOL/L (ref 21–32)
CREAT SERPL-MCNC: 1.9 MG/DL (ref 0.6–1.2)
CREAT SERPL-MCNC: 1.9 MG/DL (ref 0.6–1.2)
EOSINOPHILS ABSOLUTE: 0.6 K/UL (ref 0–0.6)
EOSINOPHILS RELATIVE PERCENT: 6.7 %
GFR AFRICAN AMERICAN: 31
GFR AFRICAN AMERICAN: 31
GFR NON-AFRICAN AMERICAN: 26
GFR NON-AFRICAN AMERICAN: 26
GLUCOSE BLD-MCNC: 103 MG/DL (ref 70–99)
GLUCOSE BLD-MCNC: 103 MG/DL (ref 70–99)
GLUCOSE BLD-MCNC: 171 MG/DL (ref 70–99)
GLUCOSE BLD-MCNC: 98 MG/DL (ref 70–99)
HCT VFR BLD CALC: 38.2 % (ref 36–48)
HEMOGLOBIN: 12.6 G/DL (ref 12–16)
LYMPHOCYTES ABSOLUTE: 2.1 K/UL (ref 1–5.1)
LYMPHOCYTES RELATIVE PERCENT: 25 %
MAGNESIUM: 2.3 MG/DL (ref 1.8–2.4)
MCH RBC QN AUTO: 30.9 PG (ref 26–34)
MCHC RBC AUTO-ENTMCNC: 33 G/DL (ref 31–36)
MCV RBC AUTO: 93.6 FL (ref 80–100)
MONOCYTES ABSOLUTE: 0.9 K/UL (ref 0–1.3)
MONOCYTES RELATIVE PERCENT: 10.7 %
NEUTROPHILS ABSOLUTE: 4.8 K/UL (ref 1.7–7.7)
NEUTROPHILS RELATIVE PERCENT: 56.9 %
PDW BLD-RTO: 14.6 % (ref 12.4–15.4)
PERFORMED ON: ABNORMAL
PERFORMED ON: NORMAL
PLATELET # BLD: 264 K/UL (ref 135–450)
PMV BLD AUTO: 7.3 FL (ref 5–10.5)
POTASSIUM REFLEX MAGNESIUM: 3.7 MMOL/L (ref 3.5–5.1)
POTASSIUM SERPL-SCNC: 3.7 MMOL/L (ref 3.5–5.1)
PRO-BNP: 643 PG/ML (ref 0–449)
RBC # BLD: 4.08 M/UL (ref 4–5.2)
SODIUM BLD-SCNC: 141 MMOL/L (ref 136–145)
SODIUM BLD-SCNC: 143 MMOL/L (ref 136–145)
WBC # BLD: 8.4 K/UL (ref 4–11)

## 2019-04-15 PROCEDURE — 80048 BASIC METABOLIC PNL TOTAL CA: CPT

## 2019-04-15 PROCEDURE — 6370000000 HC RX 637 (ALT 250 FOR IP): Performed by: INTERNAL MEDICINE

## 2019-04-15 PROCEDURE — 97530 THERAPEUTIC ACTIVITIES: CPT

## 2019-04-15 PROCEDURE — 6360000002 HC RX W HCPCS: Performed by: INTERNAL MEDICINE

## 2019-04-15 PROCEDURE — 36415 COLL VENOUS BLD VENIPUNCTURE: CPT

## 2019-04-15 PROCEDURE — 83880 ASSAY OF NATRIURETIC PEPTIDE: CPT

## 2019-04-15 PROCEDURE — 94760 N-INVAS EAR/PLS OXIMETRY 1: CPT

## 2019-04-15 PROCEDURE — 2580000003 HC RX 258: Performed by: INTERNAL MEDICINE

## 2019-04-15 PROCEDURE — 97116 GAIT TRAINING THERAPY: CPT

## 2019-04-15 PROCEDURE — 83735 ASSAY OF MAGNESIUM: CPT

## 2019-04-15 PROCEDURE — 85025 COMPLETE CBC W/AUTO DIFF WBC: CPT

## 2019-04-15 PROCEDURE — 6370000000 HC RX 637 (ALT 250 FOR IP): Performed by: HOSPITALIST

## 2019-04-15 RX ORDER — FUROSEMIDE 20 MG/1
TABLET ORAL
Qty: 270 TABLET | Refills: 1
Start: 2019-04-15 | End: 2019-10-14

## 2019-04-15 RX ORDER — FUROSEMIDE 20 MG/1
20 TABLET ORAL 3 TIMES DAILY
Status: DISCONTINUED | OUTPATIENT
Start: 2019-04-15 | End: 2019-04-15

## 2019-04-15 RX ORDER — FUROSEMIDE 40 MG/1
40 TABLET ORAL 2 TIMES DAILY
Status: DISCONTINUED | OUTPATIENT
Start: 2019-04-15 | End: 2019-04-15 | Stop reason: HOSPADM

## 2019-04-15 RX ADMIN — INSULIN LISPRO 1 UNITS: 100 INJECTION, SOLUTION INTRAVENOUS; SUBCUTANEOUS at 12:47

## 2019-04-15 RX ADMIN — LEVOTHYROXINE SODIUM 25 MCG: 25 TABLET ORAL at 06:49

## 2019-04-15 RX ADMIN — HEPARIN SODIUM 5000 UNITS: 5000 INJECTION INTRAVENOUS; SUBCUTANEOUS at 06:49

## 2019-04-15 RX ADMIN — NIFEDIPINE 30 MG: 30 TABLET, FILM COATED, EXTENDED RELEASE ORAL at 09:01

## 2019-04-15 RX ADMIN — HEPARIN SODIUM 5000 UNITS: 5000 INJECTION INTRAVENOUS; SUBCUTANEOUS at 12:48

## 2019-04-15 RX ADMIN — LEVOTHYROXINE SODIUM 112 MCG: 0.11 TABLET ORAL at 06:49

## 2019-04-15 RX ADMIN — Medication 10 ML: at 09:01

## 2019-04-15 RX ADMIN — CARVEDILOL 12.5 MG: 12.5 TABLET, FILM COATED ORAL at 09:01

## 2019-04-15 RX ADMIN — ASPIRIN 81 MG: 81 TABLET, COATED ORAL at 09:01

## 2019-04-15 RX ADMIN — FUROSEMIDE 40 MG: 40 TABLET ORAL at 09:01

## 2019-04-15 RX ADMIN — CLOPIDOGREL BISULFATE 75 MG: 75 TABLET ORAL at 09:01

## 2019-04-15 ASSESSMENT — PAIN SCALES - GENERAL: PAINLEVEL_OUTOF10: 0

## 2019-04-15 NOTE — DISCHARGE INSTR - COC
Continuity of Care Form    Patient Name: Magdalene Mace   :  1940  MRN:  [id number]    Admit date:  2019  Discharge date:  ***    Code Status Order: Full Code   Advance Directives:   Advance Care Flowsheet Documentation     Date/Time Healthcare Directive Type of Healthcare Directive Copy in [address] Agent's Name Healthcare Agent's Phone Number    19 1801  No, patient does not have an advance directive for healthcare treatment -- -- -- -- --          Admitting Physician:  Baron Alannah MD  PCP: Zulema Walters DO    Discharging Nurse: [facility] Unit/Room#: E7F-4472/3838-21  Discharging Unit Phone Number: ***    Emergency Contact:   Extended Emergency Contact Information  Primary Emergency Contact: Olga Ball  Address: [facility] [address] Phone: [phone]  Relation: Child  Secondary Emergency Contact: [address] Phone: [phone]  Relation: Child    Past Surgical History:  Past Surgical History:   Procedure Laterality Date    CATARACT REMOVAL WITH IMPLANT Bilateral     CORONARY ANGIOPLASTY WITH STENT PLACEMENT Right 2015    At [address]  2018    ILANA to RCA and POBA on ISR of LAD    HERNIA REPAIR      HYSTERECTOMY         Immunization History:   Immunization History   Administered Date(s) Administered    Influenza Virus Vaccine 10/10/2013    Influenza, High Dose (Fluzone 65 yrs and older) 10/10/2014, 2016, 2017, 2017    Influenza, [place], 6 mo and older, IM, PF (Flulaval, Fluarix) 2018    Pneumococcal 13-valent Conjugate (Opal Billow) 2017       Active Problems:  Patient Active Problem List   Diagnosis Code    Hypothyroidism E03.9    MI (mitral incompetence) I34.0    Pulmonary hypertension (HCC) I27.20    Chronic diastolic CHF (congestive heart failure), NYHA class 2 ([facility]) I50.32    Diabetes mellitus ([facility] [address] E11.9    Essential hypertension I10    NSTEMI (non-ST elevated myocardial infarction) ([facility]) I21.4    Hyperlipidemia LDL goal <70 E78.5    Acute bacterial sinusitis J01.90, B96.89    Coronary artery disease involving native coronary artery of native heart with unstable angina pectoris ([facility]) I25.110    Unstable angina pectoris ([facility]) I20.0    CKD (chronic kidney disease) stage 3, GFR 30-59 ml/min ([facility]) N18.3    Acute kidney injury ([facility]) N17.9    Acute bacterial sinusitis J01.90, B96.89    Unstable angina ([facility]) I20.0    Accelerated ventricular rhythm ([facility]) I44.2    Musculoskeletal back pain M54.9    Uncontrolled hypertension I10    Acute diastolic CHF (congestive heart failure) ([facility]) I50.31    Acute bacterial sinusitis J01.90, Y40.68    Diastolic CHF ([facility]) J94.02    Admitted for acute congestive heart failure ([facility]) I50.9       Isolation/Infection:   Isolation          No Isolation            Nurse Assessment:  Last Vital Signs: BP (!) 164/68   Pulse 52   Temp 97.6 °F (36.4 °C) (Oral)   Resp 16   Ht 5' 5\" (1.651 m)   Wt 205 lb 0.4 oz (93 kg)   LMP  (LMP Unknown)   SpO2 94%   BMI 34.12 kg/m²     Last documented pain score (0-10 scale): Pain Level: 0  Last Weight:   Wt Readings from Last 1 Encounters:   04/15/19 205 lb 0.4 oz (93 kg)     Mental Status:  {IP PT MENTAL STATUS:20030}    IV Access:  {[facility] IV ACCESS:448087660}    Nursing Mobility/ADLs:  Walking   {[facility] DME XSUC:804920126}  Transfer  {[facility] DME RNXP:103134411}  Bathing  {[facility] DME YXIY:496853484}  Dressing  {[facility] DME JFJA:073654875}  Toileting  {[facility] DME YJGU:663967722}  Feeding  {[facility] DME CGNS:124258526}  Med Admin  {[facility] DME NRFS:817992343}  Med Delivery   {[facility] MED Delivery:203263411}    Wound Care Documentation and Therapy:        Elimination:  Continence:   · Bowel: {YES / TQ:49320}  · Bladder: {YES / CK:98806}  Urinary Catheter: {Urinary Catheter:345629309} After Discharge:   PT  OT  RN  BMP on 0/51    Physician Certification: I certify the above information and transfer of Feliciano Mina  is necessary for the continuing treatment of the diagnosis listed and that she requires [address] for less 30 days.      Update Admission H&P: No change in H&P    PHYSICIAN SIGNATURE:  Electronically signed by Oniel Lugo MD on 4/15/19 at 9:40 AM

## 2019-04-15 NOTE — PLAN OF CARE
Problem: Falls - Risk of:  Goal: Will remain free from falls  Description  Will remain free from falls  4/15/2019 0515 by Mary Jane Cisse RN  Outcome: Ongoing    Problem: OXYGENATION/RESPIRATORY FUNCTION  Goal: Patient will maintain patent airway  4/15/2019 0515 by Mary Jane Cisse RN  Outcome: Ongoing     Problem: HEMODYNAMIC STATUS  Goal: Patient has stable vital signs and fluid balance  4/15/2019 0515 by Mary Jane Cisse RN  Outcome: Ongoing

## 2019-04-15 NOTE — CONSULTS
HF RN consult received from Dr Jaiden Thompson as part of HF order set. Chart reviewed. Patient presented to ED with c/o SOB x 2 days. She reports her renal function has been worsening and diuretic dosage has been adjusted. She endorses increased edema in legs and + HUMPHREY. She follows with [facility] and was last seen in office on 3/18/19 by Tye Dodson NP. Josse Mckee noted chronic SOB and chronic ankle edema. She is not on ACE or ARB secondary to  CKD. She was felt to be compensated with weight at 207#. ProBNP on admission was 1468 with Cr 1.8. Weight 216# on admission. She has been treated with IV bolus Lasix. Sticky note was placed to hospitalists asking for consideration for cardiology and renal consults as HF / diuretics is primarily managed by nephrology but neither service was consulted. Patient has diuresed 3.7 liters to date and weight is down 11#. HF measures have been implemented: daily weights, I/O, and fluid / sodium restricted diet. Patient has received >60 mins of HF education from bedside staff. HF instructions are on AVS.  Follow up is set for Thurs April 18 at 9:40am with Tye Dodson NP at [facility]. Pharmacy notified of need for discharge med rec.

## 2019-04-15 NOTE — PROGRESS NOTES
Pharmacy Heart Failure Medication Reconciliation Note    Pt discharged from [facility] today after admission for heart failure. Franchesca Shearer has a diagnosis of diastolic heart failure (last EF = 50-55% on 4/11/19). Patient taking an ACEI / ARB / Entresto:  No: CKD - HFpEF      Patient taking a BETA BLOCKER:  Yes  Patient taking a LOOP DIURETIC: Yes  Patient taking a ALDOSTERONE RECEPTOR ANTAGONIST: No: HFpEF     Corrections to discharge medications include:  none    Discharge Medications:         Medication List        CHANGE how you take these medications      furosemide 20 MG tablet  Commonly known as:  LASIX  40 mg in the morning, and 20 mg at night. What changed:    how much to take  how to take this  when to take this  additional instructions            CONTINUE taking these medications      allopurinol 100 MG tablet  Commonly known as:  ZYLOPRIM  Take one tablet by mouth once daily     aspirin EC 81 MG EC tablet  Take 1 tablet by mouth daily     blood glucose test strips strip  Commonly known as:  ASCENSIA AUTODISC VI;ONE TOUCH ULTRA TEST VI  1 each by In Vitro route daily As needed.      carvedilol 12.5 MG tablet  Commonly known as:  COREG  Take 1 tablet by mouth 2 times daily (with meals)     clopidogrel 75 MG tablet  Commonly known as:  PLAVIX  Take 1 tablet by mouth daily     glimepiride 4 MG tablet  Commonly known as:  AMARYL  TAKE 1 TABLET BY MOUTH TWICE DAILY (BEFORE MEALS)     levothyroxine 137 MCG tablet  Commonly known as:  SYNTHROID  TAKE 1 TABLET BY MOUTH ONCE DAILY     NIFEdipine 30 MG extended release tablet  Commonly known as:  ADALAT CC  Take 1 tablet by mouth daily               Where to Get Your Medications        Information about where to get these medications is not yet available    Ask your nurse or doctor about these medications  furosemide 20 MG tablet         Kyara Helton, PharmD  Heart Failure Discharge Medication Reconciliation Program  [phone]

## 2019-04-15 NOTE — PROGRESS NOTES
Physical Therapy    Daily Treatment Note    [facility]: [id number]  Room Number: H7B-5304/5980-90    ASSESSMENT: Pt is a 66 y.o. F. admitted 4/11 for CHF exacerbation, CKD. Today she ambulated 150' twice with a walker and modified independence; she also went up/down 4 steps with a hand rail and supervision steadily. She denies any concerns about funcitoning at home and reports being accustomed to taking rest breaks. Pt appears safe for returning home without home PT. I passed on some activity guidelines for her (see end of note). Discharge Recommendations: Home with assist PRN  Equipment Needs: Equipment Needed: No    Admission Date: 4/11/2019 11:39 AM    Additional Pertinent Hx: Per Dr. Joanna See, 4/11, \"78 y.o. female with PMH of diastolic CHF, CKD, pulmonary htn, CAD, DM, htn, hypothyroidism, PVD who presents with dyspnea. Has been having progressively worsening symptoms over the past 3-4 days. \"      Diagnosis: CHF; CKD  Restrictions/Precautions: Fall Risk       PMHx:  has a past medical history of CAD (coronary artery disease), CHF (congestive heart failure) ([address] DM2 (diabetes mellitus, type 2) ([address] HTN (hypertension), Hypothyroidism, MI (mitral incompetence), Over weight, Pulmonary HTN ([address] PVD (peripheral vascular disease) ([address] and Uterine cancer ([facility] [address]   PSgHx:   Past Surgical History:   Procedure Laterality Date    CATARACT REMOVAL WITH IMPLANT Bilateral     CORONARY ANGIOPLASTY WITH STENT PLACEMENT Right 9/9/2015    At [facility] WITH STENT PLACEMENT  01/2018    ILANA to RCA and POBA on ISR of LAD    HERNIA REPAIR      HYSTERECTOMY         Home Environment / Functional History  Social/Functional History  Lives With: Alone  Type of Home: House  Home Layout: One level(pt doesn't use basement)  Home Access: Stairs to enter with rails  Entrance Stairs - Number of Steps: 4  Entrance Stairs - Rails: Both  Bathroom Shower/Tub: Walk-in shower  Bathroom Toilet: Handicap height  Bathroom Equipment: Built-in shower seat, Hand-held shower  Bathroom Accessibility: Walker accessible  Home Equipment: Rolling walker  ADL Assistance: Independent  Homemaking Assistance: Independent(pt does own cooking, cleaning, laundry, grocery shopping)  Ambulation Assistance: Independent  Transfer Assistance: Independent  Active : Yes  Occupation: Retired  Additional Comments: Pt denies recent falls. Restrictions  Restrictions/Precautions: Fall Risk             SUBJECTIVE: Pt willing to demonstrate her independence; feels she does not need home PT. Pain: Patient Currently in Pain: Denies      OBJECTIVE:    Transfers  Sit to Stand: Independent  Stand to sit: Independent    Ambulation  Ambulation  Ambulation?: Yes  Ambulation 1  Surface: level tile  Device: Rolling Walker  Assistance: Modified Independent  Quality of Gait: wide base of support, steady with adequate foot clearance, slight HUMPHREY with ambulation  Distance: 2 x 150'  Comments: seated rest break after first trial; then to stairs and back to room    Stairs  Stairs/Curb  Stairs?: Yes(up/down 4 steps with hand rail and non-reciprocal pattern)    Neuro/balance  Balance  Sitting - Static: Good  Standing - Static: Good  Comments: Pt able to complete toilet transfer with CGA. Patient Education: Goals of PT       Safety Devices: Type of devices: All fall risk precautions in place, Call light within reach, Chair alarm in place, Gait belt, Left in chair, Nurse notified      ASSESSMENT:   Pt is a 66 y.o. F. admitted 4/11 for CHF exacerbation, CKD. Today she ambulated 150' twice with a walker and modified independence; she also went up/down 4 steps with a hand rail and supervision steadily. She denies any concerns about funcitoning at home and reports being accustomed to taking rest breaks. Pt appears safe for returning home without home PT.  I passed on some activity guidelines for her (see end of progress your duration as you reduce frequency (EXAMPLE: within 4 weeks progress to walking for 20 minutes roughly every three hours - about 5 walks in a 16 hour day)  - Pitfalls to avoid:  o Avoid sitting/lying down for too long - keep to your interval training and when you feel too tired to exert for full interval get up and at least walk around the room once. o Avoid exerting for long durations such that you feel the need for a day of sitting still to recover (example - going grocery shopping for an hour and then going to the park for an hour). B) TALK TEST: use this test while walking during your interval training  1) If you can talk and sing, then you probably are not exerting yourself enough. Consider exerting yourself either a little faster or a bit longer. 2) If you cannot talk, then you probably are exerting yourself too much. Sit down to take a rest break at least long enough to be able to talk conversationally. C) PURSED LIP BREATHING: breathing in through your nose and out through puckered (pursed) lips during activity. This helps your lungs gets lots of good oxygen to you.     1) Breathe in through your nose for 2 seconds   2) Breathe out through your lips for 4 seconds

## 2019-04-15 NOTE — PROGRESS NOTES
Patient with discharge orders. IV removed from right arm with no complications. Discharge instructions reviewed and patient voiced understanding.   Transport escorted patient down to lobby at 1400

## 2019-04-15 NOTE — DISCHARGE SUMMARY
Hospitalist Discharge Summary    Patient ID:  Vin Duran  [id number]  41 y.o.  1940    Admit date: 4/11/2019    Discharge date: 4/15/2019    Disposition: home    Admission Diagnoses:   Patient Active Problem List   Diagnosis    Hypothyroidism    MI (mitral incompetence)    Pulmonary [facility] ([facility]    Chronic diastolic CHF (congestive heart failure), NYHA class 2 ([facility]    Diabetes mellitus ([facility]    Essential hypertension    NSTEMI (non-ST elevated myocardial infarction) ([facility]    Hyperlipidemia LDL goal <70    Acute bacterial sinusitis    Coronary artery disease involving native coronary artery of native heart with unstable angina pectoris ([facility]    Unstable angina pectoris ([facility])    CKD (chronic kidney disease) stage 3, GFR 30-59 ml/min ([facility])    Acute kidney injury ([facility]    Acute bacterial sinusitis    Unstable angina ([facility]    Accelerated ventricular rhythm ([facility])    Musculoskeletal back pain    Uncontrolled hypertension    Acute diastolic CHF (congestive heart failure) ([facility])    Acute bacterial sinusitis    [facility]    Admitted for acute congestive heart failure ([facility]       Discharge Diagnoses: Active Problems:    Diastolic CHF ([facility]    Admitted for acute congestive heart failure ([facility]  Resolved Problems:    Chest pain      Code Status:  Full Code    Condition:  Stable    Discharge Diet: Diet:  DIET LOW SODIUM 2 GM; Carb Control: 5 carb choices (75 gms)/meal; 1500 ml    PCP to do list:          Hospital Course:     Patient presented to the hospital with shortness of breath. She was found to have acute on chronic diastolic CHF. She was treated with IV Lasix. She is being discharged to follow-up with her cardiologist and nephrology as an outpatient. Acute on chronic diastolic CHF:  Patient is feeling much better. Weight down from 216-->205  Lasix  Low-salt diet  Fluid restriction  Continue with carvedilol.   Not on a ACEI/ARBS secondary to CKD        Hypothyroidism  Continue Synthroid     Hypertension  Continue with nifedipine     .  Coronary disease  Continue with aspirin, Plavix, and beta blocker     Chronic kidney disease:  Creatinine is at baseline: 1.5-2. Avoid nephrotoxic medications  She follows with nephrology as an outpatient  BMP in 2 days     Type 2 diabetes mellitus  Continue with home meds          Discharge Medications:   Current Discharge Medication List        Current Discharge Medication List      CONTINUE these medications which have CHANGED    Details   furosemide (LASIX) 20 MG tablet 40 mg in the morning, and 20 mg at night. Qty: 270 tablet, Refills: 1    Associated Diagnoses: Chronic diastolic HF (heart failure) ([address]           Current Discharge Medication List      CONTINUE these medications which have NOT CHANGED    Details   NIFEdipine (ADALAT CC) 30 MG extended release tablet Take 1 tablet by mouth daily  Qty: 90 tablet, Refills: 1    Associated Diagnoses: Essential hypertension      clopidogrel (PLAVIX) 75 MG tablet Take 1 tablet by mouth daily  Qty: 90 tablet, Refills: 1      carvedilol (COREG) 12.5 MG tablet Take 1 tablet by mouth 2 times daily (with meals)  Qty: 180 tablet, Refills: 1      allopurinol (ZYLOPRIM) 100 MG tablet Take one tablet by mouth once daily  Qty: 90 tablet, Refills: 1      glimepiride (AMARYL) 4 MG tablet TAKE 1 TABLET BY MOUTH TWICE DAILY (BEFORE MEALS)  Qty: 180 tablet, Refills: 0      levothyroxine (SYNTHROID) 137 MCG tablet TAKE 1 TABLET BY MOUTH ONCE DAILY  Qty: 30 tablet, Refills: 2    Comments: Please consider 90 day supplies to promote better adherence      aspirin EC 81 MG EC tablet Take 1 tablet by mouth daily  Qty: 30 tablet, Refills: 5      glucose blood VI test strips (ASCENSIA AUTODISC VI;ONE TOUCH ULTRA TEST VI) strip 1 each by In Vitro route daily As needed.   Qty: 100 each, Refills: 3    Comments: Needs meter as well           Current Discharge Medication List              Procedures: Assessment on Discharge: Stable, improved     Discharge ROS:  A complete review of systems was asked and negative except for negative    Discharge Exam:  /62   Pulse 50   Temp 97.4 °F (36.3 °C) (Oral)   Resp 16   Ht 5' 5\" (1.651 m)   Wt 205 lb 0.4 oz (93 kg)   LMP  (LMP Unknown)   SpO2 96%   BMI 34.12 kg/m²     Gen: NAD  HEENT: NC/AT, moist mucous membranes, no oropharyngeal erythema or exudate  Neck: supple, trachea midline, no anterior cervical or SC LAD  Heart:  Normal s1/s2, RRR, no murmurs, gallops, or rubs. no leg edema  Lungs:  CTA bilaterally, no wheeze,no rales or rhonchi, no use of accessory muscles  Abd: bowel sounds present, soft, nontender, nondistended, no masses  Extrem:  No clubbing, cyanosis,  no edema  Skin: no lesion or masses  Psych:  A & O x3  Neuro: grossly intact, moves all four extremities    Pertinent Studies During Hospital Stay:  Radiology:  Xr Chest Portable    Result Date: 4/11/2019  EXAMINATION: SINGLE XRAY VIEW OF THE CHEST 4/11/2019 12:11 pm COMPARISON: November 20, 2018 HISTORY: ORDERING SYSTEM PROVIDED HISTORY: sob TECHNOLOGIST PROVIDED HISTORY: Reason for exam:->sob Ordering Physician Provided Reason for Exam: SOB for a few days-leg swelling Acuity: Acute Type of Exam: Initial FINDINGS: Cardiac silhouette is normal in size. Lungs appear clear. No acute bony abnormality. No acute findings.            Last Labs on Discharge:     Recent Results (from the past 24 hour(s))   POCT Glucose    Collection Time: 04/14/19  4:44 PM   Result Value Ref Range    POC Glucose 82 70 - 99 mg/dl    Performed on ACCU-CHEK    POCT Glucose    Collection Time: 04/14/19  8:12 PM   Result Value Ref Range    POC Glucose 159 (H) 70 - 99 mg/dl    Performed on ACCU-CHEK    Basic Metabolic Panel    Collection Time: 04/15/19  5:46 AM   Result Value Ref Range    Sodium 143 136 - 145 mmol/L    Potassium 3.7 3.5 - 5.1 mmol/L    Chloride 102 99 - 110 mmol/L    CO2 28 21 - 32 mmol/L    Anion Gap 13 3 - 16    Glucose 103 (H) 70 - 99 mg/dL    BUN 45 (H) 7 - 20 mg/dL    CREATININE 1.9 (H) 0.6 - 1.2 mg/dL    GFR Non-African American 26 (A) >60    GFR  31 (A) >60    Calcium 9.0 8.3 - 10.6 mg/dL   Magnesium    Collection Time: 04/15/19  5:46 AM   Result Value Ref Range    Magnesium 2.30 1.80 - 2.40 mg/dL   Brain Natriuretic Peptide    Collection Time: 04/15/19  5:46 AM   Result Value Ref Range    Pro- (H) 0 - 449 pg/mL   CBC Auto Differential    Collection Time: 04/15/19  5:46 AM   Result Value Ref Range    WBC 8.4 4.0 - 11.0 K/uL    RBC 4.08 4.00 - 5.20 M/uL    Hemoglobin 12.6 12.0 - 16.0 g/dL    Hematocrit 38.2 36.0 - 48.0 %    MCV 93.6 80.0 - 100.0 fL    MCH 30.9 26.0 - 34.0 pg    MCHC 33.0 31.0 - 36.0 g/dL    RDW 14.6 12.4 - 15.4 %    Platelets 537 435 - 985 K/uL    MPV 7.3 5.0 - 10.5 fL    Neutrophils % 56.9 %    Lymphocytes % 25.0 %    Monocytes % 10.7 %    Eosinophils % 6.7 %    Basophils % 0.7 %    Neutrophils # 4.8 1.7 - 7.7 K/uL    Lymphocytes # 2.1 1.0 - 5.1 K/uL    Monocytes # 0.9 0.0 - 1.3 K/uL    Eosinophils # 0.6 0.0 - 0.6 K/uL    Basophils # 0.1 0.0 - 0.2 K/uL   Basic Metabolic Panel w/ Reflex to MG    Collection Time: 04/15/19  5:47 AM   Result Value Ref Range    Sodium 141 136 - 145 mmol/L    Potassium reflex Magnesium 3.7 3.5 - 5.1 mmol/L    Chloride 102 99 - 110 mmol/L    CO2 28 21 - 32 mmol/L    Anion Gap 11 3 - 16    Glucose 103 (H) 70 - 99 mg/dL    BUN 46 (H) 7 - 20 mg/dL    CREATININE 1.9 (H) 0.6 - 1.2 mg/dL    GFR Non-African American 26 (A) >60    GFR  31 (A) >60    Calcium 9.0 8.3 - 10.6 mg/dL   POCT Glucose    Collection Time: 04/15/19  7:50 AM   Result Value Ref Range    POC Glucose 98 70 - 99 mg/dl    Performed on ACCU-CHEK    POCT Glucose    Collection Time: 04/15/19 11:55 AM   Result Value Ref Range    POC Glucose 171 (H) 70 - 99 mg/dl    Performed on ACCU-CHEK          Follow up: with Amanda Sanchez, DO    Note that over 30 minutes was spent in preparing discharge papers, discussing discharge with patient, medication review, etc.    Thank you Guillermina Lopez DO for the opportunity to be involved in this patient's care. If you have any questions or concerns please feel free to contact me at [phone].     Electronically signed by Oniel Lugo MD on 4/15/2019 at 1:22 PM

## 2019-04-15 NOTE — CARE COORDINATION
Presented IMM. Discussed HHC at [facility]. Pt requested to wait to see if PT/OT today continue to feel that [address] is needed as she feels much better. Evals indicate that she is safe to return home without [address]. PT [facility].    Electronically signed by Tess Garces RN on 4/15/2019 at 12:18 PM

## 2019-04-16 ENCOUNTER — CARE COORDINATION (OUTPATIENT)
Dept: CASE MANAGEMENT | Age: 79
End: 2019-04-16

## 2019-04-16 ENCOUNTER — TELEPHONE (OUTPATIENT)
Dept: PHARMACY | Facility: CLINIC | Age: 79
End: 2019-04-16

## 2019-04-16 DIAGNOSIS — I50.32 CHRONIC DIASTOLIC CHF (CONGESTIVE HEART FAILURE), NYHA CLASS 2 (HCC): Primary | ICD-10-CM

## 2019-04-16 PROCEDURE — 1111F DSCHRG MED/CURRENT MED MERGE: CPT

## 2019-04-16 NOTE — TELEPHONE ENCOUNTER
CLINICAL PHARMACY NOTE  Post-Discharge Transitions of Care ([facility])    Non-face-to-face services provided:  Assessment and support for treatment adherence and medication management-medication reconciliation    Subjective/Objective:  Eric Officer is a 66 y.o. female. Patient was discharged from [facility] on 4/15/19 with a diagnosis of CHF exacerbation. Patient outreach to review discharge medications and provide medication review and management. Spoke with patient    Allergies   Allergen Reactions    Hydrocodone-Acetaminophen      vomiting    Rosuvastatin      Leg cramps      Morphine Nausea And Vomiting       Discharge Medications (as per discharging medication list): You told us you were taking these medications at home, but the amount or how often you take this medication has CHANGED   furosemide (LASIX) 20 MG tablet  · Pt reports this is how she was taking this medication PTA. Reviewed low salt diet, fluid restriction and importance of weighing daily and to call cardiologist at the first sign of increased swelling/weight gain. Instructed pt NOT to take extra furosemide unless instructed by MD.  40 mg in the morning, and 20 mg at night. These are medications you told us you were taking at home, CONTINUE taking them after you leave the hospital   Medication Sig    NIFEdipine (ADALAT CC) 30 MG extended release tablet Take 1 tablet by mouth daily    clopidogrel (PLAVIX) 75 MG tablet Take 1 tablet by mouth daily    carvedilol (COREG) 12.5 MG tablet Take 1 tablet by mouth 2 times daily (with meals)    allopurinol (ZYLOPRIM) 100 MG tablet Take one tablet by mouth once daily    glimepiride (AMARYL) 4 MG tablet TAKE 1 TABLET BY MOUTH TWICE DAILY (BEFORE MEALS)    levothyroxine (SYNTHROID) 137 MCG tablet TAKE 1 TABLET BY MOUTH ONCE DAILY    aspirin EC 81 MG EC tablet Take 1 tablet by mouth daily     Meds to Beds:NA    Estimated Creatinine Clearance: 28 mL/min (A) (based on SCr of 1.9 mg/dL (H)).

## 2019-04-16 NOTE — CARE COORDINATION
[address] [facility] Transitions Initial Follow Up Call    Call within 2 business days of discharge: Yes    Patient: Kelly Love Patient : 1940   MRN: [id number]  Reason for Admission:   Discharge Date: 4/15/19 RARS: Readmission Risk Score: 19      Last Discharge [facility]       Complaint Diagnosis Description Type Department Provider    19   Pre-admit (Canceled)      19 Shortness of Breath Acute pulmonary edema ([address] . .. ED to Hosp-Admission (Discharged) (ADMITTED) Vu Rider MD; Lorie CARD Spoke with: Stacie Mckenzie (patient)    Facility: [facility]    Non-face-to-face services provided:  Scheduled appointment with PCP-2019  Obtained and reviewed discharge summary and/or continuity of care documents    Care Transitions 24 Hour Call    Do you have any ongoing symptoms?:  No  Do you have a copy of your discharge instructions?:  Yes  Do you have all of your prescriptions and are they filled?:  Yes  Have you been contacted by a [facility] Pharmacist?:  Yes  Have you scheduled your follow up appointment?:  No  Were you discharged with any Home Care or Post Acute Services:  No  Post Acute Services:  Home Health ([facility] )  Patient DME:  Brendon Ibarra, Other  Other Patient DME:  she has a walker at home but does not use it - she is independent with ambulation  Do you have support at home?:  Alone  Do you feel like you have everything you need to keep you well at home?:  Yes  Are you an active caregiver in your home?:  No  Care Transitions Interventions         Follow Up: Spoke with Katelin Gonzalez. She states she is doing good. Katelin Gonzalez states she received a copy of her discharge instructions and they were reviewed with her prior to her discharge. Katelin Gonzalez spoke with the pharmacy staff earlier today for med rec. She has a follow up with cardiac.  Writer offered assistance in scheduling with her PCP - Katelin Gonzalez accepted - hospital follow up(transitional care) appt scheduled with PCP for 04/17/2019. Yesy Mooney did not weigh herself today. She denies any SOB or chest pain. Yesy Mooney states she continues with some edema in her feet - but much better than when she came to the hospital. She states she follows a low sodium/no added salt diet & daily fluid restrictions. Yesy Mooney states she does not check her BG level on a daily basis. Discussed role of CTC. Yesy Mooney is agreeable to follow up calls from [address] [place] [address]. She denies any needs today. Yesy Mooney took writer's contact info.   Future Appointments   Date Time Provider Isidoro Black   4/17/2019  2:00 PM Juan Manuel Chung [facility]   4/18/2019  9:40 AM ESTELLE Lowry - CNP [facility]   9/13/2019 12:45 PM MD Paloma Hector [facility]       Tip Hall RN

## 2019-04-17 ENCOUNTER — OFFICE VISIT (OUTPATIENT)
Dept: FAMILY MEDICINE CLINIC | Age: 79
End: 2019-04-17
Payer: MEDICARE

## 2019-04-17 VITALS
BODY MASS INDEX: 34.42 KG/M2 | SYSTOLIC BLOOD PRESSURE: 138 MMHG | WEIGHT: 206.6 LBS | DIASTOLIC BLOOD PRESSURE: 66 MMHG | HEIGHT: 65 IN

## 2019-04-17 DIAGNOSIS — E03.9 ACQUIRED HYPOTHYROIDISM: ICD-10-CM

## 2019-04-17 DIAGNOSIS — I50.32 CHRONIC DIASTOLIC CONGESTIVE HEART FAILURE (HCC): Primary | ICD-10-CM

## 2019-04-17 DIAGNOSIS — Z23 NEED FOR PNEUMOCOCCAL VACCINATION: ICD-10-CM

## 2019-04-17 PROCEDURE — G8417 CALC BMI ABV UP PARAM F/U: HCPCS | Performed by: INTERNAL MEDICINE

## 2019-04-17 PROCEDURE — 90732 PPSV23 VACC 2 YRS+ SUBQ/IM: CPT | Performed by: INTERNAL MEDICINE

## 2019-04-17 PROCEDURE — G8598 ASA/ANTIPLAT THER USED: HCPCS | Performed by: INTERNAL MEDICINE

## 2019-04-17 PROCEDURE — G8400 PT W/DXA NO RESULTS DOC: HCPCS | Performed by: INTERNAL MEDICINE

## 2019-04-17 PROCEDURE — 99213 OFFICE O/P EST LOW 20 MIN: CPT | Performed by: INTERNAL MEDICINE

## 2019-04-17 PROCEDURE — G8427 DOCREV CUR MEDS BY ELIG CLIN: HCPCS | Performed by: INTERNAL MEDICINE

## 2019-04-17 PROCEDURE — 1123F ACP DISCUSS/DSCN MKR DOCD: CPT | Performed by: INTERNAL MEDICINE

## 2019-04-17 PROCEDURE — G8510 SCR DEP NEG, NO PLAN REQD: HCPCS | Performed by: INTERNAL MEDICINE

## 2019-04-17 PROCEDURE — 1111F DSCHRG MED/CURRENT MED MERGE: CPT | Performed by: INTERNAL MEDICINE

## 2019-04-17 PROCEDURE — G0009 ADMIN PNEUMOCOCCAL VACCINE: HCPCS | Performed by: INTERNAL MEDICINE

## 2019-04-17 PROCEDURE — 4040F PNEUMOC VAC/ADMIN/RCVD: CPT | Performed by: INTERNAL MEDICINE

## 2019-04-17 PROCEDURE — 1090F PRES/ABSN URINE INCON ASSESS: CPT | Performed by: INTERNAL MEDICINE

## 2019-04-17 PROCEDURE — 1036F TOBACCO NON-USER: CPT | Performed by: INTERNAL MEDICINE

## 2019-04-17 RX ORDER — LEVOTHYROXINE SODIUM 137 UG/1
TABLET ORAL
Qty: 30 TABLET | Refills: 2 | Status: SHIPPED | OUTPATIENT
Start: 2019-04-17 | End: 2019-08-02 | Stop reason: SDUPTHER

## 2019-04-17 ASSESSMENT — ENCOUNTER SYMPTOMS
ABDOMINAL PAIN: 0
APNEA: 0
COUGH: 0

## 2019-04-17 ASSESSMENT — PATIENT HEALTH QUESTIONNAIRE - PHQ9
1. LITTLE INTEREST OR PLEASURE IN DOING THINGS: 0
SUM OF ALL RESPONSES TO PHQ9 QUESTIONS 1 & 2: 0
SUM OF ALL RESPONSES TO PHQ QUESTIONS 1-9: 0
2. FEELING DOWN, DEPRESSED OR HOPELESS: 0
SUM OF ALL RESPONSES TO PHQ QUESTIONS 1-9: 0

## 2019-04-17 NOTE — PROGRESS NOTES
Immunization History   Administered Date(s) Administered    Influenza Virus Vaccine 10/10/2013    Influenza, High Dose (Fluzone 65 yrs and older) 10/10/2014, 01/14/2016, 01/20/2017, 09/25/2017    Influenza, Milderd Mons, 6 mo and older, IM, PF (Flulaval, Fluarix) 09/21/2018    Pneumococcal 13-valent Conjugate (Jacquie Beers) 01/20/2017

## 2019-04-17 NOTE — PROGRESS NOTES
Subjective:      Patient ID: Glo Bingham is a 66 y.o. female. HPI   Chief Complaint   Patient presents with    Follow-Up from Hospital     hospital follow up- acute pulmonary edema, chronic kidney disease. patient request Rx refills: Lasix and Synthroid   patient sees both  A cardiologist and a nephrologist  Glo Bingham is a 66 y.o. female with the following history as recorded in EpicCare:  Patient Active Problem List    Diagnosis Date Noted    Accelerated ventricular rhythm ([facility] 09/21/2018     Priority: High    Diastolic CHF ([address] 63/43/0340    Admitted for acute congestive heart failure ([address] 04/11/2019    Acute bacterial sinusitis 12/18/2018    Acute diastolic CHF (congestive heart failure) ([address] 11/20/2018    Musculoskeletal back pain 09/22/2018    Uncontrolled hypertension 09/22/2018    Unstable angina ([address]     Acute bacterial sinusitis 02/28/2018    CKD (chronic kidney disease) stage 3, GFR 30-59 ml/min ([address] 01/11/2018    Acute kidney injury ([address] 01/11/2018    Unstable angina pectoris ([address]     Coronary artery disease involving native coronary artery of native heart with unstable angina pectoris ([address]     Acute bacterial sinusitis 12/12/2017    NSTEMI (non-ST elevated myocardial infarction) ([address]     Hyperlipidemia LDL goal <70     Essential hypertension 01/14/2016    Diabetes mellitus ([address] 10/10/2013    Hypothyroidism     MI (mitral incompetence)     Pulmonary hypertension (HCC)     Chronic diastolic CHF (congestive heart failure), NYHA class 2 ([address] 09/06/2012     Current Outpatient Medications   Medication Sig Dispense Refill    furosemide (LASIX) 20 MG tablet 40 mg in the morning, and 20 mg at night.  270 tablet 1    NIFEdipine (ADALAT CC) 30 MG extended release tablet Take 1 tablet by mouth daily 90 tablet 1    clopidogrel (PLAVIX) 75 MG tablet Take 1 tablet by mouth daily 90 tablet 1    carvedilol (COREG) 12.5 MG tablet Take 1 tablet by mouth 2 times daily chills, diaphoresis and fatigue. HENT: Negative for postnasal drip. Eyes: Negative for visual disturbance. Respiratory: Negative for apnea and cough. Cardiovascular: Negative for chest pain and palpitations. Gastrointestinal: Negative for abdominal pain. Genitourinary: Negative for dysuria. Objective:   Physical Exam   Constitutional: She appears well-developed and well-nourished. HENT:   Head: Normocephalic and atraumatic. Right Ear: External ear normal.   Left Ear: External ear normal.   Eyes: Pupils are equal, round, and reactive to light. EOM are normal.   Cardiovascular: Normal rate and regular rhythm. No murmur heard. Pulmonary/Chest: No stridor. No respiratory distress. She has no wheezes. Abdominal: She exhibits no distension and no mass. There is no tenderness. There is no guarding. Assessment:       Diagnosis Orders   1. Chronic diastolic congestive heart failure ([address]     2. Need for pneumococcal vaccination     3. Acquired hypothyroidism           Plan:      Outpatient Encounter Medications as of 4/17/2019   Medication Sig Dispense Refill    levothyroxine (SYNTHROID) 137 MCG tablet TAKE 1 TABLET BY MOUTH ONCE DAILY 30 tablet 2    furosemide (LASIX) 20 MG tablet 40 mg in the morning, and 20 mg at night. 270 tablet 1    NIFEdipine (ADALAT CC) 30 MG extended release tablet Take 1 tablet by mouth daily 90 tablet 1    clopidogrel (PLAVIX) 75 MG tablet Take 1 tablet by mouth daily 90 tablet 1    carvedilol (COREG) 12.5 MG tablet Take 1 tablet by mouth 2 times daily (with meals) 180 tablet 1    allopurinol (ZYLOPRIM) 100 MG tablet Take one tablet by mouth once daily 90 tablet 1    glimepiride (AMARYL) 4 MG tablet TAKE 1 TABLET BY MOUTH TWICE DAILY (BEFORE MEALS) 180 tablet 0    glucose blood VI test strips (ASCENSIA AUTODISC VI;ONE TOUCH ULTRA TEST VI) strip 1 each by In Vitro route daily As needed.  100 each 3    aspirin EC 81 MG EC tablet Take 1 tablet by mouth daily 30 tablet 5    [DISCONTINUED] levothyroxine (SYNTHROID) 137 MCG tablet TAKE 1 TABLET BY MOUTH ONCE DAILY 30 tablet 2     No facility-administered encounter medications on file as of 4/17/2019.       Orders Placed This Encounter   Procedures    PNEUMOVAX 23 subcutaneous/IM (Pneumococcal polysaccharide vaccine 23-valent >= 3yo)           Vidal CROOKS,

## 2019-04-17 NOTE — PATIENT INSTRUCTIONS
Thank you for choosing [facility]. Please bring a current list of medications to every appointment. Please contact your pharmacy for any prescription refill(s) that you are requesting.

## 2019-04-23 ENCOUNTER — OFFICE VISIT (OUTPATIENT)
Dept: CARDIOLOGY CLINIC | Age: 79
End: 2019-04-23
Payer: MEDICARE

## 2019-04-23 VITALS
WEIGHT: 207 LBS | BODY MASS INDEX: 34.49 KG/M2 | HEIGHT: 65 IN | HEART RATE: 58 BPM | DIASTOLIC BLOOD PRESSURE: 70 MMHG | SYSTOLIC BLOOD PRESSURE: 136 MMHG | OXYGEN SATURATION: 98 %

## 2019-04-23 DIAGNOSIS — I25.10 CORONARY ARTERY DISEASE INVOLVING NATIVE CORONARY ARTERY OF NATIVE HEART WITHOUT ANGINA PECTORIS: ICD-10-CM

## 2019-04-23 DIAGNOSIS — R60.0 BILATERAL LEG EDEMA: ICD-10-CM

## 2019-04-23 DIAGNOSIS — E78.5 HYPERLIPIDEMIA LDL GOAL <70: ICD-10-CM

## 2019-04-23 DIAGNOSIS — I10 ESSENTIAL HYPERTENSION: ICD-10-CM

## 2019-04-23 DIAGNOSIS — I50.32 CHRONIC DIASTOLIC HF (HEART FAILURE) (HCC): Primary | ICD-10-CM

## 2019-04-23 PROCEDURE — 1090F PRES/ABSN URINE INCON ASSESS: CPT | Performed by: NURSE PRACTITIONER

## 2019-04-23 PROCEDURE — G8400 PT W/DXA NO RESULTS DOC: HCPCS | Performed by: NURSE PRACTITIONER

## 2019-04-23 PROCEDURE — 1111F DSCHRG MED/CURRENT MED MERGE: CPT | Performed by: NURSE PRACTITIONER

## 2019-04-23 PROCEDURE — 4040F PNEUMOC VAC/ADMIN/RCVD: CPT | Performed by: NURSE PRACTITIONER

## 2019-04-23 PROCEDURE — G8598 ASA/ANTIPLAT THER USED: HCPCS | Performed by: NURSE PRACTITIONER

## 2019-04-23 PROCEDURE — 1123F ACP DISCUSS/DSCN MKR DOCD: CPT | Performed by: NURSE PRACTITIONER

## 2019-04-23 PROCEDURE — 99214 OFFICE O/P EST MOD 30 MIN: CPT | Performed by: NURSE PRACTITIONER

## 2019-04-23 PROCEDURE — G8427 DOCREV CUR MEDS BY ELIG CLIN: HCPCS | Performed by: NURSE PRACTITIONER

## 2019-04-23 PROCEDURE — G8417 CALC BMI ABV UP PARAM F/U: HCPCS | Performed by: NURSE PRACTITIONER

## 2019-04-23 PROCEDURE — 1036F TOBACCO NON-USER: CPT | Performed by: NURSE PRACTITIONER

## 2019-04-23 NOTE — PATIENT INSTRUCTIONS
Continue same medications    Take extra Furosemide 20 mg tablet if increased swelling , worsening shortness of breath or weight gain > 3#. Call office if you need extra dose of diuretic more than 2-3 days.

## 2019-04-23 NOTE — LETTER
[facility] [address] [place]  Phone: [phone]  Fax: [phone]    ESTELLE Beck CNP        April 23, 2019     Antonio Feliz, [address] [address]    Patient: Nigel Aguilar  MR Number: [id number]  YOB: 1940  Date of Visit: 4/23/2019    Dear Dr. Antonio Feliz:    Thank you for the request for consultation for [facility]. If you have questions, please do not hesitate to call me. I look forward to following Franchesca Shearer along with you.     Sincerely,        ESTELLE Beck CNP

## 2019-04-23 NOTE — PROGRESS NOTES
[address]  Office Visit    Tatum Lizama  1940 April 23, 2019    CC:   Chief Complaint   Patient presents with    Coronary Artery Disease    Congestive Heart Failure    Other     no cardiac complaints     HPI:  The patient is 66 y.o. female with a past medical history significant for CAD/prior PCI, HTN, pulmonary HTN, PVD, DHF, hyperlipidemia, CKD, stage III and diabetes mellitus who is here for hospital follow up. She presented with chest pain and was admitted at [facility] from 9/20/2018-9/23/2018 with unstable angina and uncontrolled HTN. She was admitted at [facility] again from 11/20/2018-11/22/2018 with SOB and dx with acute respiratory failure with hypoxia and acute on chronic diastolic HF. She was diuresed and her Cr increased (seen by nephrology) . She was seen last in the office in 3/2019 and no medication changes were made. She was admitted from 4/11/2019-4/15/2019 with dyspnea and CHF. Not seen by cardiology. Diuresed and discharged to follow up here. Overall feeling better since discharge. Feels she is gaining strength daily. Home weight: stable and ranging 207-208#. Monitoring sodium and fluid intake closely. Weight loss 9# with diuresis during last admission in April 2019. Dry weight ~ 207#. SOBOE much improved; has some chronically with extended activity. Denies chest pain/discomfort, orthopnea/PND, cough, palpitations, dizziness, syncope or claudication. Has chronic edema in her feet (but overall stable). Review of Systems:  Constitutional: Denies weakness, night sweats or fever. + improving fatigue  HEENT: Denies new visual changes, ringing in ears, nosebleeds, nasal congestion. + chronic sinus drainage  Respiratory: + SOBOE @ baseline.  No orthopnea/PND  Cardiovascular: see HPI  GI: Denies N/V, diarrhea, constipation, abdominal pain, change in bowel habits, melena or hematochezia  : Denies urinary frequency, urgency, incontinence, hematuria or dysuria. Skin: Denies rash, hives, or cyanosis  Musculoskeletal: Denies joint or muscle aches/pain;  + chronic edema in ankles @ baseline (goes down overnight)  Neurological: Denies syncope or TIA-like symptoms. Psychiatric: Denies anxiety, insomnia or depression     Past Medical History:   Diagnosis Date    CAD (coronary artery disease)     [facility]congestive [facility] failure) [facility]     DM2 (diabetes mellitus, type 2) ([facility])     HTN (hypertension)     Hypothyroidism     MI (mitral incompetence)     Over weight     [facility]     PVD [facility]peripheral vascular disease) [facility]     Uterine cancer ([facility])      Past Surgical History:   Procedure Laterality Date    CATARACT REMOVAL WITH IMPLANT Bilateral     CORONARY ANGIOPLASTY WITH STENT PLACEMENT Right 9/9/2015    At [facility] WITH STENT PLACEMENT  01/2018    ILANA to RCA and POBA on ISR of LAD    HERNIA REPAIR      HYSTERECTOMY       Family History   Problem Relation Age of Onset    Diabetes Mother     Heart Disease Father     Cancer Sister         uterine    Heart Disease Maternal Grandmother     Heart Disease Maternal Grandfather     Heart Disease Paternal Grandmother     Heart Disease Paternal Grandfather      Social History     Tobacco Use    Smoking status: Former Smoker     Packs/day: 0.70     Years: 40.00     Pack years: 28.00     Types: Cigarettes     Last attempt to quit: 2/1/2004     Years since quitting: 15.2    Smokeless tobacco: Never Used   Substance Use Topics    Alcohol use: No    Drug use: No       Allergies   Allergen Reactions    Hydrocodone-Acetaminophen      vomiting    Rosuvastatin      Leg cramps      Morphine Nausea And Vomiting     Current Outpatient Medications   Medication Sig Dispense Refill    levothyroxine (SYNTHROID) 137 MCG tablet TAKE 1 TABLET BY MOUTH ONCE DAILY 30 tablet 2    furosemide (LASIX) 20 MG tablet 40 mg in the morning, and 20 mg at night.  270 tablet 1    NIFEdipine (ADALAT CC) 30 MG extended release tablet Take 1 tablet by mouth daily 90 tablet 1    clopidogrel (PLAVIX) 75 MG tablet Take 1 tablet by mouth daily 90 tablet 1    carvedilol (COREG) 12.5 MG tablet Take 1 tablet by mouth 2 times daily (with meals) 180 tablet 1    allopurinol (ZYLOPRIM) 100 MG tablet Take one tablet by mouth once daily 90 tablet 1    glimepiride (AMARYL) 4 MG tablet TAKE 1 TABLET BY MOUTH TWICE DAILY (BEFORE MEALS) 180 tablet 0    glucose blood VI test strips (ASCENSIA AUTODISC VI;ONE TOUCH ULTRA TEST VI) strip 1 each by In Vitro route daily As needed. 100 each 3    aspirin EC 81 MG EC tablet Take 1 tablet by mouth daily 30 tablet 5     No current facility-administered medications for this visit. Physical Exam:   /70 (Site: Left Upper Arm, Position: Sitting)   Pulse 58 Comment: manual count of pulse  Ht 5' 5\" (1.651 m)   Wt 207 lb (93.9 kg)   LMP  (LMP Unknown)   SpO2 98%   BMI 34.45 kg/m²   Wt Readings from Last 2 Encounters:   04/23/19 207 lb (93.9 kg)   04/17/19 206 lb 9.6 oz (93.7 kg)     Constitutional: She is alert and oriented x 4. She appears well-developed and well-nourished. In no acute distress. Quite talkative today  HEENT: Normocephalic and atraumatic. Sclerae anicteric. No xanthelasmas. EOM's intact. Neck: Neck supple. No JVD present. Carotids without bruits. No thyromegaly present. No lymphadenopathy  Cardiovascular: regular rhythm; bradycardic rate, normal S1 and S2; soft systolic murmur  Pulmonary/Chest: Effort normal.Lungs clear to auscultation. No rales/wheezes/rhonchi. Chest wall nontender. Abdominal: soft, nontender, nondistended. + bowel sounds; no hepatomegaly    Extremities: Trace bilateral ankle edema. No clubbing or cyanosis. Pulses  2+ radial/DP/PT bilaterally. Cap refill brisk. Neurological: No focal deficit. Skin: Skin is warm and dry. Psychiatric: She has a normal mood and affect.  Her speech is normal and behavior is normal.     Lab Review:   Lab Results   Component Value Date    TRIG 88 04/12/2019    HDL 54 04/12/2019    HDL 47 03/18/2010    LDLCALC 96 04/12/2019    LABVLDL 18 04/12/2019     Lab Results   Component Value Date     04/15/2019    K 3.7 04/15/2019     04/15/2019    CO2 28 04/15/2019    BUN 46 04/15/2019    CREATININE 1.9 04/15/2019    GLUCOSE 103 04/15/2019    CALCIUM 9.0 04/15/2019      Lab Results   Component Value Date    WBC 8.4 04/15/2019    HGB 12.6 04/15/2019    HCT 38.2 04/15/2019    MCV 93.6 04/15/2019     04/15/2019     ECG 4/11/2019: Sinus bradycardia with HR 58    Echo 4/11/2019:  Left ventricular cavity size is normal.   There is mild concentric left ventricular hypertrophy.   Ejection fraction is visually estimated to be 50-55%.   Normal right ventricular size. Echo 11/21/2018:  Suboptimal image quality.   Left ventricular cavity size is normal.   Normal left ventricular wall thickness.   Regional wall motion abnormalities cannot be determined on this study.   Ejection fraction is visually estimated to be 50%.   Indeterminate diastolic function.   Mitral valve leaflets appear mildly thickened.   Mild mitral annular calcification.   No evidence of mitral regurgitation nor stenosis.   Aortic valve leaflets not well visualized.   No evidence of aortic valve regurgitation.   Normal right ventricular size and function.   TAPSE 1.9 cm    Angiogram 9/20/2018:  1.  Left main is normal.  CHEMO 3 flow.  No stenosis. 2.  Left anterior descending artery comes from the left main coronary.  Mid portion has 50% stenosis with a fractional flow reserve of 0.84.  Diagonal branch has a 75% stenosis. 3.  Left circumflex comes from the left main coronary.  Obtuse marginal branches are patent.  Mid portion has 99% stenosis. 4.  Right coronary comes from the right coronary cuff, patent stents, and is a right dominant system.    LVEDP was 20 mmHg.   In summary, balloon angioplasty of the mid left circumflex only.    1/14/2018 Cardiac cath/PCI:  POBA of ISR to LAD and ostium of Dg; ILANA x 3 to RCA     Assessment:    1. Chronic diastolic HF (heart failure) ([address]  -last hospitalized in 4/2019  -compensated at present and NYHA class II-III  -continue BB and diuretic   -no ACE-I/ARB/aldactone d/t elevated Cr    2. Coronary artery disease involving native coronary artery of native heart without angina pectoris  -9/2018: S/P POBA mid LCX (2.0 mm); mid LAD 50% (FFR 0.84); Dg 75% stenosis   -1/2018: POBA LAD + Dg for ISR and stents x 3 to RCA  -no recurrent angina  -continue DAPT and carvedilol  -declines statin therapy    3. Essential hypertension  -controlled  -continue medical management (on multiple antihypertensives)    4. Hyperlipidemia, goal LDL < 70  -intolerant to statins  -LDL 96 on most recent lipids in 4/2019  -declines any type cholesterol lowering medications    5. Bilateral leg edema  -improved  -suspect secondary to CCB  -low sodium diet, compression hose/wraps and elevation    6. CKD (chronic kidney disease) stage 3, GFR 30-59 ml/min ([facility])  -Cr ranges 1.6-2.0; last Cr 1.9 (4/2019)  -followed by Dr. Shantal Gore  -tolerating diuretic    7. Diabetes Mellitus, type II  -followed by Primary MD    8. Hypothyroidism  -on replacement  -followed by PCP      Plan:  Continue carvedilol, nifedipine, lasix plavix, ASA   Reinforced and discussed  low-fat/low sodium diet, monitoring of daily weights, fluid restriction, worsening signs and symptoms of heart failure and when to call, and the importance of regular exercise and activity. Labs from 4/1/22019- and 4/15/2019 reviewed  Discussed taking extra 20 mg Furosemide tablet if increased edema, SOB or weight gain > 3#. Instructions on when to call office reviewed with pt and she verbalized understanding. Follow up with Dr. Huong Granados in September 2019 as scheduled or sooner if needed. Return for as scheduled in September 2019 with Dr. Huong Granados.      Thanks for allowing me to participate in the care of this patient.       Vishal France, [address] [facility], [address] [place] [address] [id number], [address] [facility]  Office: [phone]  Fax: [phone]

## 2019-05-06 ENCOUNTER — CARE COORDINATION (OUTPATIENT)
Dept: CASE MANAGEMENT | Age: 79
End: 2019-05-06

## 2019-05-06 NOTE — CARE COORDINATION
[address] Transitions Follow Up Call    2019    Patient: Pritesh Kirk  Patient : 1940   MRN: [id number]  Reason for Admission:   Discharge Date: 4/15/19 RARS: Readmission Risk Score: 19         Spoke with: no one    Care Transitions Subsequent and Final Call    Subsequent and Final Calls  Are you currently active with any services?:  Home Health  Care Transitions Interventions  Other Interventions: Follow Up: Final CTC outreach attempt. Unable to reach patient. Unable to leave message. No answer - no voicemail.   Future Appointments   Date Time Provider Isidoro Black   2019 12:45 PM MD Paloma Burkett RN

## 2019-05-13 RX ORDER — GLIMEPIRIDE 4 MG/1
TABLET ORAL
Qty: 180 TABLET | Refills: 2 | Status: SHIPPED | OUTPATIENT
Start: 2019-05-13 | End: 2019-05-17 | Stop reason: SDUPTHER

## 2019-05-17 RX ORDER — GLIMEPIRIDE 4 MG/1
TABLET ORAL
Qty: 180 TABLET | Refills: 2 | Status: SHIPPED | OUTPATIENT
Start: 2019-05-17 | End: 2020-02-18

## 2019-07-01 DIAGNOSIS — E03.9 ACQUIRED HYPOTHYROIDISM: ICD-10-CM

## 2019-07-01 RX ORDER — LEVOTHYROXINE SODIUM 0.12 MG/1
TABLET ORAL
Qty: 90 TABLET | Refills: 0 | Status: SHIPPED | OUTPATIENT
Start: 2019-07-01 | End: 2019-08-23 | Stop reason: DRUGHIGH

## 2019-08-02 RX ORDER — LEVOTHYROXINE SODIUM 137 UG/1
TABLET ORAL
Qty: 30 TABLET | Refills: 2 | Status: SHIPPED | OUTPATIENT
Start: 2019-08-02 | End: 2019-11-07 | Stop reason: SDUPTHER

## 2019-08-23 ENCOUNTER — OFFICE VISIT (OUTPATIENT)
Dept: FAMILY MEDICINE CLINIC | Age: 79
End: 2019-08-23
Payer: MEDICARE

## 2019-08-23 VITALS
SYSTOLIC BLOOD PRESSURE: 118 MMHG | TEMPERATURE: 97.6 F | BODY MASS INDEX: 34.85 KG/M2 | HEIGHT: 65 IN | DIASTOLIC BLOOD PRESSURE: 74 MMHG | WEIGHT: 209.2 LBS

## 2019-08-23 DIAGNOSIS — J01.90 ACUTE BACTERIAL SINUSITIS: Primary | ICD-10-CM

## 2019-08-23 DIAGNOSIS — B96.89 ACUTE BACTERIAL SINUSITIS: Primary | ICD-10-CM

## 2019-08-23 PROCEDURE — G8417 CALC BMI ABV UP PARAM F/U: HCPCS | Performed by: INTERNAL MEDICINE

## 2019-08-23 PROCEDURE — 1036F TOBACCO NON-USER: CPT | Performed by: INTERNAL MEDICINE

## 2019-08-23 PROCEDURE — 4040F PNEUMOC VAC/ADMIN/RCVD: CPT | Performed by: INTERNAL MEDICINE

## 2019-08-23 PROCEDURE — 99213 OFFICE O/P EST LOW 20 MIN: CPT | Performed by: INTERNAL MEDICINE

## 2019-08-23 PROCEDURE — 1090F PRES/ABSN URINE INCON ASSESS: CPT | Performed by: INTERNAL MEDICINE

## 2019-08-23 PROCEDURE — G8400 PT W/DXA NO RESULTS DOC: HCPCS | Performed by: INTERNAL MEDICINE

## 2019-08-23 PROCEDURE — G8598 ASA/ANTIPLAT THER USED: HCPCS | Performed by: INTERNAL MEDICINE

## 2019-08-23 PROCEDURE — 1123F ACP DISCUSS/DSCN MKR DOCD: CPT | Performed by: INTERNAL MEDICINE

## 2019-08-23 PROCEDURE — G8427 DOCREV CUR MEDS BY ELIG CLIN: HCPCS | Performed by: INTERNAL MEDICINE

## 2019-08-23 RX ORDER — CEFUROXIME AXETIL 250 MG/1
250 TABLET ORAL 2 TIMES DAILY
Qty: 20 TABLET | Refills: 0 | Status: SHIPPED | OUTPATIENT
Start: 2019-08-23 | End: 2019-09-02

## 2019-08-23 ASSESSMENT — ENCOUNTER SYMPTOMS
SINUS PAIN: 1
SORE THROAT: 1
ABDOMINAL PAIN: 0
COUGH: 1

## 2019-08-23 NOTE — PATIENT INSTRUCTIONS
Thank you for choosing [facility]. Please bring a current list of medications to every appointment. Please contact your pharmacy for any prescription refill(s) that you are requesting.

## 2019-08-30 ENCOUNTER — TELEPHONE (OUTPATIENT)
Dept: FAMILY MEDICINE CLINIC | Age: 79
End: 2019-08-30

## 2019-08-30 RX ORDER — ALLOPURINOL 100 MG/1
TABLET ORAL
Qty: 90 TABLET | Refills: 0 | Status: SHIPPED | OUTPATIENT
Start: 2019-08-30 | End: 2019-12-06 | Stop reason: SDUPTHER

## 2019-09-13 ENCOUNTER — OFFICE VISIT (OUTPATIENT)
Dept: CARDIOLOGY CLINIC | Age: 79
End: 2019-09-13
Payer: MEDICARE

## 2019-09-13 VITALS
OXYGEN SATURATION: 96 % | WEIGHT: 207 LBS | BODY MASS INDEX: 34.49 KG/M2 | SYSTOLIC BLOOD PRESSURE: 120 MMHG | HEIGHT: 65 IN | DIASTOLIC BLOOD PRESSURE: 70 MMHG | HEART RATE: 51 BPM

## 2019-09-13 DIAGNOSIS — I10 ESSENTIAL HYPERTENSION: Primary | ICD-10-CM

## 2019-09-13 DIAGNOSIS — I50.32 CHRONIC DIASTOLIC CHF (CONGESTIVE HEART FAILURE), NYHA CLASS 2 (HCC): ICD-10-CM

## 2019-09-13 DIAGNOSIS — I25.10 CORONARY ARTERY DISEASE INVOLVING NATIVE CORONARY ARTERY OF NATIVE HEART WITHOUT ANGINA PECTORIS: ICD-10-CM

## 2019-09-13 DIAGNOSIS — E78.5 HYPERLIPIDEMIA LDL GOAL <70: ICD-10-CM

## 2019-09-13 PROCEDURE — 4040F PNEUMOC VAC/ADMIN/RCVD: CPT | Performed by: INTERNAL MEDICINE

## 2019-09-13 PROCEDURE — 1123F ACP DISCUSS/DSCN MKR DOCD: CPT | Performed by: INTERNAL MEDICINE

## 2019-09-13 PROCEDURE — 1090F PRES/ABSN URINE INCON ASSESS: CPT | Performed by: INTERNAL MEDICINE

## 2019-09-13 PROCEDURE — G8427 DOCREV CUR MEDS BY ELIG CLIN: HCPCS | Performed by: INTERNAL MEDICINE

## 2019-09-13 PROCEDURE — 1036F TOBACCO NON-USER: CPT | Performed by: INTERNAL MEDICINE

## 2019-09-13 PROCEDURE — G8400 PT W/DXA NO RESULTS DOC: HCPCS | Performed by: INTERNAL MEDICINE

## 2019-09-13 PROCEDURE — 93000 ELECTROCARDIOGRAM COMPLETE: CPT | Performed by: INTERNAL MEDICINE

## 2019-09-13 PROCEDURE — 99213 OFFICE O/P EST LOW 20 MIN: CPT | Performed by: INTERNAL MEDICINE

## 2019-09-13 PROCEDURE — G8417 CALC BMI ABV UP PARAM F/U: HCPCS | Performed by: INTERNAL MEDICINE

## 2019-09-13 PROCEDURE — G8598 ASA/ANTIPLAT THER USED: HCPCS | Performed by: INTERNAL MEDICINE

## 2019-09-13 NOTE — PROGRESS NOTES
tablet by mouth daily 90 tablet 1    carvedilol (COREG) 12.5 MG tablet Take 1 tablet by mouth 2 times daily (with meals) 180 tablet 1    glucose blood VI test strips (ASCENSIA AUTODISC VI;ONE TOUCH ULTRA TEST VI) strip 1 each by In Vitro route daily As needed. 100 each 3    aspirin EC 81 MG EC tablet Take 1 tablet by mouth daily 30 tablet 5     No current facility-administered medications for this visit. Objective:     /70   Pulse 51   Ht 5' 5\" (1.651 m)   Wt 207 lb (93.9 kg) Comment: with shoes  LMP  (LMP Unknown)   SpO2 96%   BMI 34.45 kg/m²    Physical Exam:  General:  Awake, alert, NAD  Skin:  Warm and dry  Neck:  JVD<8, no carotid bruits  Chest:  Clear to auscultation, no wheezes/rhonchi/rales  Cardiovascular:  RRR, normal S1/S2, no M/R/G  Abdomen:  Soft, nontender, +bowel sounds  Extremities:  No edema  Pulses: 2+ bilat carotid    2+ bilat radial, no hematoma at right wrist    2+ bilat femoral      Lab Results   Component Value Date    CHOL 168 04/12/2019    HDL 54 04/12/2019    HDL 47 03/18/2010    TRIG 88 04/12/2019        LDL   96      Procedures:     Cath 8/24/16  1. Right dominant coronary arterial system with serial stents in the mid  right coronary artery. The more distal right coronary artery stent has 25%  restenosis. There is a focal 60% restenosis in the more proximal longer  stent. This is a dominant vessel. In the left system there is a 30% focal  osteal stenosis in the left main. In the left anterior descending artery  proximally there is an 80% stenosis that was intervened upon with a 3 mm x 23  mm Xience drug-eluting stent post dilated to 3.12 mm. Further in the  circumflex system the distal circumflex is a smaller vessel approximately 1.5  mm in diameter with a 99% focal stenosis. 2. Normal left ventricular systolic function with LV ejection fraction of  60%. 3. Normal left ventricular end-diastolic pressure at 13 mmHg.   4. No gradient across the aortic valve on pull back to suggest aortic  stenosis. Cath 1/10/18 (Dr. Lisa Haji)   1. We intervened on the left anterior descending artery. We only  performed balloon angioplasty of the left anterior descending artery  in-stent restenosis using a 3-mm noncompliant balloon catheter and at the  ostium of the first diagonal using a 2.5-mm noncompliant balloon. We  decided not to further JL the diagonal branch. 2.  We performed stent placement in the right coronary. Unfortunately, we  had to place three stents. The first one in the distal was 3.0 x 10 mm, in  the proximal midportion was 3.0 x 28 mm and right distal to the proximal  stent was 3.0 x 8 mm secondary to missing the lesion distal to the stent. Cath 9/20/18 (Dr. Lisa Haji)   1. Left main is normal.  CHEMO 3 flow. No stenosis. 2.  Left anterior descending artery comes from the left main coronary. Mid  portion has 50% stenosis with a fractional flow reserve of 0.84. Diagonal  branch has a 75% stenosis. 3.  Left circumflex comes from the left main coronary. Obtuse marginal  branches are patent. Mid portion has 99% stenosis. 4.  Right coronary comes from the right coronary cuff, patent stents, and  is a right dominant system. LVEDP was 20 mmHg. In summary, balloon angioplasty of the mid left circumflex only. Imaging:     Echo 10/2012  Technically difficult study. Left ventricle: The cavity   size was normal. There was mild concentric hypertrophy.   Systolic function was normal. The estimated ejection   fraction was in the range of 60% to 65%. Wall motion was   normal; there were no regional wall motion abnormalities.   Features are consistent with a pseudonormal left   ventricular filling pattern, with concomitant abnormal   relaxation and increased filling pressure (grade 2   diastolic dysfunction). Echo 1/12/18  Normal left ventricle size, wall thickness and systolic function with an  estimated ejection fraction of 50-55%.   No regional wall motion

## 2019-10-10 DIAGNOSIS — I50.32 CHRONIC DIASTOLIC HF (HEART FAILURE) (HCC): ICD-10-CM

## 2019-10-11 RX ORDER — FUROSEMIDE 20 MG/1
TABLET ORAL
Qty: 270 TABLET | Refills: 2 | Status: SHIPPED | OUTPATIENT
Start: 2019-10-11 | End: 2019-10-14

## 2019-10-12 DIAGNOSIS — I50.32 CHRONIC DIASTOLIC HF (HEART FAILURE) (HCC): ICD-10-CM

## 2019-10-14 RX ORDER — FUROSEMIDE 20 MG/1
TABLET ORAL
Qty: 270 TABLET | Refills: 1 | Status: SHIPPED
Start: 2019-10-14 | End: 2020-05-11

## 2019-11-04 RX ORDER — CLOPIDOGREL BISULFATE 75 MG/1
TABLET ORAL
Qty: 90 TABLET | Refills: 5 | Status: SHIPPED | OUTPATIENT
Start: 2019-11-04 | End: 2020-11-23

## 2019-11-08 RX ORDER — LEVOTHYROXINE SODIUM 137 UG/1
TABLET ORAL
Qty: 90 TABLET | Refills: 1 | Status: SHIPPED | OUTPATIENT
Start: 2019-11-08 | End: 2020-04-27

## 2019-11-11 RX ORDER — CARVEDILOL 12.5 MG/1
TABLET ORAL
Qty: 180 TABLET | Refills: 0 | Status: SHIPPED | OUTPATIENT
Start: 2019-11-11 | End: 2020-02-26

## 2019-12-06 RX ORDER — ALLOPURINOL 100 MG/1
TABLET ORAL
Qty: 90 TABLET | Refills: 0 | Status: SHIPPED | OUTPATIENT
Start: 2019-12-06 | End: 2020-02-26

## 2020-01-14 RX ORDER — NIFEDIPINE 30 MG/1
TABLET, EXTENDED RELEASE ORAL
Qty: 90 TABLET | Refills: 1 | Status: SHIPPED | OUTPATIENT
Start: 2020-01-14 | End: 2020-05-20 | Stop reason: SDUPTHER

## 2020-02-18 RX ORDER — GLIMEPIRIDE 4 MG/1
TABLET ORAL
Qty: 180 TABLET | Refills: 1 | Status: SHIPPED | OUTPATIENT
Start: 2020-02-18 | End: 2020-11-20

## 2020-02-26 RX ORDER — CARVEDILOL 12.5 MG/1
TABLET ORAL
Qty: 180 TABLET | Refills: 3 | Status: SHIPPED | OUTPATIENT
Start: 2020-02-26 | End: 2020-10-29 | Stop reason: ALTCHOICE

## 2020-02-26 RX ORDER — ALLOPURINOL 100 MG/1
TABLET ORAL
Qty: 90 TABLET | Refills: 0 | Status: SHIPPED | OUTPATIENT
Start: 2020-02-26 | End: 2020-06-05

## 2020-03-24 ENCOUNTER — APPOINTMENT (OUTPATIENT)
Dept: CT IMAGING | Age: 80
DRG: 189 | End: 2020-03-24
Payer: MEDICARE

## 2020-03-24 ENCOUNTER — APPOINTMENT (OUTPATIENT)
Dept: GENERAL RADIOLOGY | Age: 80
DRG: 189 | End: 2020-03-24
Payer: MEDICARE

## 2020-03-24 ENCOUNTER — HOSPITAL ENCOUNTER (INPATIENT)
Age: 80
LOS: 1 days | Discharge: HOME | DRG: 189 | End: 2020-03-26
Attending: INTERNAL MEDICINE | Admitting: INTERNAL MEDICINE
Payer: MEDICARE

## 2020-03-24 LAB
A/G RATIO: 1 (ref 1.1–2.2)
ALBUMIN SERPL-MCNC: 4 G/DL (ref 3.4–5)
ALP BLD-CCNC: 145 U/L (ref 40–129)
ALT SERPL-CCNC: 12 U/L (ref 10–40)
ANION GAP SERPL CALCULATED.3IONS-SCNC: 10 MMOL/L (ref 3–16)
AST SERPL-CCNC: 11 U/L (ref 15–37)
BACTERIA: ABNORMAL /HPF
BASOPHILS ABSOLUTE: 0.1 K/UL (ref 0–0.2)
BASOPHILS RELATIVE PERCENT: 0.5 %
BILIRUB SERPL-MCNC: 0.3 MG/DL (ref 0–1)
BILIRUBIN URINE: NEGATIVE
BLOOD, URINE: NEGATIVE
BUN BLDV-MCNC: 35 MG/DL (ref 7–20)
CALCIUM SERPL-MCNC: 9.5 MG/DL (ref 8.3–10.6)
CHLORIDE BLD-SCNC: 94 MMOL/L (ref 99–110)
CLARITY: CLEAR
CO2: 27 MMOL/L (ref 21–32)
COLOR: YELLOW
CREAT SERPL-MCNC: 2.2 MG/DL (ref 0.6–1.2)
EOSINOPHILS ABSOLUTE: 0.1 K/UL (ref 0–0.6)
EOSINOPHILS RELATIVE PERCENT: 1 %
EPITHELIAL CELLS, UA: 1 /HPF (ref 0–5)
GFR AFRICAN AMERICAN: 26
GFR NON-AFRICAN AMERICAN: 21
GLOBULIN: 3.9 G/DL
GLUCOSE BLD-MCNC: 228 MG/DL (ref 70–99)
GLUCOSE URINE: NEGATIVE MG/DL
HCT VFR BLD CALC: 44.9 % (ref 36–48)
HEMOGLOBIN: 14.8 G/DL (ref 12–16)
HYALINE CASTS: 3 /LPF (ref 0–8)
KETONES, URINE: NEGATIVE MG/DL
LEUKOCYTE ESTERASE, URINE: NEGATIVE
LIPASE: 25 U/L (ref 13–60)
LYMPHOCYTES ABSOLUTE: 1.3 K/UL (ref 1–5.1)
LYMPHOCYTES RELATIVE PERCENT: 11.6 %
MCH RBC QN AUTO: 32.1 PG (ref 26–34)
MCHC RBC AUTO-ENTMCNC: 32.9 G/DL (ref 31–36)
MCV RBC AUTO: 97.6 FL (ref 80–100)
MICROSCOPIC EXAMINATION: YES
MONOCYTES ABSOLUTE: 0.4 K/UL (ref 0–1.3)
MONOCYTES RELATIVE PERCENT: 3.7 %
NEUTROPHILS ABSOLUTE: 9.3 K/UL (ref 1.7–7.7)
NEUTROPHILS RELATIVE PERCENT: 83.2 %
NITRITE, URINE: NEGATIVE
PDW BLD-RTO: 14.5 % (ref 12.4–15.4)
PH UA: 7 (ref 5–8)
PLATELET # BLD: 205 K/UL (ref 135–450)
PMV BLD AUTO: 7.6 FL (ref 5–10.5)
POTASSIUM REFLEX MAGNESIUM: 4.6 MMOL/L (ref 3.5–5.1)
PROTEIN UA: >=300 MG/DL
RBC # BLD: 4.6 M/UL (ref 4–5.2)
RBC UA: 1 /HPF (ref 0–4)
SODIUM BLD-SCNC: 131 MMOL/L (ref 136–145)
SPECIFIC GRAVITY UA: 1.01 (ref 1–1.03)
TOTAL PROTEIN: 7.9 G/DL (ref 6.4–8.2)
TROPONIN: <0.01 NG/ML
URINE REFLEX TO CULTURE: ABNORMAL
URINE TYPE: ABNORMAL
UROBILINOGEN, URINE: 0.2 E.U./DL
WBC # BLD: 11.2 K/UL (ref 4–11)
WBC UA: 3 /HPF (ref 0–5)

## 2020-03-24 PROCEDURE — 96374 THER/PROPH/DIAG INJ IV PUSH: CPT

## 2020-03-24 PROCEDURE — 71046 X-RAY EXAM CHEST 2 VIEWS: CPT

## 2020-03-24 PROCEDURE — 83690 ASSAY OF LIPASE: CPT

## 2020-03-24 PROCEDURE — 74176 CT ABD & PELVIS W/O CONTRAST: CPT

## 2020-03-24 PROCEDURE — 80053 COMPREHEN METABOLIC PANEL: CPT

## 2020-03-24 PROCEDURE — 6370000000 HC RX 637 (ALT 250 FOR IP): Performed by: PHYSICIAN ASSISTANT

## 2020-03-24 PROCEDURE — 99285 EMERGENCY DEPT VISIT HI MDM: CPT

## 2020-03-24 PROCEDURE — 2580000003 HC RX 258: Performed by: PHYSICIAN ASSISTANT

## 2020-03-24 PROCEDURE — 85025 COMPLETE CBC W/AUTO DIFF WBC: CPT

## 2020-03-24 PROCEDURE — 93005 ELECTROCARDIOGRAM TRACING: CPT | Performed by: PHYSICIAN ASSISTANT

## 2020-03-24 PROCEDURE — 84484 ASSAY OF TROPONIN QUANT: CPT

## 2020-03-24 PROCEDURE — 36415 COLL VENOUS BLD VENIPUNCTURE: CPT

## 2020-03-24 PROCEDURE — 6360000002 HC RX W HCPCS: Performed by: PHYSICIAN ASSISTANT

## 2020-03-24 PROCEDURE — 81001 URINALYSIS AUTO W/SCOPE: CPT

## 2020-03-24 RX ORDER — ONDANSETRON 2 MG/ML
4 INJECTION INTRAMUSCULAR; INTRAVENOUS ONCE
Status: COMPLETED | OUTPATIENT
Start: 2020-03-24 | End: 2020-03-24

## 2020-03-24 RX ORDER — DICYCLOMINE HYDROCHLORIDE 10 MG/1
10 CAPSULE ORAL ONCE
Status: COMPLETED | OUTPATIENT
Start: 2020-03-24 | End: 2020-03-24

## 2020-03-24 RX ORDER — 0.9 % SODIUM CHLORIDE 0.9 %
1000 INTRAVENOUS SOLUTION INTRAVENOUS ONCE
Status: COMPLETED | OUTPATIENT
Start: 2020-03-24 | End: 2020-03-25

## 2020-03-24 RX ADMIN — ONDANSETRON 4 MG: 2 INJECTION INTRAMUSCULAR; INTRAVENOUS at 22:30

## 2020-03-24 RX ADMIN — DICYCLOMINE HYDROCHLORIDE 10 MG: 10 CAPSULE ORAL at 22:46

## 2020-03-24 RX ADMIN — SODIUM CHLORIDE 1000 ML: 9 INJECTION, SOLUTION INTRAVENOUS at 22:45

## 2020-03-24 ASSESSMENT — PAIN DESCRIPTION - LOCATION: LOCATION: ABDOMEN

## 2020-03-24 ASSESSMENT — PAIN DESCRIPTION - DESCRIPTORS: DESCRIPTORS: ACHING

## 2020-03-24 ASSESSMENT — PAIN - FUNCTIONAL ASSESSMENT: PAIN_FUNCTIONAL_ASSESSMENT: ACTIVITIES ARE NOT PREVENTED

## 2020-03-24 ASSESSMENT — PAIN DESCRIPTION - FREQUENCY: FREQUENCY: CONTINUOUS

## 2020-03-24 ASSESSMENT — PAIN DESCRIPTION - PROGRESSION: CLINICAL_PROGRESSION: NOT CHANGED

## 2020-03-24 ASSESSMENT — PAIN SCALES - GENERAL: PAINLEVEL_OUTOF10: 4

## 2020-03-24 ASSESSMENT — PAIN DESCRIPTION - PAIN TYPE: TYPE: ACUTE PAIN

## 2020-03-25 PROBLEM — B96.89 ACUTE BACTERIAL SINUSITIS: Status: RESOLVED | Noted: 2018-02-28 | Resolved: 2020-03-24

## 2020-03-25 PROBLEM — R07.89 NON-CARDIAC CHEST PAIN: Status: ACTIVE | Noted: 2020-03-25

## 2020-03-25 PROBLEM — J96.91 RESPIRATORY FAILURE WITH HYPOXIA (HCC): Status: ACTIVE | Noted: 2020-03-25

## 2020-03-25 PROBLEM — J01.90 ACUTE BACTERIAL SINUSITIS: Status: RESOLVED | Noted: 2017-12-12 | Resolved: 2020-03-24

## 2020-03-25 PROBLEM — I10 ESSENTIAL HYPERTENSION: Status: ACTIVE | Noted: 2020-03-25

## 2020-03-25 PROBLEM — B96.89 ACUTE BACTERIAL SINUSITIS: Status: RESOLVED | Noted: 2017-12-12 | Resolved: 2020-03-24

## 2020-03-25 PROBLEM — N19 RENAL FAILURE: Status: ACTIVE | Noted: 2020-03-25

## 2020-03-25 PROBLEM — R10.9 ABDOMINAL PAIN: Status: ACTIVE | Noted: 2020-03-25

## 2020-03-25 PROBLEM — R11.2 NAUSEA AND VOMITING: Status: ACTIVE | Noted: 2020-03-25

## 2020-03-25 PROBLEM — R09.02 HYPOXIA: Status: ACTIVE | Noted: 2020-03-25

## 2020-03-25 PROBLEM — K43.9 VENTRAL HERNIA: Status: ACTIVE | Noted: 2020-03-25

## 2020-03-25 PROBLEM — R73.9 HYPERGLYCEMIA: Status: ACTIVE | Noted: 2020-03-25

## 2020-03-25 PROBLEM — R19.7 DIARRHEA: Status: ACTIVE | Noted: 2020-03-25

## 2020-03-25 PROBLEM — J01.90 ACUTE BACTERIAL SINUSITIS: Status: RESOLVED | Noted: 2018-02-28 | Resolved: 2020-03-24

## 2020-03-25 LAB
ANION GAP SERPL CALCULATED.3IONS-SCNC: 15 MMOL/L (ref 3–16)
BASOPHILS ABSOLUTE: 0.1 K/UL (ref 0–0.2)
BASOPHILS RELATIVE PERCENT: 0.6 %
BUN BLDV-MCNC: 37 MG/DL (ref 7–20)
CALCIUM SERPL-MCNC: 8.7 MG/DL (ref 8.3–10.6)
CHLORIDE BLD-SCNC: 102 MMOL/L (ref 99–110)
CO2: 24 MMOL/L (ref 21–32)
CREAT SERPL-MCNC: 2.1 MG/DL (ref 0.6–1.2)
EKG ATRIAL RATE: 58 BPM
EKG DIAGNOSIS: NORMAL
EKG P-R INTERVAL: 168 MS
EKG Q-T INTERVAL: 454 MS
EKG QRS DURATION: 92 MS
EKG QTC CALCULATION (BAZETT): 445 MS
EKG R AXIS: -31 DEGREES
EKG T AXIS: 52 DEGREES
EKG VENTRICULAR RATE: 58 BPM
EOSINOPHILS ABSOLUTE: 0 K/UL (ref 0–0.6)
EOSINOPHILS RELATIVE PERCENT: 0.2 %
ESTIMATED AVERAGE GLUCOSE: 151.3 MG/DL
GFR AFRICAN AMERICAN: 27
GFR NON-AFRICAN AMERICAN: 23
GLUCOSE BLD-MCNC: 108 MG/DL (ref 70–99)
GLUCOSE BLD-MCNC: 139 MG/DL (ref 70–99)
GLUCOSE BLD-MCNC: 140 MG/DL (ref 70–99)
GLUCOSE BLD-MCNC: 155 MG/DL (ref 70–99)
GLUCOSE BLD-MCNC: 189 MG/DL (ref 70–99)
HBA1C MFR BLD: 6.9 %
HCT VFR BLD CALC: 42.9 % (ref 36–48)
HEMOGLOBIN: 13.9 G/DL (ref 12–16)
LYMPHOCYTES ABSOLUTE: 1.7 K/UL (ref 1–5.1)
LYMPHOCYTES RELATIVE PERCENT: 13.3 %
MCH RBC QN AUTO: 32.2 PG (ref 26–34)
MCHC RBC AUTO-ENTMCNC: 32.4 G/DL (ref 31–36)
MCV RBC AUTO: 99.5 FL (ref 80–100)
MONOCYTES ABSOLUTE: 0.8 K/UL (ref 0–1.3)
MONOCYTES RELATIVE PERCENT: 6.5 %
NEUTROPHILS ABSOLUTE: 10.2 K/UL (ref 1.7–7.7)
NEUTROPHILS RELATIVE PERCENT: 79.4 %
PDW BLD-RTO: 14.6 % (ref 12.4–15.4)
PERFORMED ON: ABNORMAL
PLATELET # BLD: 180 K/UL (ref 135–450)
PMV BLD AUTO: 7.8 FL (ref 5–10.5)
POTASSIUM REFLEX MAGNESIUM: 5 MMOL/L (ref 3.5–5.1)
PRO-BNP: 992 PG/ML (ref 0–449)
RBC # BLD: 4.32 M/UL (ref 4–5.2)
SODIUM BLD-SCNC: 141 MMOL/L (ref 136–145)
WBC # BLD: 12.8 K/UL (ref 4–11)

## 2020-03-25 PROCEDURE — 83036 HEMOGLOBIN GLYCOSYLATED A1C: CPT

## 2020-03-25 PROCEDURE — 6370000000 HC RX 637 (ALT 250 FOR IP): Performed by: INTERNAL MEDICINE

## 2020-03-25 PROCEDURE — 36415 COLL VENOUS BLD VENIPUNCTURE: CPT

## 2020-03-25 PROCEDURE — 83880 ASSAY OF NATRIURETIC PEPTIDE: CPT

## 2020-03-25 PROCEDURE — 93010 ELECTROCARDIOGRAM REPORT: CPT | Performed by: INTERNAL MEDICINE

## 2020-03-25 PROCEDURE — 80048 BASIC METABOLIC PNL TOTAL CA: CPT

## 2020-03-25 PROCEDURE — 1200000000 HC SEMI PRIVATE

## 2020-03-25 PROCEDURE — 6360000002 HC RX W HCPCS: Performed by: INTERNAL MEDICINE

## 2020-03-25 PROCEDURE — 85025 COMPLETE CBC W/AUTO DIFF WBC: CPT

## 2020-03-25 PROCEDURE — 94760 N-INVAS EAR/PLS OXIMETRY 1: CPT

## 2020-03-25 PROCEDURE — 99223 1ST HOSP IP/OBS HIGH 75: CPT | Performed by: INTERNAL MEDICINE

## 2020-03-25 PROCEDURE — 2580000003 HC RX 258: Performed by: INTERNAL MEDICINE

## 2020-03-25 PROCEDURE — 2700000000 HC OXYGEN THERAPY PER DAY

## 2020-03-25 PROCEDURE — 94150 VITAL CAPACITY TEST: CPT

## 2020-03-25 RX ORDER — HEPARIN SODIUM 5000 [USP'U]/ML
5000 INJECTION, SOLUTION INTRAVENOUS; SUBCUTANEOUS EVERY 8 HOURS SCHEDULED
Status: DISCONTINUED | OUTPATIENT
Start: 2020-03-25 | End: 2020-03-26 | Stop reason: HOSPADM

## 2020-03-25 RX ORDER — ONDANSETRON 2 MG/ML
4 INJECTION INTRAMUSCULAR; INTRAVENOUS EVERY 4 HOURS PRN
Status: DISCONTINUED | OUTPATIENT
Start: 2020-03-25 | End: 2020-03-26 | Stop reason: HOSPADM

## 2020-03-25 RX ORDER — CARVEDILOL 12.5 MG/1
12.5 TABLET ORAL 2 TIMES DAILY WITH MEALS
Status: DISCONTINUED | OUTPATIENT
Start: 2020-03-25 | End: 2020-03-26 | Stop reason: HOSPADM

## 2020-03-25 RX ORDER — CARVEDILOL 12.5 MG/1
12.5 TABLET ORAL 2 TIMES DAILY WITH MEALS
Status: ON HOLD | COMMUNITY
End: 2022-07-22 | Stop reason: SDUPTHER

## 2020-03-25 RX ORDER — DEXTROSE MONOHYDRATE 50 MG/ML
100 INJECTION, SOLUTION INTRAVENOUS PRN
Status: DISCONTINUED | OUTPATIENT
Start: 2020-03-25 | End: 2020-03-26 | Stop reason: HOSPADM

## 2020-03-25 RX ORDER — CLOPIDOGREL BISULFATE 75 MG/1
75 TABLET ORAL DAILY
COMMUNITY
End: 2022-07-13 | Stop reason: SDUPTHER

## 2020-03-25 RX ORDER — SODIUM CHLORIDE 0.9 % (FLUSH) 0.9 %
10 SYRINGE (ML) INJECTION PRN
Status: DISCONTINUED | OUTPATIENT
Start: 2020-03-25 | End: 2020-03-26 | Stop reason: HOSPADM

## 2020-03-25 RX ORDER — HYDRALAZINE HYDROCHLORIDE 20 MG/ML
10 INJECTION INTRAMUSCULAR; INTRAVENOUS EVERY 4 HOURS PRN
Status: DISCONTINUED | OUTPATIENT
Start: 2020-03-25 | End: 2020-03-26 | Stop reason: HOSPADM

## 2020-03-25 RX ORDER — NICOTINE POLACRILEX 4 MG
15 LOZENGE BUCCAL PRN
Status: DISCONTINUED | OUTPATIENT
Start: 2020-03-25 | End: 2020-03-26 | Stop reason: HOSPADM

## 2020-03-25 RX ORDER — GLIMEPIRIDE 4 MG/1
8 TABLET ORAL
COMMUNITY
End: 2022-07-19

## 2020-03-25 RX ORDER — SODIUM CHLORIDE 9 MG/ML
INJECTION, SOLUTION INTRAVENOUS CONTINUOUS
Status: DISCONTINUED | OUTPATIENT
Start: 2020-03-25 | End: 2020-03-25

## 2020-03-25 RX ORDER — FUROSEMIDE 20 MG/1
60 TABLET ORAL DAILY
COMMUNITY
End: 2022-07-19

## 2020-03-25 RX ORDER — FAMOTIDINE 20 MG/1
20 TABLET, FILM COATED ORAL 2 TIMES DAILY
Status: DISCONTINUED | OUTPATIENT
Start: 2020-03-25 | End: 2020-03-25 | Stop reason: DRUGHIGH

## 2020-03-25 RX ORDER — SODIUM CHLORIDE 0.9 % (FLUSH) 0.9 %
10 SYRINGE (ML) INJECTION EVERY 12 HOURS SCHEDULED
Status: DISCONTINUED | OUTPATIENT
Start: 2020-03-25 | End: 2020-03-26 | Stop reason: HOSPADM

## 2020-03-25 RX ORDER — NIFEDIPINE 30 MG/1
30 TABLET, FILM COATED, EXTENDED RELEASE ORAL DAILY
COMMUNITY
End: 2022-07-19

## 2020-03-25 RX ORDER — LEVOTHYROXINE SODIUM 137 UG/1
137 TABLET ORAL DAILY
COMMUNITY
End: 2022-03-11 | Stop reason: DRUGHIGH

## 2020-03-25 RX ORDER — ALLOPURINOL 100 MG/1
100 TABLET ORAL DAILY
COMMUNITY
End: 2022-07-13 | Stop reason: SDUPTHER

## 2020-03-25 RX ORDER — POLYETHYLENE GLYCOL 3350 17 G/17G
17 POWDER, FOR SOLUTION ORAL DAILY PRN
Status: DISCONTINUED | OUTPATIENT
Start: 2020-03-25 | End: 2020-03-26 | Stop reason: HOSPADM

## 2020-03-25 RX ORDER — DEXTROSE MONOHYDRATE 25 G/50ML
12.5 INJECTION, SOLUTION INTRAVENOUS PRN
Status: DISCONTINUED | OUTPATIENT
Start: 2020-03-25 | End: 2020-03-26 | Stop reason: HOSPADM

## 2020-03-25 RX ORDER — ACETAMINOPHEN 325 MG/1
650 TABLET ORAL EVERY 6 HOURS PRN
Status: DISCONTINUED | OUTPATIENT
Start: 2020-03-25 | End: 2020-03-26 | Stop reason: HOSPADM

## 2020-03-25 RX ORDER — FAMOTIDINE 20 MG/1
20 TABLET, FILM COATED ORAL DAILY
Status: DISCONTINUED | OUTPATIENT
Start: 2020-03-25 | End: 2020-03-26 | Stop reason: HOSPADM

## 2020-03-25 RX ADMIN — FAMOTIDINE 20 MG: 20 TABLET, FILM COATED ORAL at 09:48

## 2020-03-25 RX ADMIN — SODIUM CHLORIDE: 9 INJECTION, SOLUTION INTRAVENOUS at 03:34

## 2020-03-25 RX ADMIN — INSULIN LISPRO 2 UNITS: 100 INJECTION, SOLUTION INTRAVENOUS; SUBCUTANEOUS at 11:56

## 2020-03-25 RX ADMIN — HEPARIN SODIUM 5000 UNITS: 5000 INJECTION INTRAVENOUS; SUBCUTANEOUS at 13:15

## 2020-03-25 RX ADMIN — INSULIN LISPRO 1 UNITS: 100 INJECTION, SOLUTION INTRAVENOUS; SUBCUTANEOUS at 21:33

## 2020-03-25 RX ADMIN — HEPARIN SODIUM 5000 UNITS: 5000 INJECTION INTRAVENOUS; SUBCUTANEOUS at 21:34

## 2020-03-25 RX ADMIN — CARVEDILOL 12.5 MG: 12.5 TABLET, FILM COATED ORAL at 16:59

## 2020-03-25 RX ADMIN — Medication 10 ML: at 03:35

## 2020-03-25 RX ADMIN — HEPARIN SODIUM 5000 UNITS: 5000 INJECTION INTRAVENOUS; SUBCUTANEOUS at 07:03

## 2020-03-25 RX ADMIN — Medication 10 ML: at 21:34

## 2020-03-25 RX ADMIN — HYDRALAZINE HYDROCHLORIDE 10 MG: 20 INJECTION INTRAMUSCULAR; INTRAVENOUS at 17:02

## 2020-03-25 RX ADMIN — FUROSEMIDE 60 MG: 40 TABLET ORAL at 13:19

## 2020-03-25 ASSESSMENT — PAIN DESCRIPTION - LOCATION
LOCATION: ABDOMEN
LOCATION: ABDOMEN

## 2020-03-25 ASSESSMENT — PAIN SCALES - GENERAL
PAINLEVEL_OUTOF10: 0
PAINLEVEL_OUTOF10: 3
PAINLEVEL_OUTOF10: 2
PAINLEVEL_OUTOF10: 0

## 2020-03-25 ASSESSMENT — PAIN SCALES - WONG BAKER
WONGBAKER_NUMERICALRESPONSE: 0

## 2020-03-25 ASSESSMENT — PAIN DESCRIPTION - DESCRIPTORS: DESCRIPTORS: ACHING

## 2020-03-25 ASSESSMENT — PAIN - FUNCTIONAL ASSESSMENT: PAIN_FUNCTIONAL_ASSESSMENT: ACTIVITIES ARE NOT PREVENTED

## 2020-03-25 ASSESSMENT — PAIN DESCRIPTION - PAIN TYPE
TYPE: ACUTE PAIN
TYPE: ACUTE PAIN

## 2020-03-25 ASSESSMENT — PAIN DESCRIPTION - FREQUENCY: FREQUENCY: CONTINUOUS

## 2020-03-25 NOTE — ED PROVIDER NOTES
**EVALUATED BY ADVANCED PRACTICE PROVIDER**        [facility]  EMERGENCY DEPARTMENT ENCOUNTER      Pt Name: Albin Vizcarra  ZXZ:[id number]  [place] 1940  Date of evaluation: 3/24/2020  Provider: Hanna Whyte PA-C      Chief Complaint:    Chief Complaint   Patient presents with    Emesis     Patient started having all s/s after 3 pm today    Diarrhea    Shortness of Breath    Chest Pain       Nursing Notes, Past Medical Hx, Past Surgical Hx, Social Hx, Allergies, and Family Hx were all reviewed and agreed with or any disagreements were addressed in the HPI.    HPI:  (Location, Duration, Timing, Severity, Quality, Assoc Sx, Context, Modifying factors)  This is a  78 y.o. female complaint of abdominal pain nausea vomiting diarrhea since 3:00 this afternoon. Also shortness of breath. She denies fever, no chest pain, no back pain, no extremity weakness. No headaches, no other complaints. Denies blood in her stool        PastMedical/Surgical History:  History reviewed. No pertinent past medical history. No past surgical history on file. Medications:  Discharge Medication List as of 3/26/2020  2:31 PM      CONTINUE these medications which have NOT CHANGED    Details   allopurinol (ZYLOPRIM) 100 MG tablet Take 100 mg by mouth dailyHistorical Med      carvedilol (COREG) 12.5 MG tablet Take 12.5 mg by mouth 2 times daily (with meals)Historical Med      clopidogrel (PLAVIX) 75 MG tablet Take 75 mg by mouth dailyHistorical Med      furosemide (LASIX) 20 MG tablet Take 60 mg by mouth dailyHistorical Med      glimepiride (AMARYL) 4 MG tablet Take 8 mg by mouth every morning (before breakfast) Historical Med      levothyroxine (SYNTHROID) 137 MCG tablet Take 137 mcg by mouth DailyHistorical Med      NIFEdipine (ADALAT CC) 30 MG extended release tablet Take 30 mg by mouth dailyHistorical Med               Review of Systems:  Review of Systems   Constitutional: Negative for chills and fever.    HENT: Negative for congestion, facial swelling and sore throat. Eyes: Negative for discharge and redness. Respiratory: Positive for shortness of breath. Negative for apnea and choking. Cardiovascular: Negative for chest pain. Gastrointestinal: Positive for abdominal pain, diarrhea, nausea and vomiting. Genitourinary: Negative for dysuria. Musculoskeletal: Negative for back pain, neck pain and neck stiffness. Neurological: Negative for dizziness, tremors, seizures, weakness and headaches. All other systems reviewed and are negative. Positives and Pertinent negatives as per HPI. Except as noted above in the ROS, problem specific ROS was completed and is negative. Physical Exam:  Physical Exam  Vitals signs and nursing note reviewed. Constitutional:       Appearance: She is well-developed. She is not diaphoretic. HENT:      Head: Normocephalic and atraumatic. Nose: Nose normal.      Mouth/Throat:      Mouth: Mucous membranes are moist.      Pharynx: Oropharynx is clear. Uvula midline. No pharyngeal swelling, oropharyngeal exudate, posterior oropharyngeal erythema or uvula swelling. Eyes:      General:         Right eye: No discharge. Left eye: No discharge. Extraocular Movements: Extraocular movements intact. Conjunctiva/sclera: Conjunctivae normal.      Pupils: Pupils are equal, round, and reactive to light. Neck:      Musculoskeletal: Normal range of motion and neck supple. Cardiovascular:      Rate and Rhythm: Normal rate and regular rhythm. Heart sounds: Normal heart sounds. No murmur. No friction rub. No gallop. Pulmonary:      Effort: Pulmonary effort is normal. No respiratory distress. Breath sounds: Normal breath sounds. No wheezing or rales. Chest:      Chest wall: No tenderness. Abdominal:      General: Abdomen is flat. Bowel sounds are normal. There is no distension. Palpations: Abdomen is soft. There is no mass. Tenderness:  There components within normal limits    Narrative:     Performed at:  [facility]  [address], [place] [employer]   Phone [phone]   BASIC METABOLIC PANEL W/ REFLEX TO MG FOR LOW K - Abnormal; Notable for the following components:    Glucose 189 (*)     BUN 37 (*)     CREATININE 2.1 (*)     GFR Non- 23 (*)     GFR  27 (*)     All other components within normal limits    Narrative:     Performed at:  [place] [address], [place] [employer]   Phone [phone]   CBC WITH AUTO DIFFERENTIAL - Abnormal; Notable for the following components:    WBC 12.8 (*)     Neutrophils Absolute 10.2 (*)     All other components within normal limits    Narrative:     Performed at:  [address] [facility] [employer]   Phone [phone]   BRAIN NATRIURETIC PEPTIDE - Abnormal; Notable for the following components:    Pro- (*)     All other components within normal limits    Narrative:     Performed at:  [place] [address], [place] [employer] [address]   Phone [phone]   BASIC METABOLIC PANEL W/ REFLEX TO MG FOR LOW K - Abnormal; Notable for the following components:    BUN 33 (*)     CREATININE 1.7 (*)     GFR Non- 29 (*)     GFR  35 (*)     All other components within normal limits    Narrative:     Performed at:  [place] [address], [place] [employer]   Phone [phone]   POCT GLUCOSE - Abnormal; Notable for the following components:    POC Glucose 139 (*)     All other components within normal limits    Narrative:     Performed at:  [place] [address], [place] [employer]   Phone [phone]   POCT GLUCOSE - Abnormal; Notable for the following components:    POC Glucose GLUCOSE   POCT GLUCOSE   POCT GLUCOSE   POCT GLUCOSE        Remainder of labs reviewed and werenegative at this time or not returned at the time of this note. RADIOLOGY:   Non-plain film images such as CT, Ultrasound and MRI are read by the radiologist. Mey Fowler PA-C have directly visualized the radiologic plain film image(s) with the below findings:        Interpretation per the Radiologist below, if available at the time of this note:    CT ABDOMEN PELVIS WO CONTRAST Additional Contrast? IV   Final Result   Wide-based (infraumbilical) ventral abdominal hernia containing intra-fat and   nonobstructed loops of large and small bowel. No associated inflammatory   stranding or fluid. Advanced lumbar spondylosis and facet arthrosis. XR CHEST STANDARD (2 VW)   Final Result   No acute abnormality. Ct Abdomen Pelvis Wo Contrast Additional Contrast? Iv    Result Date: 3/24/2020  EXAMINATION: CT OF THE ABDOMEN AND PELVIS WITHOUT CONTRAST 3/24/2020 10:27 pm TECHNIQUE: CT of the abdomen and pelvis was performed without the administration of intravenous contrast. Multiplanar reformatted images are provided for review. Dose modulation, iterative reconstruction, and/or weight based adjustment of the mA/kV was utilized to reduce the radiation dose to as low as reasonably achievable. COMPARISON: CT chest 03/24/2020 HISTORY: ORDERING SYSTEM PROVIDED HISTORY: Abdominal pain with nausea vomiting diarrhea TECHNOLOGIST PROVIDED HISTORY: Reason for exam:->Abdominal pain with nausea vomiting diarrhea Additional Contrast?->IV Reason for Exam: abdomen pain, hernia FINDINGS: LOWER CHEST Lung bases: Basilar atelectasis. Normal heart size. RCA territory coronary stent in place. ABDOMEN Liver: Normal liver size, contour and parenchymal attenuation. No focal lesion on this noncontrast evaluation. Gallbladder: Normal. Biliary: No intra or extrahepatic bile duct dilatation.  Pancreas: Normal. Spleen: Normal. Adrenals: Normal. Kidneys: Left kidney is slightly smaller than the right. Right renal cysts noted. No hydronephrosis or nephrolithiasis. GI Tract: Tiny hiatus hernia. Multiple loops of nonobstructive bowel within a lower abdominal ventral hernia. No apparent bowel wall thickening or obstruction. Normal appendix. Peritoneum/Retroperitoneum: No enlarged lymph nodes, free air or free fluid. Vascular: Normal caliber abdominal aorta and IVC. PELVIS Genitourinary: Normal urinary bladder. Status post hysterectomy. Other: No free fluid. No enlarged lymph nodes. MUSCULOSKELETAL Bones and Soft Tissues: Status post ventral abdominal hernia repair. There is a lower wide-based ventral abdominal hernia containing intra-fat and nonobstructed loops of small and large bowel. No inflammatory stranding or fluid. Advanced lumbar spondylosis with multiple calcified disc bulges, contributing to moderate to severe canal stenosis at L3-L4, mild at L2-L3 and other levels. Facet arthrosis with moderate to severe foraminal stenosis at L5-S1. Wide-based (infraumbilical) ventral abdominal hernia containing intra-fat and nonobstructed loops of large and small bowel. No associated inflammatory stranding or fluid. Advanced lumbar spondylosis and facet arthrosis. Xr Chest Standard (2 Vw)    Result Date: 3/24/2020  EXAMINATION: TWO XRAY VIEWS OF THE CHEST 3/24/2020 9:27 pm COMPARISON: None. HISTORY: ORDERING SYSTEM PROVIDED HISTORY: Chest Pain TECHNOLOGIST PROVIDED HISTORY: Reason for exam:->Chest Pain Reason for Exam: chest pain Acuity: Acute Type of Exam: Initial FINDINGS: Elevation of the right hemidiaphragm is noted. Heart size is at the upper limits of normal.  There is no lung consolidation. There is no effusion or pneumothorax     No acute abnormality.           MEDICAL DECISION MAKING / ED COURSE:      PROCEDURES:   Procedures    None    Patient was given:  Medications   0.9 % sodium chloride bolus (0 mLs

## 2020-03-25 NOTE — CONSULTS
Patient is seen at the request of Dr. Lenny Schroeder for hypoxia    PCP: Anjali Pozo DO    HISTORY OF PRESENT ILLNESS: 78y.o. year old female who was admitted on 3/25/20 for hypoxia. She initially presented with a 1 day history of nausea and vomiting. She denies abdominal pain or diarrhea. In the ER, she was noted to be hypoxic to 86% and was subsequently admitted. She reports chronic shortness of breath with minimal activity. She does not feel that that has changed. Her shortness of breath improves with rest. She denies cough or sputum production. PAST MEDICAL HISTORY:  HTN  CF  CAD  Pulmonary hypertension  CKD      MEDICATIONS:  Current Facility-Administered Medications: sodium chloride flush 0.9 % injection 10 mL, 10 mL, Intravenous, 2 times per day  sodium chloride flush 0.9 % injection 10 mL, 10 mL, Intravenous, PRN  acetaminophen (TYLENOL) tablet 650 mg, 650 mg, Oral, Q6H PRN  polyethylene glycol (GLYCOLAX) packet 17 g, 17 g, Oral, Daily PRN  ondansetron (ZOFRAN) injection 4 mg, 4 mg, Intravenous, Q4H PRN  0.9 % sodium chloride infusion, , Intravenous, Continuous  heparin (porcine) injection 5,000 Units, 5,000 Units, Subcutaneous, 3 times per day  hydrALAZINE (APRESOLINE) injection 10 mg, 10 mg, Intravenous, Q4H PRN  [START ON 3/26/2020] influenza quadrivalent split vaccine (FLUZONE;FLUARIX;FLULAVAL;AFLURIA) injection 0.5 mL, 0.5 mL, Intramuscular, Once  famotidine (PEPCID) tablet 20 mg, 20 mg, Oral, Daily  insulin lispro (HUMALOG) injection vial 0-12 Units, 0-12 Units, Subcutaneous, TID WC  insulin lispro (HUMALOG) injection vial 0-6 Units, 0-6 Units, Subcutaneous, Nightly  glucose (GLUTOSE) 40 % oral gel 15 g, 15 g, Oral, PRN  dextrose 50 % IV solution, 12.5 g, Intravenous, PRN  glucagon (rDNA) injection 1 mg, 1 mg, Intramuscular, PRN  dextrose 5 % solution, 100 mL/hr, Intravenous, PRN      ALLERGIES:  Patient is allergic to pcn [penicillins].     FAMILY HISTORY:  Father- heart disease  Mother- DM    SOCIAL HISTORY:  Social History     Socioeconomic History    Marital status:       Spouse name: Not on file    Number of children: Not on file    Years of education: Not on file    Highest education level: Not on file   Occupational History    Not on file   Social Needs    Financial resource strain: Not on file    Food insecurity     Worry: Not on file     Inability: Not on file    Transportation needs     Medical: Not on file     Non-medical: Not on file   Tobacco Use    Smoking status: Not on file   Substance and Sexual Activity    Alcohol use: Not on file    Drug use: Not on file    Sexual activity: Not on file   Lifestyle    Physical activity     Days per week: Not on file     Minutes per session: Not on file    Stress: Not on file   Relationships    Social connections     Talks on phone: Not on file     Gets together: Not on file     Attends [employer] service: Not on file     Active member of club or organization: Not on file     Attends meetings of clubs or organizations: Not on file     Relationship status: Not on file    Intimate partner violence     Fear of current or ex partner: Not on file     Emotionally abused: Not on file     Physically abused: Not on file     Forced sexual activity: Not on file   Other Topics Concern    Not on file   Social History Narrative    Not on file   Former smoker; quit in 2004    REVIEW OF SYSTEMS:  Constitutional: Negative for fever  HENT: Negative for sore throat  Eyes: Negative for redness   Respiratory: + for chronic dyspnea, no cough  Cardiovascular: Negative for chest pain  Gastrointestinal: + for vomiting, no diarrhea   Genitourinary: Negative for hematuria   Musculoskeletal: Negative for arthralgias   Skin: Negative for rash  Neurological: Negative for syncope  Hematological: Negative for adenopathy  Psychiatric/Behavorial: Negative for anxiety    Objective:   PHYSICAL EXAM:  Blood pressure 135/67, pulse 55, temperature 98.2 °F (36.8 °C), temperature source Oral, resp. rate 18, height 5' 5.5\" (1.664 m), weight 212 lb 15.4 oz (96.6 kg), SpO2 97 %. on RA    Gen: Well developed; well nourished  Eyes: No scleral icterus. No conjunctival injection. ENT:  Oropharynx clear. External appearance of ears and nose normal.  Neck: Trachea midline. No obvious mass. No visible thyroid enlargement    Respiratory: Bibasilar crackles, no accessory muscle use  Cardiovascular: Regular rate and rhythm, no appreciable murmurs. No edema. Gastrointestinal: Soft, non-tender. +ventral hernia  Skin: Warm and dry. No rashes or ulcers on visible areas. Normal texture and turgor  Lymphatic: No cervical LAD. No supraclavicular LAD. Musculoskeletal: No cyanosis, clubbing or joint deformity. Psychiatric: Normal mood and affect; exhibits normal insight and judgement       Data Reviewed:   LABS:  CBC:   Recent Labs     03/24/20 2120 03/25/20  0421   WBC 11.2* 12.8*   HGB 14.8 13.9   HCT 44.9 42.9   MCV 97.6 99.5    180     BMP:   Recent Labs     03/24/20 2121 03/25/20  0421   * 141   K 4.6 5.0   CL 94* 102   CO2 27 24   BUN 35* 37*   CREATININE 2.2* 2.1*     LIVER PROFILE:   Recent Labs     03/24/20 2121   AST 11*   ALT 12   LIPASE 25.0   BILITOT 0.3   ALKPHOS 145*     PT/INR: No results for input(s): PROTIME, INR in the last 72 hours. APTT: No results for input(s): APTT in the last 72 hours. Images and reports from chest imaging were reviewed by me. My interpretation is:  CXR (3/24/20): Elevation of the right hemidiaphragm; BLL atelectasis       ECHO (4/11/19)  Summary   Left ventricular cavity size is normal.   There is mild concentric left ventricular hypertrophy. Ejection fraction is visually estimated to be 50-55%. Normal right ventricular size.       Assessment:     Acute hypoxic respiratory failure  Diastolic heart failure    Plan:      Acute hypoxic respiratory failure  - Likely due to atelectasis  - Incentive spirometry  - Weaned to room air this morning     Diastolic heart failure  - Resume coreg and lasix  - Discontinue IV fluids      Thank you for allowing me to participate in the care of this patient. Will sign off. Please call with any questions or concerns.     Cora Ospina MD  [facility] Pulmonology, Critical Care and Sleep

## 2020-03-25 NOTE — PROGRESS NOTES
Pharmacy Medication Reconciliation Note     List of medications patient is currently taking is complete.     Allergies:  Pcn [penicillins]    Source of information:   1. patient   2. disp hx    Notes regarding home medications:   1. patient has no meds listed  2. added Allopurinol,Carvedilol,Clopidogrel,Furosemide,Glimepiride,Levothyroxine,Nifedipine    Denies taking any other OTC or herbal medications  Patricia Andrade RPh 3/25/2020 10:13 AM

## 2020-03-25 NOTE — H&P
Hospital Medicine  History and Physical    PCP: Stacy Ledezma DO  Patient Name: Sarah Christiansen    Date of Service: Pt seen/examined on 03/25/2020 and Admitted to Inpatient with expected LOS greater than two midnights due to medical therapy    CHIEF COMPLAINT:  Pt c/o nausea, vomiting and diarrhea  HISTORY OF PRESENT ILLNESS: Patient is a poor historian at this time, and information for this report is derived from conversation with the emergency room physician and review of the records. Patient is a 77-year-old woman whose medical history is unknown to me at this time. She presents to the emergency room for evaluation of nausea, vomiting and diarrhea which began approximately 3 PM today. She reports several episodes of emesis since onset. She also reports shortness of breath and has developed low chest/epigastric pain associated with emesis. Her O2 saturation was 86% when ambulating in the ER. She is being admitted for further evaluation and treatment. Past Medical History:    No past medical history on file. Past Surgical History:    No past surgical history on file. Allergies:  Pcn [penicillins]    Medications Prior to Admission:    None    Family History:   Family history is negative for accelerated coronary artery disease, diabetes or malignancies. Social History:   TOBACCO:   has no history on file for tobacco.  ETOH:   has no history on file for alcohol. OCCUPATION:      REVIEW OF SYSTEMS:  A review of systems could not be performed due to patient's current mental status    Physical Exam:    Vitals: BP (!) 166/71   Pulse 68   Temp 98.1 °F (36.7 °C) (Oral)   Resp 20   Ht 5' 5.5\" (1.664 m)   Wt 212 lb 15.4 oz (96.6 kg)   SpO2 96%   BMI 34.90 kg/m²   General appearance: WD/WN 78y.o. year-old female who is awake, appears stated age and is cooperative  HEENT: Head: Normocephalic, no lesions, without obvious abnormality. Eye: Normal external eye, conjunctiva, lids cornea, ELENA.   Ears: Normal external ears. Non-tender. Nose: Normal external nose, mucus membranes and septum. Pharynx: Dental Hygiene adequate. Normal buccal mucosa. Normal pharynx. Neck: no adenopathy, no carotid bruit, no JVD, supple, symmetrical, trachea midline and thyroid not enlarged, symmetric, no tenderness/mass/nodules  Lungs: clear to auscultation bilaterally and no use of accessory muscles. Heart: regular rate and rhythm, S1, S2 normal, no murmur, click, rub or gallop and normal apical impulse  Abdomen: soft, non-tender; bowel sounds normal; no masses,  no organomegaly  Extremities: extremities atraumatic, no cyanosis or edema and Homans sign is negative, no sign of DVT. Capillary Refill: Acceptable < 3 seconds  Peripheral Pulses: +3 easily felt, not easily obliterated with pressures  Skin: Skin color, texture, turgor normal. No rashes or lesions on exposed skin  Neurologic: Neurovascularly intact without any focal sensory/motor deficits. Cranial nerves: II-XII intact, grossly non-focal. Gait was not tested. Psychiatric: Alert and oriented to self and place, easily confused, poor insight    CBC:   Recent Labs     03/24/20 2120 03/25/20  0421   WBC 11.2* 12.8*   HGB 14.8 13.9    180     BMP:    Recent Labs     03/24/20 2121 03/25/20  0421   * 141   K 4.6 5.0   CL 94* 102   CO2 27 24   BUN 35* 37*   CREATININE 2.2* 2.1*   GLUCOSE 228* 189*     Troponin:   Recent Labs     03/24/20 2121   [address] <0.01     PT/INR:  No results found for: PTINR  U/A:    Lab Results   Component Value Date    LEUKOCYTESUR Negative 03/24/2020    RBCUA 1 03/24/2020    SPECGRAV 1.014 03/24/2020    UROBILINOGEN 0.2 03/24/2020    BILIRUBINUR Negative 03/24/2020    BLOODU Negative 03/24/2020    GLUCOSEU Negative 03/24/2020    PROTEINU >=300 03/24/2020         RAD:   I have independently reviewed and interpreted the imaging studies below and based my recommendations to the patient on those findings.     Ct Abdomen Pelvis Wo Contrast Additional Contrast? Iv    Result Date: 3/24/2020  EXAMINATION: CT OF THE ABDOMEN AND PELVIS WITHOUT CONTRAST 3/24/2020 10:27 pm TECHNIQUE: CT of the abdomen and pelvis was performed without the administration of intravenous contrast. Multiplanar reformatted images are provided for review. Dose modulation, iterative reconstruction, and/or weight based adjustment of the mA/kV was utilized to reduce the radiation dose to as low as reasonably achievable. COMPARISON: CT chest 03/24/2020 HISTORY: ORDERING SYSTEM PROVIDED HISTORY: Abdominal pain with nausea vomiting diarrhea TECHNOLOGIST PROVIDED HISTORY: Reason for exam:->Abdominal pain with nausea vomiting diarrhea Additional Contrast?->IV Reason for Exam: abdomen pain, hernia FINDINGS: LOWER CHEST Lung bases: Basilar atelectasis. Normal heart size. RCA territory coronary stent in place. ABDOMEN Liver: Normal liver size, contour and parenchymal attenuation. No focal lesion on this noncontrast evaluation. Gallbladder: Normal. Biliary: No intra or extrahepatic bile duct dilatation. Pancreas: Normal. Spleen: Normal. Adrenals: Normal. Kidneys: Left kidney is slightly smaller than the right. Right renal cysts noted. No hydronephrosis or nephrolithiasis. GI Tract: Tiny hiatus hernia. Multiple loops of nonobstructive bowel within a lower abdominal ventral hernia. No apparent bowel wall thickening or obstruction. Normal appendix. Peritoneum/Retroperitoneum: No enlarged lymph nodes, free air or free fluid. Vascular: Normal caliber abdominal aorta and IVC. PELVIS Genitourinary: Normal urinary bladder. Status post hysterectomy. Other: No free fluid. No enlarged lymph nodes. MUSCULOSKELETAL Bones and Soft Tissues: Status post ventral abdominal hernia repair. There is a lower wide-based ventral abdominal hernia containing intra-fat and nonobstructed loops of small and large bowel. No inflammatory stranding or fluid.   Advanced lumbar spondylosis with multiple calcified disc bulges, contributing to moderate to severe canal stenosis at L3-L4, mild at L2-L3 and other levels. Facet arthrosis with moderate to severe foraminal stenosis at L5-S1. Wide-based (infraumbilical) ventral abdominal hernia containing intra-fat and nonobstructed loops of large and small bowel. No associated inflammatory stranding or fluid. Advanced lumbar spondylosis and facet arthrosis. Xr Chest Standard (2 Vw)    Result Date: 3/24/2020  EXAMINATION: TWO XRAY VIEWS OF THE CHEST 3/24/2020 9:27 pm COMPARISON: None. HISTORY: ORDERING SYSTEM PROVIDED HISTORY: Chest Pain TECHNOLOGIST PROVIDED HISTORY: Reason for exam:->Chest Pain Reason for Exam: chest pain Acuity: Acute Type of Exam: Initial FINDINGS: Elevation of the right hemidiaphragm is noted. Heart size is at the upper limits of normal.  There is no lung consolidation. There is no effusion or pneumothorax     No acute abnormality. Assessment:   Principal Problem:    Respiratory failure with hypoxia (HCC)  Active Problems:    Essential hypertension    Renal failure    Hyperglycemia    Abdominal pain    Ventral hernia    Nausea and vomiting    Diarrhea    Non-cardiac chest pain  Resolved Problems:    * No resolved hospital problems. *      Plan: Will need to learn more about the patient, regarding medical history and any medications she is on in the morning      Respiratory failure with hypoxia ([address] - unclear if acute or chronic. Pt, who is a poor historian,  states that she does not wear Oxygen at home. Will ask Pulmonary to see her    Non-Intractable vomiting with nausea - Will provide symptomatic treatment with Zofran as needed, maintenance of fluids and electrolytes. Diarrhea - will check stool for C diff and bacterial pathogens    Non-cardiac chest pain - started after bouts of emesis and appears to be esophageal pain. Will provide symptomatic treatment. Essential hypertension - assumption based upon observed BP.  Will start prn

## 2020-03-25 NOTE — ED TRIAGE NOTES
Patient with c/o abdominal pain, n,v,d. Patient denies chest pain, but positive for SOB. Patient is on room air at baseline but is intermittently tachypnic and desaturates on room air to upper 80s. Patient rates abdominal pain at 6/10 aching. Patient is known hypertensive with BP on arrival of 853K systolic. MD and PA are aware of baseline vitals. Patients home medications are not on file, however the extent of patient's emergency is not attributed to hypertension. Will continue to monitor, but providers aware and not administering medication at this time.

## 2020-03-26 VITALS
SYSTOLIC BLOOD PRESSURE: 158 MMHG | BODY MASS INDEX: 34.3 KG/M2 | WEIGHT: 213.41 LBS | TEMPERATURE: 99.2 F | RESPIRATION RATE: 16 BRPM | DIASTOLIC BLOOD PRESSURE: 62 MMHG | HEART RATE: 54 BPM | HEIGHT: 66 IN | OXYGEN SATURATION: 91 %

## 2020-03-26 LAB
ANION GAP SERPL CALCULATED.3IONS-SCNC: 13 MMOL/L (ref 3–16)
BASOPHILS ABSOLUTE: 0.1 K/UL (ref 0–0.2)
BASOPHILS RELATIVE PERCENT: 0.7 %
BUN BLDV-MCNC: 33 MG/DL (ref 7–20)
CALCIUM SERPL-MCNC: 8.8 MG/DL (ref 8.3–10.6)
CHLORIDE BLD-SCNC: 103 MMOL/L (ref 99–110)
CO2: 25 MMOL/L (ref 21–32)
CREAT SERPL-MCNC: 1.7 MG/DL (ref 0.6–1.2)
EOSINOPHILS ABSOLUTE: 0.3 K/UL (ref 0–0.6)
EOSINOPHILS RELATIVE PERCENT: 3.3 %
GFR AFRICAN AMERICAN: 35
GFR NON-AFRICAN AMERICAN: 29
GLUCOSE BLD-MCNC: 116 MG/DL (ref 70–99)
GLUCOSE BLD-MCNC: 86 MG/DL (ref 70–99)
GLUCOSE BLD-MCNC: 88 MG/DL (ref 70–99)
HCT VFR BLD CALC: 40 % (ref 36–48)
HEMOGLOBIN: 13.2 G/DL (ref 12–16)
LYMPHOCYTES ABSOLUTE: 2.4 K/UL (ref 1–5.1)
LYMPHOCYTES RELATIVE PERCENT: 27.3 %
MCH RBC QN AUTO: 32.3 PG (ref 26–34)
MCHC RBC AUTO-ENTMCNC: 33 G/DL (ref 31–36)
MCV RBC AUTO: 98.1 FL (ref 80–100)
MONOCYTES ABSOLUTE: 0.7 K/UL (ref 0–1.3)
MONOCYTES RELATIVE PERCENT: 8.6 %
NEUTROPHILS ABSOLUTE: 5.2 K/UL (ref 1.7–7.7)
NEUTROPHILS RELATIVE PERCENT: 60.1 %
PDW BLD-RTO: 14.5 % (ref 12.4–15.4)
PERFORMED ON: ABNORMAL
PERFORMED ON: NORMAL
PLATELET # BLD: 188 K/UL (ref 135–450)
PMV BLD AUTO: 7.8 FL (ref 5–10.5)
POTASSIUM REFLEX MAGNESIUM: 3.9 MMOL/L (ref 3.5–5.1)
RBC # BLD: 4.08 M/UL (ref 4–5.2)
SODIUM BLD-SCNC: 141 MMOL/L (ref 136–145)
WBC # BLD: 8.6 K/UL (ref 4–11)

## 2020-03-26 PROCEDURE — 85025 COMPLETE CBC W/AUTO DIFF WBC: CPT

## 2020-03-26 PROCEDURE — 80048 BASIC METABOLIC PNL TOTAL CA: CPT

## 2020-03-26 PROCEDURE — 36415 COLL VENOUS BLD VENIPUNCTURE: CPT

## 2020-03-26 PROCEDURE — 6360000002 HC RX W HCPCS: Performed by: INTERNAL MEDICINE

## 2020-03-26 PROCEDURE — 6370000000 HC RX 637 (ALT 250 FOR IP): Performed by: INTERNAL MEDICINE

## 2020-03-26 PROCEDURE — 2580000003 HC RX 258: Performed by: INTERNAL MEDICINE

## 2020-03-26 RX ADMIN — ACETAMINOPHEN 650 MG: 325 TABLET ORAL at 06:30

## 2020-03-26 RX ADMIN — HEPARIN SODIUM 5000 UNITS: 5000 INJECTION INTRAVENOUS; SUBCUTANEOUS at 06:30

## 2020-03-26 RX ADMIN — FUROSEMIDE 60 MG: 40 TABLET ORAL at 08:56

## 2020-03-26 RX ADMIN — Medication 10 ML: at 08:57

## 2020-03-26 RX ADMIN — FAMOTIDINE 20 MG: 20 TABLET, FILM COATED ORAL at 08:55

## 2020-03-26 RX ADMIN — HEPARIN SODIUM 5000 UNITS: 5000 INJECTION INTRAVENOUS; SUBCUTANEOUS at 14:11

## 2020-03-26 RX ADMIN — CARVEDILOL 12.5 MG: 12.5 TABLET, FILM COATED ORAL at 08:56

## 2020-03-26 ASSESSMENT — PAIN SCALES - GENERAL
PAINLEVEL_OUTOF10: 3
PAINLEVEL_OUTOF10: 2
PAINLEVEL_OUTOF10: 0
PAINLEVEL_OUTOF10: 0

## 2020-03-26 ASSESSMENT — PAIN SCALES - WONG BAKER
WONGBAKER_NUMERICALRESPONSE: 0

## 2020-03-26 ASSESSMENT — PAIN DESCRIPTION - ONSET
ONSET: PROGRESSIVE
ONSET: PROGRESSIVE

## 2020-03-26 ASSESSMENT — PAIN DESCRIPTION - FREQUENCY
FREQUENCY: CONTINUOUS
FREQUENCY: CONTINUOUS

## 2020-03-26 ASSESSMENT — PAIN DESCRIPTION - LOCATION
LOCATION: HEAD
LOCATION: HEAD

## 2020-03-26 ASSESSMENT — PAIN DESCRIPTION - PAIN TYPE
TYPE: ACUTE PAIN
TYPE: ACUTE PAIN

## 2020-03-26 ASSESSMENT — PAIN DESCRIPTION - DESCRIPTORS
DESCRIPTORS: ACHING
DESCRIPTORS: ACHING

## 2020-03-26 NOTE — DISCHARGE INSTR - COC
that are sent with patient):  {CHP DME Belongings:483335086}    RN SIGNATURE:  {Esignature:[id number]}    CASE MANAGEMENT/SOCIAL WORK SECTION    Inpatient Status Date: ***    Readmission Risk Assessment Score:  Readmission Risk              Risk of Unplanned Readmission:        13           Discharging to Facility/ Agency   · Name:   · Address:  · Phone:  · Fax:    Dialysis Facility (if applicable)   · Name:  · Address:  · Dialysis Schedule:  · Phone:  · Fax:    / signature: {Esignature:[id number]}    PHYSICIAN SECTION    Prognosis: {Prognosis:9270212699}    Condition at Discharge: [address] [place] [address] Patient Condition:512874312}    Rehab Potential (if transferring to Rehab): {Prognosis:7608190378}    Recommended Labs or Other Treatments After Discharge: ***    Physician Certification: I certify the above information and transfer of Felecia Gold  is necessary for the continuing treatment of the diagnosis listed and that she requires {Admit to Appropriate Level of Care:37339} for {GREATER/LESS:857137070} 30 days.      Update Admission H&P: {CHP DME Changes in MZXEC:102547917}    PHYSICIAN SIGNATURE:  {Esignature:141474501}

## 2020-03-26 NOTE — PROGRESS NOTES
Pt discharged to home. Taken downstairs in wheelchair. Transported in personal vehicle. Discharge instructions, Rx, and personal belongings given to pt. Explanation of discharge medications and instructions understood by verbal statement. No questions, comments or concerns at this time.

## 2020-03-26 NOTE — DISCHARGE SUMMARY
Hospital Medicine Discharge Summary    Patient ID: Paris Mortimer      Patient's PCP: [address],     Admit Date: 3/24/2020     Discharge Date:   3/26/20    Admitting Physician: Kamila Lopez MD     Discharge Physician: Prosper Calzada MD     Discharge Diagnoses: Active Hospital Problems    Diagnosis Date Noted    Essential hypertension [I10] 03/25/2020    Renal failure [N19] 03/25/2020    Hyperglycemia [R73.9] 03/25/2020    Abdominal pain [R10.9] 03/25/2020    Ventral hernia [K43.9] 03/25/2020    Nausea and vomiting [R11.2] 03/25/2020    Diarrhea [R19.7] 03/25/2020    Non-cardiac chest pain [R07.89] 03/25/2020    Respiratory failure with hypoxia [facility]) [J96.91] 03/25/2020    Chronic diastolic congestive heart failure ([facility] [address] [I50.32]        The patient was seen and examined on day of discharge and this discharge summary is in conjunction with any daily progress note from day of discharge. Hospital Course:   Patient is a poor historian at this time, and information for this report is derived from conversation with the emergency room physician and review of the records. Patient is a 66-year-old woman whose medical history is unknown to me at this time. She presents to the emergency room for evaluation of nausea, vomiting and diarrhea which began approximately 3 PM today. She reports several episodes of emesis since onset. She also reports shortness of breath and has developed low chest/epigastric pain associated with emesis. Her O2 saturation was 86% when ambulating in the ER. She is being admitted for further evaluation and treatment. Patient was originally brought in with respiratory failure with hypoxia she was saturating 86% on room air and ambulating in the ER. Her chest x-ray was clear and she showed no signs of fluid overload or infection. Discharge she was on room air and not requiring any oxygen. Her nausea and vomiting had subsided with supportive care.   She had no more episodes of diarrhea while in the hospital.  Her chronic conditions like hyperglycemia and hypertension were controlled with home medications. Patient stable for discharge      Exam:     BP (!) 158/62   Pulse 54   Temp 99.2 °F (37.3 °C) (Oral)   Resp 16   Ht 5' 5.5\" (1.664 m)   Wt 213 lb 6.5 oz (96.8 kg)   SpO2 91%   BMI 34.97 kg/m²     General appearance: No apparent distress, appears stated age and cooperative. HEENT: Pupils equal, round, and reactive to light. Conjunctivae/corneas clear. Neck: Supple, with full range of motion. No jugular venous distention. Trachea midline. Respiratory:  Normal respiratory effort. Clear to auscultation, bilaterally without Rales/Wheezes/Rhonchi. Cardiovascular: Regular rate and rhythm with normal S1/S2 without murmurs, rubs or gallops. Abdomen: Soft, non-tender, non-distended with normal bowel sounds. Musculoskeletal: No clubbing, cyanosis or edema bilaterally. Full range of motion without deformity. Skin: Skin color, texture, turgor normal.  No rashes or lesions. Neurologic:  Neurovascularly intact without any focal sensory/motor deficits. Cranial nerves: II-XII intact, grossly non-focal.  Psychiatric: Alert and oriented, thought content appropriate, normal insight      Consults:     IP CONSULT TO PULMONOLOGY    Significant Diagnostic Studies:     CT ABDOMEN PELVIS WO CONTRAST Additional Contrast? IV   Final Result   Wide-based (infraumbilical) ventral abdominal hernia containing intra-fat and   nonobstructed loops of large and small bowel. No associated inflammatory   stranding or fluid. Advanced lumbar spondylosis and facet arthrosis. XR CHEST STANDARD (2 VW)   Final Result   No acute abnormality. Disposition: Home    Condition at Discharge: Stable    Discharge Instructions/Follow-up: Primary care physician    Code Status:  Full Code     Activity: activity as tolerated    Diet: diabetic diet      Labs:  For convenience and continuity

## 2020-03-26 NOTE — PLAN OF CARE
Problem: Falls - Risk of:  Goal: Will remain free from falls  Description: Will remain free from falls  3/26/2020 0011 by Georgina Garcia RN  Outcome: Ongoing  Note:   Fall risk assessment completed every shift. All precautions in place. Pt has call light within reach at all times. Room clear of clutter. Pt aware to call for assistance when getting up.     3/25/2020 1347 by Leo Espinal RN  Outcome: Ongoing  Note: Fall risk assessment completed. Fall precautions in place. Call light within reach. Pt educated on calling for assistance before getting up. Walkway free of clutter. Will continue to monitor. Goal: Absence of physical injury  Description: Absence of physical injury  3/26/2020 0011 by Georgina Garcia RN  Outcome: Ongoing  Note: Pt. Will be free from physical injury. 3/25/2020 1347 by Leo Espinal RN  Outcome: Ongoing     Problem: HEMODYNAMIC STATUS  Goal: Patient has stable vital signs and fluid balance  3/26/2020 0011 by Georgina Garcia RN  Outcome: Ongoing  Note: Pt. Will remain with stable vital signs and fluid balance. 3/25/2020 1347 by Leo Espinal RN  Outcome: Ongoing  Note: VSS and WDL. Vitals monitored per floor orders. Labs monitored. I&O monitored. Medications administered as directed. Problem: FLUID AND ELECTROLYTE IMBALANCE  Goal: Fluid and electrolyte balance are achieved/maintained  3/26/2020 0011 by Georgina Garcia RN  Outcome: Ongoing  Note: Pt. Will maintain fluid and electrolyte balance. 3/25/2020 1347 by Leo Espinal RN  Outcome: Ongoing  Note: Fluid status assessed. I&O monitored. Labs monitored. Medications administered as directed. Problem: Nausea/Vomiting:  Goal: Absence of nausea/vomiting  Description: Absence of nausea/vomiting  3/26/2020 0011 by Georgina Garcia RN  Outcome: Ongoing  Note: Pt. Will remain absent of nausea and vomitting   3/25/2020 1347 by Leo Espinal RN  Outcome: Ongoing  Note: Currently denies nausea. No emesis. Anti emetics available if needed. I&O monitored.     Goal: Able to drink  Description: Able to drink  3/26/2020 0011 by Leo Dunbar RN  Outcome: Ongoing  3/25/2020 1347 by Maria Teresa Simmons RN  Outcome: Ongoing  Goal: Able to eat  Description: Able to eat  3/26/2020 0011 by Leo Dunbar RN  Outcome: Ongoing  3/25/2020 1347 by Maria Teresa Simmons RN  Outcome: Ongoing  Goal: Ability to achieve adequate nutritional intake will improve  Description: Ability to achieve adequate nutritional intake will improve  3/26/2020 0011 by Leo Dunbar RN  Outcome: Ongoing  3/25/2020 1347 by Maria Teresa Simmons RN  Outcome: Ongoing

## 2020-03-26 NOTE — PROGRESS NOTES
Pt. A&O x4. Can be confused at times. Vitals stable. Pt. Denies pain at this time. Pt. States last BM 3/23/2020. Pt. Received night medications. Pt. Denies needs at this time. Call light in reach. Bedside table in reach. Will continue to monitor.

## 2020-03-26 NOTE — PLAN OF CARE
nausea/vomiting  Description: Absence of nausea/vomiting  3/26/2020 1136 by Gurjit Thao RN  Outcome: Ongoing  Note: Pt has no complaints of nausea or vomiting at this time. 3/26/2020 0011 by Dorie Berg RN  Outcome: Ongoing  Note: Pt. Will remain absent of nausea and vomitting   Goal: Able to drink  Description: Able to drink  3/26/2020 1136 by Gurjit Thao RN  Outcome: Ongoing  Note: Pt tolerating fluids at this time. Will monitor. 3/26/2020 0011 by Dorie Berg RN  Outcome: Ongoing  Goal: Able to eat  Description: Able to eat  3/26/2020 1136 by Gurjit Thao RN  Outcome: Ongoing  Note: Pt tolerating meals at this time. Will monitor. 3/26/2020 0011 by Dorie Berg RN  Outcome: Ongoing  Goal: Ability to achieve adequate nutritional intake will improve  Description: Ability to achieve adequate nutritional intake will improve  3/26/2020 1136 by Gurjit Thao RN  Outcome: Ongoing  Note: Patient's nutrition is adequate, patient had % of breakfast and lunch, patient feeds self, adequate time for meals, intake is being monitored. Will cont to monitor and reassess.     3/26/2020 0011 by Dorie Berg RN  Outcome: Ongoing

## 2020-03-27 ENCOUNTER — CARE COORDINATION (OUTPATIENT)
Dept: CASE MANAGEMENT | Age: 80
End: 2020-03-27

## 2020-03-27 NOTE — CARE COORDINATION
COVID-19 Screening Initial Follow-up Note    Attempted to contact patient regarding COVID-19. LVM introducing self, role and the nature of the call. Contact information provided. Will attempt again.  CHADWICK WinnN RN  Care Transition Nurse [phone]

## 2020-03-28 ENCOUNTER — CARE COORDINATION (OUTPATIENT)
Dept: CASE MANAGEMENT | Age: 80
End: 2020-03-28

## 2020-03-28 NOTE — CARE COORDINATION
COVID-19 Screening Initial Follow-up Note    Patient contacted regarding Teja Lima. Care Transition Nurse/ Ambulatory Care Manager contacted the patient by telephone to perform post discharge assessment. Verified name and  with patient as identifiers. Provided introduction to self, and explanation of the CTN/ACM role, and reason for call due to risk factors for infection and/or exposure to COVID-19. Symptoms reviewed with patient who verbalized the following symptoms: fever, fatigue, pain or aching joints, cough, shortness of breath, confusion or unusual change in mental status, chills or shaking, sweating, fast heart rate, fast breathing, dizziness/lightheadedness, less urine output, cold, clammy, and pale skin, low body temperature and no new/worsening symptoms       Due to no new or worsening symptoms encounter was not routed to provider for escalation. Patient has following risk factors of: CHF. CTN/ACM reviewed discharge instructions, medical action plan and red flags such as increased shortness of breath, increasing fever and signs of decompensation with patient who verbalized understanding. Discussed exposure protocols and quarantine with CDC Guidelines What to do if you are sick with coronavirus disease 2019 Patient who was given an opportunity for questions and concerns. The patient agrees to contact the [facility] exposure line [phone], local [facility] department [address] [place] [address] Avenue [phone] and PCP office for questions related to their healthcare. CTN/ACM provided contact information for future reference. Reviewed and educated patient on any new and changed medications related to discharge diagnosis     Plan for follow-up call in 14 days based on severity of symptoms and risk factors    Patient reports she is doing \"fair\". Denies having chest pain/discomfort, cough, congestion, fever, chills.   Continues to get short of breath with exertion but states no worse than her normal.  She states she has swelling in her legs but no more than her normal.  Continues to have weakness in her legs. She is using her walker. She denies having any abdominal pain, nausea/vomiting. States she is eating but not as much as she had been. She is drinking fluids to stay hydrated. She has all of her medications. She does not have any questions or concerns at this time.      Salma Smith RN  Care Transition Nurse

## 2020-03-30 ASSESSMENT — ENCOUNTER SYMPTOMS
FACIAL SWELLING: 0
ABDOMINAL PAIN: 1
EYE DISCHARGE: 0
BACK PAIN: 0
SHORTNESS OF BREATH: 1
APNEA: 0
EYE REDNESS: 0
NAUSEA: 1
SORE THROAT: 0
CHOKING: 0
VOMITING: 1
DIARRHEA: 1

## 2020-04-27 RX ORDER — LEVOTHYROXINE SODIUM 137 UG/1
TABLET ORAL
Qty: 90 TABLET | Refills: 0 | Status: SHIPPED | OUTPATIENT
Start: 2020-04-27 | End: 2020-08-21 | Stop reason: SDUPTHER

## 2020-05-11 ENCOUNTER — APPOINTMENT (OUTPATIENT)
Dept: CT IMAGING | Age: 80
End: 2020-05-11
Payer: MEDICARE

## 2020-05-11 ENCOUNTER — HOSPITAL ENCOUNTER (OUTPATIENT)
Age: 80
Setting detail: OBSERVATION
Discharge: HOME HEALTH | End: 2020-05-12
Attending: EMERGENCY MEDICINE | Admitting: FAMILY MEDICINE
Payer: MEDICARE

## 2020-05-11 ENCOUNTER — APPOINTMENT (OUTPATIENT)
Dept: GENERAL RADIOLOGY | Age: 80
End: 2020-05-11
Payer: MEDICARE

## 2020-05-11 PROBLEM — R29.898 LEFT HAND WEAKNESS: Status: ACTIVE | Noted: 2020-05-11

## 2020-05-11 LAB
A/G RATIO: 0.8 (ref 1.1–2.2)
ALBUMIN SERPL-MCNC: 3.1 G/DL (ref 3.4–5)
ALP BLD-CCNC: 119 U/L (ref 40–129)
ALT SERPL-CCNC: 10 U/L (ref 10–40)
ANION GAP SERPL CALCULATED.3IONS-SCNC: 8 MMOL/L (ref 3–16)
AST SERPL-CCNC: 10 U/L (ref 15–37)
BASOPHILS ABSOLUTE: 0.1 K/UL (ref 0–0.2)
BASOPHILS RELATIVE PERCENT: 0.7 %
BILIRUB SERPL-MCNC: <0.2 MG/DL (ref 0–1)
BUN BLDV-MCNC: 33 MG/DL (ref 7–20)
CALCIUM SERPL-MCNC: 8.4 MG/DL (ref 8.3–10.6)
CHLORIDE BLD-SCNC: 103 MMOL/L (ref 99–110)
CO2: 25 MMOL/L (ref 21–32)
CREAT SERPL-MCNC: 1.6 MG/DL (ref 0.6–1.2)
EOSINOPHILS ABSOLUTE: 0.3 K/UL (ref 0–0.6)
EOSINOPHILS RELATIVE PERCENT: 4.2 %
GFR AFRICAN AMERICAN: 38
GFR NON-AFRICAN AMERICAN: 31
GLOBULIN: 3.7 G/DL
GLUCOSE BLD-MCNC: 148 MG/DL (ref 70–99)
GLUCOSE BLD-MCNC: 162 MG/DL (ref 70–99)
GLUCOSE BLD-MCNC: 340 MG/DL (ref 70–99)
GLUCOSE BLD-MCNC: 353 MG/DL (ref 70–99)
HCT VFR BLD CALC: 41.1 % (ref 36–48)
HEMOGLOBIN: 13.4 G/DL (ref 12–16)
INR BLD: 1.03 (ref 0.86–1.14)
LYMPHOCYTES ABSOLUTE: 1.5 K/UL (ref 1–5.1)
LYMPHOCYTES RELATIVE PERCENT: 20.8 %
MCH RBC QN AUTO: 32.7 PG (ref 26–34)
MCHC RBC AUTO-ENTMCNC: 32.7 G/DL (ref 31–36)
MCV RBC AUTO: 100.1 FL (ref 80–100)
MONOCYTES ABSOLUTE: 0.6 K/UL (ref 0–1.3)
MONOCYTES RELATIVE PERCENT: 8.5 %
NEUTROPHILS ABSOLUTE: 4.7 K/UL (ref 1.7–7.7)
NEUTROPHILS RELATIVE PERCENT: 65.8 %
PDW BLD-RTO: 15.1 % (ref 12.4–15.4)
PERFORMED ON: ABNORMAL
PLATELET # BLD: 203 K/UL (ref 135–450)
PMV BLD AUTO: 7.5 FL (ref 5–10.5)
POTASSIUM SERPL-SCNC: 4.4 MMOL/L (ref 3.5–5.1)
PROTHROMBIN TIME: 12 SEC (ref 10–13.2)
RBC # BLD: 4.1 M/UL (ref 4–5.2)
SODIUM BLD-SCNC: 136 MMOL/L (ref 136–145)
TOTAL PROTEIN: 6.8 G/DL (ref 6.4–8.2)
TROPONIN: <0.01 NG/ML
WBC # BLD: 7.1 K/UL (ref 4–11)

## 2020-05-11 PROCEDURE — 85610 PROTHROMBIN TIME: CPT

## 2020-05-11 PROCEDURE — 6370000000 HC RX 637 (ALT 250 FOR IP): Performed by: FAMILY MEDICINE

## 2020-05-11 PROCEDURE — 6360000002 HC RX W HCPCS: Performed by: FAMILY MEDICINE

## 2020-05-11 PROCEDURE — 71045 X-RAY EXAM CHEST 1 VIEW: CPT

## 2020-05-11 PROCEDURE — 94760 N-INVAS EAR/PLS OXIMETRY 1: CPT

## 2020-05-11 PROCEDURE — G0378 HOSPITAL OBSERVATION PER HR: HCPCS

## 2020-05-11 PROCEDURE — 85025 COMPLETE CBC W/AUTO DIFF WBC: CPT

## 2020-05-11 PROCEDURE — 70450 CT HEAD/BRAIN W/O DYE: CPT

## 2020-05-11 PROCEDURE — 80053 COMPREHEN METABOLIC PANEL: CPT

## 2020-05-11 PROCEDURE — 93005 ELECTROCARDIOGRAM TRACING: CPT | Performed by: EMERGENCY MEDICINE

## 2020-05-11 PROCEDURE — 84484 ASSAY OF TROPONIN QUANT: CPT

## 2020-05-11 PROCEDURE — 96372 THER/PROPH/DIAG INJ SC/IM: CPT

## 2020-05-11 PROCEDURE — 99285 EMERGENCY DEPT VISIT HI MDM: CPT

## 2020-05-11 PROCEDURE — 70496 CT ANGIOGRAPHY HEAD: CPT

## 2020-05-11 PROCEDURE — 6360000004 HC RX CONTRAST MEDICATION: Performed by: EMERGENCY MEDICINE

## 2020-05-11 PROCEDURE — 2580000003 HC RX 258: Performed by: FAMILY MEDICINE

## 2020-05-11 RX ORDER — LEVOTHYROXINE SODIUM 137 UG/1
1 TABLET ORAL DAILY
Status: DISCONTINUED | OUTPATIENT
Start: 2020-05-11 | End: 2020-05-11

## 2020-05-11 RX ORDER — FUROSEMIDE 20 MG/1
20 TABLET ORAL EVERY EVENING
Status: DISCONTINUED | OUTPATIENT
Start: 2020-05-11 | End: 2020-05-12 | Stop reason: HOSPADM

## 2020-05-11 RX ORDER — POLYETHYLENE GLYCOL 3350 17 G/17G
17 POWDER, FOR SOLUTION ORAL DAILY PRN
Status: DISCONTINUED | OUTPATIENT
Start: 2020-05-11 | End: 2020-05-12 | Stop reason: HOSPADM

## 2020-05-11 RX ORDER — FUROSEMIDE 20 MG/1
20 TABLET ORAL SEE ADMIN INSTRUCTIONS
COMMUNITY
End: 2020-08-18 | Stop reason: ALTCHOICE

## 2020-05-11 RX ORDER — DEXTROSE MONOHYDRATE 25 G/50ML
12.5 INJECTION, SOLUTION INTRAVENOUS PRN
Status: DISCONTINUED | OUTPATIENT
Start: 2020-05-11 | End: 2020-05-12 | Stop reason: HOSPADM

## 2020-05-11 RX ORDER — ASPIRIN 81 MG/1
81 TABLET ORAL DAILY
Status: DISCONTINUED | OUTPATIENT
Start: 2020-05-12 | End: 2020-05-12 | Stop reason: HOSPADM

## 2020-05-11 RX ORDER — NICOTINE POLACRILEX 4 MG
15 LOZENGE BUCCAL PRN
Status: DISCONTINUED | OUTPATIENT
Start: 2020-05-11 | End: 2020-05-12 | Stop reason: HOSPADM

## 2020-05-11 RX ORDER — DEXTROSE MONOHYDRATE 50 MG/ML
100 INJECTION, SOLUTION INTRAVENOUS PRN
Status: DISCONTINUED | OUTPATIENT
Start: 2020-05-11 | End: 2020-05-12 | Stop reason: HOSPADM

## 2020-05-11 RX ORDER — ALLOPURINOL 100 MG/1
100 TABLET ORAL DAILY
Status: DISCONTINUED | OUTPATIENT
Start: 2020-05-12 | End: 2020-05-12 | Stop reason: HOSPADM

## 2020-05-11 RX ORDER — CLOPIDOGREL BISULFATE 75 MG/1
75 TABLET ORAL DAILY
Status: DISCONTINUED | OUTPATIENT
Start: 2020-05-12 | End: 2020-05-12 | Stop reason: HOSPADM

## 2020-05-11 RX ORDER — PROMETHAZINE HYDROCHLORIDE 25 MG/1
12.5 TABLET ORAL EVERY 6 HOURS PRN
Status: DISCONTINUED | OUTPATIENT
Start: 2020-05-11 | End: 2020-05-12 | Stop reason: HOSPADM

## 2020-05-11 RX ORDER — FUROSEMIDE 40 MG/1
40 TABLET ORAL DAILY
Status: DISCONTINUED | OUTPATIENT
Start: 2020-05-12 | End: 2020-05-12 | Stop reason: HOSPADM

## 2020-05-11 RX ORDER — SODIUM CHLORIDE 0.9 % (FLUSH) 0.9 %
10 SYRINGE (ML) INJECTION PRN
Status: DISCONTINUED | OUTPATIENT
Start: 2020-05-11 | End: 2020-05-12 | Stop reason: HOSPADM

## 2020-05-11 RX ORDER — SODIUM CHLORIDE 0.9 % (FLUSH) 0.9 %
10 SYRINGE (ML) INJECTION EVERY 12 HOURS SCHEDULED
Status: DISCONTINUED | OUTPATIENT
Start: 2020-05-11 | End: 2020-05-12 | Stop reason: HOSPADM

## 2020-05-11 RX ORDER — ASPIRIN 81 MG/1
81 TABLET ORAL DAILY
Status: DISCONTINUED | OUTPATIENT
Start: 2020-05-11 | End: 2020-05-11 | Stop reason: SDUPTHER

## 2020-05-11 RX ORDER — ASPIRIN 300 MG/1
300 SUPPOSITORY RECTAL DAILY
Status: DISCONTINUED | OUTPATIENT
Start: 2020-05-12 | End: 2020-05-12 | Stop reason: HOSPADM

## 2020-05-11 RX ORDER — ONDANSETRON 2 MG/ML
4 INJECTION INTRAMUSCULAR; INTRAVENOUS EVERY 6 HOURS PRN
Status: DISCONTINUED | OUTPATIENT
Start: 2020-05-11 | End: 2020-05-12 | Stop reason: HOSPADM

## 2020-05-11 RX ADMIN — IOPAMIDOL 75 ML: 755 INJECTION, SOLUTION INTRAVENOUS at 15:24

## 2020-05-11 RX ADMIN — INSULIN LISPRO 2 UNITS: 100 INJECTION, SOLUTION INTRAVENOUS; SUBCUTANEOUS at 22:25

## 2020-05-11 RX ADMIN — INSULIN LISPRO 2 UNITS: 100 INJECTION, SOLUTION INTRAVENOUS; SUBCUTANEOUS at 19:13

## 2020-05-11 RX ADMIN — SODIUM CHLORIDE, PRESERVATIVE FREE 10 ML: 5 INJECTION INTRAVENOUS at 22:50

## 2020-05-11 RX ADMIN — ENOXAPARIN SODIUM 40 MG: 40 INJECTION SUBCUTANEOUS at 22:50

## 2020-05-11 RX ADMIN — FUROSEMIDE 20 MG: 20 TABLET ORAL at 19:13

## 2020-05-11 ASSESSMENT — PAIN SCALES - GENERAL
PAINLEVEL_OUTOF10: 0

## 2020-05-11 NOTE — ED PROVIDER NOTES
[facility]  eMERGENCY dEPARTMENT eNCOUnter      Pt Name: Josh Maciel  MRN: [id number]  [place] 1940  Date of evaluation: 5/11/2020  Provider: Cheyenne Pope MD    [address] [place] [address]       Chief Complaint   Patient presents with    Extremity Weakness         CRITICAL CARE TIME   Total Critical Care time was a minimum of 30 minutes, excluding separately reportable procedures. There was a high probability of clinically significant/life threatening deterioration in the patient's condition which required my urgent intervention. Local care time includes my initial evaluation, ongoing reassessment, review of laboratory, EKG, chest x-ray, CT scan, consultation with the stroke team physician hospitalist for admission. No procedure time was included. HISTORY OF PRESENT ILLNESS  (Location/Symptom, Timing/Onset, Context/Setting, Quality, Duration, Modifying Factors, Severity.)   Josh Maciel is a 78 y.o. female who presents to the emergency department with sudden onset of left hand weakness and incoordination around noon today. No lower extremity weakness or numbness. No visual speech problems. She states she has had a previous history of a heart attack and became concerned that this could indicate a heart attack despite the fact that she did not have chest pain. She states she took a nitroglycerin. She states over a period of about 30 minutes or a little longer it seemed to gradually get better. She is having no symptoms at this time. She denies headache. Nursing Notes were reviewed and I agree. REVIEW OF SYSTEMS    (2-9 systems for level 4, 10 or more for level 5)     General: No fever or chills. ENT: No nasal congestion sore throat. Cardiovascular: No chest pain. Pulmonary: No shortness of breath. GI: No abdominal pain nausea vomiting. Musculoskeletal: No extremity swelling or pain.   Neuro: Left hand/arm weakness and incoordination as above,

## 2020-05-11 NOTE — ED TRIAGE NOTES
Patient to ED via rumr: turn off the lights EMS from home with complaints of left hand weakness and numbness starting about noon today. Patient states it has mostly resolved, weakness is slightly. Patient states she did take one nitro and aspirin after and symptoms relieved in about 30 mins. Denies hx of stroke. BS as documented. Patient states she is a diabetic and does not take her meds as directed.

## 2020-05-11 NOTE — PROGRESS NOTES
Pt to room 5128  Bedside handoff with ED RN. [facility] performed. Pt oriented to room and call light. Side rails up x2 call light with in place swallow test completed and passed.

## 2020-05-11 NOTE — ED NOTES
Reports called to Jr T[facility]. All questions answered. This RN will transport patient for bedside NIH shortly.       Nova Acosta, RN  05/11/20 Cristela Guthrie RN  05/11/20 5416

## 2020-05-12 ENCOUNTER — APPOINTMENT (OUTPATIENT)
Dept: MRI IMAGING | Age: 80
End: 2020-05-12
Payer: MEDICARE

## 2020-05-12 VITALS
HEART RATE: 55 BPM | RESPIRATION RATE: 20 BRPM | TEMPERATURE: 97.9 F | HEIGHT: 65 IN | OXYGEN SATURATION: 95 % | WEIGHT: 211.42 LBS | DIASTOLIC BLOOD PRESSURE: 61 MMHG | BODY MASS INDEX: 35.22 KG/M2 | SYSTOLIC BLOOD PRESSURE: 153 MMHG

## 2020-05-12 LAB
CHOLESTEROL, TOTAL: 183 MG/DL (ref 0–199)
EKG ATRIAL RATE: 50 BPM
EKG DIAGNOSIS: NORMAL
EKG P-R INTERVAL: 176 MS
EKG Q-T INTERVAL: 460 MS
EKG QRS DURATION: 88 MS
EKG QTC CALCULATION (BAZETT): 419 MS
EKG R AXIS: -36 DEGREES
EKG T AXIS: 2 DEGREES
EKG VENTRICULAR RATE: 50 BPM
ESTIMATED AVERAGE GLUCOSE: 142.7 MG/DL
GLUCOSE BLD-MCNC: 123 MG/DL (ref 70–99)
GLUCOSE BLD-MCNC: 128 MG/DL (ref 70–99)
GLUCOSE BLD-MCNC: 149 MG/DL (ref 70–99)
GLUCOSE BLD-MCNC: 195 MG/DL (ref 70–99)
GLUCOSE BLD-MCNC: 60 MG/DL (ref 70–99)
GLUCOSE BLD-MCNC: 93 MG/DL (ref 70–99)
HBA1C MFR BLD: 6.6 %
HCT VFR BLD CALC: 39.9 % (ref 36–48)
HDLC SERPL-MCNC: 48 MG/DL (ref 40–60)
HEMOGLOBIN: 13.1 G/DL (ref 12–16)
LDL CHOLESTEROL CALCULATED: 104 MG/DL
LV EF: 53 %
LVEF MODALITY: NORMAL
MCH RBC QN AUTO: 32.8 PG (ref 26–34)
MCHC RBC AUTO-ENTMCNC: 32.9 G/DL (ref 31–36)
MCV RBC AUTO: 99.8 FL (ref 80–100)
PDW BLD-RTO: 15.2 % (ref 12.4–15.4)
PERFORMED ON: ABNORMAL
PERFORMED ON: NORMAL
PLATELET # BLD: 193 K/UL (ref 135–450)
PMV BLD AUTO: 7.4 FL (ref 5–10.5)
RBC # BLD: 4 M/UL (ref 4–5.2)
TRIGL SERPL-MCNC: 155 MG/DL (ref 0–150)
VLDLC SERPL CALC-MCNC: 31 MG/DL
WBC # BLD: 8.5 K/UL (ref 4–11)

## 2020-05-12 PROCEDURE — 83036 HEMOGLOBIN GLYCOSYLATED A1C: CPT

## 2020-05-12 PROCEDURE — 97530 THERAPEUTIC ACTIVITIES: CPT

## 2020-05-12 PROCEDURE — 97162 PT EVAL MOD COMPLEX 30 MIN: CPT | Performed by: PHYSICAL THERAPIST

## 2020-05-12 PROCEDURE — 93010 ELECTROCARDIOGRAM REPORT: CPT | Performed by: INTERNAL MEDICINE

## 2020-05-12 PROCEDURE — 70551 MRI BRAIN STEM W/O DYE: CPT

## 2020-05-12 PROCEDURE — G0378 HOSPITAL OBSERVATION PER HR: HCPCS

## 2020-05-12 PROCEDURE — 97530 THERAPEUTIC ACTIVITIES: CPT | Performed by: PHYSICAL THERAPIST

## 2020-05-12 PROCEDURE — 93306 TTE W/DOPPLER COMPLETE: CPT

## 2020-05-12 PROCEDURE — 80061 LIPID PANEL: CPT

## 2020-05-12 PROCEDURE — 2580000003 HC RX 258: Performed by: FAMILY MEDICINE

## 2020-05-12 PROCEDURE — 97116 GAIT TRAINING THERAPY: CPT | Performed by: PHYSICAL THERAPIST

## 2020-05-12 PROCEDURE — 92523 SPEECH SOUND LANG COMPREHEN: CPT

## 2020-05-12 PROCEDURE — 92610 EVALUATE SWALLOWING FUNCTION: CPT

## 2020-05-12 PROCEDURE — 97165 OT EVAL LOW COMPLEX 30 MIN: CPT

## 2020-05-12 PROCEDURE — 85027 COMPLETE CBC AUTOMATED: CPT

## 2020-05-12 PROCEDURE — 6370000000 HC RX 637 (ALT 250 FOR IP): Performed by: FAMILY MEDICINE

## 2020-05-12 PROCEDURE — 97535 SELF CARE MNGMENT TRAINING: CPT

## 2020-05-12 RX ORDER — ASPIRIN 81 MG/1
81 TABLET ORAL DAILY
Qty: 30 TABLET | Refills: 3 | Status: SHIPPED | OUTPATIENT
Start: 2020-05-13 | End: 2020-05-12

## 2020-05-12 RX ORDER — ASPIRIN 81 MG/1
81 TABLET ORAL DAILY
Qty: 30 TABLET | Refills: 0 | Status: SHIPPED | OUTPATIENT
Start: 2020-05-13

## 2020-05-12 RX ADMIN — INSULIN LISPRO 2 UNITS: 100 INJECTION, SOLUTION INTRAVENOUS; SUBCUTANEOUS at 12:44

## 2020-05-12 RX ADMIN — FUROSEMIDE 40 MG: 40 TABLET ORAL at 09:12

## 2020-05-12 RX ADMIN — LEVOTHYROXINE SODIUM 137 MCG: 0.03 TABLET ORAL at 06:00

## 2020-05-12 RX ADMIN — FUROSEMIDE 20 MG: 20 TABLET ORAL at 17:22

## 2020-05-12 RX ADMIN — ALLOPURINOL 100 MG: 100 TABLET ORAL at 09:12

## 2020-05-12 RX ADMIN — ASPIRIN 81 MG: 81 TABLET, COATED ORAL at 09:12

## 2020-05-12 RX ADMIN — SODIUM CHLORIDE, PRESERVATIVE FREE 10 ML: 5 INJECTION INTRAVENOUS at 09:23

## 2020-05-12 RX ADMIN — CLOPIDOGREL BISULFATE 75 MG: 75 TABLET ORAL at 09:12

## 2020-05-12 ASSESSMENT — PAIN SCALES - GENERAL
PAINLEVEL_OUTOF10: 0
PAINLEVEL_OUTOF10: 0

## 2020-05-12 NOTE — PROGRESS NOTES
Pt admitted into room 5128. Pt on telemetry SB per telemetry CMU confirmed of Box #. /69, HR 50. Other VS stable. Will continue to monitor BP and HR. Pt assessment completed and charted. Score 0 on NIH. Pt oriented to bed unit and call light. Pt strongly encouraged to call for assistance out of bed. Bed alarm on and call light in reach.

## 2020-05-12 NOTE — PLAN OF CARE
Problem: Falls - Risk of:  Goal: Will remain free from falls  Description: Will remain free from falls  5/11/2020 2334 by Eduar Roa RN  Outcome: Ongoing  5/11/2020 2248 by Eduar Roa RN  Outcome: Ongoing  Goal: Absence of physical injury  Description: Absence of physical injury  5/11/2020 2334 by Eduar Roa RN  Outcome: Ongoing  5/11/2020 2248 by Eduar Roa RN  Outcome: Ongoing     Problem: HEMODYNAMIC STATUS  Goal: Patient has stable vital signs and fluid balance  5/11/2020 2334 by Eduar Roa RN  Outcome: Ongoing  5/11/2020 2248 by Eduar Roa RN  Outcome: Ongoing     Problem: ACTIVITY INTOLERANCE/IMPAIRED MOBILITY  Goal: Mobility/activity is maintained at optimum level for patient  5/11/2020 2334 by Eduar Roa RN  Outcome: Ongoing  5/11/2020 2248 by Eduar Roa RN  Outcome: Ongoing     Problem: COMMUNICATION IMPAIRMENT  Goal: Ability to express needs and understand communication  5/11/2020 2334 by Eduar Roa RN  Outcome: Ongoing  5/11/2020 2248 by Eduar Roa RN  Outcome: Ongoing

## 2020-05-12 NOTE — CONSULTS
Neurology Consult Note  Reason for Consult: L hand weakness, paresthesias    Chief complaint: L hand symptoms    Dr Marga Redmond, DO asked me to see Rolan Gregory in consultation for evaluation of L hand weakness, paresthesias    History of Present Illness:  Rolan Gregory is a 78 y.o. female who presents with L hand symptoms . I obtained my information via interview w/ the patient, supplemented by chart review. The patient says that she was sitting in a chair w/ her arms resting on the kitchen table preparing to eat lunch w/ her son when her entire left hand felt numb and looked \"white\", like the blood had been drained from it. She says she tried grasping a bottle of aspiring w/ the L hand but was unable to successfully. After about 10 minutes of opening and closing the hand, she says it felt like her symptoms had resolved for the most part. The color and feeling returned. She did tell me she took a nitro and aspirin shortly after as she was concerned she may be having a heart attack. Her son eventually called 911, subsequently transported to the ED in order to be evaluated. She denies any additional symptoms during this time. BP was 176/61. No fever. CT head w/out any acute findings. CTA head/neck no LVO. Blood sugar was in the 350s. Currently, she continues to feel back to normal w/out any complaints. She says about a week ago she had a similar episode of L hand numbness/loss of color which she was able to [employer]" after a few minutes of hand clenching. She also reports a mechanical fall last Thursday. She has been using a walker since the fall to be cautious. She says to me that both of her hips feel tender/sensitive, particularly the L. No bowel or bladder issues. No new back pain. She does take aspirin and Plavix for Cardiac reasons prior to admission. She did not previously tolerate statin therapy.      Medical History:  Past Medical History:   Diagnosis Date vomiting    Rosuvastatin      Leg cramps      Morphine Nausea And Vomiting     Family History   Problem Relation Age of Onset    Diabetes Mother     Heart Disease Father     Cancer Sister         uterine    Heart Disease Maternal Grandmother     Heart Disease Maternal Grandfather     Heart Disease Paternal Grandmother     Heart Disease Paternal Grandfather       Social History     Tobacco Use   Smoking Status Former Smoker    Packs/day: 0.70    Years: 40.00    Pack years: 28.00    Types: Cigarettes    Last attempt to quit: 2004    Years since quittin.2   Smokeless Tobacco Never Used     Social History     Substance and Sexual Activity   Drug Use No     Social History     Substance and Sexual Activity   Alcohol Use No     ROS:  Constitutional- No weight loss or fevers  Eyes- No diplopia. No photophobia. Ears/nose/throat- No dysphagia. No Dysarthria  Cardiovascular- No palpitations. No chest pain  Respiratory- No dyspnea. No Cough  Gastrointestinal- No Abdominal pain. No Vomiting. Genitourinary- No incontinence. No urinary retention  Musculoskeletal- No myalgia. No arthralgia  Skin- No rash. No easy bruising. Psychiatric- No depression. No anxiety  Endocrine- + diabetes. + thyroid issues. Hematologic- No bleeding difficulty. No fatigue  Neurologic- + weakness. No Headache. Exam:  Blood pressure (!) 187/73, pulse 50, temperature 97.5 °F (36.4 °C), temperature source Oral, resp. rate 16, height 5' 5\" (1.651 m), weight 211 lb 6.7 oz (95.9 kg), SpO2 97 %, not currently breastfeeding. Constitutional    Vital signs: BP, HR, and RR reviewed   General alert, no distress, well-nourished  Eyes: unable to visualize the fundi  Cardiovascular: pulses symmetric in all 4 extremities. No peripheral edema. Psychiatric: cooperative with examination, no psychotic behavior noted. Neurologic  Mental status:   orientation to person, place, time, situation.      General fund of knowledge grossly benefit from statin though patient intolerable. Consider alternative therapies. 4.  Telemetry. Consider a 30 day event monitor. 5.  If she continues to have similar symptoms, might benefit from EMG/NCS outpatient.       Zoë Neil NP  [address] [place] [address] [address] Neurology    A copy of this note was provided for Dr Katy Huang,

## 2020-05-12 NOTE — PROGRESS NOTES
Pt awaken to void, pt diaphoretic and states she feels funny. Blood glucose checked and result was 60. Pt given 240 cc of apple juice and a ham sandwich. Will recheck after snack completed.

## 2020-05-12 NOTE — FLOWSHEET NOTE
05/12/20 0813   Vital Signs   BP (!) 187/73     MD notified.      Electronically signed by Darby Miller RN on 5/12/2020 at 8:29 AM

## 2020-05-12 NOTE — PROGRESS NOTES
Vision  Vision: Impaired  Vision Exceptions: Wears glasses for reading(left at home)  Hearing  Hearing: Exceptions to [facility]  Hearing Exceptions: Hard of hearing/hearing concerns       Prior Status:   Diet: regular texture, thin liquids  Lives With: Alone (pt states \"I see someone everyday\")  Type of Home: House [place] with family on property)  ADL Assistance: Independent  Homemaking Assistance: Independent(pt does own cooking, cleaning, laundry, grocery shopping, medications, finances)  Ambulation Assistance: Independent  Transfer Assistance: Independent  Active : Yes  Occupation: Retired    Reason for referral: Maria Alejandra Douglas was referred for a Speech-Language and Bedside Swallow Evaluation to assess the efficiency of communicative effectiveness; the efficiency of swallow function, rule out aspiration and make recommendations regarding safe dietary consistencies, effective compensatory strategies, and safe eating environment. Dysphagia Treatment Diagnosis: Oropharyngeal Dysphagia   Speech Language Treatment Diagnosis:  Cognitive linguistic impairment    IMPRESSIONS  Dysphagia Impression : Oral phase appears grossly WFL. Pharyngeal phase characterized by potentially decreased laryngeal elevation likely r/t age, WITHOUT any overt s/s of aspiration. Cognitive Diagnosis: Minimal to mild cognitive linguistic deficit with higher level functions including working memory, thought organization, and abstract reasoning. Orientation, simple PS/reasoning, short term recall, and attention skills intact upon assessment. Aphasia Diagnosis: Appears grossly WFL for assessment; pt subjectively reports occasionally 'the words just don't come,' but has been present for >1 year. Speech Diagnosis: No dysarthria noted; slightly reduced dry/raspy vocal quality which pt reports might be new. Recommended Diet:  Diet Solids Recommendation: Regular  Liquid Consistency Recommendation:  Thin  Medication

## 2020-05-12 NOTE — DISCHARGE SUMMARY
Hospital Medicine Discharge Summary    Patient ID: Snehal Overton      Patient's PCP: Sarah Hernandez DO    Admit Date: 5/11/2020     Discharge Date:   5/12/2020    Admitting Physician: Peter Toure DO     Discharge Physician: ESTELLE Gerard - CNP     Discharge Diagnoses: Active Hospital Problems    Diagnosis Date Noted    Left hand weakness [R29.898] 05/11/2020     PCP/SNF to follow up: f/u in 2 wks     Discharge Instructions/Follow-up:  Neuro on discharge for returning of symptoms      The patient was seen and examined on day of discharge and this discharge summary is in conjunction with any daily progress note from day of discharge. Hospital Course: The patient is a 76 y.o. female who presents to [facility] with acute onset of left hand weakness and numbness around noon on 5/10/2020 and occurring approximately 3 hrs prior to arrival at the ER. It happened one other time a week or so ago but resolved quickly so she did not come in. She states her grandson, who was present, said she \"blanked out\" for several seconds, but she had no changes in speech. Work up in the ER, including ct head (normal), bp was mildly elevated. She doesn't smoke, but admits to not taking her diabetes meds with complete compliance. On admission  her hand weakness was resolved, but still felt a little numb. Her NIHSS was 0.      She took a nitroglycerin at home before coming into the ER which helped the color returned to the hand and the numbness got better. She was outside the window for tPA. The Stroke team was consulted but her symptoms had resolved so required no treatment. She never had any pain, redness or swelling of the arm. She was admitted for further TIA/Stroke workup. Neurology was consulted. CTA head and neck showed 50-69% stenosis of bilateral cavernous segments of internal carotid arteries. Mild to moderate narrowing of the proximal basilar artery.      MRI showed chronic microvascular disease without acute intracranial abnormality. Echo was neg for acute findings. EF 50-55%    She improved almost completely to her baseline with some residual numbness of the left hand while she was here. She would benefit from taking a statin, however is intolerant of them. She will take an 81mg ASA on discharge     PT/OT evals were performed with no recommendations on discharge. She is in good condition on discharge and ready to be discharged clinically. Exam:     BP (!) 210/83   Pulse 55   Temp 97.6 °F (36.4 °C) (Oral)   Resp 20   Ht 5' 5\" (1.651 m)   Wt 211 lb 6.7 oz (95.9 kg)   LMP  (LMP Unknown)   SpO2 98%   BMI 35.18 kg/m²     General appearance: No apparent distress, appears stated age and cooperative. HEENT: Pupils equal, round, and reactive to light. Conjunctivae/corneas clear. Neck: Supple, with full range of motion. No jugular venous distention. Trachea midline. Respiratory:  Normal respiratory effort. Clear to auscultation, bilaterally without Rales/Wheezes/Rhonchi. Cardiovascular: Regular rate and rhythm with normal S1/S2 without murmurs, rubs or gallops. Abdomen: Soft, non-tender, non-distended with normal bowel sounds. Musculoskelatal: No clubbing, cyanosis or edema bilaterally. Full range of motion without deformity. Skin: Skin color, texture, turgor normal.  No rashes or lesions. Neurologic: equal  bilaterally, neg pronator drift, Neurovascularly intact without any focal sensory/motor deficits. Cranial nerves: II-XII intact, grossly non-focal.  Psychiatric: Alert and oriented, thought content appropriate, normal insight      Consults:     IP CONSULT TO STROKE TEAM  IP CONSULT TO HOSPITALIST  IP CONSULT TO NEUROLOGY  IP CONSULT TO SOCIAL WORK  IP CONSULT TO HOME CARE NEEDS    Significant Diagnostic Studies:   CTA head and neck showed 50-69% stenosis of bilateral cavernous segments of internal carotid arteries.  Mild to moderate narrowing of the !! aspirin EC 81 MG EC tablet Take 1 tablet by mouth daily  Qty: 30 tablet, Refills: 5      NIFEdipine (PROCARDIA XL) 30 MG extended release tablet TAKE ONE TABLET BY MOUTH DAILY  Qty: 90 tablet, Refills: 1    Associated Diagnoses: Essential hypertension      glucose blood VI test strips (ASCENSIA AUTODISC VI;ONE TOUCH ULTRA TEST VI) strip 1 each by In Vitro route daily As needed. Qty: 100 each, Refills: 3    Comments: Needs meter as well       !! - Potential duplicate medications found. Please discuss with provider. Time Spent on discharge is more than 30 minutes in the examination, evaluation, counseling and review of medications and discharge plan. Signed:    ESTELLE Noland - CNP   5/12/2020      Thank you Shruthi Cazares DO for the opportunity to be involved in this patient's care. If you have any questions or concerns please feel free to contact me at [phone].

## 2020-05-12 NOTE — PROGRESS NOTES
Physical Therapy    Facility/Department: [facility] 5Y PROGRESSIVE CARE  Initial Assessment    NAME: Ana Maria Read  : 1940  MRN: [id number]    Date of Service: 2020    Discharge Recommendations:  Home with assist PRN, S Level 1   PT Equipment Recommendations  Equipment Needed: No  Ana Maria Read scored a  on the AM-PAC short mobility form. Current research shows that an AM-PAC score of 18 or greater is typically associated with a discharge to the patient's home setting. Based on the patient's AM-PAC score and their current functional mobility deficits, it is recommended that the patient have 2-3 sessions per week of Physical Therapy at d/c to increase the patient's independence. At this time, this patient demonstrates the endurance and safety to discharge home with home PT and a follow up treatment frequency of 2-3x/wk. Please see assessment section for further patient specific details. HOME HEALTH CARE: LEVEL 1 STANDARD     -Initial home health evaluation to occur within 24-48 hours, in patient home    -Home health agency to establish plan of care for patient over 60 day period    -Medication Reconciliation    -PCP Visit scheduled within seven days of discharge    -PT/OT to evaluate with goal of regaining prior level of functioning    -OT to evaluate if patient has [address] needs for personal care     If patient discharges prior to next session this note will serve as a discharge summary. Please see below for the latest assessment towards goals.      Assessment   Body structures, Functions, Activity limitations: Decreased functional mobility   Assessment: pt is a 77 yo female who was adm to hosp with L hand numbness, MRI (-) for acute infarct; pt appears slightly below her usual baseline since having a fall in the shower at [facility]; feel pt will be safe to return home with family assist and home PT  Prognosis: Good  Decision Making: Medium Complexity  Clinical Presentation: evolving  PT Education: PT Role;General Safety  Barriers to Learning: none  REQUIRES PT FOLLOW UP: Yes  Activity Tolerance  Activity Tolerance: Patient Tolerated treatment well       Patient Diagnosis(es): The encounter diagnosis was TIA (transient ischemic attack). has a past medical history of CAD (coronary artery disease), CHF (congestive heart failure) ([address] DM2 (diabetes mellitus, type 2) ([address] HTN (hypertension), Hypothyroidism, MI (mitral incompetence), Over weight, Pulmonary HTN ([address] PVD (peripheral vascular disease) ([address] and Uterine cancer ([address] has a past surgical history that includes Cataract removal with implant (Bilateral); Coronary angioplasty with stent (Right, 9/9/2015); hernia repair; Coronary angioplasty with stent (01/2018); and Hysterectomy. Restrictions  Restrictions/Precautions  Restrictions/Precautions: Fall Risk  Vision/Hearing  Vision: Within Functional Limits  Hearing: Within functional limits     Subjective  General  Chart Reviewed: Yes  Patient assessed for rehabilitation services?: Yes  Additional Pertinent Hx: Per MD note: The patient is a 78 y.o. female who presents to [facility] with acute onset of left hand weakness and numbness around noon today. She states her grandson, who was present, said she \"blanked out\" for several seconds, but she had no changes in speech. Work up in the ed, including ct head, was normal, bp was mildly elevated. She doesn't smoke, but admits to not taking her diabetes meds with complete compliance. When I saw her she reports her hand weakness was resolved, but still feels a little numb. MRI 5-12:  Chronic microvascular disease without acute intracranial abnormality.   Response To Previous Treatment: Not applicable  Family / Caregiver Present: No  Follows Commands: Within Functional Limits  General Comment  Comments: pt very pleasant and agreeable to therapy; reports some residual discomfort in her L thigh from a fall around Easter   Pain notified    G-Code       OutComes Score                                                  AM-PAC Score  AM-PAC Inpatient Mobility Raw Score : 21 (05/12/20 0934)  AM-PAC Inpatient T-Scale Score : 50.25 (05/12/20 0934)  Mobility Inpatient CMS 0-100% Score: 28.97 (05/12/20 0934)  Mobility Inpatient CMS G-Code Modifier : Jennifer Kelley (05/12/20 0934)          Goals  Short term goals  Time Frame for Short term goals: by discharge  Short term goal 1: transfers MI  Short term goal 2: amb 100-200 feet with LRAD MI  Short term goal 3: negotiate stairs when able  Patient Goals   Patient goals : to return home       Therapy Time   Individual Concurrent Group Co-treatment   Time In 3180         Time Out 0918         Minutes [address], PT, 9737   JANELL CARRASCO, PT

## 2020-05-12 NOTE — PROGRESS NOTES
arteries. Moderate tandem stenoses distal left vertebral artery. Moderate narrowing proximal left internal carotid artery. Moderate narrowing proximal basilar artery. - MRI: chronic microvascular disease w/out acute intracrranial abnormality  - ECHO pending  - ASA, statin  - PT/OT eval   -  Lipids: chol 183 / HDL 48 /  / triglyc 155 / VLDL 31  - allow permissive HTN, SBP < 220, DBP < 110   - consulted neurology     Diabetes  - uncontrolled, noncompliant  - hypoglycemic on admission, initially held home PO meds  - hold glimepiride while in-pt, restart on discharge  - SSI med-dose while in-pt  - ac/hs POCT  -  HBA1c 6.6     CKD, stage 3  -Creatinine at baseline  - monitor     CAD w/hx stents   - anticoagulated with Plavix  - stable  - cont ASA, statin  - hold bb as above        DVT Prophylaxis: lovenox  Diet: No diet orders on file  Code Status: Prior     Dispo - obs        Continue current regimen/therapies. Monitor. Adjust medical regimen as appropriate. Body mass index is 35.18 kg/m². The patient and / or the family were informed of the results of any tests, a time was given to answer questions, a plan was proposed and they agreed with plan.       Probiotic if on abx: [] Yes [] No [x] Not Indicated    Diet: DIET CARB CONTROL; Carb Control: 5 carb choices (75 gms)/meal    Consults:  IP CONSULT TO STROKE TEAM  IP CONSULT TO HOSPITALIST  IP CONSULT TO NEUROLOGY  IP CONSULT TO SOCIAL WORK      Disposition:  [] Home  [] Home with home health [] Rehab [] Psych [] SNF  [] LTAC  [x] Long term nursing home or group home [] Transfer to ICU  [] Transfer to PCU [] Other:    Code Status: Full Code    ELOS: 2-3 days      ESTELLE Modi - CHELLE  05/12/20

## 2020-05-12 NOTE — PROGRESS NOTES
time.  She denies headache. PTA pt from home alone where she was Ind with mobility and ADLs with use of RW as needed. Pt currently functioning slightly below baseline completing mobility and transfers with supervision/SBA. Anticipate pt needing up to supervision with ADLs. Pt will benefit from skilled OT services at this time. Anticipate pt safe to return home with PRN assist and HHOT to address continued L UE deficits. Prognosis: Good  Decision Making: Low Complexity  Exam: see above  Assistance / Modification: RW  OT Education: OT Role;Plan of Care  REQUIRES OT FOLLOW UP: Yes  Activity Tolerance  Activity Tolerance: Patient Tolerated treatment well  Safety Devices  Safety Devices in place: Yes  Type of devices: Chair alarm in place;Call light within reach;Gait belt;Nurse notified; Left in chair           Patient Diagnosis(es): The encounter diagnosis was TIA (transient ischemic attack). has a past medical history of CAD (coronary artery disease), CHF (congestive heart failure) ([facility] DM2 (diabetes mellitus, type 2) ([facility] HTN (hypertension), Hypothyroidism, MI (mitral incompetence), Over weight, Pulmonary HTN ([facility] PVD (peripheral vascular disease) ([facility] and Uterine cancer ([facility] has a past surgical history that includes Cataract removal with implant (Bilateral); Coronary angioplasty with stent (Right, 9/9/2015); hernia repair; Coronary angioplasty with stent (01/2018); and Hysterectomy. Restrictions  Restrictions/Precautions  Restrictions/Precautions: Fall Risk    Subjective   General  Chart Reviewed: Yes  Patient assessed for rehabilitation services?: Yes  Additional Pertinent Hx: per ED note, Shailesh Winslow is a 78 y.o. female who presents to the emergency department with sudden onset of left hand weakness and incoordination around noon today. No lower extremity weakness or numbness. No visual speech problems.   She states she has had a previous history of a heart attack and became Limits     Balance  Sitting Balance: Independent  Standing Balance: Supervision(RW)  Functional Mobility  Functional - Mobility Device: Rolling Walker  Activity: To/from bathroom; Other(household distances in room and hallway)  Assist Level: Stand by assistance  Functional Mobility Comments: no LOB  Toilet Transfers  Toilet - Technique: Ambulating(RW)  Equipment Used: Grab bars  Toilet Transfer: Supervision  Wheelchair Bed Transfers  Wheelchair/Bed - Technique: Ambulating(RW)  Equipment Used: Bed;Other(bed to chair)  Level of Asssistance: Supervision  ADL  Feeding: Independent(seated in chair)  Grooming: Supervision(in stance at sink to complete hand washing)  Toileting: Supervision(superivison for balance in stance; pt able to complete pericare and brief management)  Additional Comments: Anticipate pt needing up to supervision for ADLs inlcuding dressing, bathing, and toileting based on ROM, strength, and balance  Tone RUE  RUE Tone: Normotonic  Tone LUE  LUE Tone: Normotonic  Coordination  Movements Are Fluid And Coordinated: No  Coordination and Movement description: Fine motor impairments; Left UE(minimal fine motor deficits noted)     Bed mobility  Supine to Sit: Supervision  Sit to Supine: Supervision  Scooting: Supervision  Transfers  Sit to stand: Supervision  Stand to sit: Supervision  Transfer Comments: to/from RW     Cognition  Overall Cognitive Status: WFL        Sensation  Overall Sensation Status: Impaired(minimal numbness in L hand/fingers; pt reports improving overall)        LUE AROM (degrees)  LUE AROM : WFL  RUE AROM (degrees)  RUE AROM : WFL  LUE Strength  Gross LUE Strength: WFL  L Hand General: 4+/5  RUE Strength  Gross RUE Strength: WFL  R Hand General: 5/5                   Plan   Plan  Times per week: 3-5x  Current Treatment Recommendations: Strengthening, Functional Mobility Training, Balance Training, Self-Care / ADL, Safety Education & Training, Equipment Evaluation, Education, &

## 2020-05-12 NOTE — DISCHARGE SUMMARY
Discharge instructions reviewed with pt/family, discussed medications, denies any further questions or anxiety related to discharge. Educational handouts in regards to new medications, aspirin and lasix, given via Lexicomp, side effects discussed, VU. IV/tele discontinued. Pt discharged to home with homecare. Taken from room via weelchair by PCA, personal belongings taken with. Follow up appointment made with Dr. Noel Holstein for patient. New medications supplied through outpatient pharmacy.      Electronically signed by Ulysses Pica, RN on 5/12/2020 at 5:38 PM

## 2020-05-12 NOTE — DISCHARGE INSTR - COC
Continuity of Care Form    Patient Name: Rolan Gregory   :  1940  MRN:  [id number]    Admit date:  2020  Discharge date:  2020    Code Status Order: Full Code   Advance Directives:   [facility] Documentation     Date/Time Healthcare Directive Type of Healthcare Directive Copy in [address] Agent's Name Healthcare Agent's Phone Number    20 0063  No, patient does not have an advance directive for healthcare treatment  --  --  --  n/a  n/a    20 1906  No, patient does not have an advance directive for healthcare treatment  --  Other (Comment)  Adult Children  --  --          Admitting Physician:  Marga Redmond DO  PCP: Jimi Zimmerman DO    Discharging Nurse:  Ladan Becerril RN  Discharging Hospital Unit/Room#: F2Z-1587/2479-63  Discharging Unit Phone Number: [phone]    Emergency Contact:   Extended Emergency Contact Information  Primary Emergency Contact: [place], [place]  Address[facility] [address] [facility] [address] [place] [address] Phone: [phone]  Relation: Child  Secondary Emergency Contact: Georgina [facility] of [facility] Phone: [phone]  Relation: Child    Past Surgical History:  Past Surgical History:   Procedure Laterality Date    CATARACT REMOVAL WITH IMPLANT Bilateral     CORONARY ANGIOPLASTY WITH STENT PLACEMENT Right 2015    At [facility] WITH STENT PLACEMENT  2018    ILANA to RCA and POBA on ISR of LAD    HERNIA REPAIR      HYSTERECTOMY         Immunization History:   Immunization History   Administered Date(s) Administered    Influenza 10/10/2013    Influenza, High Dose (Fluzone 65 yrs and older) 10/10/2014, 2016, 2017, 2017    Influenza, Preeti Cid, 6 mo and older, IM, PF (Flulaval, Fluarix) 2018    Pneumococcal Conjugate 13-valent ([id number]) 2017    Pneumococcal Polysaccharide ([id number]) 2019       Active

## 2020-05-20 ENCOUNTER — TELEPHONE (OUTPATIENT)
Dept: CARDIOLOGY CLINIC | Age: 80
End: 2020-05-20

## 2020-05-20 RX ORDER — NIFEDIPINE 30 MG/1
TABLET, EXTENDED RELEASE ORAL
Qty: 90 TABLET | Refills: 1 | Status: SHIPPED | OUTPATIENT
Start: 2020-05-20 | End: 2020-11-20

## 2020-05-20 NOTE — TELEPHONE ENCOUNTER
Needs re-evaluation by Neuro ASAP. Can order a left upper extremity Doppler but likely to be unrevealing.

## 2020-05-20 NOTE — TELEPHONE ENCOUNTER
Medication Refill    NIFEdipine (PROCARDIA XL) 30 MG extended release tablet   TAKE ONE TABLET BY MOUTH DAILY    Oscar Mason [address], [address] [facility]  [phone] - F [phone]    Pt had a refill for this back on 1/14/2020 but pt never picked it up.

## 2020-05-20 NOTE — TELEPHONE ENCOUNTER
Dr. Frieda Almanza,     Please review and advise. Thanks. Home care called to report that the patient was complaining of left hand tingling and left tongue tingling, difficulty following commands, left facial droop. Home care states it took about 10 min for the patient to go back to normal. Her BP got up to 164/82. Out of Nifedipine for the last 7 days. Cardiology was not consulted during recent hospital 5/11/20 for left hand weakness/numbness/color changes resolving ~10 minutes. Occurred one other time the week prior. Per Neurology consultation: No evidence to suggest TIA or stroke. Suspect due to transient hypoperfusion most likely secondary to peripheral vascular disease peripheral nerve issue?, multivessel stenosis, hyperlipidemia, DM with hyperglycemia on admission, hypertension, PAD.    Neurology Recommendation: f/u with PCP or vascular, arterial studies LUE, DAPT, re-trial on statin.      Next follow up with you 8/18/2020    (discussed with [employer], RN briefly for Dr. Frieda Almanza to review asa this afternoon as he is covering [facility])

## 2020-06-05 RX ORDER — ALLOPURINOL 100 MG/1
TABLET ORAL
Qty: 90 TABLET | Refills: 0 | Status: SHIPPED | OUTPATIENT
Start: 2020-06-05 | End: 2020-09-14

## 2020-06-11 ENCOUNTER — TELEPHONE (OUTPATIENT)
Dept: FAMILY MEDICINE CLINIC | Age: 80
End: 2020-06-11
Payer: MEDICARE

## 2020-06-11 PROCEDURE — G0180 MD CERTIFICATION HHA PATIENT: HCPCS | Performed by: INTERNAL MEDICINE

## 2020-08-17 RX ORDER — LEVOTHYROXINE SODIUM 137 UG/1
TABLET ORAL
Qty: 90 TABLET | Refills: 0 | OUTPATIENT
Start: 2020-08-17

## 2020-08-17 NOTE — PROGRESS NOTES
[facility], [place], operated by [facility]   Daily Progress Note    CC: \"I get winded\"     HPI:   Ms. Tabor is a 67 yo male with a past medical history significant for pulmonary hypertension, PAD, essential hypertension, CAD with prior PCI and chronic diastolic CHF. She presented to [facility] with chest pain 8/2016 and underwent cath with stent placement to the LAD. She underwent another catheterization with my partner Dr. Mae Armas on 1/10/18 and found to have in-stent restenosis in the LAD requiring balloon angioplasty. She also required stent placement x 3 to her RCA. Heart cath performed on 9/20/18 also completed by Dr. Mae Armas resulted in balloon angioplasty in the mid left circumflex. She was recently admitted 4/11/19-4/15/19 with a CHF exacerbation but was not seen by Cardiology. Her dry weight on discharge was 207. She presents today for follow up. Today, she reports feeing \"okay\". She reports to shortness of breath that will present with walking short distances. She states this has been ongoing for a while. She will get short of breath with laying on her back. She has been taking Lasix twice daily. She has not been monitoring her weight daily but does feel she has gained some. She was a previous smoker but has never been diagnosed with any chronic lung disease.      Current Outpatient Medications   Medication Sig Dispense Refill    allopurinol (ZYLOPRIM) 100 MG tablet TAKE ONE TABLET BY MOUTH DAILY 90 tablet 0    NIFEdipine (PROCARDIA XL) 30 MG extended release tablet TAKE ONE TABLET BY MOUTH DAILY 90 tablet 1    aspirin 81 MG EC tablet Take 1 tablet by mouth daily 30 tablet 0    furosemide (LASIX) 20 MG tablet Take 20 mg by mouth See Admin Instructions Takes 40 mg in AM and 20 mg at 6 pm      levothyroxine (SYNTHROID) 137 MCG tablet TAKE ONE TABLET BY MOUTH DAILY 90 tablet 0    carvedilol (COREG) 12.5 MG tablet TAKE ONE TABLET BY MOUTH TWICE A DAY WITH MEALS 180 tablet 3    glimepiride (AMARYL) 4 MG tablet TAKE ONE TABLET BY MOUTH TWICE A DAY BEFORE MEALS 180 tablet 1    clopidogrel (PLAVIX) 75 MG tablet TAKE ONE TABLET BY MOUTH DAILY 90 tablet 5    glucose blood VI test strips (ASCENSIA AUTODISC VI;ONE TOUCH ULTRA TEST VI) strip 1 each by In Vitro route daily As needed. (Patient not taking: Reported on 8/18/2020) 100 each 3     No current facility-administered medications for this visit. Objective:     /63   Pulse 52   Temp 98.3 °F (36.8 °C)   Ht 5' 5\" (1.651 m)   Wt 215 lb 9.6 oz (97.8 kg) Comment: with shoes  LMP  (LMP Unknown)   SpO2 96%   BMI 35.88 kg/m²    Physical Exam:  General:  Awake, alert, NAD  Skin:  Warm and dry  Neck:  JVD<8, no carotid bruits  Chest:  Clear to auscultation, no wheezes/rhonchi/rales  Cardiovascular:  RRR, normal S1/S2, no M/R/G  Abdomen:  Soft, nontender, +bowel sounds  Extremities:  No edema  Pulses: 2+ bilat carotid    2+ bilat radial, no hematoma at right wrist    2+ bilat femoral      Lab Results   Component Value Date    CHOL 183 05/12/2020    HDL 48 05/12/2020    HDL 47 03/18/2010    TRIG 155 05/12/2020        LDL   104      Procedures:     Cath 8/24/16  1. Right dominant coronary arterial system with serial stents in the mid  right coronary artery. The more distal right coronary artery stent has 25%  restenosis. There is a focal 60% restenosis in the more proximal longer  stent. This is a dominant vessel. In the left system there is a 30% focal  osteal stenosis in the left main. In the left anterior descending artery  proximally there is an 80% stenosis that was intervened upon with a 3 mm x 23  mm Xience drug-eluting stent post dilated to 3.12 mm. Further in the  circumflex system the distal circumflex is a smaller vessel approximately 1.5  mm in diameter with a 99% focal stenosis. 2. Normal left ventricular systolic function with LV ejection fraction of  60%. 3. Normal left ventricular end-diastolic pressure at 13 mmHg.   4. No gradient across the aortic valve on pull back to suggest aortic  stenosis. Cath 1/10/18 (Dr. Bert Hall)   1. We intervened on the left anterior descending artery. We only  performed balloon angioplasty of the left anterior descending artery  in-stent restenosis using a 3-mm noncompliant balloon catheter and at the  ostium of the first diagonal using a 2.5-mm noncompliant balloon. We  decided not to further JL the diagonal branch. 2.  We performed stent placement in the right coronary. Unfortunately, we  had to place three stents. The first one in the distal was 3.0 x 10 mm, in  the proximal midportion was 3.0 x 28 mm and right distal to the proximal  stent was 3.0 x 8 mm secondary to missing the lesion distal to the stent. Cath 9/20/18 (Dr. Bert Hall)   1. Left main is normal.  CHEMO 3 flow. No stenosis. 2.  Left anterior descending artery comes from the left main coronary. Mid  portion has 50% stenosis with a fractional flow reserve of 0.84. Diagonal  branch has a 75% stenosis. 3.  Left circumflex comes from the left main coronary. Obtuse marginal  branches are patent. Mid portion has 99% stenosis. 4.  Right coronary comes from the right coronary cuff, patent stents, and  is a right dominant system. LVEDP was 20 mmHg. In summary, balloon angioplasty of the mid left circumflex only. Imaging:     Echo 10/2012  Technically difficult study. Left ventricle: The cavity   size was normal. There was mild concentric hypertrophy.   Systolic function was normal. The estimated ejection   fraction was in the range of 60% to 65%. Wall motion was   normal; there were no regional wall motion abnormalities.   Features are consistent with a pseudonormal left   ventricular filling pattern, with concomitant abnormal   relaxation and increased filling pressure (grade 2   diastolic dysfunction). Echo 1/12/18  Normal left ventricle size, wall thickness and systolic function with an  estimated ejection fraction of 50-55%.   No regional wall motion abnormalities are seen. Mild aortic valve sclerosis without any evidence of aortic stenosis. Echo 4/12/19  Left ventricular cavity size is normal.  There is mild concentric left ventricular hypertrophy. Ejection fraction is visually estimated to be 50-55%. Normal right ventricular size.     Echo 5/12/20  Left ventricular cavity size is normal.  Normal left ventricular wall thickness. Ejection fraction is visually estimated to be 50-55%. Normal diastolic function. The left atrium is mildly dilated. A bubble study was performed and fails to show evidence of shunting. Normal right ventricular size. Assessment:  1. CAD of native coronary arteries without angina  2. Hyperlipidemia with goal LDL<70mg/dL  3. Pulmonary Venous Hypertension  4. Chronic Diastolic CHF, class 2    Plan:   Her weight is up today in the office and she has been experiencing dyspnea and orthopnea. I will have her discontinue Lasix and begin taking torsemide 40 mg once daily. She will complete a BMP in 2 weeks to assess her kidney function. She is not endorsing any symptoms representing angina. I will see her in office for follow up in 2-3 months. This note was scribed in the presence of Yesica Sampson MD by [employer], RN. Physician Attestation:  The scribes documentation has been prepared under my direction and personally reviewed by me in its entirety. I, Dr. Jono Melton personally performed the services described in this documentation as scribed by my RN,  [employer] in my presence, and I confirm that the note above accurately reflects all work, treatment, procedures, and medical decision making performed by me.

## 2020-08-18 ENCOUNTER — OFFICE VISIT (OUTPATIENT)
Dept: CARDIOLOGY CLINIC | Age: 80
End: 2020-08-18
Payer: MEDICARE

## 2020-08-18 VITALS
OXYGEN SATURATION: 96 % | DIASTOLIC BLOOD PRESSURE: 63 MMHG | TEMPERATURE: 98.3 F | HEART RATE: 52 BPM | BODY MASS INDEX: 35.92 KG/M2 | WEIGHT: 215.6 LBS | SYSTOLIC BLOOD PRESSURE: 131 MMHG | HEIGHT: 65 IN

## 2020-08-18 PROCEDURE — G8400 PT W/DXA NO RESULTS DOC: HCPCS | Performed by: INTERNAL MEDICINE

## 2020-08-18 PROCEDURE — 99214 OFFICE O/P EST MOD 30 MIN: CPT | Performed by: INTERNAL MEDICINE

## 2020-08-18 PROCEDURE — 1090F PRES/ABSN URINE INCON ASSESS: CPT | Performed by: INTERNAL MEDICINE

## 2020-08-18 PROCEDURE — 1036F TOBACCO NON-USER: CPT | Performed by: INTERNAL MEDICINE

## 2020-08-18 PROCEDURE — 1123F ACP DISCUSS/DSCN MKR DOCD: CPT | Performed by: INTERNAL MEDICINE

## 2020-08-18 PROCEDURE — G8427 DOCREV CUR MEDS BY ELIG CLIN: HCPCS | Performed by: INTERNAL MEDICINE

## 2020-08-18 PROCEDURE — 4040F PNEUMOC VAC/ADMIN/RCVD: CPT | Performed by: INTERNAL MEDICINE

## 2020-08-18 PROCEDURE — G8417 CALC BMI ABV UP PARAM F/U: HCPCS | Performed by: INTERNAL MEDICINE

## 2020-08-18 RX ORDER — TORSEMIDE 20 MG/1
40 TABLET ORAL DAILY
Qty: 60 TABLET | Refills: 3 | Status: SHIPPED | OUTPATIENT
Start: 2020-08-18 | End: 2021-01-07 | Stop reason: SDUPTHER

## 2020-08-21 RX ORDER — LEVOTHYROXINE SODIUM 137 UG/1
TABLET ORAL
Qty: 30 TABLET | Refills: 0 | Status: SHIPPED | OUTPATIENT
Start: 2020-08-21 | End: 2020-09-14

## 2020-08-21 NOTE — TELEPHONE ENCOUNTER
Patient is scheduled for 8/26 for med refill, she is asking if you can call in a few levothyroxine to get her thru until then. Irina Burr.       Please advise, [phone]

## 2020-08-26 ENCOUNTER — OFFICE VISIT (OUTPATIENT)
Dept: FAMILY MEDICINE CLINIC | Age: 80
End: 2020-08-26
Payer: MEDICARE

## 2020-08-26 VITALS
HEIGHT: 65 IN | BODY MASS INDEX: 35.82 KG/M2 | WEIGHT: 215 LBS | TEMPERATURE: 97.2 F | SYSTOLIC BLOOD PRESSURE: 122 MMHG | DIASTOLIC BLOOD PRESSURE: 84 MMHG

## 2020-08-26 PROCEDURE — G8427 DOCREV CUR MEDS BY ELIG CLIN: HCPCS | Performed by: INTERNAL MEDICINE

## 2020-08-26 PROCEDURE — G8417 CALC BMI ABV UP PARAM F/U: HCPCS | Performed by: INTERNAL MEDICINE

## 2020-08-26 PROCEDURE — 3288F FALL RISK ASSESSMENT DOCD: CPT | Performed by: INTERNAL MEDICINE

## 2020-08-26 PROCEDURE — G8510 SCR DEP NEG, NO PLAN REQD: HCPCS | Performed by: INTERNAL MEDICINE

## 2020-08-26 PROCEDURE — 4040F PNEUMOC VAC/ADMIN/RCVD: CPT | Performed by: INTERNAL MEDICINE

## 2020-08-26 PROCEDURE — 1090F PRES/ABSN URINE INCON ASSESS: CPT | Performed by: INTERNAL MEDICINE

## 2020-08-26 PROCEDURE — 1036F TOBACCO NON-USER: CPT | Performed by: INTERNAL MEDICINE

## 2020-08-26 PROCEDURE — G8400 PT W/DXA NO RESULTS DOC: HCPCS | Performed by: INTERNAL MEDICINE

## 2020-08-26 PROCEDURE — 99213 OFFICE O/P EST LOW 20 MIN: CPT | Performed by: INTERNAL MEDICINE

## 2020-08-26 PROCEDURE — 1123F ACP DISCUSS/DSCN MKR DOCD: CPT | Performed by: INTERNAL MEDICINE

## 2020-08-26 ASSESSMENT — ENCOUNTER SYMPTOMS
APNEA: 0
SHORTNESS OF BREATH: 0
RHINORRHEA: 0

## 2020-08-26 ASSESSMENT — PATIENT HEALTH QUESTIONNAIRE - PHQ9
1. LITTLE INTEREST OR PLEASURE IN DOING THINGS: 0
SUM OF ALL RESPONSES TO PHQ9 QUESTIONS 1 & 2: 0
2. FEELING DOWN, DEPRESSED OR HOPELESS: 0
SUM OF ALL RESPONSES TO PHQ QUESTIONS 1-9: 0
SUM OF ALL RESPONSES TO PHQ QUESTIONS 1-9: 0

## 2020-08-26 NOTE — PROGRESS NOTES
Subjective:      Patient ID: Carley North is a [de-identified] y.o. female.     HPI   Chief Complaint   Patient presents with    Hypertension     medication refill     Carley North is a [de-identified] y.o. female with the following history as recorded in [facility]:  Patient Active Problem List    Diagnosis Date Noted    Accelerated ventricular rhythm ([facility] 09/21/2018     Priority: High    Left hand weakness 71/70/9959    Diastolic CHF ([facility] 63/88/2752    Admitted for acute congestive heart failure ([facility] 04/11/2019    Acute bacterial sinusitis 12/18/2018    Acute diastolic CHF (congestive heart failure) ([facility] 11/20/2018    Musculoskeletal back pain 09/22/2018    Uncontrolled hypertension 09/22/2018    Unstable angina ([facility])     CKD (chronic kidney disease) stage 3, GFR 30-59 ml/min ([facility]) 01/11/2018    Acute kidney injury ([facility] 01/11/2018    Unstable angina pectoris ([facility]     Coronary artery disease involving native coronary artery of native heart with unstable angina pectoris ([facility]     NSTEMI (non-ST elevated myocardial infarction) ([facility]     Hyperlipidemia LDL goal <70     Essential hypertension 01/14/2016    Diabetes mellitus ([facility] 10/10/2013    Hypothyroidism     MI (mitral incompetence)     Pulmonary hypertension (HCC)     Chronic diastolic CHF (congestive heart failure), NYHA class 2 ([facility] 09/06/2012     Current Outpatient Medications   Medication Sig Dispense Refill    levothyroxine (SYNTHROID) 137 MCG tablet TAKE ONE TABLET BY MOUTH DAILY 30 tablet 0    torsemide (DEMADEX) 20 MG tablet Take 2 tablets by mouth daily 60 tablet 3    allopurinol (ZYLOPRIM) 100 MG tablet TAKE ONE TABLET BY MOUTH DAILY 90 tablet 0    NIFEdipine (PROCARDIA XL) 30 MG extended release tablet TAKE ONE TABLET BY MOUTH DAILY 90 tablet 1    aspirin 81 MG EC tablet Take 1 tablet by mouth daily 30 tablet 0    carvedilol (COREG) 12.5 MG tablet TAKE ONE TABLET BY MOUTH TWICE A DAY WITH MEALS 180 tablet 3    glimepiride (AMARYL) 4 MG tablet TAKE ONE TABLET BY MOUTH TWICE A DAY BEFORE MEALS 180 tablet 1    clopidogrel (PLAVIX) 75 MG tablet TAKE ONE TABLET BY MOUTH DAILY 90 tablet 5    glucose blood VI test strips (ASCENSIA AUTODISC VI;ONE TOUCH ULTRA TEST VI) strip 1 each by In Vitro route daily As needed. (Patient not taking: Reported on 2020) 100 each 3     No current facility-administered medications for this visit. Allergies: Hydrocodone-acetaminophen; Rosuvastatin; and Morphine  Past Medical History:   Diagnosis Date    CAD (coronary artery disease)     [facility]congestive [facility] failure) [facility]     Chronic kidney disease     DM2 (diabetes mellitus, type 2) ([facility])     HTN (hypertension)     Hypothyroidism     MI (mitral incompetence)     Over weight     [facility] HT[facility]     PVD (peripheral vascular disease) ([facility]     Uterine cancer ([facility]      Past Surgical History:   Procedure Laterality Date    CATARACT REMOVAL WITH IMPLANT Bilateral     CORONARY ANGIOPLASTY WITH STENT PLACEMENT Right 2015    At [address] [place] [address] WITH STENT PLACEMENT  2018    ILANA to RCA and POBA on ISR of LAD    HERNIA REPAIR      HYSTERECTOMY       Family History   Problem Relation Age of Onset    Diabetes Mother     Heart Disease Father     Cancer Sister         uterine    Heart Disease Maternal Grandmother     Heart Disease Maternal Grandfather     Heart Disease Paternal Grandmother     Heart Disease Paternal Grandfather      Social History     Tobacco Use    Smoking status: Former Smoker     Packs/day: 0.70     Years: 40.00     Pack years: 28.00     Types: Cigarettes     Last attempt to quit: 2004     Years since quittin.5    Smokeless tobacco: Never Used   Substance Use Topics    Alcohol use: No       Review of Systems   Constitutional: Negative for chills, diaphoresis and fever. HENT: Negative for congestion, postnasal drip and rhinorrhea. Eyes: Negative for visual disturbance.    Respiratory: Negative

## 2020-09-14 RX ORDER — ALLOPURINOL 100 MG/1
TABLET ORAL
Qty: 90 TABLET | Refills: 0 | Status: SHIPPED | OUTPATIENT
Start: 2020-09-14 | End: 2020-12-16

## 2020-09-14 RX ORDER — LEVOTHYROXINE SODIUM 137 UG/1
TABLET ORAL
Qty: 30 TABLET | Refills: 0 | Status: SHIPPED | OUTPATIENT
Start: 2020-09-14 | End: 2020-10-26

## 2020-09-17 DIAGNOSIS — I50.32 CHRONIC DIASTOLIC CHF (CONGESTIVE HEART FAILURE), NYHA CLASS 2 (HCC): ICD-10-CM

## 2020-09-17 LAB
ANION GAP SERPL CALCULATED.3IONS-SCNC: 10 MMOL/L (ref 3–16)
BUN BLDV-MCNC: 31 MG/DL (ref 7–20)
CALCIUM SERPL-MCNC: 9.1 MG/DL (ref 8.3–10.6)
CHLORIDE BLD-SCNC: 105 MMOL/L (ref 99–110)
CO2: 26 MMOL/L (ref 21–32)
CREAT SERPL-MCNC: 1.7 MG/DL (ref 0.6–1.2)
GFR AFRICAN AMERICAN: 35
GFR NON-AFRICAN AMERICAN: 29
GLUCOSE BLD-MCNC: 111 MG/DL (ref 70–99)
POTASSIUM SERPL-SCNC: 5.2 MMOL/L (ref 3.5–5.1)
SODIUM BLD-SCNC: 141 MMOL/L (ref 136–145)

## 2020-10-26 RX ORDER — LEVOTHYROXINE SODIUM 137 UG/1
TABLET ORAL
Qty: 30 TABLET | Refills: 0 | Status: SHIPPED | OUTPATIENT
Start: 2020-10-26 | End: 2020-11-13 | Stop reason: SDUPTHER

## 2020-10-27 NOTE — PROGRESS NOTES
[address]   Daily Progress Note    CC: \"I get short of breath\"     HPI:   Ms. Reyes Quan is a 69 yo male with a past medical history significant for pulmonary hypertension, PAD, essential hypertension, CAD with prior PCI and chronic diastolic CHF. She presented to [facility] with chest pain 8/2016 and underwent cath with stent placement to the LAD. She underwent another catheterization with my partner Dr. Aysha Hernández on 1/10/18 and found to have in-stent restenosis in the LAD requiring balloon angioplasty. She also required stent placement x 3 to her RCA. Heart cath performed on 9/20/18 also completed by Dr. Aysha Hernández resulted in balloon angioplasty in the mid left circumflex. She was recently admitted 4/11/19-4/15/19 with a CHF exacerbation but was not seen by Cardiology. Her dry weight on discharge was 207. She presents today for follow up. Today, she reports feeling \"okay\". She admits to continued shortness of breath and doesn't feel it has changed much since her last visit. She is short of breath with exertion and her breathing recovers quickly with rest. She is able to lay on her side to sleep at night. She does feel her weight by her home scale is up some. Her lower extremity swelling is stable. She denies chest pain with rest or exertion. She reports to monitoring her sodium intake. She was a previous smoker but has never been diagnosed with any chronic lung disease. Review of Systems:  · Constitutional: No unanticipated weight loss. There's been no change in energy level, sleep pattern, or activity level. No fevers, chills. · Eyes: No visual changes or diplopia. No scleral icterus. · ENT: No Headaches, hearing loss or vertigo. No mouth sores or sore throat. · Cardiovascular: as reviewed in HPI  · Respiratory: No cough or wheezing, no sputum production. No hemoptysis. · Gastrointestinal: No abdominal pain, appetite loss, blood in stools. No change in bowel or bladder habits.   · Genitourinary: No dysuria, trouble voiding, or hematuria. · Musculoskeletal:  No gait disturbance, no joint complaints. · Integumentary: No rash or pruritis. · Neurological: No headache, diplopia, change in muscle strength, numbness or tingling. · Psychiatric: No anxiety or depression. · Endocrine: No temperature intolerance. No excessive thirst, fluid intake, or urination. No tremor. · Hematologic/Lymphatic: No abnormal bruising or bleeding, blood clots or swollen lymph nodes. · Allergic/Immunologic: No nasal congestion or hives. Current Outpatient Medications   Medication Sig Dispense Refill    levothyroxine (SYNTHROID) 137 MCG tablet TAKE ONE TABLET BY MOUTH DAILY 30 tablet 0    allopurinol (ZYLOPRIM) 100 MG tablet TAKE ONE TABLET BY MOUTH DAILY 90 tablet 0    torsemide (DEMADEX) 20 MG tablet Take 2 tablets by mouth daily 60 tablet 3    NIFEdipine (PROCARDIA XL) 30 MG extended release tablet TAKE ONE TABLET BY MOUTH DAILY 90 tablet 1    aspirin 81 MG EC tablet Take 1 tablet by mouth daily 30 tablet 0    carvedilol (COREG) 12.5 MG tablet TAKE ONE TABLET BY MOUTH TWICE A DAY WITH MEALS 180 tablet 3    glimepiride (AMARYL) 4 MG tablet TAKE ONE TABLET BY MOUTH TWICE A DAY BEFORE MEALS 180 tablet 1    clopidogrel (PLAVIX) 75 MG tablet TAKE ONE TABLET BY MOUTH DAILY 90 tablet 5    glucose blood VI test strips (ASCENSIA AUTODISC VI;ONE TOUCH ULTRA TEST VI) strip 1 each by In Vitro route daily As needed. 100 each 3     No current facility-administered medications for this visit. Objective:     BP (!) 140/62   Pulse (!) 49   Temp 97.7 °F (36.5 °C)   Ht 5' 5\" (1.651 m)   Wt 213 lb 9.6 oz (96.9 kg) Comment: with shoes  LMP  (LMP Unknown)   SpO2 98%   BMI 35.54 kg/m²    Physical Exam:  Constitutional: The patient is oriented to person, place, and time. Appears well-developed and well-nourished. In no acute distress. Head: Normocephalic and atraumatic. Pupils equal and round. Neck: Neck supple.  No JVP or carotid bruit appreciated. No mass and no thyromegaly present. No lymphadenopathy present. Cardiovascular: Normal rate. Normal heart sounds. Exam reveals no gallop and no friction rub. No murmur heard. Pulmonary/Chest: Effort normal and breath sounds normal. No respiratory distress. No wheezes, rhonchi or rales. Abdominal: Soft, non-tender. Bowel sounds are normal. Exhibits no organomegaly, mass or bruit. Extremities: No edema. No cyanosis or clubbing. Pulses are 2+ radial and carotid bilaterally. Neurological: No gross cranial nerve deficit. Coordination normal.   Skin: Skin is warm and dry. There is no rash or diaphoresis. Psychiatric: Patient has a normal mood and affect. Speech is normal and behavior is normal.     Lab Results   Component Value Date    CHOL 183 05/12/2020    HDL 48 05/12/2020    HDL 47 03/18/2010    TRIG 155 05/12/2020        LDL   104      Procedures:     Cath 8/24/16  1. Right dominant coronary arterial system with serial stents in the mid  right coronary artery. The more distal right coronary artery stent has 25%  restenosis. There is a focal 60% restenosis in the more proximal longer  stent. This is a dominant vessel. In the left system there is a 30% focal  osteal stenosis in the left main. In the left anterior descending artery  proximally there is an 80% stenosis that was intervened upon with a 3 mm x 23  mm Xience drug-eluting stent post dilated to 3.12 mm. Further in the  circumflex system the distal circumflex is a smaller vessel approximately 1.5  mm in diameter with a 99% focal stenosis. 2. Normal left ventricular systolic function with LV ejection fraction of  60%. 3. Normal left ventricular end-diastolic pressure at 13 mmHg. 4. No gradient across the aortic valve on pull back to suggest aortic  stenosis. Cath 1/10/18 (Dr. Nathan Camacho)   1. We intervened on the left anterior descending artery.   We only  performed balloon angioplasty of the left anterior descending artery  in-stent restenosis using a 3-mm noncompliant balloon catheter and at the  ostium of the first diagonal using a 2.5-mm noncompliant balloon. We  decided not to further JL the diagonal branch. 2.  We performed stent placement in the right coronary. Unfortunately, we  had to place three stents. The first one in the distal was 3.0 x 10 mm, in  the proximal midportion was 3.0 x 28 mm and right distal to the proximal  stent was 3.0 x 8 mm secondary to missing the lesion distal to the stent. Cath 9/20/18 (Dr. Ivonne Jane)   1. Left main is normal.  CEHMO 3 flow. No stenosis. 2.  Left anterior descending artery comes from the left main coronary. Mid  portion has 50% stenosis with a fractional flow reserve of 0.84. Diagonal  branch has a 75% stenosis. 3.  Left circumflex comes from the left main coronary. Obtuse marginal  branches are patent. Mid portion has 99% stenosis. 4.  Right coronary comes from the right coronary cuff, patent stents, and  is a right dominant system. LVEDP was 20 mmHg. In summary, balloon angioplasty of the mid left circumflex only. Imaging:     Echo 10/2012  Technically difficult study. Left ventricle: The cavity   size was normal. There was mild concentric hypertrophy.   Systolic function was normal. The estimated ejection   fraction was in the range of 60% to 65%. Wall motion was   normal; there were no regional wall motion abnormalities.   Features are consistent with a pseudonormal left   ventricular filling pattern, with concomitant abnormal   relaxation and increased filling pressure (grade 2   diastolic dysfunction). Echo 1/12/18  Normal left ventricle size, wall thickness and systolic function with an  estimated ejection fraction of 50-55%. No regional wall motion abnormalities are seen. Mild aortic valve sclerosis without any evidence of aortic stenosis.     Echo 4/12/19  Left ventricular cavity size is normal.  There is mild concentric left ventricular hypertrophy. Ejection fraction is visually estimated to be 50-55%. Normal right ventricular size.     Echo 5/12/20  Left ventricular cavity size is normal.  Normal left ventricular wall thickness. Ejection fraction is visually estimated to be 50-55%. Normal diastolic function. The left atrium is mildly dilated. A bubble study was performed and fails to show evidence of shunting. Normal right ventricular size. Assessment:  1. CAD of native coronary arteries without angina  2. Hyperlipidemia with goal LDL<70mg/dL  3. Pulmonary Venous Hypertension  4. Chronic Diastolic CHF, class 2    Plan:   She continues with her exertion and her weight is up slightly. I will have her begin taking torsemide 40 mg in the mornings rather than 20 mg BID. I will also have her begin taking metolazone 2.5 mg once weekly. She will complete a BNP/BMP to assess her kidney function in 1 week. She is bradycardic by EKG and I will have her discontinue carvedilol. Her most recent lipid profile was not well controlled and she was unable to tolerate statin therapy in the past. I will have her begin taking Zetia 10 mg daily. I will see her in office for follow up in 3 months. This note was scribed in the presence of Maddie Gonzalez MD by [employer], RN. Physician Attestation:  The scribes documentation has been prepared under my direction and personally reviewed by me in its entirety. I, Dr. Zoila Sanabria personally performed the services described in this documentation as scribed by my RN in my presence, and I confirm that the note above accurately reflects all work, treatment, procedures, and medical decision making performed by me.

## 2020-10-29 ENCOUNTER — OFFICE VISIT (OUTPATIENT)
Dept: CARDIOLOGY CLINIC | Age: 80
End: 2020-10-29
Payer: MEDICARE

## 2020-10-29 VITALS
WEIGHT: 213.6 LBS | SYSTOLIC BLOOD PRESSURE: 140 MMHG | HEART RATE: 49 BPM | BODY MASS INDEX: 35.59 KG/M2 | OXYGEN SATURATION: 98 % | HEIGHT: 65 IN | TEMPERATURE: 97.7 F | DIASTOLIC BLOOD PRESSURE: 62 MMHG

## 2020-10-29 PROCEDURE — 93000 ELECTROCARDIOGRAM COMPLETE: CPT | Performed by: INTERNAL MEDICINE

## 2020-10-29 PROCEDURE — G8400 PT W/DXA NO RESULTS DOC: HCPCS | Performed by: INTERNAL MEDICINE

## 2020-10-29 PROCEDURE — 99214 OFFICE O/P EST MOD 30 MIN: CPT | Performed by: INTERNAL MEDICINE

## 2020-10-29 PROCEDURE — G8484 FLU IMMUNIZE NO ADMIN: HCPCS | Performed by: INTERNAL MEDICINE

## 2020-10-29 PROCEDURE — G8427 DOCREV CUR MEDS BY ELIG CLIN: HCPCS | Performed by: INTERNAL MEDICINE

## 2020-10-29 PROCEDURE — 4040F PNEUMOC VAC/ADMIN/RCVD: CPT | Performed by: INTERNAL MEDICINE

## 2020-10-29 PROCEDURE — 1090F PRES/ABSN URINE INCON ASSESS: CPT | Performed by: INTERNAL MEDICINE

## 2020-10-29 PROCEDURE — 1123F ACP DISCUSS/DSCN MKR DOCD: CPT | Performed by: INTERNAL MEDICINE

## 2020-10-29 PROCEDURE — 1036F TOBACCO NON-USER: CPT | Performed by: INTERNAL MEDICINE

## 2020-10-29 PROCEDURE — G8417 CALC BMI ABV UP PARAM F/U: HCPCS | Performed by: INTERNAL MEDICINE

## 2020-10-29 RX ORDER — METOLAZONE 2.5 MG/1
TABLET ORAL
Qty: 10 TABLET | Refills: 3 | Status: SHIPPED | OUTPATIENT
Start: 2020-10-29 | End: 2020-11-11

## 2020-10-29 RX ORDER — EZETIMIBE 10 MG/1
10 TABLET ORAL DAILY
Qty: 90 TABLET | Refills: 3 | Status: SHIPPED | OUTPATIENT
Start: 2020-10-29 | End: 2021-12-09

## 2020-11-11 ENCOUNTER — OFFICE VISIT (OUTPATIENT)
Dept: FAMILY MEDICINE CLINIC | Age: 80
End: 2020-11-11
Payer: MEDICARE

## 2020-11-11 VITALS
DIASTOLIC BLOOD PRESSURE: 78 MMHG | TEMPERATURE: 98.1 F | BODY MASS INDEX: 34.62 KG/M2 | SYSTOLIC BLOOD PRESSURE: 128 MMHG | HEIGHT: 65 IN | WEIGHT: 207.8 LBS

## 2020-11-11 DIAGNOSIS — E13.69 OTHER SPECIFIED DIABETES MELLITUS WITH OTHER SPECIFIED COMPLICATION, UNSPECIFIED WHETHER LONG TERM INSULIN USE (HCC): ICD-10-CM

## 2020-11-11 DIAGNOSIS — I50.32 CHRONIC DIASTOLIC CHF (CONGESTIVE HEART FAILURE), NYHA CLASS 2 (HCC): ICD-10-CM

## 2020-11-11 DIAGNOSIS — E78.5 HYPERLIPIDEMIA LDL GOAL <70: ICD-10-CM

## 2020-11-11 DIAGNOSIS — E03.9 ACQUIRED HYPOTHYROIDISM: ICD-10-CM

## 2020-11-11 PROCEDURE — G8484 FLU IMMUNIZE NO ADMIN: HCPCS | Performed by: INTERNAL MEDICINE

## 2020-11-11 PROCEDURE — 90694 VACC AIIV4 NO PRSRV 0.5ML IM: CPT | Performed by: INTERNAL MEDICINE

## 2020-11-11 PROCEDURE — 4040F PNEUMOC VAC/ADMIN/RCVD: CPT | Performed by: INTERNAL MEDICINE

## 2020-11-11 PROCEDURE — 1123F ACP DISCUSS/DSCN MKR DOCD: CPT | Performed by: INTERNAL MEDICINE

## 2020-11-11 PROCEDURE — G8427 DOCREV CUR MEDS BY ELIG CLIN: HCPCS | Performed by: INTERNAL MEDICINE

## 2020-11-11 PROCEDURE — 1090F PRES/ABSN URINE INCON ASSESS: CPT | Performed by: INTERNAL MEDICINE

## 2020-11-11 PROCEDURE — G8417 CALC BMI ABV UP PARAM F/U: HCPCS | Performed by: INTERNAL MEDICINE

## 2020-11-11 PROCEDURE — 99214 OFFICE O/P EST MOD 30 MIN: CPT | Performed by: INTERNAL MEDICINE

## 2020-11-11 PROCEDURE — G0008 ADMIN INFLUENZA VIRUS VAC: HCPCS | Performed by: INTERNAL MEDICINE

## 2020-11-11 PROCEDURE — G8400 PT W/DXA NO RESULTS DOC: HCPCS | Performed by: INTERNAL MEDICINE

## 2020-11-11 PROCEDURE — 1036F TOBACCO NON-USER: CPT | Performed by: INTERNAL MEDICINE

## 2020-11-11 RX ORDER — METOLAZONE 2.5 MG/1
2.5 TABLET ORAL WEEKLY
COMMUNITY
End: 2021-01-07 | Stop reason: ALTCHOICE

## 2020-11-11 ASSESSMENT — ENCOUNTER SYMPTOMS
NAUSEA: 0
RHINORRHEA: 0
SINUS PRESSURE: 0
WHEEZING: 0
SINUS PAIN: 0
CONSTIPATION: 0
ABDOMINAL PAIN: 0
DIARRHEA: 0
SHORTNESS OF BREATH: 0
COUGH: 0
VOMITING: 0
BLOOD IN STOOL: 0
APNEA: 0
SORE THROAT: 0

## 2020-11-11 NOTE — PROGRESS NOTES
disturbance. Respiratory: Negative for apnea, cough, shortness of breath and wheezing. Cardiovascular: Negative for chest pain and palpitations. Gastrointestinal: Negative for abdominal pain, blood in stool, constipation, diarrhea, nausea and vomiting. Endocrine: Negative for polyuria. Genitourinary: Negative for decreased urine volume, dysuria, frequency and hematuria. Musculoskeletal: Negative for arthralgias and myalgias. Skin: Negative for rash. Neurological: Negative for dizziness, syncope, weakness, numbness and headaches. Hematological: Negative for adenopathy. Objective:   Physical Exam  Vitals signs and nursing note reviewed. Constitutional:       Appearance: Normal appearance. HENT:      Head: Normocephalic and atraumatic. Right Ear: Tympanic membrane, ear canal and external ear normal.      Left Ear: Tympanic membrane, ear canal and external ear normal.   Eyes:      General: No scleral icterus. Right eye: No discharge. Left eye: No discharge. Extraocular Movements: Extraocular movements intact. Pupils: Pupils are equal, round, and reactive to light. Cardiovascular:      Rate and Rhythm: Normal rate and regular rhythm. Heart sounds: No murmur. Pulmonary:      Effort: No respiratory distress. Breath sounds: No wheezing. Abdominal:      General: There is no distension. Tenderness: There is no guarding. Musculoskeletal:         General: No swelling. Skin:     Coloration: Skin is not jaundiced. Findings: No rash. Neurological:      Mental Status: She is alert. Cranial Nerves: No cranial nerve deficit. Motor: No weakness. Psychiatric:         Mood and Affect: Mood normal.         Behavior: Behavior normal.         Thought Content: Thought content normal.         Judgment: Judgment normal.         Assessment:       Diagnosis Orders   1.  Other specified diabetes mellitus with other specified complication, unspecified whether long term insulin use (HCC)  Basic Metabolic Panel    Hemoglobin A1C   2. Hyperlipidemia LDL goal <70  Lipid Panel    AST    ALT   3. Acquired hypothyroidism  TSH without Reflex   4. Flu vaccine need     above stable      Plan:      Outpatient Encounter Medications as of 11/11/2020   Medication Sig Dispense Refill    metOLazone (ZAROXOLYN) 2.5 MG tablet Take 2.5 mg by mouth once a week      ezetimibe (ZETIA) 10 MG tablet Take 1 tablet by mouth daily 90 tablet 3    [DISCONTINUED] metOLazone (ZAROXOLYN) 2.5 MG tablet Take one tablet once weekly 30 minutes prior to torsemide dose. 10 tablet 3    levothyroxine (SYNTHROID) 137 MCG tablet TAKE ONE TABLET BY MOUTH DAILY 30 tablet 0    allopurinol (ZYLOPRIM) 100 MG tablet TAKE ONE TABLET BY MOUTH DAILY 90 tablet 0    torsemide (DEMADEX) 20 MG tablet Take 2 tablets by mouth daily 60 tablet 3    NIFEdipine (PROCARDIA XL) 30 MG extended release tablet TAKE ONE TABLET BY MOUTH DAILY 90 tablet 1    aspirin 81 MG EC tablet Take 1 tablet by mouth daily 30 tablet 0    glimepiride (AMARYL) 4 MG tablet TAKE ONE TABLET BY MOUTH TWICE A DAY BEFORE MEALS 180 tablet 1    clopidogrel (PLAVIX) 75 MG tablet TAKE ONE TABLET BY MOUTH DAILY 90 tablet 5    glucose blood VI test strips (ASCENSIA AUTODISC VI;ONE TOUCH ULTRA TEST VI) strip 1 each by In Vitro route daily As needed. 100 each 3     No facility-administered encounter medications on file as of 11/11/2020.       Orders Placed This Encounter   Procedures    INFLUENZA, QUADV, ADJUVANTED, 72 YRS =, IM, PF, PREFILL SYR, 0.5ML (FLUAD)    Basic Metabolic Panel     Standing Status:   Future     Standing Expiration Date:   11/11/2021    Lipid Panel     Standing Status:   Future     Standing Expiration Date:   11/11/2021     Order Specific Question:   Is Patient Fasting?/# of Hours     Answer:   12    AST     Standing Status:   Future     Standing Expiration Date:   11/11/2021    ALT     Standing Status: Future     Standing Expiration Date:   11/11/2021    Hemoglobin A1C     Standing Status:   Future     Standing Expiration Date:   11/11/2021    TSH without Reflex     Standing Status:   Future     Standing Expiration Date:   11/11/2021           [facility] DO ISAC

## 2020-11-11 NOTE — PROGRESS NOTES
Immunization History   Administered Date(s) Administered    Influenza 10/10/2013    Influenza, High Dose (Fluzone 65 yrs and older) 10/10/2014, 01/14/2016, 01/20/2017, 09/25/2017    Influenza, Catherine [place]ahn, 6 mo and older, IM, PF (Flulaval, Fluarix) 09/21/2018    Pneumococcal Conjugate 13-valent ([id number]) 01/20/2017    Pneumococcal Polysaccharide ([id number]) 04/17/2019       Vaccine Information Sheet, \"Influenza - Inactivated\"  given to Iman Farias, or parent/legal guardian of  Iman Farias and verbalized understanding. Patient responses:    Have you ever had a reaction to a flu vaccine? No  Do you have any current illness? No  Have you ever had [place] Syndrome? No  Do you have a serious allergy to any of the follow: Neomycin, Polymyxin, Thimerosal, eggs or egg products? No    Flu vaccine given per order. Please see immunization tab. Risks and benefits explained. Current VIS given.

## 2020-11-11 NOTE — PATIENT INSTRUCTIONS
Thank you for choosing [facility]. Please bring a current list of medications to every appointment. Please contact your pharmacy for any prescription refill(s) that you are requesting.

## 2020-11-12 LAB
ALT SERPL-CCNC: 11 U/L (ref 10–40)
ANION GAP SERPL CALCULATED.3IONS-SCNC: 15 MMOL/L (ref 3–16)
AST SERPL-CCNC: 16 U/L (ref 15–37)
BUN BLDV-MCNC: 49 MG/DL (ref 7–20)
CALCIUM SERPL-MCNC: 9.9 MG/DL (ref 8.3–10.6)
CHLORIDE BLD-SCNC: 96 MMOL/L (ref 99–110)
CHOLESTEROL, TOTAL: 200 MG/DL (ref 0–199)
CO2: 29 MMOL/L (ref 21–32)
CREAT SERPL-MCNC: 2.4 MG/DL (ref 0.6–1.2)
ESTIMATED AVERAGE GLUCOSE: 159.9 MG/DL
GFR AFRICAN AMERICAN: 23
GFR NON-AFRICAN AMERICAN: 19
GLUCOSE BLD-MCNC: 164 MG/DL (ref 70–99)
HBA1C MFR BLD: 7.2 %
HDLC SERPL-MCNC: 49 MG/DL (ref 40–60)
LDL CHOLESTEROL CALCULATED: 130 MG/DL
POTASSIUM SERPL-SCNC: 4.5 MMOL/L (ref 3.5–5.1)
PRO-BNP: 350 PG/ML (ref 0–449)
SODIUM BLD-SCNC: 140 MMOL/L (ref 136–145)
TRIGL SERPL-MCNC: 104 MG/DL (ref 0–150)
TSH SERPL DL<=0.05 MIU/L-ACNC: 0.64 UIU/ML (ref 0.27–4.2)
VLDLC SERPL CALC-MCNC: 21 MG/DL

## 2020-11-13 RX ORDER — LEVOTHYROXINE SODIUM 137 UG/1
TABLET ORAL
Qty: 90 TABLET | Refills: 2 | Status: SHIPPED | OUTPATIENT
Start: 2020-11-13 | End: 2021-08-31 | Stop reason: SDUPTHER

## 2020-11-20 RX ORDER — GLIMEPIRIDE 4 MG/1
TABLET ORAL
Qty: 180 TABLET | Refills: 0 | Status: SHIPPED | OUTPATIENT
Start: 2020-11-20 | End: 2021-02-23

## 2020-11-23 RX ORDER — CLOPIDOGREL BISULFATE 75 MG/1
TABLET ORAL
Qty: 90 TABLET | Refills: 4 | Status: SHIPPED | OUTPATIENT
Start: 2020-11-23 | End: 2021-12-09

## 2020-12-08 ENCOUNTER — TELEPHONE (OUTPATIENT)
Dept: CARDIOLOGY CLINIC | Age: 80
End: 2020-12-08

## 2020-12-08 NOTE — TELEPHONE ENCOUNTER
Pt called is planning on going tho the [facility] lab 12/9 to have her labs drawn to ck her kidney function per Dr Chas Mejia. Last time she went they had a hard time finding her orders. I did not see them. She would like to be called to verify they are in there before she drives up there tomorrow.     Razia #       Has 2 mrn #

## 2020-12-09 DIAGNOSIS — I50.32 CHRONIC DIASTOLIC CHF (CONGESTIVE HEART FAILURE), NYHA CLASS 2 (HCC): ICD-10-CM

## 2020-12-09 DIAGNOSIS — I27.20 PULMONARY HYPERTENSION (HCC): ICD-10-CM

## 2020-12-09 DIAGNOSIS — I44.2: ICD-10-CM

## 2020-12-09 DIAGNOSIS — I21.4 NSTEMI (NON-ST ELEVATED MYOCARDIAL INFARCTION) (HCC): ICD-10-CM

## 2020-12-10 LAB
ALBUMIN SERPL-MCNC: 3.7 G/DL (ref 3.4–5)
ANION GAP SERPL CALCULATED.3IONS-SCNC: 12 MMOL/L (ref 3–16)
BUN BLDV-MCNC: 46 MG/DL (ref 7–20)
CALCIUM SERPL-MCNC: 9.5 MG/DL (ref 8.3–10.6)
CHLORIDE BLD-SCNC: 99 MMOL/L (ref 99–110)
CO2: 28 MMOL/L (ref 21–32)
CREAT SERPL-MCNC: 2 MG/DL (ref 0.6–1.2)
GFR AFRICAN AMERICAN: 29
GFR NON-AFRICAN AMERICAN: 24
GLUCOSE BLD-MCNC: 186 MG/DL (ref 70–99)
HCT VFR BLD CALC: 43.7 % (ref 36–48)
HEMOGLOBIN: 14.7 G/DL (ref 12–16)
MCH RBC QN AUTO: 33.2 PG (ref 26–34)
MCHC RBC AUTO-ENTMCNC: 33.7 G/DL (ref 31–36)
MCV RBC AUTO: 98.4 FL (ref 80–100)
PDW BLD-RTO: 13.7 % (ref 12.4–15.4)
PHOSPHORUS: 3 MG/DL (ref 2.5–4.9)
PLATELET # BLD: 286 K/UL (ref 135–450)
PMV BLD AUTO: 7.6 FL (ref 5–10.5)
POTASSIUM SERPL-SCNC: 5 MMOL/L (ref 3.5–5.1)
RBC # BLD: 4.44 M/UL (ref 4–5.2)
SODIUM BLD-SCNC: 139 MMOL/L (ref 136–145)
WBC # BLD: 10.3 K/UL (ref 4–11)

## 2020-12-16 RX ORDER — ALLOPURINOL 100 MG/1
TABLET ORAL
Qty: 90 TABLET | Refills: 0 | Status: SHIPPED | OUTPATIENT
Start: 2020-12-16 | End: 2020-12-21

## 2020-12-21 RX ORDER — ALLOPURINOL 100 MG/1
TABLET ORAL
Qty: 90 TABLET | Refills: 0 | Status: SHIPPED | OUTPATIENT
Start: 2020-12-21 | End: 2021-06-22

## 2021-01-06 NOTE — PROGRESS NOTES
[address]   Daily Progress Note    CC: \"my breathing is the same\"     HPI:   Ms. Rishabh Stewart is a 69 yo male with a past medical history significant for pulmonary hypertension, PAD, essential hypertension, CAD with prior PCI and chronic diastolic CHF. She presented to [facility] with chest pain 8/2016 and underwent cath with stent placement to the LAD. She underwent another catheterization with my partner Dr. Frann Schlatter on 1/10/18 and found to have in-stent restenosis in the LAD requiring balloon angioplasty. She also required stent placement x 3 to her RCA. Heart cath performed on 9/20/18 also completed by Dr. Frann Schlatter resulted in balloon angioplasty in the mid left circumflex. She was recently admitted 4/11/19-4/15/19 with a CHF exacerbation but was not seen by Cardiology. Her dry weight on discharge was 207. She presents today for follow up. Today, she reports feeling \"okay\". She does admit to some increase in her energy since discontinuing beta blocker at her previous visit. Her breathing has been \"okay\" and unchanged from previous. She is able to lay flat to sleep at night. She denies lower extremity swelling. She has not been taking metolazone since her most recent lab results. She was a previous smoker but has never been diagnosed with any chronic lung disease. Review of Systems:  · Constitutional: No unanticipated weight loss. There's been no change in energy level, sleep pattern, or activity level. No fevers, chills. · Eyes: No visual changes or diplopia. No scleral icterus. · ENT: No Headaches, hearing loss or vertigo. No mouth sores or sore throat. · Cardiovascular: as reviewed in HPI  · Respiratory: No cough or wheezing, no sputum production. No hemoptysis. · Gastrointestinal: No abdominal pain, appetite loss, blood in stools. No change in bowel or bladder habits. · Genitourinary: No dysuria, trouble voiding, or hematuria.   · Musculoskeletal:  No gait disturbance, no joint complaints. · Integumentary: No rash or pruritis. · Neurological: No headache, diplopia, change in muscle strength, numbness or tingling. · Psychiatric: No anxiety or depression. · Endocrine: No temperature intolerance. No excessive thirst, fluid intake, or urination. No tremor. · Hematologic/Lymphatic: No abnormal bruising or bleeding, blood clots or swollen lymph nodes. · Allergic/Immunologic: No nasal congestion or hives. Current Outpatient Medications   Medication Sig Dispense Refill    allopurinol (ZYLOPRIM) 100 MG tablet TAKE ONE TABLET BY MOUTH DAILY 90 tablet 0    clopidogrel (PLAVIX) 75 MG tablet TAKE ONE TABLET BY MOUTH DAILY 90 tablet 4    glimepiride (AMARYL) 4 MG tablet TAKE ONE TABLET BY MOUTH TWICE A DAY BEFORE MEALS 180 tablet 0    NIFEdipine (PROCARDIA XL) 30 MG extended release tablet TAKE ONE TABLET BY MOUTH DAILY 90 tablet 3    levothyroxine (SYNTHROID) 137 MCG tablet TAKE ONE TABLET BY MOUTH DAILY 90 tablet 2    ezetimibe (ZETIA) 10 MG tablet Take 1 tablet by mouth daily 90 tablet 3    torsemide (DEMADEX) 20 MG tablet Take 2 tablets by mouth daily 60 tablet 3    aspirin 81 MG EC tablet Take 1 tablet by mouth daily 30 tablet 0    glucose blood VI test strips (ASCENSIA AUTODISC VI;ONE TOUCH ULTRA TEST VI) strip 1 each by In Vitro route daily As needed. 100 each 3    metOLazone (ZAROXOLYN) 2.5 MG tablet Take 2.5 mg by mouth once a week       No current facility-administered medications for this visit. Objective:   /86   Pulse 55   Temp 97.1 °F (36.2 °C)   Ht 5' 5\" (1.651 m)   Wt 204 lb 12.8 oz (92.9 kg)   LMP  (LMP Unknown)   SpO2 96%   BMI 34.08 kg/m²      Physical Exam:  Constitutional: The patient is oriented to person, place, and time. Appears well-developed and well-nourished. In no acute distress. Head: Normocephalic and atraumatic. Pupils equal and round. Neck: Neck supple. No JVP or carotid bruit appreciated. No mass and no thyromegaly present.  No lymphadenopathy present. Cardiovascular: Normal rate. Normal heart sounds. Exam reveals no gallop and no friction rub. No murmur heard. Pulmonary/Chest: Effort normal and breath sounds normal. No respiratory distress. No wheezes, rhonchi or rales. Abdominal: Soft, non-tender. Bowel sounds are normal. Exhibits no organomegaly, mass or bruit. Extremities: No edema. No cyanosis or clubbing. Pulses are 2+ radial and carotid bilaterally. Neurological: No gross cranial nerve deficit. Coordination normal.   Skin: Skin is warm and dry. There is no rash or diaphoresis. Psychiatric: Patient has a normal mood and affect. Speech is normal and behavior is normal.     Lab Results   Component Value Date    CHOL 200 11/11/2020    HDL 49 11/11/2020    HDL 47 03/18/2010    TRIG 104 11/11/2020        LDL   130      Procedures:     Cath 8/24/16  1. Right dominant coronary arterial system with serial stents in the mid  right coronary artery. The more distal right coronary artery stent has 25%  restenosis. There is a focal 60% restenosis in the more proximal longer  stent. This is a dominant vessel. In the left system there is a 30% focal  osteal stenosis in the left main. In the left anterior descending artery  proximally there is an 80% stenosis that was intervened upon with a 3 mm x 23  mm Xience drug-eluting stent post dilated to 3.12 mm. Further in the  circumflex system the distal circumflex is a smaller vessel approximately 1.5  mm in diameter with a 99% focal stenosis. 2. Normal left ventricular systolic function with LV ejection fraction of  60%. 3. Normal left ventricular end-diastolic pressure at 13 mmHg. 4. No gradient across the aortic valve on pull back to suggest aortic  stenosis. Cath 1/10/18 (Dr. Tony Bowling)   1. We intervened on the left anterior descending artery.   We only  performed balloon angioplasty of the left anterior descending artery  in-stent restenosis using a 3-mm noncompliant balloon catheter and ventricular size.     Echo 5/12/20  Left ventricular cavity size is normal.  Normal left ventricular wall thickness. Ejection fraction is visually estimated to be 50-55%. Normal diastolic function. The left atrium is mildly dilated. A bubble study was performed and fails to show evidence of shunting. Normal right ventricular size. Assessment:  1. CAD of native coronary arteries without angina  2. Hyperlipidemia with goal LDL<70 mg/dL  3. Pulmonary Venous Hypertension  4. Chronic Diastolic CHF, class 2  5. CKD, stage 3    Plan:   Given her kidney function, I will have her continue holding metolazone and also reduce her dose of torsemide to 20 mg once daily. Should she experience weight gain or shortness of breath should can take an extra 20 mg as needed. She will complete a BMP in 2 weeks to assess her kidney function. She is not endorsing any symptoms representing angina and is currently well compensated. She will complete a fasting lipid profile in 2 weeks as well as she has now been taking Zetia. She seems to have improved since stopping her carvedilol due to bradycardia. I will see her in office for follow up in 6 months. This note was scribed in the presence of Immanuel Menjivar MD by [employer], RN. Physician Attestation:  The scribes documentation has been prepared under my direction and personally reviewed by me in its entirety. I, Dr. Batool Hernandez personally performed the services described in this documentation as scribed by my RN in my presence, and I confirm that the note above accurately reflects all work, treatment, procedures, and medical decision making performed by me.

## 2021-01-07 ENCOUNTER — OFFICE VISIT (OUTPATIENT)
Dept: CARDIOLOGY CLINIC | Age: 81
End: 2021-01-07
Payer: MEDICARE

## 2021-01-07 VITALS
SYSTOLIC BLOOD PRESSURE: 126 MMHG | TEMPERATURE: 97.1 F | HEIGHT: 65 IN | DIASTOLIC BLOOD PRESSURE: 86 MMHG | BODY MASS INDEX: 34.12 KG/M2 | OXYGEN SATURATION: 96 % | HEART RATE: 55 BPM | WEIGHT: 204.8 LBS

## 2021-01-07 DIAGNOSIS — I50.32 CHRONIC DIASTOLIC CHF (CONGESTIVE HEART FAILURE), NYHA CLASS 2 (HCC): ICD-10-CM

## 2021-01-07 DIAGNOSIS — I25.10 CORONARY ARTERY DISEASE INVOLVING NATIVE CORONARY ARTERY OF NATIVE HEART WITHOUT ANGINA PECTORIS: Primary | ICD-10-CM

## 2021-01-07 DIAGNOSIS — E78.5 HYPERLIPIDEMIA LDL GOAL <70: ICD-10-CM

## 2021-01-07 PROCEDURE — G8400 PT W/DXA NO RESULTS DOC: HCPCS | Performed by: INTERNAL MEDICINE

## 2021-01-07 PROCEDURE — 1090F PRES/ABSN URINE INCON ASSESS: CPT | Performed by: INTERNAL MEDICINE

## 2021-01-07 PROCEDURE — 4040F PNEUMOC VAC/ADMIN/RCVD: CPT | Performed by: INTERNAL MEDICINE

## 2021-01-07 PROCEDURE — 1036F TOBACCO NON-USER: CPT | Performed by: INTERNAL MEDICINE

## 2021-01-07 PROCEDURE — G8417 CALC BMI ABV UP PARAM F/U: HCPCS | Performed by: INTERNAL MEDICINE

## 2021-01-07 PROCEDURE — G8484 FLU IMMUNIZE NO ADMIN: HCPCS | Performed by: INTERNAL MEDICINE

## 2021-01-07 PROCEDURE — 99214 OFFICE O/P EST MOD 30 MIN: CPT | Performed by: INTERNAL MEDICINE

## 2021-01-07 PROCEDURE — 1123F ACP DISCUSS/DSCN MKR DOCD: CPT | Performed by: INTERNAL MEDICINE

## 2021-01-07 PROCEDURE — G8427 DOCREV CUR MEDS BY ELIG CLIN: HCPCS | Performed by: INTERNAL MEDICINE

## 2021-01-07 RX ORDER — TORSEMIDE 20 MG/1
20 TABLET ORAL DAILY
Qty: 60 TABLET | Refills: 3
Start: 2021-01-07 | End: 2021-02-03

## 2021-01-26 RX ORDER — TORSEMIDE 20 MG/1
TABLET ORAL
Qty: 60 TABLET | Refills: 2 | OUTPATIENT
Start: 2021-01-26

## 2021-02-03 NOTE — TELEPHONE ENCOUNTER
Last ov 1/7/21  Pending appt 7/29/21    Pharm needed new instructions since pt is to take another dose if sob or weight gain.

## 2021-02-04 RX ORDER — TORSEMIDE 20 MG/1
20 TABLET ORAL DAILY
Qty: 135 TABLET | Refills: 2 | Status: SHIPPED | OUTPATIENT
Start: 2021-02-04 | End: 2021-08-25

## 2021-02-23 RX ORDER — GLIMEPIRIDE 4 MG/1
TABLET ORAL
Qty: 180 TABLET | Refills: 0 | Status: SHIPPED | OUTPATIENT
Start: 2021-02-23 | End: 2021-06-01

## 2021-06-01 RX ORDER — GLIMEPIRIDE 4 MG/1
TABLET ORAL
Qty: 180 TABLET | Refills: 0 | Status: SHIPPED | OUTPATIENT
Start: 2021-06-01 | End: 2021-08-31 | Stop reason: SDUPTHER

## 2021-06-22 RX ORDER — ALLOPURINOL 100 MG/1
TABLET ORAL
Qty: 90 TABLET | Refills: 0 | Status: SHIPPED | OUTPATIENT
Start: 2021-06-22 | End: 2021-08-31 | Stop reason: SDUPTHER

## 2021-07-14 ENCOUNTER — TELEPHONE (OUTPATIENT)
Dept: FAMILY MEDICINE CLINIC | Age: 81
End: 2021-07-14

## 2021-07-14 NOTE — TELEPHONE ENCOUNTER
----- Message from Loly Ding sent at 7/14/2021  9:27 AM EDT -----  Subject: Appointment Request    Reason for Call: Urgent Cough Cold    QUESTIONS  Type of Appointment? Established Patient  Reason for appointment request? No appointments available during search  Additional Information for Provider? Pt has been sick for more than 5 days   and needs to be seen. Screen urgent. She had a sore throat at the   beginning and now is coughing and congested. Health history, she has   congestive heart failure. please call the pt [phone]  ---------------------------------------------------------------------------  --------------  CALL BACK INFO  What is the best way for the office to contact you? Do not leave any   message, patient will call back for answer  Preferred Call Back Phone Number? [phone]  ---------------------------------------------------------------------------  --------------  SCRIPT ANSWERS  Relationship to Patient? Self  Appointment reason? Symptomatic  Select script based on patient symptoms? Adult Cough/Cold Symptoms [Runny   Nose, Sore Throat, Flu, Sinus, Sinus Infection, Upper Respiratory   Infection [URI], Congestion]  Are you currently unable to finish sentences due to any difficulty   breathing? No  Are you unable to swallow liquids? No  Are you having fevers (100.4 or greater), chills, or sweats? No  Do you have COPD, asthma or a chronic lung condition? No  Have your symptoms been present for more than 5 days? Yes   Have you been diagnosed with, awaiting test results for, or told that you   are suspected of having COVID-19 (Coronavirus)? (If patient has tested   negative or was tested as a requirement for work, school, or travel and   not based on symptoms, answer no)? No  Do you currently have flu-like symptoms including fever or chills, cough,   shortness of breath, difficulty breathing, or new loss of taste or smell?    No  Have you had close contact with someone with COVID-19 in the

## 2021-07-14 NOTE — TELEPHONE ENCOUNTER
E[facility] stated that pt screened urgent     Pt has been sick for about  5 days   Pt has CHF     Sore throat - gone   Congested in head   Cough - gray phlegm  Fever - NO   Sob - b/c of chf     yared urrutia     Pl advise   [phone]

## 2021-08-25 RX ORDER — TORSEMIDE 20 MG/1
TABLET ORAL
Qty: 135 TABLET | Refills: 2 | Status: SHIPPED | OUTPATIENT
Start: 2021-08-25 | End: 2022-06-06

## 2021-08-25 NOTE — TELEPHONE ENCOUNTER
Last OV: 01/07/21  Next OV: x  Last refill:02/03/21  Most recent Labs: 11/11/20 bmp   Last PT/INR (if needed):  Last EKG (if needed):

## 2021-08-31 ENCOUNTER — TELEPHONE (OUTPATIENT)
Dept: FAMILY MEDICINE CLINIC | Age: 81
End: 2021-08-31

## 2021-08-31 RX ORDER — LEVOTHYROXINE SODIUM 137 UG/1
TABLET ORAL
Qty: 30 TABLET | Refills: 0 | Status: SHIPPED | OUTPATIENT
Start: 2021-08-31 | End: 2021-09-08 | Stop reason: SDUPTHER

## 2021-08-31 RX ORDER — GLIMEPIRIDE 4 MG/1
TABLET ORAL
Qty: 60 TABLET | Refills: 0 | Status: SHIPPED | OUTPATIENT
Start: 2021-08-31 | End: 2021-09-08 | Stop reason: SDUPTHER

## 2021-08-31 RX ORDER — ALLOPURINOL 100 MG/1
TABLET ORAL
Qty: 30 TABLET | Refills: 0 | Status: SHIPPED | OUTPATIENT
Start: 2021-08-31 | End: 2021-09-08 | Stop reason: SDUPTHER

## 2021-08-31 NOTE — TELEPHONE ENCOUNTER
----- Message from Arpit Dumont sent at 5/08/5505 12:25 PM EDT -----  Subject: Refill Request    QUESTIONS  Name of Medication? levothyroxine (SYNTHROID) 137 MCG tablet  Patient-reported dosage and instructions? 1x daily  How many days do you have left? 0  Preferred Pharmacy? [id number]  Pharmacy phone number (if available)? [phone]  ---------------------------------------------------------------------------  --------------,  Name of Medication? allopurinol (ZYLOPRIM) 100 MG tablet  Patient-reported dosage and instructions? 1x daily  How many days do you have left? 4  Preferred Pharmacy? [id number]  Pharmacy phone number (if available)? [phone]  ---------------------------------------------------------------------------  --------------,  Name of Medication? glimepiride (AMARYL) 4 MG tablet  Patient-reported dosage and instructions? 1x daily  How many days do you have left? 2  Preferred Pharmacy? [id number]  Pharmacy phone number (if available)? [phone]  Additional Information for Provider? Pt is requesting an expedite, all out   of medication   ---------------------------------------------------------------------------  --------------  CALL BACK INFO  What is the best way for the office to contact you? OK to leave message on   voicemail  Preferred Call Back Phone Number?  [phone]

## 2021-09-08 ENCOUNTER — OFFICE VISIT (OUTPATIENT)
Dept: FAMILY MEDICINE CLINIC | Age: 81
End: 2021-09-08
Payer: MEDICARE

## 2021-09-08 VITALS
DIASTOLIC BLOOD PRESSURE: 74 MMHG | HEIGHT: 65 IN | WEIGHT: 203.4 LBS | SYSTOLIC BLOOD PRESSURE: 136 MMHG | TEMPERATURE: 97.8 F | BODY MASS INDEX: 33.89 KG/M2

## 2021-09-08 DIAGNOSIS — I10 ESSENTIAL HYPERTENSION: ICD-10-CM

## 2021-09-08 DIAGNOSIS — E03.9 ACQUIRED HYPOTHYROIDISM: ICD-10-CM

## 2021-09-08 DIAGNOSIS — E78.5 HYPERLIPIDEMIA LDL GOAL <70: Primary | ICD-10-CM

## 2021-09-08 PROCEDURE — G8400 PT W/DXA NO RESULTS DOC: HCPCS | Performed by: INTERNAL MEDICINE

## 2021-09-08 PROCEDURE — 1123F ACP DISCUSS/DSCN MKR DOCD: CPT | Performed by: INTERNAL MEDICINE

## 2021-09-08 PROCEDURE — 1036F TOBACCO NON-USER: CPT | Performed by: INTERNAL MEDICINE

## 2021-09-08 PROCEDURE — 4040F PNEUMOC VAC/ADMIN/RCVD: CPT | Performed by: INTERNAL MEDICINE

## 2021-09-08 PROCEDURE — 1090F PRES/ABSN URINE INCON ASSESS: CPT | Performed by: INTERNAL MEDICINE

## 2021-09-08 PROCEDURE — G8427 DOCREV CUR MEDS BY ELIG CLIN: HCPCS | Performed by: INTERNAL MEDICINE

## 2021-09-08 PROCEDURE — 99214 OFFICE O/P EST MOD 30 MIN: CPT | Performed by: INTERNAL MEDICINE

## 2021-09-08 PROCEDURE — G8417 CALC BMI ABV UP PARAM F/U: HCPCS | Performed by: INTERNAL MEDICINE

## 2021-09-08 RX ORDER — ALLOPURINOL 100 MG/1
TABLET ORAL
Qty: 30 TABLET | Refills: 0 | Status: SHIPPED | OUTPATIENT
Start: 2021-09-08 | End: 2021-12-06

## 2021-09-08 RX ORDER — LEVOTHYROXINE SODIUM 137 UG/1
TABLET ORAL
Qty: 30 TABLET | Refills: 0 | Status: SHIPPED | OUTPATIENT
Start: 2021-09-08 | End: 2021-11-19

## 2021-09-08 RX ORDER — GLIMEPIRIDE 4 MG/1
TABLET ORAL
Qty: 60 TABLET | Refills: 0 | Status: SHIPPED | OUTPATIENT
Start: 2021-09-08 | End: 2021-11-19

## 2021-09-08 SDOH — ECONOMIC STABILITY: FOOD INSECURITY: WITHIN THE PAST 12 MONTHS, THE FOOD YOU BOUGHT JUST DIDN'T LAST AND YOU DIDN'T HAVE MONEY TO GET MORE.: NEVER TRUE

## 2021-09-08 SDOH — ECONOMIC STABILITY: FOOD INSECURITY: WITHIN THE PAST 12 MONTHS, YOU WORRIED THAT YOUR FOOD WOULD RUN OUT BEFORE YOU GOT MONEY TO BUY MORE.: NEVER TRUE

## 2021-09-08 ASSESSMENT — PATIENT HEALTH QUESTIONNAIRE - PHQ9
SUM OF ALL RESPONSES TO PHQ QUESTIONS 1-9: 0
1. LITTLE INTEREST OR PLEASURE IN DOING THINGS: 0
SUM OF ALL RESPONSES TO PHQ QUESTIONS 1-9: 0
2. FEELING DOWN, DEPRESSED OR HOPELESS: 0
SUM OF ALL RESPONSES TO PHQ QUESTIONS 1-9: 0
SUM OF ALL RESPONSES TO PHQ9 QUESTIONS 1 & 2: 0

## 2021-09-08 ASSESSMENT — ENCOUNTER SYMPTOMS
ABDOMINAL PAIN: 0
DIARRHEA: 0
CONSTIPATION: 0
SORE THROAT: 0
RHINORRHEA: 0
BLOOD IN STOOL: 0
SHORTNESS OF BREATH: 0
SINUS PRESSURE: 0
APNEA: 0
COUGH: 0
WHEEZING: 0
SINUS PAIN: 0
NAUSEA: 0

## 2021-09-08 ASSESSMENT — SOCIAL DETERMINANTS OF HEALTH (SDOH): HOW HARD IS IT FOR YOU TO PAY FOR THE VERY BASICS LIKE FOOD, HOUSING, MEDICAL CARE, AND HEATING?: NOT HARD AT ALL

## 2021-09-08 NOTE — PROGRESS NOTES
Specific Question:   Is Patient Fasting?/# of Hours     Answer:   12    AST     Standing Status:   Future     Standing Expiration Date:   9/8/2022    ALT     Standing Status:   Future     Standing Expiration Date:   9/8/2022    TSH without Reflex     Standing Status:   Future     Standing Expiration Date:   9/8/2022            Subjective   SUBJECTIVE/OBJECTIVE:  HPI   Chief Complaint   Patient presents with    Check-Up     hypertension, hyperlipidemia, hypothyroidism- patient denies any complaints at this time     Rupinder Tellez is a 80 y.o. female with the following history as recorded in [facility]:  Patient Active Problem List    Diagnosis Date Noted    Accelerated ventricular rhythm ([facility] 09/21/2018    Left hand weakness 49/84/6733    Diastolic CHF ([facility] 02/25/4541    Admitted for acute congestive heart failure ([facility] 04/11/2019    Acute bacterial sinusitis 12/18/2018    Acute diastolic CHF (congestive heart failure) ([facility] 11/20/2018    Musculoskeletal back pain 09/22/2018    Uncontrolled hypertension 09/22/2018    [facility]stable angina ([facility])     CKD (chronic kidney disease) stage 3, GFR 30-59 ml/min ([facility]) 01/11/2018    Acute kidney injury ([facility] 01/11/2018    Unstable angina pectoris ([facility]     Coronary artery disease involving native coronary artery of native heart with unstable angina pectoris ([facility]     NSTEMI (non-ST elevated myocardial infarction) ([facility]     Hyperlipidemia LDL goal <70     Diabetes mellitus ([facility] 10/10/2013    Hypothyroidism     MI (mitral incompetence)     Pulmonary hypertension (HCC)     Chronic diastolic CHF (congestive heart failure), NYHA class 2 ([facility] 09/06/2012     Current Outpatient Medications   Medication Sig Dispense Refill    levothyroxine (SYNTHROID) 137 MCG tablet TAKE ONE TABLET BY MOUTH DAILY 30 tablet 0    allopurinol (ZYLOPRIM) 100 MG tablet TAKE ONE TABLET BY MOUTH DAILY 30 tablet 0    glimepiride (AMARYL) 4 MG tablet TAKE ONE TABLET BY MOUTH TWICE A DAY BEFORE A MEAL 60 tablet 0    torsemide (DEMADEX) 20 MG tablet TAKE ONE TABLET BY MOUTH DAILY -MAY TAKE AN EXTRA TABLET IF NEEDED IF FOR SHORTNESS OF BREATH OR WEIGHT GAIN 135 tablet 2    clopidogrel (PLAVIX) 75 MG tablet TAKE ONE TABLET BY MOUTH DAILY 90 tablet 4    NIFEdipine (PROCARDIA XL) 30 MG extended release tablet TAKE ONE TABLET BY MOUTH DAILY 90 tablet 3    ezetimibe (ZETIA) 10 MG tablet Take 1 tablet by mouth daily 90 tablet 3    aspirin 81 MG EC tablet Take 1 tablet by mouth daily 30 tablet 0    glucose blood VI test strips (ASCENSIA AUTODISC VI;ONE TOUCH ULTRA TEST VI) strip 1 each by In Vitro route daily As needed. 100 each 3     No current facility-administered medications for this visit.      Allergies: Hydrocodone-acetaminophen, Rosuvastatin, and Morphine  Past Medical History:   Diagnosis Date    CAD (coronary artery disease)     CHF (congestive heart failure) (HCC)     Chronic kidney disease     DM2 [facility]diabetes mellitus, type 2) ([facility])     HTN (hypertension)     Hypothyroidism     MI (mitral incompetence)     Over weight     [facility] HTN [facility]     PVD (peripheral vascular disease) ([facility]     Uterine cancer ([facility]      Past Surgical History:   Procedure Laterality Date    CATARACT REMOVAL WITH IMPLANT Bilateral     CORONARY ANGIOPLASTY WITH STENT PLACEMENT Right 9/9/2015    At [facility] WITH STENT PLACEMENT  01/2018    ILANA to RCA and POBA on ISR of LAD    HERNIA REPAIR      HYSTERECTOMY       Family History   Problem Relation Age of Onset    Diabetes Mother     Heart Disease Father     Cancer Sister         uterine    Heart Disease Maternal Grandmother     Heart Disease Maternal Grandfather     Heart Disease Paternal Grandmother     Heart Disease Paternal Grandfather      Social History     Tobacco Use    Smoking status: Former Smoker     Packs/day: 0.70     Years: 40.00     Pack years: 28.00     Types: Cigarettes     Quit date: 2/1/2004     Years since Coloration: Skin is not jaundiced. Neurological:      General: No focal deficit present. Mental Status: She is alert and oriented to person, place, and time. Psychiatric:         Mood and Affect: Mood normal.         Thought Content:  Thought content normal.         Judgment: Judgment normal.                  An electronic signature was used to authenticate this note.    --Wanda Jerry, DO

## 2021-09-08 NOTE — PROGRESS NOTES
Immunization History   Administered Date(s) Administered    Influenza 10/10/2013    Influenza, High Dose (Fluzone 65 yrs and older) 10/10/2014, 01/14/2016, 01/20/2017, 09/25/2017    Influenza, [place], 6 mo and older, IM, PF (Flulaval, Fluarix) 09/21/2018    Influenza, Quadv, adjuvanted, 65 yrs +, IM, PF (Fluad) 11/11/2020    Pneumococcal Conjugate 13-valent ([id number]) 01/20/2017    Pneumococcal Polysaccharide ([id number]) 04/17/2019

## 2021-11-19 RX ORDER — GLIMEPIRIDE 4 MG/1
TABLET ORAL
Qty: 60 TABLET | Refills: 0 | Status: SHIPPED | OUTPATIENT
Start: 2021-11-19 | End: 2021-12-21

## 2021-11-19 RX ORDER — LEVOTHYROXINE SODIUM 137 UG/1
TABLET ORAL
Qty: 30 TABLET | Refills: 0 | Status: SHIPPED | OUTPATIENT
Start: 2021-11-19 | End: 2021-12-21

## 2021-11-22 DIAGNOSIS — E03.9 ACQUIRED HYPOTHYROIDISM: ICD-10-CM

## 2021-11-22 DIAGNOSIS — I25.10 CORONARY ARTERY DISEASE INVOLVING NATIVE CORONARY ARTERY OF NATIVE HEART WITHOUT ANGINA PECTORIS: ICD-10-CM

## 2021-11-22 DIAGNOSIS — E78.5 HYPERLIPIDEMIA LDL GOAL <70: ICD-10-CM

## 2021-11-22 DIAGNOSIS — I50.32 CHRONIC DIASTOLIC CHF (CONGESTIVE HEART FAILURE), NYHA CLASS 2 (HCC): ICD-10-CM

## 2021-11-23 DIAGNOSIS — E03.9 ACQUIRED HYPOTHYROIDISM: Primary | ICD-10-CM

## 2021-11-23 LAB
ALT SERPL-CCNC: 13 U/L (ref 10–40)
ANION GAP SERPL CALCULATED.3IONS-SCNC: 15 MMOL/L (ref 3–16)
AST SERPL-CCNC: 13 U/L (ref 15–37)
BUN BLDV-MCNC: 34 MG/DL (ref 7–20)
CALCIUM SERPL-MCNC: 9 MG/DL (ref 8.3–10.6)
CHLORIDE BLD-SCNC: 103 MMOL/L (ref 99–110)
CHOLESTEROL, FASTING: 209 MG/DL (ref 0–199)
CO2: 25 MMOL/L (ref 21–32)
CREAT SERPL-MCNC: 1.8 MG/DL (ref 0.6–1.2)
GFR AFRICAN AMERICAN: 33
GFR NON-AFRICAN AMERICAN: 27
GLUCOSE BLD-MCNC: 140 MG/DL (ref 70–99)
HDLC SERPL-MCNC: 65 MG/DL (ref 40–60)
LDL CHOLESTEROL CALCULATED: 122 MG/DL
POTASSIUM SERPL-SCNC: 4.8 MMOL/L (ref 3.5–5.1)
SODIUM BLD-SCNC: 143 MMOL/L (ref 136–145)
TRIGLYCERIDE, FASTING: 111 MG/DL (ref 0–150)
TSH SERPL DL<=0.05 MIU/L-ACNC: 19.1 UIU/ML (ref 0.27–4.2)
VLDLC SERPL CALC-MCNC: 22 MG/DL

## 2021-12-06 DIAGNOSIS — I10 ESSENTIAL HYPERTENSION: ICD-10-CM

## 2021-12-06 RX ORDER — ALLOPURINOL 100 MG/1
TABLET ORAL
Qty: 30 TABLET | Refills: 0 | Status: SHIPPED | OUTPATIENT
Start: 2021-12-06 | End: 2022-01-21 | Stop reason: SDUPTHER

## 2021-12-09 RX ORDER — EZETIMIBE 10 MG/1
TABLET ORAL
Qty: 90 TABLET | Refills: 3 | Status: SHIPPED | OUTPATIENT
Start: 2021-12-09

## 2021-12-09 RX ORDER — NIFEDIPINE 30 MG/1
TABLET, EXTENDED RELEASE ORAL
Qty: 90 TABLET | Refills: 3 | Status: SHIPPED | OUTPATIENT
Start: 2021-12-09

## 2021-12-09 RX ORDER — CLOPIDOGREL BISULFATE 75 MG/1
TABLET ORAL
Qty: 90 TABLET | Refills: 4 | Status: SHIPPED | OUTPATIENT
Start: 2021-12-09

## 2021-12-21 RX ORDER — GLIMEPIRIDE 4 MG/1
TABLET ORAL
Qty: 60 TABLET | Refills: 0 | Status: SHIPPED | OUTPATIENT
Start: 2021-12-21 | End: 2022-01-21 | Stop reason: SDUPTHER

## 2021-12-21 RX ORDER — LEVOTHYROXINE SODIUM 137 UG/1
TABLET ORAL
Qty: 30 TABLET | Refills: 0 | Status: SHIPPED | OUTPATIENT
Start: 2021-12-21 | End: 2022-02-14 | Stop reason: SDUPTHER

## 2022-01-07 ENCOUNTER — TELEPHONE (OUTPATIENT)
Dept: FAMILY MEDICINE CLINIC | Age: 82
End: 2022-01-07

## 2022-01-07 ENCOUNTER — VIRTUAL VISIT (OUTPATIENT)
Dept: FAMILY MEDICINE CLINIC | Age: 82
End: 2022-01-07
Payer: MEDICARE

## 2022-01-07 DIAGNOSIS — B96.89 ACUTE BACTERIAL SINUSITIS: Primary | ICD-10-CM

## 2022-01-07 DIAGNOSIS — J01.90 ACUTE BACTERIAL SINUSITIS: Primary | ICD-10-CM

## 2022-01-07 PROCEDURE — 99442 PR PHYS/QHP TELEPHONE EVALUATION 11-20 MIN: CPT | Performed by: INTERNAL MEDICINE

## 2022-01-07 RX ORDER — CEFUROXIME AXETIL 250 MG/1
250 TABLET ORAL 2 TIMES DAILY
Qty: 20 TABLET | Refills: 0 | Status: SHIPPED | OUTPATIENT
Start: 2022-01-07 | End: 2022-01-07

## 2022-01-07 ASSESSMENT — ENCOUNTER SYMPTOMS
ABDOMINAL PAIN: 0
RHINORRHEA: 1
APNEA: 0
COUGH: 1
SINUS PRESSURE: 1
SHORTNESS OF BREATH: 0
SINUS PAIN: 1

## 2022-01-07 NOTE — PROGRESS NOTES
Canelo Dunbar is a 80 y.o. female evaluated via telephone on 1/7/2022. Consent:  She and/or health care decision maker is aware that that she may receive a bill for this telephone service, depending on her insurance coverage, and has provided verbal consent to proceed: Yes  Chief Complaint   Patient presents with    Sinusitis     telephone visit: [phone]. patient c/o productive cough with \"spasms\" since 12/31/2021; ? low grade fever, sore throat, chest congestion. patient tested negative for Covid-19 on 12/31/2021.   patient has taken OTC Mucinex     Canelo Dunbar is a 80 y.o. female with the following history as [facility] in [facility]:  Patient Active Problem List    Diagnosis Date Noted    Accelerated ventricular rhythm ([facility] 09/21/2018    Left hand weakness 79/79/7749    Diastolic CHF ([address] 64/61/0580    Admitted for acute congestive heart failure ([facility] 04/11/2019    Acute bacterial sinusitis 12/18/2018    Acute diastolic CHF (congestive heart failure) ([address] 11/20/2018    Musculoskeletal back pain 09/22/2018    Uncontrolled hypertension 09/22/2018    Unstable angina ([facility])     CKD (chronic kidney disease) stage 3, GFR 30-59 ml/min ([facility]) 01/11/2018    Acute kidney injury ([address] 01/11/2018    Unstable angina pectoris ([address]     Coronary artery disease involving native coronary artery of native heart with unstable angina pectoris ([facility])     NSTEMI (non-ST elevated myocardial infarction) ([address]     Hyperlipidemia LDL goal <70     Diabetes mellitus ([address] 10/10/2013    Hypothyroidism     MI (mitral incompetence)     Pulmonary hypertension (HCC)     Chronic diastolic CHF (congestive heart failure), NYHA class 2 ([address] 09/06/2012     Current Outpatient Medications   Medication Sig Dispense Refill    levothyroxine (SYNTHROID) 137 MCG tablet TAKE ONE TABLET BY MOUTH DAILY 30 tablet 0    glimepiride (AMARYL) 4 MG tablet TAKE ONE TABLET BY MOUTH TWICE A DAY BEFORE A MEAL 60 tablet 0    ezetimibe (ZETIA) 10 MG tablet TAKE ONE TABLET BY MOUTH DAILY 90 tablet 3    NIFEdipine (PROCARDIA XL) 30 MG extended release tablet TAKE ONE TABLET BY MOUTH DAILY 90 tablet 3    clopidogrel (PLAVIX) 75 MG tablet TAKE ONE TABLET BY MOUTH DAILY 90 tablet 4    allopurinol (ZYLOPRIM) 100 MG tablet TAKE ONE TABLET BY MOUTH DAILY 30 tablet 0    torsemide (DEMADEX) 20 MG tablet TAKE ONE TABLET BY MOUTH DAILY -MAY TAKE AN EXTRA TABLET IF NEEDED IF FOR SHORTNESS OF BREATH OR WEIGHT GAIN 135 tablet 2    aspirin 81 MG EC tablet Take 1 tablet by mouth daily 30 tablet 0    glucose blood VI test strips (ASCENSIA AUTODISC VI;ONE TOUCH ULTRA TEST VI) strip 1 each by In Vitro route daily As needed. 100 each 3     No current facility-administered medications for this visit.      Allergies: Hydrocodone-acetaminophen, Rosuvastatin, and Morphine  Past Medical History:   Diagnosis Date    CAD (coronary artery disease)     [facility] failure) [facility]     Chronic kidney disease     DM2 (diabetes mellitus, type 2) ([facility])     HTN (hypertension)     Hypothyroidism     MI (mitral incompetence)     Over weight     [facility]monary HTN ([facility]     PVD (peripheral vascular disease) ([facility]     Uterine cancer ([facility]      Past Surgical History:   Procedure Laterality Date    CATARACT REMOVAL WITH IMPLANT Bilateral     CORONARY ANGIOPLASTY WITH STENT PLACEMENT Right 9/9/2015    At [facility] WITH STENT PLACEMENT  01/2018    ILANA to RCA and POBA on ISR of LAD    HERNIA REPAIR      HYSTERECTOMY       Family History   Problem Relation Age of Onset    Diabetes Mother     Heart Disease Father     Cancer Sister         uterine    Heart Disease Maternal Grandmother     Heart Disease Maternal Grandfather     Heart Disease Paternal Grandmother     Heart Disease Paternal Grandfather      Social History     Tobacco Use    Smoking status: Former Smoker     Packs/day: 0.70     Years: 40.00     Pack years: 28.00 Types: Cigarettes     Quit date: 2004     Years since quittin.9    Smokeless tobacco: Never Used   Substance Use Topics    Alcohol use: No   Review of Systems   Constitutional: Negative for chills, diaphoresis and fatigue. HENT: Positive for congestion, postnasal drip, rhinorrhea, sinus pressure and sinus pain. Respiratory: Positive for cough. Negative for apnea and shortness of breath. Cardiovascular: Negative for chest pain and palpitations. Gastrointestinal: Negative for abdominal pain. Genitourinary: Negative for dysuria, frequency and hematuria. Neurological: Negative for dizziness and headaches. Documentation:  I communicated with the patient and/or health care decision maker about   Chief Complaint   Patient presents with    Sinusitis     telephone visit: [phone]. patient c/o productive cough with \"spasms\" since 2021; ? low grade fever, sore throat, chest congestion. patient tested negative for Covid-19 on 2021. patient has taken OTC Mucinex   .    Details of this discussion including any medical advice provided:   Outpatient Encounter Medications as of 2022   Medication Sig Dispense Refill    cefUROXime (CEFTIN) 250 MG tablet Take 1 tablet by mouth 2 times daily for 10 days 20 tablet 0    levothyroxine (SYNTHROID) 137 MCG tablet TAKE ONE TABLET BY MOUTH DAILY 30 tablet 0    glimepiride (AMARYL) 4 MG tablet TAKE ONE TABLET BY MOUTH TWICE A DAY BEFORE A MEAL 60 tablet 0    ezetimibe (ZETIA) 10 MG tablet TAKE ONE TABLET BY MOUTH DAILY 90 tablet 3    NIFEdipine (PROCARDIA XL) 30 MG extended release tablet TAKE ONE TABLET BY MOUTH DAILY 90 tablet 3    clopidogrel (PLAVIX) 75 MG tablet TAKE ONE TABLET BY MOUTH DAILY 90 tablet 4    allopurinol (ZYLOPRIM) 100 MG tablet TAKE ONE TABLET BY MOUTH DAILY 30 tablet 0    torsemide (DEMADEX) 20 MG tablet TAKE ONE TABLET BY MOUTH DAILY -MAY TAKE AN EXTRA TABLET IF NEEDED IF FOR SHORTNESS OF BREATH OR WEIGHT GAIN 135 tablet 2    aspirin 81 MG EC tablet Take 1 tablet by mouth daily 30 tablet 0    glucose blood VI test strips (ASCENSIA AUTODISC VI;ONE TOUCH ULTRA TEST VI) strip 1 each by In Vitro route daily As needed. 100 each 3     No facility-administered encounter medications on file as of 1/7/2022. No orders of the defined types were placed in this encounter. mucinex        I affirm this is a Patient Initiated Episode with a Patient who has not had a related appointment within my department in the past 7 days or scheduled within the next 24 hours. Patient identification was verified at the start of the visit: Yes    Total Time: minutes: 11-20 minutes    The visit was conducted pursuant to the emergency declaration under the [address] [place] [address], [address] [place] [address] authority and the [employer] Act. Patient identification was verified, and a caregiver was present when appropriate. The patient was located in a state where the provider was credentialed to provide care.     Note: not billable if this call serves to triage the patient into an appointment for the relevant concern      Sergio Hayes DO

## 2022-01-07 NOTE — PATIENT INSTRUCTIONS
Thank you for choosing [facility]. Please bring a current list of medications to every appointment. Please contact your pharmacy for any prescription refill(s) that you are requesting.

## 2022-01-07 NOTE — TELEPHONE ENCOUNTER
----- Message from Brunilda Oconnell MA sent at 1/7/2022  1:02 PM EST -----  Subject: Appointment Request    Reason for Call: Urgent Cough Cold    QUESTIONS  Type of Appointment? Established Patient  Reason for appointment request? No appointments available during search  Additional Information for Provider? Cough, fever 7 days. Taking vitamins   and mucinex. Stopped Mucinex. [place] W[place]. NK[facility]. [address]? [address] - P [phone] - F [phone]  ---------------------------------------------------------------------------  --------------  Deepthi RUIZ  What is the best way for the office to contact you? OK to leave message on   voicemail  Preferred Call Back Phone Number? [phone]  ---------------------------------------------------------------------------  --------------  SCRIPT ANSWERS  Relationship to Patient? Self  Are you currently unable to finish sentences due to any difficulty   breathing? No  Are you unable to swallow liquids? No  Are you having fevers (100.4 or greater), chills, or sweats? Yes   Have you been diagnosed with, awaiting test results for, or told that you   are suspected of having COVID-19 (Coronavirus)? (If patient has tested   negative or was tested as a requirement for work, school, or travel and   not based on symptoms, answer no)? No  Within the past two weeks have you developed any of the following symptoms   (answer no if symptoms have been present longer than 2 weeks or began   more than 2 weeks ago)? Fever or Chills, Cough, Shortness of breath or   difficulty breathing, Loss of taste or smell, Sore throat, Nasal   congestion, Sneezing or runny nose, Fatigue or generalized body aches   (answer no if pain is specific to a body part e.g. back pain), Diarrhea,   Headache?  Yes

## 2022-01-19 RX ORDER — GLIMEPIRIDE 4 MG/1
TABLET ORAL
Qty: 60 TABLET | Refills: 5 | OUTPATIENT
Start: 2022-01-19

## 2022-01-19 RX ORDER — ALLOPURINOL 100 MG/1
100 TABLET ORAL DAILY
Qty: 30 TABLET | Refills: 5 | OUTPATIENT
Start: 2022-01-19

## 2022-01-21 ENCOUNTER — TELEPHONE (OUTPATIENT)
Dept: FAMILY MEDICINE CLINIC | Age: 82
End: 2022-01-21

## 2022-01-21 RX ORDER — ALLOPURINOL 100 MG/1
TABLET ORAL
Qty: 30 TABLET | Refills: 3 | Status: SHIPPED | OUTPATIENT
Start: 2022-01-21 | End: 2022-04-26

## 2022-01-21 RX ORDER — GLIMEPIRIDE 4 MG/1
TABLET ORAL
Qty: 60 TABLET | Refills: 3 | Status: SHIPPED | OUTPATIENT
Start: 2022-01-21 | End: 2022-04-26

## 2022-01-21 NOTE — TELEPHONE ENCOUNTER
Patient calling for refills on allopurinol 100 mg & glimepiride 4 mg to [facility].       LOV  1/7/22      Please call, [phone]

## 2022-02-11 DIAGNOSIS — E78.5 HYPERLIPIDEMIA LDL GOAL <70: ICD-10-CM

## 2022-02-11 DIAGNOSIS — E03.9 ACQUIRED HYPOTHYROIDISM: ICD-10-CM

## 2022-02-11 LAB
CHOLESTEROL, TOTAL: 199 MG/DL (ref 0–199)
HDLC SERPL-MCNC: 60 MG/DL (ref 40–60)
LDL CHOLESTEROL CALCULATED: 111 MG/DL
TRIGL SERPL-MCNC: 138 MG/DL (ref 0–150)
TSH SERPL DL<=0.05 MIU/L-ACNC: 10.71 UIU/ML (ref 0.27–4.2)
VLDLC SERPL CALC-MCNC: 28 MG/DL

## 2022-02-15 RX ORDER — LEVOTHYROXINE SODIUM 137 UG/1
TABLET ORAL
Qty: 30 TABLET | Refills: 5 | Status: SHIPPED
Start: 2022-02-15 | End: 2022-03-11 | Stop reason: DRUGHIGH

## 2022-03-11 DIAGNOSIS — E03.9 ACQUIRED HYPOTHYROIDISM: Primary | ICD-10-CM

## 2022-03-11 RX ORDER — LEVOTHYROXINE SODIUM 0.15 MG/1
150 TABLET ORAL DAILY
Qty: 30 TABLET | Refills: 3 | Status: SHIPPED | OUTPATIENT
Start: 2022-03-11 | End: 2022-07-18

## 2022-04-26 RX ORDER — GLIMEPIRIDE 4 MG/1
TABLET ORAL
Qty: 180 TABLET | Refills: 1 | Status: SHIPPED | OUTPATIENT
Start: 2022-04-26 | End: 2022-11-04 | Stop reason: SDUPTHER

## 2022-04-26 RX ORDER — ALLOPURINOL 100 MG/1
TABLET ORAL
Qty: 90 TABLET | Refills: 1 | Status: SHIPPED | OUTPATIENT
Start: 2022-04-26 | End: 2022-11-04 | Stop reason: SDUPTHER

## 2022-06-06 RX ORDER — TORSEMIDE 20 MG/1
TABLET ORAL
Qty: 135 TABLET | Refills: 2 | Status: SHIPPED | OUTPATIENT
Start: 2022-06-06

## 2022-07-13 ENCOUNTER — OFFICE VISIT (OUTPATIENT)
Dept: ENT CLINIC | Age: 82
End: 2022-07-13
Payer: MEDICARE

## 2022-07-13 ENCOUNTER — OFFICE VISIT (OUTPATIENT)
Dept: FAMILY MEDICINE CLINIC | Age: 82
End: 2022-07-13
Payer: MEDICARE

## 2022-07-13 VITALS
BODY MASS INDEX: 33.49 KG/M2 | SYSTOLIC BLOOD PRESSURE: 128 MMHG | HEIGHT: 65 IN | TEMPERATURE: 97.3 F | DIASTOLIC BLOOD PRESSURE: 82 MMHG | WEIGHT: 201 LBS

## 2022-07-13 VITALS
HEART RATE: 79 BPM | DIASTOLIC BLOOD PRESSURE: 61 MMHG | SYSTOLIC BLOOD PRESSURE: 125 MMHG | WEIGHT: 200 LBS | BODY MASS INDEX: 33.28 KG/M2

## 2022-07-13 DIAGNOSIS — R49.0 DYSPHONIA: Primary | ICD-10-CM

## 2022-07-13 DIAGNOSIS — R06.03 RESPIRATORY DISTRESS: ICD-10-CM

## 2022-07-13 DIAGNOSIS — J01.90 ACUTE NON-RECURRENT SINUSITIS, UNSPECIFIED LOCATION: ICD-10-CM

## 2022-07-13 DIAGNOSIS — R49.9 HOARSENESS OR CHANGING VOICE: Primary | ICD-10-CM

## 2022-07-13 DIAGNOSIS — J98.8 AIRWAY OBSTRUCTION: ICD-10-CM

## 2022-07-13 PROCEDURE — 1123F ACP DISCUSS/DSCN MKR DOCD: CPT | Performed by: INTERNAL MEDICINE

## 2022-07-13 PROCEDURE — G8400 PT W/DXA NO RESULTS DOC: HCPCS | Performed by: OTOLARYNGOLOGY

## 2022-07-13 PROCEDURE — 1036F TOBACCO NON-USER: CPT | Performed by: OTOLARYNGOLOGY

## 2022-07-13 PROCEDURE — 99213 OFFICE O/P EST LOW 20 MIN: CPT | Performed by: INTERNAL MEDICINE

## 2022-07-13 PROCEDURE — 1090F PRES/ABSN URINE INCON ASSESS: CPT | Performed by: OTOLARYNGOLOGY

## 2022-07-13 PROCEDURE — 99204 OFFICE O/P NEW MOD 45 MIN: CPT | Performed by: OTOLARYNGOLOGY

## 2022-07-13 PROCEDURE — 31575 DIAGNOSTIC LARYNGOSCOPY: CPT | Performed by: OTOLARYNGOLOGY

## 2022-07-13 PROCEDURE — G8417 CALC BMI ABV UP PARAM F/U: HCPCS | Performed by: INTERNAL MEDICINE

## 2022-07-13 PROCEDURE — G8417 CALC BMI ABV UP PARAM F/U: HCPCS | Performed by: OTOLARYNGOLOGY

## 2022-07-13 PROCEDURE — 1090F PRES/ABSN URINE INCON ASSESS: CPT | Performed by: INTERNAL MEDICINE

## 2022-07-13 PROCEDURE — 1123F ACP DISCUSS/DSCN MKR DOCD: CPT | Performed by: OTOLARYNGOLOGY

## 2022-07-13 PROCEDURE — G8427 DOCREV CUR MEDS BY ELIG CLIN: HCPCS | Performed by: INTERNAL MEDICINE

## 2022-07-13 PROCEDURE — G8400 PT W/DXA NO RESULTS DOC: HCPCS | Performed by: INTERNAL MEDICINE

## 2022-07-13 PROCEDURE — 1036F TOBACCO NON-USER: CPT | Performed by: INTERNAL MEDICINE

## 2022-07-13 PROCEDURE — G8427 DOCREV CUR MEDS BY ELIG CLIN: HCPCS | Performed by: OTOLARYNGOLOGY

## 2022-07-13 RX ORDER — DOXYCYCLINE HYCLATE 100 MG
100 TABLET ORAL 2 TIMES DAILY
Qty: 14 TABLET | Refills: 0 | Status: ON HOLD
Start: 2022-07-13 | End: 2022-07-22 | Stop reason: HOSPADM

## 2022-07-13 ASSESSMENT — PATIENT HEALTH QUESTIONNAIRE - PHQ9
1. LITTLE INTEREST OR PLEASURE IN DOING THINGS: 0
SUM OF ALL RESPONSES TO PHQ QUESTIONS 1-9: 0
SUM OF ALL RESPONSES TO PHQ QUESTIONS 1-9: 0
2. FEELING DOWN, DEPRESSED OR HOPELESS: 0
SUM OF ALL RESPONSES TO PHQ QUESTIONS 1-9: 0
SUM OF ALL RESPONSES TO PHQ QUESTIONS 1-9: 0
SUM OF ALL RESPONSES TO PHQ9 QUESTIONS 1 & 2: 0

## 2022-07-13 ASSESSMENT — ENCOUNTER SYMPTOMS
COUGH: 0
ABDOMINAL PAIN: 0
RHINORRHEA: 0
APNEA: 0
SHORTNESS OF BREATH: 0

## 2022-07-13 NOTE — PROGRESS NOTES
Manohar Sawyer (:  1940) is a 80 y.o. female,Established patient, here for evaluation of the following chief complaint(s):  Pharyngitis (throat wants to tighten up x 1 week)  present when lying down . Also has had change in voice for several weeks        ASSESSMENT/PLAN:   Diagnosis Orders   1. Hoarseness or changing voice     2. Airway obstruction     appt made with ENT for today .          Subjective   SUBJECTIVE/OBJECTIVE:  HPI   Chief Complaint   Patient presents with    Pharyngitis     throat wants to tighten up x 1 week     Manohar Sawyer is a 80 y.o. female with the following history as recorded in [facility]:  Patient Active Problem List    Diagnosis Date Noted    Accelerated ventricular rhythm ([address] 2018    Left hand weakness 2020    Essential hypertension 2020    Renal failure 2020    Hyperglycemia 2020    Abdominal pain 2020    Ventral hernia 2020    Nausea and vomiting 2020    Diarrhea 2020    Non-cardiac chest pain 2020    Respiratory failure with hypoxia ([address] 2020    Chronic diastolic congestive heart failure (HCC)     Diastolic CHF ([address]     Admitted for acute congestive heart failure ([address] 2019    Acute bacterial sinusitis 2018    Acute diastolic CHF (congestive heart failure) ([address] 2018    Musculoskeletal back pain 2018    Uncontrolled hypertension 2018    Unstable angina (HCC)     CKD (chronic kidney disease) stage 3, GFR 30-59 ml/min ([facility] 2018    Acute kidney injury ([address] 2018    Unstable angina pectoris ([address]     Coronary artery disease involving native coronary artery of native heart with unstable angina pectoris ([address]     NSTEMI (non-ST elevated myocardial infarction) ([address]     Hyperlipidemia LDL goal <70     Diabetes mellitus ([address] 10/10/2013    Hypothyroidism     MI (mitral incompetence)     Pulmonary hypertension (HCC)     Chronic diastolic CHF (congestive heart failure), NYHA class 2 ([facility]) 09/06/2012     Current Outpatient Medications   Medication Sig Dispense Refill    torsemide (DEMADEX) 20 MG tablet TAKE ONE TABLET BY MOUTH DAILY -MAY TAKE AN EXTRA TABLET IF NEEDED FOR SHORTNESS OF BREATH OR WEIGHT GAIN 135 tablet 2    glimepiride (AMARYL) 4 MG tablet TAKE ONE TABLET BY MOUTH TWICE A DAY BEFORE A MEAL 180 tablet 1    allopurinol (ZYLOPRIM) 100 MG tablet TAKE ONE TABLET BY MOUTH DAILY 90 tablet 1    levothyroxine (SYNTHROID) 150 MCG tablet Take 1 tablet by mouth daily 30 tablet 3    ezetimibe (ZETIA) 10 MG tablet TAKE ONE TABLET BY MOUTH DAILY 90 tablet 3    NIFEdipine (PROCARDIA XL) 30 MG extended release tablet TAKE ONE TABLET BY MOUTH DAILY 90 tablet 3    clopidogrel (PLAVIX) 75 MG tablet TAKE ONE TABLET BY MOUTH DAILY 90 tablet 4    aspirin 81 MG EC tablet Take 1 tablet by mouth daily 30 tablet 0    carvedilol (COREG) 12.5 MG tablet Take 12.5 mg by mouth 2 times daily (with meals)      furosemide (LASIX) 20 MG tablet Take 60 mg by mouth daily      glimepiride (AMARYL) 4 MG tablet Take 8 mg by mouth every morning (before breakfast)       NIFEdipine (ADALAT CC) 30 MG extended release tablet Take 30 mg by mouth daily      glucose blood VI test strips (ASCENSIA AUTODISC VI;ONE TOUCH ULTRA TEST VI) strip 1 each by In Vitro route daily As needed. 100 each 3     No current facility-administered medications for this visit.      Allergies: Hydrocodone-acetaminophen, Pcn [penicillins], Rosuvastatin, and Morphine  Past Medical History:   Diagnosis Date    CAD (coronary artery disease)     CHF (congestive heart failure) ([facility])     Chronic kidney disease     DM2 (diabetes mellitus, type 2) ([facility])     HTN (hypertension)     Hypothyroidism     MI (mitral incompetence)     Over weight     Pulmonary HTN ([address]     PVD (peripheral vascular disease) ([facility])     Uterine cancer ([facility]      Past Surgical History:   Procedure Laterality Date  CATARACT EXTRACTION W/  INTRAOCULAR LENS IMPLANT Bilateral     CORONARY ANGIOPLASTY WITH STENT PLACEMENT Right 2015    At [address] [facility] WITH STENT PLACEMENT  2018    ILANA to RCA and POBA on ISR of LAD    HERNIA REPAIR      HYSTERECTOMY       Family History   Problem Relation Age of Onset    Diabetes Mother     Heart Disease Father     Cancer Sister         uterine    Heart Disease Maternal Grandmother     Heart Disease Maternal Grandfather     Heart Disease Paternal Grandmother     Heart Disease Paternal Grandfather      Social History     Tobacco Use    Smoking status: Former Smoker     Packs/day: 0.70     Years: 40.00     Pack years: 28.00     Types: Cigarettes     Quit date: 2004     Years since quittin.4    Smokeless tobacco: Never Used   Substance Use Topics    Alcohol use: No       Review of Systems   Constitutional: Negative for chills, diaphoresis and fatigue. HENT: Negative for congestion, postnasal drip and rhinorrhea. Eyes: Negative for visual disturbance. Respiratory: Negative for apnea, cough and shortness of breath. Cardiovascular: Negative for chest pain. Gastrointestinal: Negative for abdominal pain. Genitourinary: Negative for dysuria and frequency. Neurological: Negative for dizziness. Objective   Physical Exam  Vitals and nursing note reviewed. Constitutional:       Appearance: Normal appearance. HENT:      Head: Normocephalic and atraumatic. Comments: Hoarse voice     Mouth/Throat:      Mouth: Mucous membranes are moist.      Pharynx: Oropharynx is clear. Eyes:      Pupils: Pupils are equal, round, and reactive to light. Cardiovascular:      Rate and Rhythm: Normal rate. Pulmonary:      Effort: Pulmonary effort is normal. No respiratory distress. Breath sounds: Normal breath sounds. Abdominal:      General: There is no distension. Tenderness: There is no abdominal tenderness.  There is no guarding. Skin:     Coloration: Skin is not jaundiced. Findings: No rash. Neurological:      Mental Status: She is alert.                   An electronic signature was used to authenticate this note.    --Cassie Cherry, DO

## 2022-07-13 NOTE — PROGRESS NOTES
[facility]      Patient Name: [facility] Record Number:  [id number]  Primary Care Physician:  Jud Marion DO  Date of Consultation: 7/13/2022    Chief Complaint: Trouble breathing        HISTORY Shalini Estrella is a(n) 80 y.o. female who presents for evaluation of breathing issues. The patient says over the last couple weeks she developed an issue with laying flat. She said she developed trouble breathing. She says it feels like an anxiety attack. She is never had this before. She also has hoarseness, but says that is been present for at least a year and comes and goes. She is never had surgery on her head and neck. She stopped smoking 20 years ago. She does not have issues swallowing. No neck masses noted. She is not coughed up blood or any other concerning symptoms. She does have a significant cardiac history. She does think that her nose has been draining more recently. Sinus infections on occasion            REVIEW OF SYSTEMS  As above  PHYSICAL EXAM  GENERAL: Mild hoarseness, no stridor  EYES: EOMI, Anti-icteric  HENT:   Head: Normocephalic and atraumatic. Face:  Symmetric, facial nerve intact, no sinus tenderness  Right Ear: Normal external ear, normal external auditory canal, intact tympanic membrane with normal mobility and aerated middle ear  Left Ear: Normal external ear, normal external auditory canal, intact tympanic membrane with normal mobility and aerated middle ear  Mouth/Oral Cavity:  normal lips, Uvula is midline, no mucosal lesions, no trismus  Oropharynx/Larynx:  normal oropharynx  Nose:Normal external nasal appearance.   Anterior rhinoscopy shows a rightward septal deviation with a bit of drainage on the right side  NECK: Normal range of motion, no thyromegaly, trachea is midline, no lymphadenopathy, no neck masses, no crepitus          RADIOLOGY  Summary of findings:  I reviewed his CTA from 2020.  I do not appreciate any neck masses at that time. She had minimal thyroid tissue. PROCEDURE  Flexible laryngoscopy  Afrin and lidocaine were applied to the left nasal cavity. A flexible scope was passed to left nasal cavity. Nasopharynx was normal.  Base of tongue and vallecula normal.  Vocal cords were completely normal.  She did have a bit of interarytenoid edema. No piriform masses. I can actually see the subglottis and proximal trachea are fairly well and there was no evidence of stenosis. ASSESSMENT/PLAN  1. Dysphonia  I am not positive was going with this patient. Her dysphonia has been present for a while and could be related to her vocal cord dysfunction or even reflux. Long-term we can have her work with our speech therapist after we determined that there is nothing more concerning going on with the breathing.  - CT SOFT TISSUE NECK WO CONTRAST; Future    2. Respiratory distress  Patient looked okay on my laryngoscopy. Given the hoarseness in her symptoms she was describing I was expecting some sort of upper airway obstruction, but I saw no evidence of this on laryngoscopy and I got a good view. I am going get a CT scan without contrast as she does have some renal failure based on her last labs. I tried to rule out any evidence of a large thyroid mass or may be a stenosis of the trachea further down than I can see. I think this is unlikely particularly with the CT scan that I reviewed from 2020. I would like to see her in 2 weeks for follow-up. If she has any additional trouble breathing I told her she needs to go to the emergency room for further evaluation. I think she more likely has something along the lines of VCD or anxiety. This could also be cardiopulmonary in nature given her significant cardiac history. You can get significant trouble breathing when lying flat with heart failure. - CT SOFT TISSUE NECK WO CONTRAST; Future    3.  Acute non-recurrent sinusitis, unspecified location  Subjectively the patient has a sinusitis I will going give her a week of doxycycline. I will hold off on any prednisone as she feels as though a lot of this is anxiety and it would certainly make that worse. In addition she does have diabetes. I have performed a head and neck physical exam personally or was physically present during the key or critical portions of the service. This note was generated completely or in part utilizing Dragon dictation speech recognition software. Occasionally, words are mistranscribed and despite editing, the text may contain inaccuracies due to incorrect word recognition. If further clarification is needed please contact the office at [phone].

## 2022-07-14 ENCOUNTER — OFFICE VISIT (OUTPATIENT)
Dept: ENT CLINIC | Age: 82
End: 2022-07-14
Payer: MEDICARE

## 2022-07-14 ENCOUNTER — HOSPITAL ENCOUNTER (OUTPATIENT)
Dept: CT IMAGING | Age: 82
Discharge: HOME | End: 2022-07-14
Payer: MEDICARE

## 2022-07-14 VITALS
WEIGHT: 200 LBS | HEART RATE: 73 BPM | SYSTOLIC BLOOD PRESSURE: 120 MMHG | BODY MASS INDEX: 33.32 KG/M2 | DIASTOLIC BLOOD PRESSURE: 74 MMHG | HEIGHT: 65 IN

## 2022-07-14 DIAGNOSIS — R49.0 DYSPHONIA: ICD-10-CM

## 2022-07-14 DIAGNOSIS — R06.03 RESPIRATORY DISTRESS: ICD-10-CM

## 2022-07-14 DIAGNOSIS — R49.0 DYSPHONIA: Primary | ICD-10-CM

## 2022-07-14 PROCEDURE — G8400 PT W/DXA NO RESULTS DOC: HCPCS | Performed by: OTOLARYNGOLOGY

## 2022-07-14 PROCEDURE — G8427 DOCREV CUR MEDS BY ELIG CLIN: HCPCS | Performed by: OTOLARYNGOLOGY

## 2022-07-14 PROCEDURE — 70490 CT SOFT TISSUE NECK W/O DYE: CPT

## 2022-07-14 PROCEDURE — 99213 OFFICE O/P EST LOW 20 MIN: CPT | Performed by: OTOLARYNGOLOGY

## 2022-07-14 PROCEDURE — G8417 CALC BMI ABV UP PARAM F/U: HCPCS | Performed by: OTOLARYNGOLOGY

## 2022-07-14 PROCEDURE — 1090F PRES/ABSN URINE INCON ASSESS: CPT | Performed by: OTOLARYNGOLOGY

## 2022-07-14 PROCEDURE — 1123F ACP DISCUSS/DSCN MKR DOCD: CPT | Performed by: OTOLARYNGOLOGY

## 2022-07-14 PROCEDURE — 1036F TOBACCO NON-USER: CPT | Performed by: OTOLARYNGOLOGY

## 2022-07-14 NOTE — PROGRESS NOTES
[address] [facility] SURGERY  Follow up      [facility] Record Number:  [id number]  Primary Care Physician:  Shorty Zhu DO  Date of Consultation: 7/14/2022    Chief Complaint respiratory distress         Interval History  Patient following up for respiratory distress. I saw her yesterday and I did not appreciate any upper airway obstruction with laryngoscopy. She did however have hoarseness and a lot of her symptoms were concerning for this so I went ahead and ordered a CT scan. Patient says she did have an episode when getting down with a CT scan today. REVIEW OF SYSTEMS    As above  PHYSICAL EXAM  GENERAL: Fair amount of hoarseness, no stridor  EYES: EOMI, Anti-icteric  HENT:   Head: Normocephalic and atraumatic. Face:  Symmetric, facial nerve intact, no sinus tenderness  Right Ear: Normal external ear  Left Ear: Normal external ear      RADIOLOGY  Summary of findings:  I reviewed the patient's CT scan. No contrast was used given her elevated creatinine. I do not appreciate any upper airway mass or lesion. She has minimal thyroid tissue. She has somewhat of a thick esophagus with a history of reflux. ASSESSMENT/PLAN  1. Dysphonia  The patient is certainly hoarse but I do not appreciate any evidence of upper airway mass on my exam with a CT scan. I would like for her to see our speech therapist.  Certainly we can further work-up the hoarseness at least.  She could have a degree of vocal cord dysfunction that is causing some of her respiratory distress. She did have a somewhat thickened esophagus and history of reflux. Would consider GI referral long-term after respiratory distress is under control. 2. Respiratory distress  I worry this is more cardiopulmonary in nature versus anxiety with vocal cord dysfunction.   Recommend she follow-up with her primary care doctor and our speech therapist.             I have

## 2022-07-18 RX ORDER — LEVOTHYROXINE SODIUM 0.15 MG/1
TABLET ORAL
Qty: 30 TABLET | Refills: 3 | Status: SHIPPED | OUTPATIENT
Start: 2022-07-18 | End: 2022-11-04 | Stop reason: SDUPTHER

## 2022-07-19 ENCOUNTER — HOSPITAL ENCOUNTER (INPATIENT)
Age: 82
LOS: 3 days | Discharge: HOME | DRG: 291 | End: 2022-07-22
Attending: EMERGENCY MEDICINE | Admitting: INTERNAL MEDICINE
Payer: MEDICARE

## 2022-07-19 ENCOUNTER — APPOINTMENT (OUTPATIENT)
Dept: NUCLEAR MEDICINE | Age: 82
DRG: 291 | End: 2022-07-19
Payer: MEDICARE

## 2022-07-19 ENCOUNTER — APPOINTMENT (OUTPATIENT)
Dept: GENERAL RADIOLOGY | Age: 82
DRG: 291 | End: 2022-07-19
Payer: MEDICARE

## 2022-07-19 DIAGNOSIS — R79.89 ELEVATED BRAIN NATRIURETIC PEPTIDE (BNP) LEVEL: ICD-10-CM

## 2022-07-19 DIAGNOSIS — I50.32 CHRONIC DIASTOLIC CONGESTIVE HEART FAILURE (HCC): ICD-10-CM

## 2022-07-19 DIAGNOSIS — R77.8 ELEVATED TROPONIN: ICD-10-CM

## 2022-07-19 DIAGNOSIS — I50.9 ACUTE CONGESTIVE HEART FAILURE, UNSPECIFIED HEART FAILURE TYPE (HCC): Primary | ICD-10-CM

## 2022-07-19 DIAGNOSIS — R06.01 ORTHOPNEA: ICD-10-CM

## 2022-07-19 DIAGNOSIS — R06.00 DYSPNEA, UNSPECIFIED TYPE: ICD-10-CM

## 2022-07-19 LAB
A/G RATIO: 1.1 (ref 1.1–2.2)
ALBUMIN SERPL-MCNC: 3.5 G/DL (ref 3.4–5)
ALP BLD-CCNC: 127 U/L (ref 40–129)
ALT SERPL-CCNC: 9 U/L (ref 10–40)
ANION GAP SERPL CALCULATED.3IONS-SCNC: 11 MMOL/L (ref 3–16)
AST SERPL-CCNC: 10 U/L (ref 15–37)
BASE EXCESS VENOUS: 2.7 MMOL/L
BASOPHILS ABSOLUTE: 0.1 K/UL (ref 0–0.2)
BASOPHILS RELATIVE PERCENT: 0.5 %
BILIRUB SERPL-MCNC: 0.4 MG/DL (ref 0–1)
BUN BLDV-MCNC: 32 MG/DL (ref 7–20)
CALCIUM SERPL-MCNC: 8.5 MG/DL (ref 8.3–10.6)
CARBOXYHEMOGLOBIN: 1.4 %
CHLORIDE BLD-SCNC: 100 MMOL/L (ref 99–110)
CO2: 25 MMOL/L (ref 21–32)
CREAT SERPL-MCNC: 1.7 MG/DL (ref 0.6–1.2)
D DIMER: 1.45 UG/ML FEU (ref 0–0.6)
EOSINOPHILS ABSOLUTE: 0.1 K/UL (ref 0–0.6)
EOSINOPHILS RELATIVE PERCENT: 0.8 %
GFR AFRICAN AMERICAN: 35
GFR NON-AFRICAN AMERICAN: 29
GLUCOSE BLD-MCNC: 166 MG/DL (ref 70–99)
GLUCOSE BLD-MCNC: 220 MG/DL (ref 70–99)
HCO3 VENOUS: 29 MMOL/L (ref 23–29)
HCT VFR BLD CALC: 37.9 % (ref 36–48)
HEMOGLOBIN: 12.7 G/DL (ref 12–16)
LYMPHOCYTES ABSOLUTE: 1.2 K/UL (ref 1–5.1)
LYMPHOCYTES RELATIVE PERCENT: 11.2 %
MAGNESIUM: 2.2 MG/DL (ref 1.8–2.4)
MCH RBC QN AUTO: 32.9 PG (ref 26–34)
MCHC RBC AUTO-ENTMCNC: 33.5 G/DL (ref 31–36)
MCV RBC AUTO: 98.4 FL (ref 80–100)
METHEMOGLOBIN VENOUS: 0.3 %
MONOCYTES ABSOLUTE: 1.1 K/UL (ref 0–1.3)
MONOCYTES RELATIVE PERCENT: 9.5 %
NEUTROPHILS ABSOLUTE: 8.6 K/UL (ref 1.7–7.7)
NEUTROPHILS RELATIVE PERCENT: 78 %
O2 SAT, VEN: 65 %
O2 THERAPY: NORMAL
PCO2, VEN: 47.5 MMHG (ref 40–50)
PDW BLD-RTO: 14.5 % (ref 12.4–15.4)
PERFORMED ON: ABNORMAL
PH VENOUS: 7.39 (ref 7.35–7.45)
PLATELET # BLD: 213 K/UL (ref 135–450)
PMV BLD AUTO: 7.4 FL (ref 5–10.5)
PO2, VEN: 34 MMHG
POTASSIUM SERPL-SCNC: 4.1 MMOL/L (ref 3.5–5.1)
PRO-BNP: 4000 PG/ML (ref 0–449)
RBC # BLD: 3.85 M/UL (ref 4–5.2)
SARS-COV-2, NAAT: NOT DETECTED
SODIUM BLD-SCNC: 136 MMOL/L (ref 136–145)
TCO2 CALC VENOUS: 30 MMOL/L
TOTAL PROTEIN: 6.8 G/DL (ref 6.4–8.2)
TROPONIN: 0.08 NG/ML
WBC # BLD: 11 K/UL (ref 4–11)

## 2022-07-19 PROCEDURE — 84484 ASSAY OF TROPONIN QUANT: CPT

## 2022-07-19 PROCEDURE — 36415 COLL VENOUS BLD VENIPUNCTURE: CPT

## 2022-07-19 PROCEDURE — 6370000000 HC RX 637 (ALT 250 FOR IP): Performed by: INTERNAL MEDICINE

## 2022-07-19 PROCEDURE — 80053 COMPREHEN METABOLIC PANEL: CPT

## 2022-07-19 PROCEDURE — 96375 TX/PRO/DX INJ NEW DRUG ADDON: CPT

## 2022-07-19 PROCEDURE — 87635 SARS-COV-2 COVID-19 AMP PRB: CPT

## 2022-07-19 PROCEDURE — 93005 ELECTROCARDIOGRAM TRACING: CPT | Performed by: NURSE PRACTITIONER

## 2022-07-19 PROCEDURE — 6360000002 HC RX W HCPCS: Performed by: NURSE PRACTITIONER

## 2022-07-19 PROCEDURE — 83735 ASSAY OF MAGNESIUM: CPT

## 2022-07-19 PROCEDURE — 85379 FIBRIN DEGRADATION QUANT: CPT

## 2022-07-19 PROCEDURE — 1200000000 HC SEMI PRIVATE

## 2022-07-19 PROCEDURE — A9540 TC99M MAA: HCPCS | Performed by: NURSE PRACTITIONER

## 2022-07-19 PROCEDURE — 96374 THER/PROPH/DIAG INJ IV PUSH: CPT

## 2022-07-19 PROCEDURE — 78580 LUNG PERFUSION IMAGING: CPT

## 2022-07-19 PROCEDURE — 82803 BLOOD GASES ANY COMBINATION: CPT

## 2022-07-19 PROCEDURE — 71045 X-RAY EXAM CHEST 1 VIEW: CPT

## 2022-07-19 PROCEDURE — 83880 ASSAY OF NATRIURETIC PEPTIDE: CPT

## 2022-07-19 PROCEDURE — 93970 EXTREMITY STUDY: CPT

## 2022-07-19 PROCEDURE — 85025 COMPLETE CBC W/AUTO DIFF WBC: CPT

## 2022-07-19 PROCEDURE — 3430000000 HC RX DIAGNOSTIC RADIOPHARMACEUTICAL: Performed by: NURSE PRACTITIONER

## 2022-07-19 PROCEDURE — 6370000000 HC RX 637 (ALT 250 FOR IP): Performed by: EMERGENCY MEDICINE

## 2022-07-19 PROCEDURE — 99285 EMERGENCY DEPT VISIT HI MDM: CPT

## 2022-07-19 RX ORDER — CARVEDILOL 12.5 MG/1
12.5 TABLET ORAL 2 TIMES DAILY WITH MEALS
Status: DISCONTINUED | OUTPATIENT
Start: 2022-07-19 | End: 2022-07-22 | Stop reason: HOSPADM

## 2022-07-19 RX ORDER — SODIUM CHLORIDE 9 MG/ML
INJECTION, SOLUTION INTRAVENOUS PRN
Status: DISCONTINUED | OUTPATIENT
Start: 2022-07-19 | End: 2022-07-22 | Stop reason: HOSPADM

## 2022-07-19 RX ORDER — INSULIN LISPRO 100 [IU]/ML
0-6 INJECTION, SOLUTION INTRAVENOUS; SUBCUTANEOUS NIGHTLY
Status: DISCONTINUED | OUTPATIENT
Start: 2022-07-19 | End: 2022-07-22 | Stop reason: HOSPADM

## 2022-07-19 RX ORDER — ENOXAPARIN SODIUM 100 MG/ML
40 INJECTION SUBCUTANEOUS DAILY
Status: DISCONTINUED | OUTPATIENT
Start: 2022-07-19 | End: 2022-07-19

## 2022-07-19 RX ORDER — ASPIRIN 81 MG/1
81 TABLET ORAL DAILY
Status: DISCONTINUED | OUTPATIENT
Start: 2022-07-20 | End: 2022-07-22 | Stop reason: HOSPADM

## 2022-07-19 RX ORDER — NIFEDIPINE 30 MG/1
30 TABLET, EXTENDED RELEASE ORAL DAILY
Status: DISCONTINUED | OUTPATIENT
Start: 2022-07-20 | End: 2022-07-22 | Stop reason: HOSPADM

## 2022-07-19 RX ORDER — CLOPIDOGREL BISULFATE 75 MG/1
75 TABLET ORAL DAILY
Status: DISCONTINUED | OUTPATIENT
Start: 2022-07-20 | End: 2022-07-22 | Stop reason: HOSPADM

## 2022-07-19 RX ORDER — FUROSEMIDE 10 MG/ML
40 INJECTION INTRAMUSCULAR; INTRAVENOUS 2 TIMES DAILY
Status: DISCONTINUED | OUTPATIENT
Start: 2022-07-20 | End: 2022-07-21

## 2022-07-19 RX ORDER — LEVOTHYROXINE SODIUM 0.07 MG/1
150 TABLET ORAL
Status: DISCONTINUED | OUTPATIENT
Start: 2022-07-20 | End: 2022-07-22 | Stop reason: HOSPADM

## 2022-07-19 RX ORDER — FUROSEMIDE 10 MG/ML
20 INJECTION INTRAMUSCULAR; INTRAVENOUS ONCE
Status: COMPLETED | OUTPATIENT
Start: 2022-07-19 | End: 2022-07-19

## 2022-07-19 RX ORDER — ENOXAPARIN SODIUM 100 MG/ML
30 INJECTION SUBCUTANEOUS DAILY
Status: DISCONTINUED | OUTPATIENT
Start: 2022-07-20 | End: 2022-07-22 | Stop reason: HOSPADM

## 2022-07-19 RX ORDER — POLYETHYLENE GLYCOL 3350 17 G/17G
17 POWDER, FOR SOLUTION ORAL DAILY PRN
Status: DISCONTINUED | OUTPATIENT
Start: 2022-07-19 | End: 2022-07-22 | Stop reason: HOSPADM

## 2022-07-19 RX ORDER — ONDANSETRON 4 MG/1
4 TABLET, ORALLY DISINTEGRATING ORAL EVERY 8 HOURS PRN
Status: DISCONTINUED | OUTPATIENT
Start: 2022-07-19 | End: 2022-07-22 | Stop reason: HOSPADM

## 2022-07-19 RX ORDER — ONDANSETRON 2 MG/ML
4 INJECTION INTRAMUSCULAR; INTRAVENOUS EVERY 6 HOURS PRN
Status: DISCONTINUED | OUTPATIENT
Start: 2022-07-19 | End: 2022-07-22 | Stop reason: HOSPADM

## 2022-07-19 RX ORDER — EZETIMIBE 10 MG/1
10 TABLET ORAL DAILY
Status: DISCONTINUED | OUTPATIENT
Start: 2022-07-20 | End: 2022-07-22 | Stop reason: HOSPADM

## 2022-07-19 RX ORDER — SODIUM CHLORIDE 0.9 % (FLUSH) 0.9 %
5-40 SYRINGE (ML) INJECTION PRN
Status: DISCONTINUED | OUTPATIENT
Start: 2022-07-19 | End: 2022-07-22 | Stop reason: HOSPADM

## 2022-07-19 RX ORDER — SODIUM CHLORIDE 0.9 % (FLUSH) 0.9 %
5-40 SYRINGE (ML) INJECTION EVERY 12 HOURS SCHEDULED
Status: DISCONTINUED | OUTPATIENT
Start: 2022-07-19 | End: 2022-07-22 | Stop reason: HOSPADM

## 2022-07-19 RX ORDER — ALLOPURINOL 100 MG/1
100 TABLET ORAL DAILY
Status: DISCONTINUED | OUTPATIENT
Start: 2022-07-20 | End: 2022-07-22 | Stop reason: HOSPADM

## 2022-07-19 RX ORDER — ASPIRIN 81 MG/1
324 TABLET, CHEWABLE ORAL ONCE
Status: COMPLETED | OUTPATIENT
Start: 2022-07-19 | End: 2022-07-19

## 2022-07-19 RX ORDER — FUROSEMIDE 10 MG/ML
40 INJECTION INTRAMUSCULAR; INTRAVENOUS ONCE
Status: DISCONTINUED | OUTPATIENT
Start: 2022-07-19 | End: 2022-07-19

## 2022-07-19 RX ORDER — INSULIN LISPRO 100 [IU]/ML
0-12 INJECTION, SOLUTION INTRAVENOUS; SUBCUTANEOUS
Status: DISCONTINUED | OUTPATIENT
Start: 2022-07-19 | End: 2022-07-22 | Stop reason: HOSPADM

## 2022-07-19 RX ADMIN — ASPIRIN 324 MG: 81 TABLET, CHEWABLE ORAL at 14:22

## 2022-07-19 RX ADMIN — CARVEDILOL 12.5 MG: 12.5 TABLET, FILM COATED ORAL at 20:15

## 2022-07-19 RX ADMIN — KIT FOR THE PREPARATION OF TECHNETIUM TC 99M ALBUMIN AGGREGATED 6 MILLICURIE: 2.5 INJECTION, POWDER, FOR SOLUTION INTRAVENOUS at 13:50

## 2022-07-19 RX ADMIN — FUROSEMIDE 20 MG: 10 INJECTION, SOLUTION INTRAMUSCULAR; INTRAVENOUS at 13:10

## 2022-07-19 RX ADMIN — INSULIN LISPRO 2 UNITS: 100 INJECTION, SOLUTION INTRAVENOUS; SUBCUTANEOUS at 20:16

## 2022-07-19 ASSESSMENT — ENCOUNTER SYMPTOMS
COUGH: 0
SHORTNESS OF BREATH: 1
ABDOMINAL PAIN: 0
BACK PAIN: 0
EYE PAIN: 0
SORE THROAT: 0
VOMITING: 0
ANAL BLEEDING: 0
NAUSEA: 0

## 2022-07-19 ASSESSMENT — HEART SCORE: ECG: 0

## 2022-07-19 ASSESSMENT — PAIN - FUNCTIONAL ASSESSMENT: PAIN_FUNCTIONAL_ASSESSMENT: NONE - DENIES PAIN

## 2022-07-19 NOTE — PROGRESS NOTES
Medication Reconciliation    List of medications patient is currently taking is complete. Source of information: 1. Conversation with patient at bedside                                      2. EPIC records      Allergies  Hydrocodone-acetaminophen, Pcn [penicillins], Rosuvastatin, and Morphine     Notes regarding home medications:   1. Patient did not receive any of her home medications prior to arrival to the emergency department today. 2. Patient indicates that she does take Coreg.     Saritha Alexander [facility], PharmD, BCPS  7/19/2022 2:35 PM

## 2022-07-19 NOTE — ED PROVIDER NOTES
Attending Note:    The patient was seen and examined by the mid-level provider. I also completed a face-to-face examination. HPI: Smiley Fournier is a 80 y.o. female who presents to the emergency department with a complaint of shortness of breath since last night. Patient states that she had some mild shortness of breath \"for a while\" that she describes as a few weeks but it seems to be getting worse. She states that she has noticed over the last couple of days that she is unable to lie flat without getting very short of breath. She also reports some dyspnea on exertion with activity. She denies any shortness of breath at rest.  She denies any associated chest pain heaviness pressure or tightness. She denies any fever or chills. She is had a slight cough but no productive sputum. No exposure to illness. No exposure to COVID. She does report some mild lower extremity edema but states that it is not any worse than usual.  She denies any weight gain. Medical history is significant for hypertension hyperlipidemia, diabetes, coronary artery disease, diastolic heart failure, hypothyroidism, pulmonary hypertension, NSTEMI, PCI in 2015 and 2018, mitral insufficiency, and peripheral vascular disease. She does take aspirin 81 mg daily. No other anticoagulants. She denies any history of thromboembolic disease. No recent travel. No leg or calf pain. She denies any recent vomiting diarrhea, dysuria or hematuria. She denies any melena hematochezia. She denies any syncope or palpitations. No abdominal pain back pain or flank pain. Physical Exam:     Constitutional: No apparent distress. Head: No visible evidence of trauma. Normocephalic. Eyes: Pupils equal and reactive. No photophobia. Conjunctiva normal.    HENT: Oral mucosa moist.  Airway patent. Neck:  Soft and supple. Nontender. Heart:  Regular rate and rhythm. No murmur. Lungs: Diminished breath sounds bilaterally.   Bilateral rales were noted. No witnessed cough. No accessory muscle use. Very slight conversational dyspnea. Oxygen saturation was 94% on room air. Abdomen:  Soft, non-distended. Nontender. No guarding rigidity or rebound. Musculoskeletal: Extremities non-tender with full range of motion. Radial and dorsalis pedis pulses were equal laterally. No calf tenderness or erythema. Slight 1+ bilateral pretibial and pedal edema noted. Neurological: Alert and oriented x 3. Speech clear. No acute focal motor or sensory deficits. Skin: Skin is warm and dry. No rash. Psychiatric: Normal mood and affect. Behavior is normal.     DIAGNOSTIC RESULTS     EKG: All EKG's are interpreted by the Emergency Department Physician who either signs or Co-signs this chart in the absence of a cardiologist.    Normal sinus rhythm. Rate 61. VT interval 140 ms. QRS duration 80 ms. QTc 432 ms. R Axis III degrees. There is no ST elevation. Q waves noted in the anterior septal leads. Nonspecific T wave abnormalities. EKG is very similar in comparison to a previous EKG from October 29, 2020. No acute change. RADIOLOGY:   Non-plain film images such as CT, Ultrasound and MRI are read by the radiologist. Plain radiographic images are visualized and preliminarily interpreted by the emergency physician with the below findings:        Interpretation per the Radiologist below, if available at the time of this note:    [address]   Final Result   Low probability for pulmonary embolism. VL Extremity Venous Bilateral   Final Result      XR CHEST PORTABLE   Final Result   No radiographic evidence of acute pulmonary disease.                ED BEDSIDE ULTRASOUND:   Performed by ED Physician - none    LABS:  Labs Reviewed   CBC WITH AUTO DIFFERENTIAL - Abnormal; Notable for the following components:       Result Value    RBC 3.85 (*)     Neutrophils Absolute 8.6 (*)     All other components within normal limits COMPREHENSIVE METABOLIC PANEL - Abnormal; Notable for the following components:    Glucose 166 (*)     BUN 32 (*)     CREATININE 1.7 (*)     GFR Non- 29 (*)     GFR  35 (*)     ALT 9 (*)     AST 10 (*)     All other components within normal limits   TROPONIN - Abnormal; Notable for the following components:    Troponin 0.08 (*)     All other components within normal limits   BRAIN NATRIURETIC PEPTIDE - Abnormal; Notable for the following components:    Pro-BNP 4,000 (*)     All other components within normal limits   D-DIMER, QUANTITATIVE - Abnormal; Notable for the following components:    D-Dimer, Quant 1.45 (*)     All other components within normal limits   COVID-19, RAPID   MAGNESIUM   BLOOD GAS, VENOUS       All other labs were within normal range or not returned as of this dictation. EMERGENCY DEPARTMENT COURSE and DIFFERENTIAL DIAGNOSIS/MDM:   Vitals:    Vitals:    07/19/22 1011 07/19/22 1215   BP: (!) 184/66 98/87   Pulse: 63 67   Resp: 20 19   Temp:  98.3 °F (36.8 °C)   TempSrc:  Oral   SpO2: 94%    Weight: 205 lb 7.5 oz (93.2 kg)    Height: 5' 5\" (1.651 m)            MDM        I personally saw and performed a substantive portion of the visit including all aspects of the medical decision making. The patient presents with worsening dyspnea on exertion as well as worsening orthopnea as noted above. She has some mild hypoxia at rest with an oxygen saturation of 94% on room air. She does have bilateral rales. EKG reveals normal sinus rhythm but no acute change. No ST elevation. No associated chest pain. Laboratory studies were reviewed. BNP is greater than 4000. Clinical presentation is consistent with acute CHF. She was given a mild dose of Lasix, 20 mg IV. Troponin is mildly elevated at 0.08. This may be related to her heart failure. Suspicion for acute coronary syndrome is very low. Troponin will need to be trended.   In addition, D-dimer is significantly elevated. Suspicion for pulmonary embolus is very low. GFR is less than 30. Therefore, CT chest is contraindicated. She may need further evaluation with VQ scan and/or lower extremity Dopplers. Nurse practitioner will discuss with the hospitalist to determine if the patient needs anticoagulation prior to the studies being done. VQ scan and lower extremity Dopplers were obtained. Low probability for pulmonary embolus. No evidence of DVT. CRITICAL CARE TIME     I personally saw the patient and independently provided 0 minutes of non-concurrent critical care out of the total shared critical care time provided. This excludes separately reportable procedures. There was a high probability of clinically significant/life threatening deterioration in the patient's condition which required my urgent intervention. CONSULTS:  IP CONSULT TO HOSPITALIST  IP CONSULT TO CARDIOLOGY    PROCEDURES:  Unless otherwise noted below, none     Procedures        FINAL IMPRESSION      1. Acute congestive heart failure, unspecified heart failure type (HCC)    2. Elevated brain natriuretic peptide (BNP) level    3. Elevated troponin    4. Orthopnea    5. Dyspnea, unspecified type          DISPOSITION/PLAN   DISPOSITION Decision To Admit 07/19/2022 02:18:20 PM      PATIENT REFERRED TO:  No follow-up provider specified. DISCHARGE MEDICATIONS:  New Prescriptions    No medications on file     Controlled Substances Monitoring:     No flowsheet data found. (Please note that portions of this note were completed with a voice recognition program.  Efforts were made to edit the dictations but occasionally words are mis-transcribed. )    [id number] Zane Perla DO (electronically signed)  Attending Emergency Physician       Cynthia Perez DO  07/19/22 8858

## 2022-07-19 NOTE — ED PROVIDER NOTES
[address] [address]  Dept: [phone]  Loc: [address] [place] [address]        I have seen and evaluated this patient with my supervising physician, Dr. Kei Valverde. CHIEF COMPLAINT    Chief Complaint   Patient presents with    Shortness of Breath     SOB onset ~ 12 hrs ago. History of CHF, c/o orthopnea. No cough. 93 on room air after ambulation. HPI    Jose Wilkes is a 80 y.o. nontoxic and well-appearing, mildly distressed female with a medical history including, but not limited to, CAD, CHF, CKD, type 2 diabetes mellitus, hypertension, hypothyroidism, mitral incompetence, pulmonary hypertension, and PVD who presents with shortness of breath, orthopnea, diaphoresis, and \"sore\" 1/10 left-sided chest pain. Onset was last [email or web address] hrs. The duration has been constant since the onset. The context was that the symptoms started spontaneously. Denies nausea, vomiting, dizziness, presyncope, cough, fever, chills, change in ability to smell/taste, hemoptysis, leg pain/swelling, headache, body aches, abdominal pain, diarrhea, urinary symptoms/retention, or other concerns. Last cardiac work-up was approximately 5 years ago. She states her cardiologist is Dr. Lexus Mendes. Review of Systems   Constitutional:  Positive for diaphoresis. Negative for chills, fatigue and fever. HENT:  Negative for congestion and sore throat. Eyes:  Negative for pain and visual disturbance. Respiratory:  Positive for shortness of breath. Negative for cough.         + Orthopnea   Cardiovascular:  Positive for chest pain. Negative for leg swelling. Gastrointestinal:  Negative for abdominal pain, anal bleeding, nausea and vomiting. Genitourinary:  Negative for difficulty urinating, dysuria, frequency and urgency. Musculoskeletal:  Negative for back pain and neck pain.    Skin:  Negative for rash and wound.   Neurological:  Negative for dizziness and light-headedness. Hematological: Negative. Psychiatric/Behavioral: Negative. HEART SCORE:    History: Slightly Suspicious  ECG: Normal  Patient Age: > 65 years  *Risk factors for Atherosclerotic disease: Cigarette smoking; Diabetes Mellitus; Hypercholesterolemia; Obesity; Coronary Artery Disease  Risk Factors: > 3 Risk factors or history of atherosclerotic disease*  Troponin: < 1X normal limit  Heart Score Total: 4      Heart score: 4.   This falls under the following category: Score of 4-6, which indicates low/moderate risk for major adverse cardiac event and supports observation with repeated troponins and/or non-invasive testing        PAST MEDICAL & SURGICAL HISTORY    Past Medical History:   Diagnosis Date    CAD (coronary artery disease)     CHF (congestive heart failure) ([facility])     Chronic kidney disease     DM2 (diabetes mellitus, type 2) ([facility] [address]     HTN (hypertension)     Hypothyroidism     MI (mitral incompetence)     Over weight     Pulmonary HTN ([facility])     PVD (peripheral vascular disease) ([facility] [address]     Uterine cancer ([facility] [address]      Past Surgical History:   Procedure Laterality Date    CATARACT REMOVAL WITH IMPLANT Bilateral     CORONARY ANGIOPLASTY WITH STENT PLACEMENT Right 9/9/2015    At [address] [place] [address] WITH STENT PLACEMENT  01/2018    ILANA to RCA and POBA on ISR of LAD    HERNIA REPAIR      HYSTERECTOMY (CERVIX STATUS UNKNOWN)         CURRENT MEDICATIONS  (may include discharge medications prescribed in the ED)  Current Outpatient Rx   Medication Sig Dispense Refill    levothyroxine (SYNTHROID) 150 MCG tablet TAKE ONE TABLET BY MOUTH DAILY 30 tablet 3    doxycycline hyclate (VIBRA-TABS) 100 MG tablet Take 1 tablet by mouth 2 times daily for 7 days 14 tablet 0    torsemide (DEMADEX) 20 MG tablet TAKE ONE TABLET BY MOUTH DAILY -MAY TAKE AN EXTRA TABLET IF NEEDED FOR SHORTNESS OF BREATH OR WEIGHT GAIN 135 tablet 2    glimepiride (AMARYL) 4 MG tablet TAKE ONE TABLET BY MOUTH TWICE A DAY BEFORE A MEAL 180 tablet 1    allopurinol (ZYLOPRIM) 100 MG tablet TAKE ONE TABLET BY MOUTH DAILY 90 tablet 1    ezetimibe (ZETIA) 10 MG tablet TAKE ONE TABLET BY MOUTH DAILY 90 tablet 3    NIFEdipine (PROCARDIA XL) 30 MG extended release tablet TAKE ONE TABLET BY MOUTH DAILY 90 tablet 3    clopidogrel (PLAVIX) 75 MG tablet TAKE ONE TABLET BY MOUTH DAILY 90 tablet 4    aspirin 81 MG EC tablet Take 1 tablet by mouth daily 30 tablet 0    carvedilol (COREG) 12.5 MG tablet Take 12.5 mg by mouth 2 times daily (with meals)      furosemide (LASIX) 20 MG tablet Take 60 mg by mouth daily      glimepiride (AMARYL) 4 MG tablet Take 8 mg by mouth every morning (before breakfast)       NIFEdipine (ADALAT CC) 30 MG extended release tablet Take 30 mg by mouth daily      glucose blood VI test strips (ASCENSIA AUTODISC VI;ONE TOUCH ULTRA TEST VI) strip 1 each by In Vitro route daily As needed. 100 each 3       ALLERGIES    Allergies   Allergen Reactions    Hydrocodone-Acetaminophen      vomiting    Pcn [Penicillins]     Rosuvastatin      Leg cramps      Morphine Nausea And Vomiting       SOCIAL & FAMILY HISTORY    Social History     Socioeconomic History    Marital status:      Number of children: 2   Tobacco Use    Smoking status: Former     Packs/day: 0.70     Years: 40.00     Pack years: 28.00     Types: Cigarettes     Quit date: 2004     Years since quittin.4    Smokeless tobacco: Never   Vaping Use    Vaping Use: Never used   Substance and Sexual Activity    Alcohol use: No    Drug use: No   Social History Narrative    ** Merged History Encounter **         ** Merged History Encounter **          Social Determinants of Health     Financial Resource Strain: Low Risk     Difficulty of Paying Living Expenses: Not hard at all   Food Insecurity: No Food Insecurity    Worried About [address] [employer] in the Last Year: Never true    Ran Out of Food in the Last Year: Never baseline cardiac labs including CBC, CMP, BNP, troponin, VBG, magnesium, COVID-19, and D-dimer. D-dimer as the patient is having chest pain or shortness of breath so we will need to rule out PE. Patient medicated with  mg p.o. Work-up pending.     Labs reviewed:  I have reviewed and interpreted all of the currently available lab results from this visit:  Results for orders placed or performed during the hospital encounter of 07/19/22   COVID-19, Rapid    Specimen: Nasopharyngeal Swab   Result Value Ref Range    SARS-CoV-2, NAAT Not Detected Not Detected   CBC with Auto Differential   Result Value Ref Range    WBC 11.0 4.0 - 11.0 K/uL    RBC 3.85 (L) 4.00 - 5.20 M/uL    Hemoglobin 12.7 12.0 - 16.0 g/dL    Hematocrit 37.9 36.0 - 48.0 %    MCV 98.4 80.0 - 100.0 fL    MCH 32.9 26.0 - 34.0 pg    MCHC 33.5 31.0 - 36.0 g/dL    RDW 14.5 12.4 - 15.4 %    Platelets 827 505 - 609 K/uL    MPV 7.4 5.0 - 10.5 fL    Neutrophils % 78.0 %    Lymphocytes % 11.2 %    Monocytes % 9.5 %    Eosinophils % 0.8 %    Basophils % 0.5 %    Neutrophils Absolute 8.6 (H) 1.7 - 7.7 K/uL    Lymphocytes Absolute 1.2 1.0 - 5.1 K/uL    Monocytes Absolute 1.1 0.0 - 1.3 K/uL    Eosinophils Absolute 0.1 0.0 - 0.6 K/uL    Basophils Absolute 0.1 0.0 - 0.2 K/uL   Comprehensive Metabolic Panel   Result Value Ref Range    Sodium 136 136 - 145 mmol/L    Potassium 4.1 3.5 - 5.1 mmol/L    Chloride 100 99 - 110 mmol/L    CO2 25 21 - 32 mmol/L    Anion Gap 11 3 - 16    Glucose 166 (H) 70 - 99 mg/dL    BUN 32 (H) 7 - 20 mg/dL    CREATININE 1.7 (H) 0.6 - 1.2 mg/dL    GFR Non-African American 29 (A) >60    GFR  35 (A) >60    Calcium 8.5 8.3 - 10.6 mg/dL    Total Protein 6.8 6.4 - 8.2 g/dL    Albumin 3.5 3.4 - 5.0 g/dL    Albumin/Globulin Ratio 1.1 1.1 - 2.2    Total Bilirubin 0.4 0.0 - 1.0 mg/dL    Alkaline Phosphatase 127 40 - 129 U/L    ALT 9 (L) 10 - 40 U/L    AST 10 (L) 15 - 37 U/L   Magnesium   Result Value Ref Range    Magnesium 2.20 1.80 - 2.40 mg/dL   Troponin   Result Value Ref Range    Troponin 0.08 (H) <0.01 ng/mL   Brain Natriuretic Peptide   Result Value Ref Range    Pro-BNP 4,000 (H) 0 - 449 pg/mL   Blood Gas, Venous   Result Value Ref Range    pH, López 7.387 7.350 - 7.450    pCO2, López 47.5 40.0 - 50.0 mmHg    pO2, López 34 Not Established mmHg    HCO3, Venous 29 23 - 29 mmol/L    Base Excess, López 2.7 Not Established mmol/L    O2 Sat, López 65 Not Established %    Carboxyhemoglobin 1.4 %    MetHgb, López 0.3 <1.5 %    TC02 (Calc), López 30 Not Established mmol/L    O2 Therapy Unknown    D-Dimer, Quantitative   Result Value Ref Range    D-Dimer, Quant 1.45 (H) 0.00 - 0.60 ug/mL FEU   POCT Glucose   Result Value Ref Range    POC Glucose 220 (H) 70 - 99 mg/dl    Performed on ACCU-CHEK    EKG 12 Lead   Result Value Ref Range    Ventricular Rate 61 BPM    Atrial Rate 61 BPM    P-R Interval 140 ms    QRS Duration 80 ms    Q-T Interval 430 ms    QTc Calculation (Bazett) 432 ms    R Axis 3 degrees    T Axis 262 degrees    Diagnosis       Normal sinus rhythmSeptal infarct (cited on or before 11-MAY-2020)Abnormal ECGWhen compared with ECG of 11-MAY-2020 15:17,Nonspecific T wave abnormality now evident in Lateral leads     Imaging reviewed:  NM LUNG SCAN PERFUSION ONLY    Result Date: 7/19/2022  EXAMINATION: NUCLEAR MEDICINE PERFUSION SCAN. 7/19/2022 TECHNIQUE: Ventilation not performed as part of COVID-19 safety precautions. 6.4 millicuries of Tc 00O MAA was administered intravenously prior to planar imaging of the lungs in multiple projections. COMPARISON: Chest radiograph 07/19/2022.  V/Q scan 11/20/2018 HISTORY: ORDERING SYSTEM PROVIDED HISTORY: r/o PE TECHNOLOGIST PROVIDED HISTORY: Reason for exam:->r/o PE Decision Support Exception - unselect if not a suspected or confirmed emergency medical condition->Emergency Medical Condition (MA) Reason for Exam: sob, slight hypoxia, elevated d-dimer Relevant Medical/Surgical History: chf FINDINGS: PERFUSION: Mildly heterogeneous distribution of radiotracer. No segmental perfusion defects. CHEST RADIOGRAPH: Elevated right hemidiaphragm. No focal airspace opacity. Low probability for pulmonary embolism. XR CHEST PORTABLE    Result Date: 7/19/2022  EXAMINATION: ONE XRAY VIEW OF THE CHEST 7/19/2022 10:46 am COMPARISON: Chest x-ray dated 05/11/2020. HISTORY: ORDERING SYSTEM PROVIDED HISTORY: SOB TECHNOLOGIST PROVIDED HISTORY: Reason for exam:->SOB Reason for Exam: SOB FINDINGS: HEART/MEDIASTINUM: The cardiomediastinal silhouette is within normal limits. PLEURA/LUNGS: There is elevation of the right hemidiaphragm. There are no focal consolidations or pleural effusions. There is no appreciable pneumothorax. BONES/SOFT TISSUE: No acute abnormality. No radiographic evidence of acute pulmonary disease. VL Extremity Venous Bilateral    Result Date: 7/19/2022  Lower Extremities DVT Study  Demographics   Patient Name       Teri BOATENG   Date of Study      07/19/2022         Gender              Female   Patient Number     [id number]         Date of Birth       1940   Visit Number       [id number]          Age                 80 year(s)   Accession Number   [id number]         Room Number         031   Corporate ID       [id number]            Sonographer         Cooper Curran RVT   Ordering Physician [address]                      APRN-CNP           Physician           Surg                                                            Chinedu Badillo DO  Procedure Type of Study:   Veins:Lower Extremities DVT Study, VASC EXTREMITY VENOUS DUPLEX BILATERAL. Vascular Sonographer Report  Indications for Study:Swelling and Leg pain. Impressions Right Impression No evidence of deep vein or superficial vein thrombosis involving the right lower extremity.  Left Impression No evidence of deep vein or superficial vein thrombosis involving the left lower extremity. Conclusions   Summary   Normal venous duplex study of the bilateral lower extremities. There is no  evidence of deep or superficial venous thrombosis. Signature   ------------------------------------------------------------------  Electronically signed by Tye Segovia DO (Interpreting  physician) on 07/19/2022 at 02:32 PM  ------------------------------------------------------------------  Patient Status:Routine. [facility] [address] [facility]. Technical Quality:Adequate visualization. Velocities are measured in cm/s ; Diameters are measured in mm Right Lower Extremities DVT Study Measurements Right 2D Measurements +------------------------+----------+---------------+----------+ ! Location                ! Visualized! Compressibility! Thrombosis! +------------------------+----------+---------------+----------+ ! Sapheno Femoral Junction! Yes       ! Yes            ! None      ! +------------------------+----------+---------------+----------+ ! GSV Thigh               ! Yes       ! Yes            ! None      ! +------------------------+----------+---------------+----------+ ! Common Femoral          !Yes       ! Yes            ! None      ! +------------------------+----------+---------------+----------+ ! Femoral                 !Yes       ! Yes            ! None      ! +------------------------+----------+---------------+----------+ ! Prox Femoral            !Yes       ! Yes            ! None      ! +------------------------+----------+---------------+----------+ ! Mid Femoral             !Yes       ! Yes            ! None      ! +------------------------+----------+---------------+----------+ ! Dist Femoral            !Yes       ! Yes            ! None      ! +------------------------+----------+---------------+----------+ ! Deep Femoral            !Yes       ! Yes            ! None      ! +------------------------+----------+---------------+----------+ ! Popliteal !Yes       !Yes            ! None      ! +------------------------+----------+---------------+----------+ ! GSV Below Knee          ! Yes       ! Yes            ! None      ! +------------------------+----------+---------------+----------+ ! Gastroc                 ! Yes       ! Yes            ! None      ! +------------------------+----------+---------------+----------+ ! Soleal                  !Yes       ! Yes            ! None      ! +------------------------+----------+---------------+----------+ ! PTV                     ! Yes       ! Yes            ! None      ! +------------------------+----------+---------------+----------+ ! ATV                     ! Yes       ! Yes            ! None      ! +------------------------+----------+---------------+----------+ ! Peroneal                !Yes       ! Yes            ! None      ! +------------------------+----------+---------------+----------+ ! GSV Calf                ! Yes       ! Yes            ! None      ! +------------------------+----------+---------------+----------+ ! SSV                     ! Yes       ! Yes            ! None      ! +------------------------+----------+---------------+----------+ Right Doppler Measurements +--------------+------+------+------------+ ! Location      ! Signal!Reflux! Reflux (sec)! +--------------+------+------+------------+ ! Common Femoral!Phasic!      !            ! +--------------+------+------+------------+ ! Popliteal     !Phasic!      !            ! +--------------+------+------+------------+ Left Lower Extremities DVT Study Measurements Left 2D Measurements +------------------------+----------+---------------+----------+ ! Location                ! Visualized! Compressibility! Thrombosis! +------------------------+----------+---------------+----------+ ! Sapheno Femoral Junction! Yes       ! Yes            ! None      ! +------------------------+----------+---------------+----------+ ! GSV Thigh               ! Yes       ! Yes            ! None      ! +------------------------+----------+---------------+----------+ ! Common Femoral          !Yes       ! Yes            ! None      ! +------------------------+----------+---------------+----------+ ! Femoral                 !Yes       ! Yes            ! None      ! +------------------------+----------+---------------+----------+ ! Prox Femoral            !Yes       ! Yes            ! None      ! +------------------------+----------+---------------+----------+ ! Mid Femoral             !Yes       ! Yes            ! None      ! +------------------------+----------+---------------+----------+ ! Dist Femoral            !Yes       ! Yes            ! None      ! +------------------------+----------+---------------+----------+ ! Deep Femoral            !Yes       ! Yes            ! None      ! +------------------------+----------+---------------+----------+ ! Popliteal               !Yes       ! Yes            ! None      ! +------------------------+----------+---------------+----------+ ! GSV Below Knee          ! Yes       ! Yes            ! None      ! +------------------------+----------+---------------+----------+ ! Gastroc                 ! Yes       ! Yes            ! None      ! +------------------------+----------+---------------+----------+ ! Soleal                  !Yes       ! Yes            ! None      ! +------------------------+----------+---------------+----------+ ! PTV                     ! Yes       ! Yes            ! None      ! +------------------------+----------+---------------+----------+ ! ATV                     ! Yes       ! Yes            ! None      ! +------------------------+----------+---------------+----------+ ! Peroneal                !Yes       ! Yes            ! None      ! +------------------------+----------+---------------+----------+ ! GSV Calf                ! Yes       ! Yes            ! None      ! +------------------------+----------+---------------+----------+ ! SSV                     ! Yes       ! Yes            ! None      ! +------------------------+----------+---------------+----------+ Left Doppler Measurements +--------------+------+------+------------+ ! Location      ! Signal!Reflux! Reflux (sec)! +--------------+------+------+------------+ ! Common Femoral!Phasic!      !            ! +--------------+------+------+------------+ ! Popliteal     !Phasic!      !            ! +--------------+------+------+------------+       Work-up reveals:    D-dimer: Elevated at 1.45, I therefore ordered a VQ scan as patient has significant allergy to contrast  COVID-19: Not detected  Magnesium: [email or web address]  CMP: Negative for electrolyte derangement, hyperglycemic at 166, baseline renal dysfunction with a BUN of 32, creatinine 1.7, GFR 29 with no elevation in LFTs, otherwise unremarkable  VBG: pH is [email or web address]  BNP: 4000  Troponin: [email or web address]  CBC: No leukocytosis with RBC slightly reduced at 3.85, neutrophils absolute elevated 8.6 otherwise unremarkable  EKG: As interpreted by EMD identifies no ischemic changes  CXR: As noted above identifying no radiographic evidence of acute pulmonary disease  NM lung scan perfusion only: Low probability of pulmonary embolism  VL extremity venous bilateral: As above, noting no evidence of deep vein or superficial vein thrombosis of the left or right lower extremities              CRITICAL CARE NOTE:  There was a high probability of clinically significant life-threatening deterioration of the patient's condition requiring my urgent intervention. Total critical care time was at least 0 minutes. This includes vital sign monitoring, pulse oximetry monitoring, telemetry monitoring, clinical response to the IV medications, reviewing the nursing notes, consultation time, dictation/documentation time, and interpretation of the labwork. This excludes any separately billable procedures performed.     Patient's symptoms of shortness of breath, orthopnea, with lower extremity edema are consistent with CHF with exacerbation. She also has elevated [email or web address] as well as above [email or web address] 08. Therefore, I did give Lasix 20 mg IVP. She also has an elevated heart score 4 placing her in the low to moderate risk category for major adverse cardiac events within the next 6 weeks. Therefore, patient will be admitted to the hospital for acute congestive heart failure, elevated BNP, elevated troponin, orthopnea, and dyspnea. She could potentially be experiencing ischemic chest pain. Therefore she will be admitted to the hospital for diuresis and further cardiac work-up. Patient is agreeable to plan for admission. All questions answered. Consultation with hospitalist at 1418 hours: I contacted Dr. Nicci Allen via [employer], and the patient was accepted for admission. FINAL IMPRESSION    Acute congestive heart failure  Elevated [email or web address]  Elevated troponin@0.08  Orthopnea-slept sitting up in chair last p.m.   Dyspnea    PLAN  Admission to the hospital    (Please note that this note was completed with a voice recognition program.  Every attempt was made to edit the dictations, but inevitably there remain words that are mis-transcribed.)      Sammy Schmitz, ESTELLE - CHELLE  07/19/22 0507

## 2022-07-19 NOTE — H&P
Hospital Medicine History & Physical      PCP: Wan Murcia DO    Date of Admission: 7/19/2022    Date of Service: Pt seen/examined on 07/19/22 and Admitted to Inpatient with expected LOS greater than two midnights due to medical therapy. Placed in Observation. Chief Complaint:  SOB      History Of Present Illness:    80 y.o. female with PMH of CAD, diastolic CHF, DMII, PVD presented to [facility] for shortness of breath since last night. Patient states that she had some mild shortness of breath \"for a while\" that she describes as a few weeks but it seems to be getting worse. She states that she has noticed over the last couple of days that she is unable to lie flat without getting very short of breath. She also reports some dyspnea on exertion with activity. She denies any shortness of breath at rest.  She denies any associated chest pain heaviness pressure or tightness. She denies any fever or chills. She is had a slight cough but no productive sputum. No exposure to illness. No exposure to COVID. She does report some mild lower extremity edema but states that it is not any worse than usual.  She denies any weight gain. Medical history is significant for hypertension hyperlipidemia, diabetes, coronary artery disease, diastolic heart failure, hypothyroidism, pulmonary hypertension, NSTEMI, PCI in 2015 and 2018, mitral insufficiency, and peripheral vascular disease. She does take aspirin 81 mg daily. No other anticoagulants. She denies any history of thromboembolic disease. No recent travel. No leg or calf pain.     Past Medical History:          Diagnosis Date    CAD (coronary artery disease)     CHF (congestive heart failure) (HCC)     Chronic kidney disease     DM2 (diabetes mellitus, type 2) (HCC)     HTN (hypertension)     Hypothyroidism     MI (mitral incompetence)     Over weight     Pulmonary HTN ([address]     PVD (peripheral vascular disease) ([address]     Uterine cancer ([address] Past Surgical History:          Procedure Laterality Date    CATARACT REMOVAL WITH IMPLANT Bilateral     CORONARY ANGIOPLASTY WITH STENT PLACEMENT Right 9/9/2015    At [address] [place] [address] Dirve WITH STENT PLACEMENT  01/2018    ILANA to RCA and POBA on ISR of LAD    HERNIA REPAIR      HYSTERECTOMY (CERVIX STATUS UNKNOWN)         Medications Prior to Admission:      Prior to Admission medications    Medication Sig Start Date End Date Taking? Authorizing Provider   levothyroxine (SYNTHROID) 150 MCG tablet TAKE ONE TABLET BY MOUTH DAILY 7/18/22   Antonio Holley,    doxycycline hyclate (VIBRA-TABS) 100 MG tablet Take 1 tablet by mouth 2 times daily for 7 days 7/13/22 7/20/22  Florencia Walsh MD   torsemide ([address]) 20 MG tablet TAKE ONE TABLET BY MOUTH DAILY -MAY TAKE AN EXTRA TABLET IF NEEDED FOR SHORTNESS OF BREATH OR WEIGHT GAIN 6/6/22   Eliazar Lomeli MD   glimepiride (AMARYL) 4 MG tablet TAKE ONE TABLET BY MOUTH TWICE A DAY BEFORE A MEAL 4/26/22   Antonio Holley, DO   allopurinol (ZYLOPRIM) 100 MG tablet TAKE ONE TABLET BY MOUTH DAILY 4/26/22   Antonio Holley, DO   ezetimibe (ZETIA) 10 MG tablet TAKE ONE TABLET BY MOUTH DAILY 12/9/21   Eliazar Lomeli MD   NIFEdipine (PROCARDIA XL) 30 MG extended release tablet TAKE ONE TABLET BY MOUTH DAILY 12/9/21   Eliazar Lomeli MD   clopidogrel (PLAVIX) 75 MG tablet TAKE ONE TABLET BY MOUTH DAILY 12/9/21   Eliazar Lomeli MD   aspirin 81 MG EC tablet Take 1 tablet by mouth daily 5/13/20   ESTELLE Swan - CNP   carvedilol (COREG) 12.5 MG tablet Take 12.5 mg by mouth 2 times daily (with meals)    Historical Provider, MD       Allergies:  Hydrocodone-acetaminophen, Pcn [penicillins], Rosuvastatin, and Morphine    Social History:      The patient currently lives at home    TOBACCO:   reports that she quit smoking about 18 years ago. Her smoking use included cigarettes. She has a 28.00 pack-year smoking history.  She has never used smokeless tobacco.  ETOH:   reports no history of alcohol use. E-cigarette/Vaping       Questions Responses    E-cigarette/Vaping Use Never User    Start Date     Passive Exposure     Quit Date     Counseling Given     Comments               Family History:       Reviewed and negative in regards to presenting illness/complaint. Problem Relation Age of Onset    Diabetes Mother     Heart Disease Father     Cancer Sister         uterine    Heart Disease Maternal Grandmother     Heart Disease Maternal Grandfather     Heart Disease Paternal Grandmother     Heart Disease Paternal Grandfather        REVIEW OF SYSTEMS COMPLETED:   Pertinent positives as noted in the HPI. All other systems reviewed and negative. PHYSICAL EXAM PERFORMED:    BP (!) 178/70   Pulse 65   Temp 98.5 °F (36.9 °C) (Oral)   Resp 17   Ht 5' 5\" (1.651 m)   Wt 205 lb 7.5 oz (93.2 kg)   LMP  (LMP Unknown)   SpO2 94%   BMI 34.19 kg/m²     General appearance:  No apparent distress, appears stated age and cooperative. HEENT:  Normal cephalic, atraumatic without obvious deformity. Pupils equal, round, and reactive to light. Extra ocular muscles intact. Conjunctivae/corneas clear. Neck: Supple, with full range of motion. No jugular venous distention. Trachea midline. Respiratory:  Normal respiratory effort. Clear to auscultation, bilaterally without Rales/Wheezes/Rhonchi. Cardiovascular:  Regular rate and rhythm with normal S1/S2 without murmurs, rubs or gallops. Abdomen: Soft, non-tender, non-distended with normal bowel sounds. Musculoskeletal:  No clubbing, cyanosis or edema bilaterally. Full range of motion without deformity. Skin: Skin color, texture, turgor normal.  No rashes or lesions. Neurologic:  Neurovascularly intact without any focal sensory/motor deficits.  Cranial nerves: II-XII intact, grossly non-focal.  Psychiatric:  Alert and oriented, thought content appropriate, normal insight  Capillary Refill: Brisk,3 this patient's care. If you have any questions or concerns please feel free to contact me at [phone].

## 2022-07-19 NOTE — PROGRESS NOTES
Clinical Pharmacy Note  Subcutaneous Anticoagulant Adjustment     Enoxaparin has been adjusted to 30 mg daily based on [facility] policy. Recent Labs     07/19/22  1138   CREATININE 1.7*     Recent Labs     07/19/22  1138   HGB 12.7   HCT 37.9        Estimated Creatinine Clearance: 29 mL/min (A) (based on SCr of 1.7 mg/dL (H)). Pharmacist Review of Appropriate Use and Automatic Dose Adjustment of Subcutaneous Anticoagulants (Adult)    The guidance below is to provide initial recommendations for dosing. If recommended dose does not align well with patient's current clinical picture, communications with the care team will occur to determine most appropriate medication and dose. TABLE 1.   ENOXAPARIN ROUTINE PROPHYLAXIS DOSING (Medically ill, routine surgery)   Patient Weight (kg)     50.9 and below 51 - 100.9 101 - 150.9 151 - 174.9 175 or greater         Estimated CrCl  (ml/min) 30 or greater   30 mg SUBQ daily   40 mg SUBQ daily 30 mg SUBQ BID  40 mg SUBQ BID 60mg SUBQ BID      15-29 UFH 5000 units SUBQ BID   30 mg SUBQ daily 30 mg SUBQ daily 40 mg SUBQ daily   60 mg SUBQ daily      Less than 15 or Dialysis UFH 5000 units SUBQ BID   UFH 5000 units SUBQ TID UFH 7500 units SUBQ TID     MALVIN Wang OBDULIO [facility] 7/19/2022 7:58 PM

## 2022-07-20 LAB
ANION GAP SERPL CALCULATED.3IONS-SCNC: 10 MMOL/L (ref 3–16)
BUN BLDV-MCNC: 34 MG/DL (ref 7–20)
CALCIUM SERPL-MCNC: 8.5 MG/DL (ref 8.3–10.6)
CHLORIDE BLD-SCNC: 104 MMOL/L (ref 99–110)
CHOLESTEROL, TOTAL: 148 MG/DL (ref 0–199)
CO2: 25 MMOL/L (ref 21–32)
CREAT SERPL-MCNC: 1.5 MG/DL (ref 0.6–1.2)
EKG ATRIAL RATE: 61 BPM
EKG DIAGNOSIS: NORMAL
EKG P-R INTERVAL: 140 MS
EKG Q-T INTERVAL: 430 MS
EKG QRS DURATION: 80 MS
EKG QTC CALCULATION (BAZETT): 432 MS
EKG R AXIS: 3 DEGREES
EKG T AXIS: 262 DEGREES
EKG VENTRICULAR RATE: 61 BPM
GFR AFRICAN AMERICAN: 40
GFR NON-AFRICAN AMERICAN: 33
GLUCOSE BLD-MCNC: 101 MG/DL (ref 70–99)
GLUCOSE BLD-MCNC: 110 MG/DL (ref 70–99)
GLUCOSE BLD-MCNC: 112 MG/DL (ref 70–99)
GLUCOSE BLD-MCNC: 117 MG/DL (ref 70–99)
GLUCOSE BLD-MCNC: 139 MG/DL (ref 70–99)
HDLC SERPL-MCNC: 52 MG/DL (ref 40–60)
LDL CHOLESTEROL CALCULATED: 84 MG/DL
LV EF: 58 %
LVEF MODALITY: NORMAL
MAGNESIUM: 2.2 MG/DL (ref 1.8–2.4)
PERFORMED ON: ABNORMAL
POTASSIUM SERPL-SCNC: 4 MMOL/L (ref 3.5–5.1)
SODIUM BLD-SCNC: 139 MMOL/L (ref 136–145)
TRIGL SERPL-MCNC: 60 MG/DL (ref 0–150)
VLDLC SERPL CALC-MCNC: 12 MG/DL

## 2022-07-20 PROCEDURE — 6370000000 HC RX 637 (ALT 250 FOR IP): Performed by: INTERNAL MEDICINE

## 2022-07-20 PROCEDURE — 36415 COLL VENOUS BLD VENIPUNCTURE: CPT

## 2022-07-20 PROCEDURE — 1200000000 HC SEMI PRIVATE

## 2022-07-20 PROCEDURE — 80061 LIPID PANEL: CPT

## 2022-07-20 PROCEDURE — 83735 ASSAY OF MAGNESIUM: CPT

## 2022-07-20 PROCEDURE — 2580000003 HC RX 258: Performed by: INTERNAL MEDICINE

## 2022-07-20 PROCEDURE — 99223 1ST HOSP IP/OBS HIGH 75: CPT | Performed by: INTERNAL MEDICINE

## 2022-07-20 PROCEDURE — 93010 ELECTROCARDIOGRAM REPORT: CPT | Performed by: INTERNAL MEDICINE

## 2022-07-20 PROCEDURE — 80048 BASIC METABOLIC PNL TOTAL CA: CPT

## 2022-07-20 PROCEDURE — 6360000002 HC RX W HCPCS: Performed by: INTERNAL MEDICINE

## 2022-07-20 RX ORDER — DEXTROSE MONOHYDRATE 100 MG/ML
INJECTION, SOLUTION INTRAVENOUS CONTINUOUS PRN
Status: DISCONTINUED | OUTPATIENT
Start: 2022-07-20 | End: 2022-07-22 | Stop reason: HOSPADM

## 2022-07-20 RX ADMIN — CLOPIDOGREL BISULFATE 75 MG: 75 TABLET ORAL at 08:58

## 2022-07-20 RX ADMIN — ALLOPURINOL 100 MG: 100 TABLET ORAL at 08:58

## 2022-07-20 RX ADMIN — SODIUM CHLORIDE, PRESERVATIVE FREE 10 ML: 5 INJECTION INTRAVENOUS at 09:01

## 2022-07-20 RX ADMIN — CARVEDILOL 12.5 MG: 12.5 TABLET, FILM COATED ORAL at 16:58

## 2022-07-20 RX ADMIN — FUROSEMIDE 40 MG: 10 INJECTION, SOLUTION INTRAMUSCULAR; INTRAVENOUS at 16:58

## 2022-07-20 RX ADMIN — NIFEDIPINE 30 MG: 30 TABLET, FILM COATED, EXTENDED RELEASE ORAL at 08:58

## 2022-07-20 RX ADMIN — LEVOTHYROXINE SODIUM 150 MCG: 0.07 TABLET ORAL at 08:58

## 2022-07-20 RX ADMIN — FUROSEMIDE 40 MG: 10 INJECTION, SOLUTION INTRAMUSCULAR; INTRAVENOUS at 09:02

## 2022-07-20 RX ADMIN — EZETIMIBE 10 MG: 10 TABLET ORAL at 08:58

## 2022-07-20 RX ADMIN — ASPIRIN 81 MG: 81 TABLET, COATED ORAL at 08:58

## 2022-07-20 RX ADMIN — CARVEDILOL 12.5 MG: 12.5 TABLET, FILM COATED ORAL at 08:58

## 2022-07-20 RX ADMIN — ENOXAPARIN SODIUM 30 MG: 100 INJECTION SUBCUTANEOUS at 08:58

## 2022-07-20 NOTE — PROGRESS NOTES
Hospital Medicine Progress Note      Admit Date: 7/19/2022       CC: F/U for sob    HPI: 80 y.o. female with PMH of CAD, diastolic CHF, DMII, PVD presented to [facility] for shortness of breath since last night. Patient states that she had some mild shortness of breath \"for a while\" that she describes as a few weeks but it seems to be getting worse. She states that she has noticed over the last couple of days that she is unable to lie flat without getting very short of breath. She also reports some dyspnea on exertion with activity. She denies any shortness of breath at rest.  She denies any associated chest pain heaviness pressure or tightness. She denies any fever or chills. She is had a slight cough but no productive sputum. No exposure to illness. No exposure to COVID. She does report some mild lower extremity edema but states that it is not any worse than usual.  She denies any weight gain. Medical history is significant for hypertension hyperlipidemia, diabetes, coronary artery disease, diastolic heart failure, hypothyroidism, pulmonary hypertension, NSTEMI, PCI in 2015 and 2018, mitral insufficiency, and peripheral vascular disease. She does take aspirin 81 mg daily. No other anticoagulants. She denies any history of thromboembolic disease. No recent travel. No leg or calf pain. Interval History/Subjective: cardiology has been consulted for CHF. Started on bid IV lasix. Echo pending   VQ low PE prob  Bilat LE dopplers neg for DVT    Review of Systems:       The patient denied headaches, visual changes, LOC, SOB, CP, ABD pain, N/V/D, skin changes, new or worsening weakness or neuromuscular deficits. Comprehensive ROS negative except as mentioned above.     Past Medical History:        Diagnosis Date    CAD (coronary artery disease)     CHF (congestive heart failure) (HCC)     Chronic kidney disease     DM2 (diabetes mellitus, type 2) (HCC)     HTN (hypertension) Hypothyroidism     MI (mitral incompetence)     Over weight     Pulmonary HTN (HCC)     PVD (peripheral vascular disease) [address]     Uterine cancer ([facility]        Past Surgical History:        Procedure Laterality Date    CATARACT REMOVAL WITH IMPLANT Bilateral     CORONARY ANGIOPLASTY WITH STENT PLACEMENT Right 9/9/2015    At [address] WITH STENT PLACEMENT  01/2018    ILANA to RCA and POBA on ISR of LAD    HERNIA REPAIR      HYSTERECTOMY (CERVIX STATUS UNKNOWN)         Allergies:  Hydrocodone-acetaminophen, Pcn [penicillins], Rosuvastatin, and Morphine    Past medical and surgical history reviewed. Any changes have been noted. PHYSICAL EXAM:  BP (!) 181/51   Pulse 66   Temp 99.8 °F (37.7 °C) (Oral)   Resp 18   Ht 5' 5\" (1.651 m)   Wt 198 lb 13.7 oz (90.2 kg)   LMP  (LMP Unknown)   SpO2 90%   BMI 33.09 kg/m²       Intake/Output Summary (Last 24 hours) at 7/20/2022 1019  Last data filed at 7/20/2022 0645  Gross per 24 hour   Intake 240 ml   Output 150 ml   Net 90 ml        General appearance:   No apparent distress, appears stated age. Cooperative. HEENT:  Normocephalic, atraumatic. PERRLA. EOMi. Conjunctivae/corneas clear, no icterus, non-injected. Neck: Supple, with full range of motion. No jugular venous distention. Trachea midline. Respiratory:  Normal respiratory effort. Clear to auscultation, bilaterally without Rales/Wheezes/Rhonchi. Cardiovascular:  Regular rate and rhythm without murmurs, rubs or gallops. Abdomen: Soft, non-tender, non-distended, without rebound or guarding. Normal bowel sounds. Musculoskeletal:  No clubbing, cyanosis or edema bilaterally. Full range of motion without deformity. Skin: Skin color, texture, turgor normal.  No rashes or lesions. Neurologic:  Neurovascularly intact without any focal sensory/motor deficits. Cranial nerves: II-XII intact, grossly intact. No facial asymmetry, tongue midline.    Psychiatric:  Alert and oriented, thought content appropriate  Capillary Refill: Brisk,< 3 seconds   Peripheral Pulses: +2 palpable, equal bilaterally       LABS:    Lab Results   Component Value Date    WBC 11.0 07/19/2022    HGB 12.7 07/19/2022    HCT 37.9 07/19/2022    MCV 98.4 07/19/2022     07/19/2022    LYMPHOPCT 11.2 07/19/2022    RBC 3.85 (L) 07/19/2022    MCH 32.9 07/19/2022    MCHC 33.5 07/19/2022    RDW 14.5 07/19/2022       Lab Results   Component Value Date    CREATININE 1.5 (H) 07/20/2022    BUN 34 (H) 07/20/2022     07/20/2022    K 4.0 07/20/2022     07/20/2022    CO2 25 07/20/2022       Lab Results   Component Value Date/Time    MG 2.20 07/20/2022 06:43 AM       Lab Results   Component Value Date    ALT 9 (L) 07/19/2022    AST 10 (L) 07/19/2022    ALKPHOS 127 07/19/2022    BILITOT 0.4 07/19/2022        No flowsheet data found. Lab Results   Component Value Date    LABA1C 7.2 11/11/2020       Imaging:  NM LUNG SCAN PERFUSION ONLY   Final Result   Low probability for pulmonary embolism. VL Extremity Venous Bilateral   Final Result      XR CHEST PORTABLE   Final Result   No radiographic evidence of acute pulmonary disease.              Scheduled and prn Medications:    Scheduled Meds:   allopurinol  100 mg Oral Daily    aspirin  81 mg Oral Daily    carvedilol  12.5 mg Oral BID WC    clopidogrel  75 mg Oral Daily    ezetimibe  10 mg Oral Daily    levothyroxine  150 mcg Oral QAM AC    NIFEdipine  30 mg Oral Daily    sodium chloride flush  5-40 mL IntraVENous 2 times per day    furosemide  40 mg IntraVENous BID    insulin lispro  0-12 Units SubCUTAneous TID WC    insulin lispro  0-6 Units SubCUTAneous Nightly    enoxaparin  30 mg SubCUTAneous Daily     Continuous Infusions:   dextrose      sodium chloride       PRN Meds:.glucose, dextrose bolus **OR** dextrose bolus, glucagon (rDNA), dextrose, sodium chloride flush, sodium chloride, ondansetron **OR** ondansetron, polyethylene glycol    Assessment & Plan:         Acute diastolic CHF (congestive heart failure) ([facility]) [I50.31] 11/20/2018               SOB 2/2 acute diastolic CHF  CAD  HTN  HLD  Gout  DMII  - she does have some orthopnea but CXR did not show pulm edema  - repeat ECHO  - consulted cardiology  - IV lasix 40 BID  - continue home BP meds  - asa, plavix, statin  - strict I&O  - bilat LE doppler neg for DVT  - VQ low prob for PE      Continue current regimen/therapies. Monitor. Adjust medical regimen as appropriate. Body mass index is 33.09 kg/m². The patient and / or the family were informed of the results of any tests, a time was given to answer questions, a plan was proposed and they agreed with plan. DVT ppx: lovenox and plavix       Diet: ADULT DIET; Regular;  Low Sodium (2 gm)    Consults:  IP CONSULT TO HOSPITALIST  IP CONSULT TO CARDIOLOGY  IP CONSULT TO HEART FAILURE NURSE/COORDINATOR  IP CONSULT TO DIETITIAN    DISPO/placement plan: pending    Code Status: Full Code      ESTELLE Brown CNP  07/20/22

## 2022-07-20 NOTE — PROGRESS NOTES
Physician Progress Note      PATIENT:               Chance Rodriguez  CSN #:                  [id number]  :                       1940  ADMIT DATE:       2022 10:17 AM  DISCH DATE:  RESPONDING  PROVIDER #:        Carnella Gitelman APRN - CNP        QUERY TEXT:    Stage of Chronic Kidney Disease: Please provide further specificity, if known. Clinical indicators include: ckd  Options provided:  -- Chronic kidney disease stage 1  -- Chronic kidney disease stage 2  -- Chronic kidney disease stage 3  -- Chronic kidney disease stage 3a  -- Chronic kidney disease stage 3b  -- Chronic kidney disease stage 4  -- Chronic kidney disease stage 5  -- Chronic kidney disease stage 5, requiring dialysis  -- End stage renal disease  -- Other - I will add my own diagnosis  -- Disagree - Not applicable / Not valid  -- Disagree - Clinically Unable to determine / Unknown        PROVIDER RESPONSE TEXT:    The patient has chronic kidney disease stage 3.       Electronically signed by:  Carnella Gitelman APRN - CNP 2022 1:39 PM

## 2022-07-20 NOTE — PLAN OF CARE
Problem: Discharge Planning  Goal: Discharge to home or other facility with appropriate resources  7/20/2022 0912 by Giovanni Nesbitt RN  Outcome: Progressing  7/19/2022 2349 by Thuy Mcclellan RN  Outcome: Progressing     Problem: Safety - Adult  Goal: Free from fall injury  7/20/2022 0912 by Giovanni Nesbitt RN  Outcome: Progressing  7/19/2022 2349 by Thuy Mcclellan RN  Outcome: Progressing     Problem: ABCDS Injury Assessment  Goal: Absence of physical injury  7/20/2022 0912 by Giovanni Nesbitt RN  Outcome: Progressing  7/19/2022 2349 by Thuy Mcclellan RN  Outcome: Progressing     Problem: Cardiovascular - Adult  Goal: Maintains optimal cardiac output and hemodynamic stability  7/20/2022 0912 by Giovanni Nesbitt RN  Outcome: Progressing  7/19/2022 2349 by Thuy Mcclellan RN  Outcome: Progressing  Goal: Absence of cardiac dysrhythmias or at baseline  7/20/2022 0912 by Giovanni Nesbitt RN  Outcome: Progressing  7/19/2022 2349 by Thuy Mcclellan RN  Outcome: Progressing     Problem: Metabolic/Fluid and Electrolytes - Adult  Goal: Electrolytes maintained within normal limits  7/20/2022 0912 by Giovanni Nesbitt RN  Outcome: Progressing  7/19/2022 2349 by Thuy Mcclellan RN  Outcome: Progressing  Goal: Hemodynamic stability and optimal renal function maintained  7/20/2022 0912 by Giovanni Nesbitt RN  Outcome: Progressing  7/19/2022 2349 by Thuy Mcclellan RN  Outcome: Progressing  Goal: Glucose maintained within prescribed range  7/20/2022 0912 by Giovanni Nesbitt RN  Outcome: Progressing  7/19/2022 2349 by Thuy Mcclellan RN  Outcome: Progressing     Problem: Chronic Conditions and Co-morbidities  Goal: Patient's chronic conditions and co-morbidity symptoms are monitored and maintained or improved  Outcome: Progressing

## 2022-07-20 NOTE — CONSULTS
41 Suni Santos  1940 July 20, 2022    Reason for Consult: Shortness of breath    CC: Shortness of breath    HPI:  The patient is 80 y.o. female with a past medical history significant for pulmonary hypertension, PAD, essential hypertension, CAD with prior PCI and chronic diastolic CHF. She had initial stent placement to her LAD 8/2016 but had subsequent LHC by Dr. Daina Walters on 1/10/18 and found to have in-stent restenosis in the LAD requiring balloon angioplasty. She also required stent placement x 3 to her RCA. [facility] 9/20/18 also completed by Dr. Daina Walters resulted in balloon angioplasty in the mid left circumflex. She has had admissions for diastolic CHF in the past. Her dry weight is ~207lbs. I have not seen her in the office since 1/7/21. Beta blockade was stopped at that time due to fatigue and bradycardia. She states that she has been short of breath for months but \"it is not every day\". Some days she is okay but the next day she may be short of breath. Jill Burkitt was given a breathing treatment en route to the hospital. Today she states that she is \"more \"short-winded\" than when she got the breathing treatment yesterday. She endorses some weakness in her voice as well. She has no chest pain or tightness. She did have chest pain with her prior PCI's. She adds that her weight has actually gone down about 5 pounds recently. Review of Systems:  Constitutional: No fatigue, weakness, night sweats or fever. HEENT: No new vision difficulties or ringing in the ears. Respiratory:Positive for new SOB, PND, orthopnea. No cough. Cardiovascular: See HPI   GI: No n/v, diarrhea, constipation, abdominal pain or changes in bowel habits. No melena, no hematochezia  : No urinary frequency, urgency, incontinence, hematuria or dysuria. Skin: No cyanosis or skin lesions. Musculoskeletal: No new muscle or joint pain.   Neurological: No syncope or TIA-like symptoms.   Psychiatric: No anxiety, insomnia or depression     Past Medical History:   Diagnosis Date    CAD (coronary artery disease)     CHF (congestive heart failure) ([facility])     Chronic kidney disease     DM2 (diabetes mellitus, type 2) ([facility])     HTN (hypertension)     Hypothyroidism     MI (mitral incompetence)     Over weight     Pulmonary HTN (HCC)     PVD (peripheral vascular disease) ([facility] [address]     Uterine cancer ([facility] [address]      Past Surgical History:   Procedure Laterality Date    CATARACT REMOVAL WITH IMPLANT Bilateral     CORONARY ANGIOPLASTY WITH STENT PLACEMENT Right 2015    At [address] [place] [address] Dirve WITH STENT PLACEMENT  2018    ILANA to RCA and POBA on ISR of LAD    HERNIA REPAIR      HYSTERECTOMY (CERVIX STATUS UNKNOWN)       Family History   Problem Relation Age of Onset    Diabetes Mother     Heart Disease Father     Cancer Sister         uterine    Heart Disease Maternal Grandmother     Heart Disease Maternal Grandfather     Heart Disease Paternal Grandmother     Heart Disease Paternal Grandfather      Social History     Tobacco Use    Smoking status: Former     Packs/day: 0.70     Years: 40.00     Pack years: 28.00     Types: Cigarettes     Quit date: 2004     Years since quittin.4    Smokeless tobacco: Never   Vaping Use    Vaping Use: Never used   Substance Use Topics    Alcohol use: No    Drug use: No       Allergies   Allergen Reactions    Hydrocodone-Acetaminophen      vomiting    Pcn [Penicillins]     Rosuvastatin      Leg cramps      Morphine Nausea And Vomiting     Current Facility-Administered Medications   Medication Dose Route Frequency Provider Last Rate Last Admin    glucose chewable tablet 16 g  4 tablet Oral PRN Nevaeh Ramez, APRN - CNP        dextrose bolus 10% 125 mL  125 mL IntraVENous PRN Nevaeh Ramez, APRN - CNP        Or    dextrose bolus 10% 250 mL  250 mL IntraVENous PRN Nevaeh Ramez, APRN - CNP        glucagon (rDNA) injection 1 mg  1 mg SubCUTAneous PRN Henretta Haver, APRN - CNP        dextrose 10 % infusion   IntraVENous Continuous PRN Henretta Haver, APRN - CNP        allopurinol (ZYLOPRIM) tablet 100 mg  100 mg Oral Daily Robert Stall, DO   100 mg at 07/20/22 3692    aspirin EC tablet 81 mg  81 mg Oral Daily Robert Stall, DO   81 mg at 07/20/22 0858    carvedilol (COREG) tablet 12.5 mg  12.5 mg Oral BID [facility], DO   12.5 mg at 07/20/22 8388    clopidogrel (PLAVIX) tablet 75 mg  75 mg Oral Daily Robert Stall, DO   75 mg at 07/20/22 7891    ezetimibe (ZETIA) tablet 10 mg  10 mg Oral Daily Robert Stall, DO   10 mg at 07/20/22 1212    levothyroxine (SYNTHROID) tablet 150 mcg  150 mcg Oral QAM [facility], DO   150 mcg at 07/20/22 0858    NIFEdipine (PROCARDIA XL) extended release tablet 30 mg  30 mg Oral Daily [facility], DO   30 mg at 07/20/22 2223    sodium chloride flush 0.9 % injection 5-40 mL  5-40 mL IntraVENous 2 times per day [facility], DO   10 mL at 07/20/22 0901    sodium chloride flush 0.9 % injection 5-40 mL  5-40 mL IntraVENous PRN [facility], DO        0.9 % sodium chloride infusion   IntraVENous PRN [place] Stall, DO        ondansetron (ZOFRAN-ODT) disintegrating tablet 4 mg  4 mg Oral Q8H PRN [facility], DO        Or    ondansetron [facility]) injection 4 mg  4 mg IntraVENous Q6H PRN Robert Stall, DO        polyethylene glycol (GLYCOLAX) packet 17 g  17 g Oral Daily PRN Robert Stall, DO        furosemide (LASIX) injection 40 mg  40 mg IntraVENous BID Robert Stall, DO   40 mg at 07/20/22 0902    insulin lispro (HUMALOG) injection vial 0-12 Units  0-12 Units SubCUTAneous TID [facility] Stall, DO        insulin lispro (HUMALOG) injection vial 0-6 Units  0-6 Units SubCUTAneous Nightly Robert Stall, DO   2 Units at 07/19/22 2016    enoxaparin Sodium (LOVENOX) injection 30 mg  30 mg SubCUTAneous Daily Robert Stall, DO   30 mg at 07/20/22 0858       Physical Exam:   BP (!) 181/51   Pulse 66   Temp 99.8 °F (37.7 °C) (Oral)   Resp 18   Ht 5' 5\" (1.651 m)   Wt 198 lb 13.7 oz (90.2 kg)   LMP (LMP Unknown)   SpO2 90%   BMI 33.09 kg/m²     Intake/Output Summary (Last 24 hours) at 7/20/2022 7062  Last data filed at 7/20/2022 0645  Gross per 24 hour   Intake 240 ml   Output 150 ml   Net 90 ml     Wt Readings from Last 2 Encounters:   07/20/22 198 lb 13.7 oz (90.2 kg)   07/14/22 200 lb (90.7 kg)     Constitutional: She is oriented to person, place, and time. She appears well-developed and well-nourished. In no acute distress. Head: Normocephalic and atraumatic. Neck: Neck supple. No JVD present. Carotid bruit is not present. No mass and no thyromegaly present. No lymphadenopathy present. Cardiovascular: Normal rate, regular rhythm, normal heart sounds and intact distal pulses. Exam reveals no gallop and no friction rub. No murmur heard. Pulmonary/Chest: Effort normal and breath sounds normal. No respiratory distress. She has no wheezes, rhonchi or rales. Abdominal: Soft, non-tender. Bowel sounds and aorta are normal. She exhibits no organomegaly, mass or bruit. Extremities: No edema, cyanosis, or clubbing. Pulses are 2+ radial/carotid/dorsalis pedis and posterior tibial bilaterally. Neurological: She is alert and oriented to person, place, and time. She has normal reflexes. No cranial nerve deficit. Coordination normal.   Skin: Skin is warm and dry. There is no rash or diaphoresis. Psychiatric: She has a normal mood and affect.  Her speech is normal and behavior is normal.     Personally reviewed and interpreted   EKG Interpretation: Sinus rhythm with prior septal infarct    Lab Review:   Lab Results   Component Value Date/Time    TRIG 60 07/20/2022 06:43 AM    HDL 52 07/20/2022 06:43 AM    HDL 47 03/18/2010 10:07 AM    LDLCALC 84 07/20/2022 06:43 AM    LABVLDL 12 07/20/2022 06:43 AM     Lab Results   Component Value Date/Time     07/20/2022 06:43 AM    K 4.0 07/20/2022 06:43 AM    K 3.9 03/26/2020 05:19 AM    BUN 34 07/20/2022 06:43 AM    CREATININE 1.5 07/20/2022 06:43 AM     Recent Labs     07/19/22  1138   WBC 11.0   HGB 12.7   HCT 37.9        Echo 5/12/2020:  Left ventricular cavity size is normal.   Normal left ventricular wall thickness. Ejection fraction is visually estimated to be 50-55%. Normal diastolic function. The left atrium is mildly dilated. A bubble study was performed and fails to show evidence of shunting. Normal right ventricular size. V/Q scan 7/19/22:  Low probability for pulmonary embolism. Chest X-ray  : NAD    Echo 7/20/22:  Left ventricular cavity size is normal.  There is mildly increased left ventricular wall thickness. Overall left ventricular systolic function appears normal.  Ejection fraction is visually estimated to be 55-60%. No regional wall motion abnormalities are noted. Grade II diastolic dysfunction with elevated LV filling pressures. The right ventricle is normal in size and function. The left atrium is mildly dilated. Moderate mitral regurgitation. Assessment / Plan: 1. Acute on chronic diastolic CHF, class 4  Razia does seem to have some mild volume ovelroad despite being below her usual dry weight. I would diurese her for another 24 hours with IV lasix 40mg bid and see how she does. Continue her current medications otherwise. 2.CAD of native coronary arteries with unstable angina  She is endorsing some of her anginal arm pain. I would order a Lexiscan stress perfusion study for her tomorrow to assess for any ongoing ischemia. 3.Essential Hypertension  Continue her carvedilol but increase to 25mg bid. Hold any ACEI or ARB therapy given her CKD.     4. Stage 3 CKD  Stable

## 2022-07-20 NOTE — ACP (ADVANCE CARE PLANNING)
Advance Care Planning     Advance Care Planning Activator (Inpatient)  Conversation Note      Date of ACP Conversation: 7/20/2022     Conversation Conducted with: Patient with Decision Making Capacity    ACP Activator: [address] Decision Maker:     Current Designated Health Care Decision Maker:     Primary Decision Maker: Bronwyn - Child - [phone]    Secondary Decision Maker: Julia Nance Child - [phone]    Care Preferences    Ventilation: \"If you were in your present state of health and suddenly became very ill and were unable to breathe on your own, what would your preference be about the use of a ventilator (breathing machine) if it were available to you? \"      Would the patient desire the use of ventilator (breathing machine)?: yes    \"If your health worsens and it becomes clear that your chance of recovery is unlikely, what would your preference be about the use of a ventilator (breathing machine) if it were available to you? \"     Would the patient desire the use of ventilator (breathing machine)?: No      Resuscitation  \"CPR works best to restart the heart when there is a sudden event, like a heart attack, in someone who is otherwise healthy. Unfortunately, CPR does not typically restart the heart for people who have serious health conditions or who are very sick. \"    \"In the event your heart stopped as a result of an underlying serious health condition, would you want attempts to be made to restart your heart (answer \"yes\" for attempt to resuscitate) or would you prefer a natural death (answer \"no\" for do not attempt to resuscitate)? \" yes       [] Yes   [x] No   Educated Patient / Julito Szymanski regarding differences between Advance Directives and portable DNR orders.     Length of ACP Conversation in minutes: 5     Conversation Outcomes:  [x] ACP discussion completed  [] Existing advance directive reviewed with patient; no changes to patient's previously recorded wishes  [] New Advance Directive completed  [] Portable Do Not Rescitate prepared for Provider review and signature  [] POLST/POST/MOLST/MOST prepared for Provider review and signature      Follow-up plan:    [] Schedule follow-up conversation to continue planning  [] Referred individual to Provider for additional questions/concerns   [] Advised patient/agent/surrogate to review completed ACP document and update if needed with changes in condition, patient preferences or care setting    [x] This note routed to one or more involved healthcare providers    Electronically signed by Desirae Perkins on 7/20/2022 at 12:29 PM

## 2022-07-20 NOTE — PROGRESS NOTES
Pt admitted to 4109 via stretcher. Pt is A&O x 4, denies pain. Pt does have SOB with exertion. Pt oriented to room, call light, fall precautions and POC. Call light and needs in reach.    Electronically signed by Thuy Mcclellan RN on 7/19/2022 at 11:53 PM

## 2022-07-20 NOTE — PLAN OF CARE
Problem: Safety - Adult  Goal: Free from fall injury  Outcome: Progressing     Problem: ABCDS Injury Assessment  Goal: Absence of physical injury  Outcome: Progressing     Problem: Cardiovascular - Adult  Goal: Maintains optimal cardiac output and hemodynamic stability  Outcome: Progressing     Problem: Cardiovascular - Adult  Goal: Absence of cardiac dysrhythmias or at baseline  Outcome: Progressing     Problem: Metabolic/Fluid and Electrolytes - Adult  Goal: Electrolytes maintained within normal limits  Outcome: Progressing     Problem: Metabolic/Fluid and Electrolytes - Adult  Goal: Hemodynamic stability and optimal renal function maintained  Outcome: Progressing     Problem: Metabolic/Fluid and Electrolytes - Adult  Goal: Glucose maintained within prescribed range  Outcome: Progressing

## 2022-07-20 NOTE — CARE COORDINATION
DISCHARGE PLAN: Pt plans to return home upon d/c and stated that her dgtr or dgtr in law will transport her home at d/c. No d/c needs noted at this time. ____________________________________  INITIAL ASSESSMENT  Met w/pt to address barriers to dc. HOME: Pt reported that she resides alone in a two story home. Pt stated that she resides on the main level of the home. There are 4 KYLE. Disease Specific: Acute diastolic CHF-Orthopnea    COVID Vaccination: Yes    DME/O2: Pt reported that she has a walker and a walk in shower. Grab bars in the shower. Pt stated that she does not use the walker. No DME needs noted at this time. ACTIVE SERVICES: Pt reported that she was independent with all self care PTA and denied the need for any assistance upon d/c. TRANSPORTATION: Pt is an active  and stated that her dgtr pr dgtr in law will transport her home at d/c. PHARMACY: Denies difficulty obtaining/taking meds. Pt has all meds filled at [facility] in [place]. PCP: Ana Trevino    DEMOGRAPHICS: Verified address/phone number as correct    INSURANCE:  Medicare/[facility]  PRESCRIPTION COVERAGE: [facility]    HD/PD: No    THERAPY RECS Not ordered    Discharge planning team will remain available for needs. Please consult for any specifics not addressed in this note.     SANTHOSH Arroyo  [phone]  Electronically signed by Mabel Villanueva on 7/20/2022 at 12:27 PM

## 2022-07-21 LAB
ANION GAP SERPL CALCULATED.3IONS-SCNC: 13 MMOL/L (ref 3–16)
BUN BLDV-MCNC: 37 MG/DL (ref 7–20)
CALCIUM SERPL-MCNC: 8.5 MG/DL (ref 8.3–10.6)
CHLORIDE BLD-SCNC: 103 MMOL/L (ref 99–110)
CO2: 24 MMOL/L (ref 21–32)
CREAT SERPL-MCNC: 1.7 MG/DL (ref 0.6–1.2)
GFR AFRICAN AMERICAN: 35
GFR NON-AFRICAN AMERICAN: 29
GLUCOSE BLD-MCNC: 108 MG/DL (ref 70–99)
GLUCOSE BLD-MCNC: 113 MG/DL (ref 70–99)
GLUCOSE BLD-MCNC: 137 MG/DL (ref 70–99)
GLUCOSE BLD-MCNC: 138 MG/DL (ref 70–99)
GLUCOSE BLD-MCNC: 154 MG/DL (ref 70–99)
LV EF: 57 %
LVEF MODALITY: NORMAL
MAGNESIUM: 2.2 MG/DL (ref 1.8–2.4)
PERFORMED ON: ABNORMAL
POTASSIUM SERPL-SCNC: 3.7 MMOL/L (ref 3.5–5.1)
SODIUM BLD-SCNC: 140 MMOL/L (ref 136–145)
TROPONIN: 0.06 NG/ML

## 2022-07-21 PROCEDURE — 99233 SBSQ HOSP IP/OBS HIGH 50: CPT | Performed by: NURSE PRACTITIONER

## 2022-07-21 PROCEDURE — 1200000000 HC SEMI PRIVATE

## 2022-07-21 PROCEDURE — 80048 BASIC METABOLIC PNL TOTAL CA: CPT

## 2022-07-21 PROCEDURE — 6370000000 HC RX 637 (ALT 250 FOR IP): Performed by: INTERNAL MEDICINE

## 2022-07-21 PROCEDURE — 78452 HT MUSCLE IMAGE SPECT MULT: CPT

## 2022-07-21 PROCEDURE — 6370000000 HC RX 637 (ALT 250 FOR IP): Performed by: NURSE PRACTITIONER

## 2022-07-21 PROCEDURE — 36415 COLL VENOUS BLD VENIPUNCTURE: CPT

## 2022-07-21 PROCEDURE — 84484 ASSAY OF TROPONIN QUANT: CPT

## 2022-07-21 PROCEDURE — 83735 ASSAY OF MAGNESIUM: CPT

## 2022-07-21 PROCEDURE — 93017 CV STRESS TEST TRACING ONLY: CPT

## 2022-07-21 PROCEDURE — 3430000000 HC RX DIAGNOSTIC RADIOPHARMACEUTICAL

## 2022-07-21 PROCEDURE — 2580000003 HC RX 258: Performed by: INTERNAL MEDICINE

## 2022-07-21 PROCEDURE — 6360000002 HC RX W HCPCS: Performed by: INTERNAL MEDICINE

## 2022-07-21 PROCEDURE — 94760 N-INVAS EAR/PLS OXIMETRY 1: CPT

## 2022-07-21 PROCEDURE — 3430000000 HC RX DIAGNOSTIC RADIOPHARMACEUTICAL: Performed by: INTERNAL MEDICINE

## 2022-07-21 PROCEDURE — A9502 TC99M TETROFOSMIN: HCPCS

## 2022-07-21 PROCEDURE — A9502 TC99M TETROFOSMIN: HCPCS | Performed by: INTERNAL MEDICINE

## 2022-07-21 RX ORDER — TORSEMIDE 20 MG/1
20 TABLET ORAL DAILY
Status: DISCONTINUED | OUTPATIENT
Start: 2022-07-21 | End: 2022-07-22

## 2022-07-21 RX ORDER — ISOSORBIDE MONONITRATE 30 MG/1
30 TABLET, EXTENDED RELEASE ORAL DAILY
Status: DISCONTINUED | OUTPATIENT
Start: 2022-07-21 | End: 2022-07-22 | Stop reason: HOSPADM

## 2022-07-21 RX ADMIN — LEVOTHYROXINE SODIUM 150 MCG: 0.07 TABLET ORAL at 06:35

## 2022-07-21 RX ADMIN — TETROFOSMIN 30 MILLICURIE: 1.38 INJECTION, POWDER, LYOPHILIZED, FOR SOLUTION INTRAVENOUS at 11:27

## 2022-07-21 RX ADMIN — ASPIRIN 81 MG: 81 TABLET, COATED ORAL at 09:12

## 2022-07-21 RX ADMIN — ALLOPURINOL 100 MG: 100 TABLET ORAL at 09:12

## 2022-07-21 RX ADMIN — EZETIMIBE 10 MG: 10 TABLET ORAL at 09:12

## 2022-07-21 RX ADMIN — ISOSORBIDE MONONITRATE 30 MG: 30 TABLET, EXTENDED RELEASE ORAL at 16:53

## 2022-07-21 RX ADMIN — REGADENOSON 0.4 MG: 0.08 INJECTION, SOLUTION INTRAVENOUS at 11:28

## 2022-07-21 RX ADMIN — ENOXAPARIN SODIUM 30 MG: 100 INJECTION SUBCUTANEOUS at 09:13

## 2022-07-21 RX ADMIN — CARVEDILOL 12.5 MG: 12.5 TABLET, FILM COATED ORAL at 09:12

## 2022-07-21 RX ADMIN — CLOPIDOGREL BISULFATE 75 MG: 75 TABLET ORAL at 09:12

## 2022-07-21 RX ADMIN — INSULIN LISPRO 2 UNITS: 100 INJECTION, SOLUTION INTRAVENOUS; SUBCUTANEOUS at 17:03

## 2022-07-21 RX ADMIN — CARVEDILOL 12.5 MG: 12.5 TABLET, FILM COATED ORAL at 16:53

## 2022-07-21 RX ADMIN — NIFEDIPINE 30 MG: 30 TABLET, FILM COATED, EXTENDED RELEASE ORAL at 09:12

## 2022-07-21 RX ADMIN — SODIUM CHLORIDE, PRESERVATIVE FREE 10 ML: 5 INJECTION INTRAVENOUS at 20:50

## 2022-07-21 RX ADMIN — SODIUM CHLORIDE, PRESERVATIVE FREE 10 ML: 5 INJECTION INTRAVENOUS at 07:25

## 2022-07-21 RX ADMIN — TETROFOSMIN 10 MILLICURIE: 1.38 INJECTION, POWDER, LYOPHILIZED, FOR SOLUTION INTRAVENOUS at 10:03

## 2022-07-21 RX ADMIN — TORSEMIDE 20 MG: 20 TABLET ORAL at 12:38

## 2022-07-21 ASSESSMENT — PAIN SCALES - GENERAL: PAINLEVEL_OUTOF10: 0

## 2022-07-21 NOTE — PROGRESS NOTES
Hospital Medicine Progress Note      Admit Date: 2022       CC: F/U for sob    HPI: 80 y.o. female with PMH of CAD, diastolic CHF, DMII, PVD presented to [facility] for shortness of breath since last night. Patient states that she had some mild shortness of breath \"for a while\" that she describes as a few weeks but it seems to be getting worse. She states that she has noticed over the last couple of days that she is unable to lie flat without getting very short of breath. She also reports some dyspnea on exertion with activity. She denies any shortness of breath at rest.  She denies any associated chest pain heaviness pressure or tightness. She denies any fever or chills. She is had a slight cough but no productive sputum. No exposure to illness. No exposure to COVID. She does report some mild lower extremity edema but states that it is not any worse than usual.  She denies any weight gain. Medical history is significant for hypertension hyperlipidemia, diabetes, coronary artery disease, diastolic heart failure, hypothyroidism, pulmonary hypertension, NSTEMI, PCI in  and , mitral insufficiency, and peripheral vascular disease. She does take aspirin 81 mg daily. No other anticoagulants. She denies any history of thromboembolic disease. No recent travel. No leg or calf pain. Patient is long-time patient of Dr. Arline Weber  : cardiology has been consulted for CHF. Started on bid IV lasix. Echo pending  VQ low PE prob  Bilat LE dopplers neg for DVT    Interval History/Subjective: cardiology is seeing. On IV lasix 40mg bid   Echo --EF 55-60%, moderate MR  Lexiscan stress today for anginal arm pain -- has partially reversible lesion if inferior wall, ischemia.    Continue carvedilol but increase to 25mg bid  Patient would like to hold off on angiogram at this time stating that her   on the table of an angiogram. She was quite tearful during my exam. Cardiology will adjust meds in the meantime first.      Review of Systems:       The patient denied headaches, visual changes, LOC, SOB, CP, ABD pain, N/V/D, skin changes, new or worsening weakness or neuromuscular deficits. Comprehensive ROS negative except as mentioned above. Past Medical History:        Diagnosis Date    CAD (coronary artery disease)     CHF (congestive heart failure) ([facility])     Chronic kidney disease     DM2 (diabetes mellitus, type 2) ([facility])     HTN (hypertension)     Hypothyroidism     MI (mitral incompetence)     Over weight     Pulmonary HTN (HCC)     PVD (peripheral vascular disease) ([facility] [address]     Uterine cancer ([facility] [address]        Past Surgical History:        Procedure Laterality Date    CATARACT REMOVAL WITH IMPLANT Bilateral     CORONARY ANGIOPLASTY WITH STENT PLACEMENT Right 9/9/2015    At [address] [place] [address] Dirve WITH STENT PLACEMENT  01/2018    ILANA to RCA and POBA on ISR of LAD    HERNIA REPAIR      HYSTERECTOMY (CERVIX STATUS UNKNOWN)         Allergies:  Hydrocodone-acetaminophen, Pcn [penicillins], Rosuvastatin, and Morphine    Past medical and surgical history reviewed. Any changes have been noted. PHYSICAL EXAM:  BP (!) 164/56   Pulse 51   Temp 98.1 °F (36.7 °C) (Oral)   Resp 18   Ht 5' 5\" (1.651 m)   Wt 197 lb 1.5 oz (89.4 kg)   LMP  (LMP Unknown)   SpO2 91%   BMI 32.80 kg/m²       Intake/Output Summary (Last 24 hours) at 7/21/2022 0739  Last data filed at 7/21/2022 6624  Gross per 24 hour   Intake 720 ml   Output 1470 ml   Net -750 ml        General appearance:   No apparent distress, appears stated age. Cooperative. HEENT:  Normocephalic, atraumatic. PERRLA. EOMi. Conjunctivae/corneas clear, no icterus, non-injected. Neck: Supple, with full range of motion. No jugular venous distention. Trachea midline. Respiratory:  Normal respiratory effort. Clear to auscultation, bilaterally without Rales/Wheezes/Rhonchi.   Cardiovascular:  Regular rate and rhythm without murmurs, rubs or gallops. Abdomen: Soft, non-tender, non-distended, without rebound or guarding. Normal bowel sounds. Musculoskeletal:  No clubbing, cyanosis or edema bilaterally. Full range of motion without deformity. Skin: Skin color, texture, turgor normal.  No rashes or lesions. Neurologic:  Neurovascularly intact without any focal sensory/motor deficits. Cranial nerves: II-XII intact, grossly intact. No facial asymmetry, tongue midline. Psychiatric:  Alert and oriented, thought content appropriate  Capillary Refill: Brisk,< 3 seconds   Peripheral Pulses: +2 palpable, equal bilaterally       LABS:    Lab Results   Component Value Date    WBC 11.0 07/19/2022    HGB 12.7 07/19/2022    HCT 37.9 07/19/2022    MCV 98.4 07/19/2022     07/19/2022    LYMPHOPCT 11.2 07/19/2022    RBC 3.85 (L) 07/19/2022    MCH 32.9 07/19/2022    MCHC 33.5 07/19/2022    RDW 14.5 07/19/2022       Lab Results   Component Value Date    CREATININE 1.7 (H) 07/21/2022    BUN 37 (H) 07/21/2022     07/21/2022    K 3.7 07/21/2022     07/21/2022    CO2 24 07/21/2022       Lab Results   Component Value Date/Time    MG 2.20 07/21/2022 04:30 AM       Lab Results   Component Value Date    ALT 9 (L) 07/19/2022    AST 10 (L) 07/19/2022    ALKPHOS 127 07/19/2022    BILITOT 0.4 07/19/2022        No flowsheet data found. Lab Results   Component Value Date    LABA1C 7.2 11/11/2020       Imaging:  NM LUNG SCAN PERFUSION ONLY   Final Result   Low probability for pulmonary embolism. VL Extremity Venous Bilateral   Final Result      XR CHEST PORTABLE   Final Result   No radiographic evidence of acute pulmonary disease.          NM Cardiac Stress Test Nuclear Imaging    (Results Pending)       Scheduled and prn Medications:    Scheduled Meds:   allopurinol  100 mg Oral Daily    aspirin  81 mg Oral Daily    carvedilol  12.5 mg Oral BID WC    clopidogrel  75 mg Oral Daily    ezetimibe  10 mg Oral Daily    levothyroxine  150 mcg Oral QAM AC NIFEdipine  30 mg Oral Daily    sodium chloride flush  5-40 mL IntraVENous 2 times per day    furosemide  40 mg IntraVENous BID    insulin lispro  0-12 Units SubCUTAneous TID WC    insulin lispro  0-6 Units SubCUTAneous Nightly    enoxaparin  30 mg SubCUTAneous Daily     Continuous Infusions:   dextrose      sodium chloride       PRN Meds:.glucose, dextrose bolus **OR** dextrose bolus, glucagon (rDNA), dextrose, regadenoson, sodium chloride flush, sodium chloride, ondansetron **OR** ondansetron, polyethylene glycol    Assessment & Plan:        SOB 2/2 acute diastolic CHF  CAD  HTN  HLD  Gout  DMII  - she does have some orthopnea but CXR did not show pulm edema  - repeat ECHO  - consulted cardiology  - IV lasix 40 BID  - continue home BP meds  - asa, plavix, statin  - strict I&O  - bilat LE doppler neg for DVT  - VQ low prob for PE  - medication adjustment. Hold off on angiogram at this time per patient's request.          Continue current regimen/therapies. Monitor. Adjust medical regimen as appropriate. Body mass index is 32.8 kg/m². The patient and / or the family were informed of the results of any tests, a time was given to answer questions, a plan was proposed and they agreed with plan.       DVT ppx: lovenox and plavix      Diet: Diet NPO    Consults:  IP CONSULT TO HOSPITALIST  IP CONSULT TO CARDIOLOGY  IP CONSULT TO HEART FAILURE NURSE/COORDINATOR  IP CONSULT TO DIETITIAN    DISPO/placement plan: pending    Code Status: Full Code      ESTELLE Purvis CNP  07/21/22

## 2022-07-21 NOTE — PLAN OF CARE
Problem: Discharge Planning  Goal: Discharge to home or other facility with appropriate resources  Outcome: Progressing     Problem: Safety - Adult  Goal: Free from fall injury  Outcome: Progressing     Problem: ABCDS Injury Assessment  Goal: Absence of physical injury  Outcome: Progressing     Problem: Cardiovascular - Adult  Goal: Maintains optimal cardiac output and hemodynamic stability  Outcome: Progressing  Goal: Absence of cardiac dysrhythmias or at baseline  Outcome: Progressing     Problem: Metabolic/Fluid and Electrolytes - Adult  Goal: Electrolytes maintained within normal limits  Outcome: Progressing  Goal: Hemodynamic stability and optimal renal function maintained  Outcome: Progressing  Goal: Glucose maintained within prescribed range  Outcome: Progressing     Problem: Chronic Conditions and Co-morbidities  Goal: Patient's chronic conditions and co-morbidity symptoms are monitored and maintained or improved  Outcome: Progressing

## 2022-07-21 NOTE — CONSULTS
Heart Failure Diet Education:    Per hospital protocol or consult, patient being seen for Heart Failure diet guidelines. Learners: [x]Patient []Family []Caregiver [] Other: ______________  Methods: [x]Verbal Education  [x]Handouts  []Teachback     Patient's Lifestyle Questions:   Is HF a new Diagnosis? []Yes [x]No    Has patient had prior education? [x]Yes []No    Who does Grocery shopping and cooking? Pt does it for herself    Typical Eating Habits:  - Egg sandwich with neal and sausage   - Soup  - Snacks on peanut butter and annalisa crackers  - TV dinners       Instructed the Patient on:   [x] High/Low Sodium foods    [] Moderation/portion control  [] Eating out  [x] Fluid Restriction    [] Label reading  [] Carb Counting   [] Other:      Educational Materials Provided:   [x] Nutrition Care Manual (NCM) Heart Failure Nutrition Therapy   [] NCM Sodium (Salt) Content of Foods  [] NCM Sodium Free Flavoring Tips    [x] Heart Healthy Eating Label Reading Tips   [] Healthy Eating when Eating Out  [x] Controlling Your Fluids   [] Fast Food Guide (from [employer])  [] NCM Carbohydrate Counting for People with Diabetes   [] Managing Your Diabetes Plate Method     -Diet Recommendations: 2000 mg Sodium/day, 64 oz Fluids/day      Monitoring/Evaluation:   -Barriers: TV dinners, convenience meals  -Evaluation of education: Indicates understanding.  -Expected compliance: fair. Additional Comments: Educated pt on using herbs and spices instead of salt (Ms. Delfina Diaz), using fresh and frozen vegetables instead of canned, choosing turkey instead of pork neal / sausage. Also educated pt on watching fluids. Not going over 64 oz in a day. Pt does not watch fluid intake at home. Educated pt that things that are liquid at room temperature count as fluid and soup counts as a fluid. Pt with no further questions.      Total time involved in patient education: 15 minutes        Electronically signed by Devaughn Kyle RD, LD on 7/21/2022 at 2:51 PM

## 2022-07-21 NOTE — PROGRESS NOTES
[facility]   Daily Progress Note      Admit Date:  7/19/2022    Reason for follow up visit: Shortness of breath and chest pain    CC: \"I had pain in my L breast and back. It's there more than it's not. \"    79 y/o female with PMH significant for pulmonary HTN, PAD, HTN, CAD with prior PCI LAD/ISR with PTCA/stent x 3 to RCA/PTCA mid LCX,  chronic diastolic HF, CKDand diabetes mellitus, type II who was admitted to [facility] after presenting with several month H/O SOB improved after breathing treatment and L breast pain. Intial troponins slightly elevated and admitted for further evaluation. Scheduled for Lexiscan-Myoview stress test today. Interval History:  Pt. seen and examined; records reviewed  BP noted (elevated SBP). Remains on room air  + L breast pain intermittently (none at present)  + improved SOB    Subjective:  Pt with no acute overnight cardiac events. Denies edema, weight gain, orthopnea/PND, cough    Review of Systems:   Constitutional: no unanticipated weight loss. There's been no change in energy level, sleep pattern, or activity level. No fevers, chills. Eyes: No visual changes or diplopia. No scleral icterus. ENT: No Headaches, hearing loss or vertigo. No mouth sores or sore throat. Cardiovascular: as reviewed in HPI  Respiratory: No cough or wheezing, no sputum production. No hematemesis. Gastrointestinal: No abdominal pain, appetite loss, blood in stools. No change in bowel or bladder habits. Genitourinary: No dysuria, trouble voiding, or hematuria. Musculoskeletal:  No gait disturbance, no joint complaints. Integumentary: No rash or pruritis. Neurological: No headache, diplopia, change in muscle strength, numbness or tingling. Psychiatric: No anxiety or depression. Endocrine: No temperature intolerance. No excessive thirst, fluid intake, or urination. No tremor.   Hematologic/Lymphatic: No abnormal bruising or bleeding, blood clots or swollen lymph nodes. Allergic/Immunologic: No nasal congestion or hives. Objective:   BP (!) 164/56   Pulse 51   Temp 98.1 °F (36.7 °C) (Oral)   Resp 18   Ht 5' 5\" (1.651 m)   Wt 197 lb 1.5 oz (89.4 kg)   LMP  (LMP Unknown)   SpO2 91%   BMI 32.80 kg/m²     Intake/Output Summary (Last 24 hours) at 7/21/2022 0829  Last data filed at 7/21/2022 2915  Gross per 24 hour   Intake 480 ml   Output 1470 ml   Net -990 ml     Wt Readings from Last 3 Encounters:   07/21/22 197 lb 1.5 oz (89.4 kg)   07/14/22 200 lb (90.7 kg)   07/13/22 200 lb (90.7 kg)       Physical Exam:  General: In no acute distress. Awake, alert, and oriented X4. Up in chair  Skin:  Warm and dry. No new appearing rashes or lesions. Neck:  Supple. No JVD or carotid bruit appreciated. Chest:  Lungs clear to auscultation. No wheezes/rhonchi/rales  Cardiovascular:  RRR. Normal S1 and S2. No murmur/gallop or rub   Abdomen: soft, nontender, nondistended, +bowel sounds. No hepatomegaly  Extremities:  No LE edema. No clubbing or cyanosis. 2+ bilateral radial/DP/PT pulses.  Cap refill brisk    Medications:    allopurinol  100 mg Oral Daily    aspirin  81 mg Oral Daily    carvedilol  12.5 mg Oral BID WC    clopidogrel  75 mg Oral Daily    ezetimibe  10 mg Oral Daily    levothyroxine  150 mcg Oral QAM AC    NIFEdipine  30 mg Oral Daily    sodium chloride flush  5-40 mL IntraVENous 2 times per day    furosemide  40 mg IntraVENous BID    insulin lispro  0-12 Units SubCUTAneous TID WC    insulin lispro  0-6 Units SubCUTAneous Nightly    enoxaparin  30 mg SubCUTAneous Daily      dextrose      sodium chloride       glucose, dextrose bolus **OR** dextrose bolus, glucagon (rDNA), dextrose, regadenoson, sodium chloride flush, sodium chloride, ondansetron **OR** ondansetron, polyethylene glycol    Lab Data:  CBC:   Recent Labs     07/19/22  1138   WBC 11.0   HGB 12.7        BMP:    Recent Labs     07/19/22  1138 07/20/22  0643 07/21/22  0430    139 140   K 4.1 4.0 3.7   CO2 25 25 24   BUN 32* 34* 37*   CREATININE 1.7* 1.5* 1.7*     LIVR:   Recent Labs     07/19/22  1138   AST 10*   ALT 9*      Latest Reference Range & Units 7/19/22 11:38   Pro-BNP 0 - 449 pg/mL 4,000 (H)   (H): Data is abnormally high     Latest Reference Range & Units 7/19/22 11:38 7/21/22 09:02   Troponin <0.01 ng/mL 0.08 (H) 0.06 (H)   (H): Data is abnormally high     Latest Reference Range & Units 7/20/22 06:43   CHOLESTEROL, TOTAL, 230082 0 - 199 mg/dL 148   HDL Cholesterol 40 - 60 mg/dL 52   LDL Calculated <100 mg/dL 84   Triglycerides 0 - 150 mg/dL 60   VLDL Cholesterol Calculated Not Established mg/dL 12       ECG 7/19/2022:  Sinus rhythm with septal infarct    V/Q scan:  Low probability of PE    Venous dopplers: negative for DVT    7/21/2022 Lexiscan-Myoview:  Pharmacological Stress/MPI Results:  1. Technically a satisfactory study. 2. Partially reversible defect involving the inferior wall suggestive of ischemia. Increase uptake due to adjacent bowel activity may affect  the accuracy of the study. 3. Gated Study shows normal LV size and Systolic function; EF is 48%. Echo 7/20/2022:  Left ventricular cavity size is normal.  There is mildly increased left ventricular wall thickness. Overall left ventricular systolic function appears normal.  Ejection fraction is visually estimated to be 55-60%. No regional wall motion abnormalities are noted. Grade II diastolic dysfunction with elevated LV filling pressures. The right ventricle is normal in size and function. The left atrium is mildly dilated. Moderate mitral regurgitation. Echo 5/12/2020:   Left ventricular cavity size is normal.   Normal left ventricular wall thickness. Ejection fraction is visually estimated to be 50-55%. Normal diastolic function. The left atrium is mildly dilated. A bubble study was performed and fails to show evidence of shunting. Normal right ventricular size.     9/20/2018 LHC/PCI:  ANGIOGRAPHIC FINDINGS:  1. Left main is normal.  CHEMO 3 flow. No stenosis. 2.  Left anterior descending artery comes from the left main coronary. Mid  portion has 50% stenosis with a fractional flow reserve of 0.84. Diagonal  branch has a 75% stenosis. 3.  Left circumflex comes from the left main coronary. Obtuse marginal  branches are patent. Mid portion has 99% stenosis. 4.  Right coronary comes from the right coronary cuff, patent stents, and  is a right dominant system. LVEDP was 20 mmHg. In summary, balloon angioplasty of the mid left circumflex only. 1/14/2018 LHC/PCI:  ANGIOGRAPHIC FINDINGS:  1. Right coronary artery comes from the right coronary cusp giving rise to  right posterior descending artery and posterolateral branch, is a right  dominant system with 80% in-stent restenosis of the right coronary artery  stent and distally, there was an 80% stenosis present. 2.  The left main comes from the left coronary cusp giving rise to left  anterior descending and left circumflex arteries, has no significant  stenosis. 3.  Left anterior descending artery comes from left main, in the proximal  portion has stent present which has 90% in-stent restenosis and 99%  stenosis of the ostium of the first diagonal.  4.  This left circumflex is small caliber nondominant, has 80% stenosis  present. Obtuse marginal was patent. 5.  LVEDP was 15 mmHg. INTERVENTION PERFORMED:  1. We intervened on the left anterior descending artery. We only  performed balloon angioplasty of the left anterior descending artery  in-stent restenosis using a 3-mm noncompliant balloon catheter and at the  ostium of the first diagonal using a 2.5-mm noncompliant balloon. We  decided not to further JL the diagonal branch. 2.  We performed stent placement in the right coronary. Unfortunately, we  had to place three stents.   The first one in the distal was 3.0 x 10 mm, in  the proximal midportion was 3.0 x 28 mm and right distal to the proximal  stent was 3.0 x 8 mm secondary to missing the lesion distal to the stent. Telemetry: Sinus rhythm without ectopy  (Personally reviewed)    Assessment/Plan:    Acute on chronic diastolic HF  -NYHA class III; currently appears to be better compensated  -echo this admit: grade II diastolic dysfunction; LVEF 55-60%  -change IV diuretic to po  -continue carvedilol  -not on ACE/ARB/aldactone d/t elevated Cr    2. Chest pain/Elevated troponins  -atypical for angina  -suspect CKD contributing to elevated troponin  -ECG without acute ischemic changes  -await Lexiscan-Myoview results: partially reversible inferior defect. 3. CAD of native coronary artery with angina  -has had prioe PCI's of RCA, LAD and LCX  -follow up Lexiscan-Myoview  -continue DAPT, carvedilol, zetia    4. Essential HTN  -goal BP < 130/80  -continue medical management  -needs tighter control    5. Chronic kidney disease, stage 3  -Cr remains stable    6. Hyperlipidemia, goal LDL < 70  -on zetia  -intolerant to statins    Discussed results of stress test with Petar Cuellar. She is pain free and quite hesitant to proceed with cardiac catheterization. Discussed indications, risks/complications and she would like to take a conservative approach initially and attempt to control with medications. If recurrent or increasing chest pain she would consider cardiac cath with possible PCI. Will adjust medications and hopefully plan for discharge tomorrow with follow up in office next week.       Electronically signed by ESTELLE Bhandari CNP on 7/21/2022 at 8:29 AM

## 2022-07-21 NOTE — PROGRESS NOTES
Spoke with stress lab RN. OK to give all morning medications besides lasix. All morning meds given with a sip of water. Lab notified to draw STAT troponin lab so stress test can be scheduled. Lab at bedside to draw.

## 2022-07-21 NOTE — PLAN OF CARE
Problem: Safety - Adult  Goal: Free from fall injury  Outcome: Progressing     Problem: ABCDS Injury Assessment  Goal: Absence of physical injury  Outcome: Progressing     Problem: Cardiovascular - Adult  Goal: Maintains optimal cardiac output and hemodynamic stability  Outcome: Progressing     Problem: Cardiovascular - Adult  Goal: Absence of cardiac dysrhythmias or at baseline  Outcome: Progressing     Problem: Metabolic/Fluid and Electrolytes - Adult  Goal: Electrolytes maintained within normal limits  Outcome: Progressing     Problem: Metabolic/Fluid and Electrolytes - Adult  Goal: Hemodynamic stability and optimal renal function maintained  Outcome: Progressing     Problem: Metabolic/Fluid and Electrolytes - Adult  Goal: Glucose maintained within prescribed range  Outcome: Progressing     Problem: Chronic Conditions and Co-morbidities  Goal: Patient's chronic conditions and co-morbidity symptoms are monitored and maintained or improved  Outcome: Progressing

## 2022-07-21 NOTE — CONSULTS
[facility]  HEART FAILURE PROGRAM      NAME:  [address] RECORD NUMBER:  [id number]  AGE: 80 y.o.    GENDER: female  : 1940  TODAY'S DATE:  2022    Subjective:     VISIT TYPE: evaluation     ADMITTING PHYSICIAN:  Mirian Muse DO    PAST MEDICAL HISTORY        Diagnosis Date    CAD (coronary artery disease)     CHF (congestive heart failure) (HCC)     Chronic kidney disease     DM2 (diabetes mellitus, type 2) (HCC)     HTN (hypertension)     Hypothyroidism     MI (mitral incompetence)     Over weight     Pulmonary HTN (HCC)     PVD (peripheral vascular disease) (HCC)     Uterine cancer ([facility] [place] [address]        SOCIAL HISTORY    Social History     Tobacco Use    Smoking status: Former     Packs/day: 0.70     Years: 40.00     Pack years: 28.00     Types: Cigarettes     Quit date: 2004     Years since quittin.4    Smokeless tobacco: Never   Vaping Use    Vaping Use: Never used   Substance Use Topics    Alcohol use: No    Drug use: No       ALLERGIES    Allergies   Allergen Reactions    Hydrocodone-Acetaminophen      vomiting    Pcn [Penicillins]     Rosuvastatin      Leg cramps      Morphine Nausea And Vomiting       MEDICATIONS  Scheduled Meds:   torsemide  20 mg Oral Daily    isosorbide mononitrate  30 mg Oral Daily    allopurinol  100 mg Oral Daily    aspirin  81 mg Oral Daily    carvedilol  12.5 mg Oral BID WC    clopidogrel  75 mg Oral Daily    ezetimibe  10 mg Oral Daily    levothyroxine  150 mcg Oral QAM AC    NIFEdipine  30 mg Oral Daily    sodium chloride flush  5-40 mL IntraVENous 2 times per day    insulin lispro  0-12 Units SubCUTAneous TID WC    insulin lispro  0-6 Units SubCUTAneous Nightly    enoxaparin  30 mg SubCUTAneous Daily       ADMIT DATE: 2022      Objective:     ADMISSION DIAGNOSIS:   Orthopnea [R06.01]  Elevated troponin [R77.8]  Elevated brain natriuretic peptide (BNP) level [J98.51]  Acute diastolic CHF (congestive heart failure) ([facility] [place] [address] [I50.31]  Dyspnea, unspecified type [R06.00]  Acute congestive heart failure, unspecified heart failure type ([facility] [address] [I50.9]     BP (!) 129/57   Pulse (!) 45   Temp 97.8 °F (36.6 °C) (Oral)   Resp 18   Ht 5' 5\" (1.651 m)   Wt 197 lb 1.5 oz (89.4 kg)   LMP  (LMP Unknown)   SpO2 94%   BMI 32.80 kg/m²     ADMIT:  Weight: 205 lb 7.5 oz (93.2 kg)    TODAY: Weight: 197 lb 1.5 oz (89.4 kg)    Wt Readings from Last 10 Encounters:   07/21/22 197 lb 1.5 oz (89.4 kg)   07/14/22 200 lb (90.7 kg)   07/13/22 200 lb (90.7 kg)   07/13/22 201 lb (91.2 kg)   09/08/21 203 lb 6.4 oz (92.3 kg)   01/07/21 204 lb 12.8 oz (92.9 kg)   11/11/20 207 lb 12.8 oz (94.3 kg)   10/29/20 213 lb 9.6 oz (96.9 kg)   08/26/20 215 lb (97.5 kg)   08/18/20 215 lb 9.6 oz (97.8 kg)          Intake/Output Summary (Last 24 hours) at 7/21/2022 1649  Last data filed at 7/21/2022 0911  Gross per 24 hour   Intake 270 ml   Output 1270 ml   Net -1000 ml       LABS  BMP:   Lab Results   Component Value Date/Time     07/21/2022 04:30 AM    K 3.7 07/21/2022 04:30 AM    K 3.9 03/26/2020 05:19 AM     07/21/2022 04:30 AM    CO2 24 07/21/2022 04:30 AM    BUN 37 07/21/2022 04:30 AM    LABALBU 3.5 07/19/2022 11:38 AM    CREATININE 1.7 07/21/2022 04:30 AM    CALCIUM 8.5 07/21/2022 04:30 AM    GFRAA 35 07/21/2022 04:30 AM    GFRAA >60 07/02/2012 12:07 PM    LABGLOM 29 07/21/2022 04:30 AM    GLUCOSE 108 07/21/2022 04:30 AM     CBC:   Recent Labs     07/19/22  1138   WBC 11.0   HGB 12.7   HCT 37.9   MCV 98.4        BNP: No results found for: BNP    ECHOCARDIOGRAM:   7/20/22  Summary   Left ventricular cavity size is normal.   There is mildly increased left ventricular wall thickness. Overall left ventricular systolic function appears normal.   Ejection fraction is visually estimated to be 55-60%. No regional wall motion abnormalities are noted. Grade II diastolic dysfunction with elevated LV filling pressures.    The right ventricle is normal in size and function. The left atrium is mildly dilated. Moderate mitral regurgitation    Assessment:     CONSULTS:   IP CONSULT TO HOSPITALIST  IP CONSULT TO CARDIOLOGY  IP CONSULT TO HEART FAILURE NURSE/COORDINATOR  IP CONSULT TO DIETITIAN    Patient has a CARDIOLOGY CONSULT: Yes - Dr Buena Merlin      Patient taking an ACEI/ARB:  No:   EF >40%     Patient taking a BETA BLOCKER:  Yes: coreg    SCALE AVAILABLE:  Yes - has functioning scale at home    EDUCATION STATUS: Patient   [x]  Provided both written and verbal education on Heart Failure signs/symptoms. [x]  Provided instructions on daily medications. [x]  Provided instructions to monitor and record weight daily. [x]  Provided instructions to call if weight increases 3 lbs in one day or 5 lbs in one week. [x]  Received verbal acknowledgment/understanding of Heart Failure related causes. [x]  Provided instructions on how to maintain a low sodium diet. []  Provided recommendations for smoking cessation programs  [x]  Provided recommendations on activity and exercise      [x]  Other:    HF RN consulted, chart reviewed. Pt admitted with SOB and chest/arm discomfort. Was found to be in acute on chronic HF. Echo revealed 55-60%, grade II diastolic dysfunction. This is not a new diagnosis for pt. She follows relatively closely with Dr Buena Merlin at [facility] location. Pt's PCP is Dr Dina Pope. Upon entering room pt was up in recliner, on room air, with no signs of distress. Plan of care was to transition the IV lasix to PO tomorrow with likely dc tomorrow. Pt did have a stress test which showed a partial reversible defect however she wants to hold off on cardiac cath at this time. I introduced myself and my role in her care. She was agreeable to spend time with me. She had a fair understanding of her HF however was not weighing herself daily or necessarily following a low Na diet. \"I kind of got away from all that. \" We reviewed the Pt HF Education booklet together in detail. Pt was very much engaged and asked excellent questions. Teaching done on the importance of re-initiating daily wts, 3/5 rule, HF Zones, and who and when to call. She verbalized understanding. She met with the Dietician earlier today. She was able to 'teachback' to me the reasoning for the need to follow a low Na diet. She admits to using a salt shaker. Diet material re-iterated. She does not appear to drink over the 2 liter FR. She takes her meds routinely and has no issues affording them. Pt is an active  so transportation is not an issue. Pt resides on a [place] acre farm in [place], [place] that her 2 sons farm (hay). Her one son lives next door \"so I see him practically every day. \" She also has 2 grandsons (and great grandkids) that she is very close with. It's clear she has excellent family support. Pt tearful when she shared her  Umer Nguyễn  5 years ago. Emotional support provided. Pt aware I will be getting her hospital follow up appts which will be on her AVS. I gave her my [facility] HF RN Navigator number as a resource and encouraged her to call with any questions or concerns. I thoroughly enjoyed talking with Ms Nasra Berg. She was appreciative of the information and the time spent, 35 mins. CURRENT DIET: ADULT DIET; Regular; Low Sodium (2 gm)    EDUCATIONAL PACKETS PROVIDED- PRINTED FROM [employer]. Titles and material given:  Yes   []  What is Heart Failure?   []  Heart Failure: Warning Signs of a Flare-Up  []  Heart Failure: Making Changes to Your Diet  []  Heart Failure: Medications to Help Your Heart   [x]  Other: [place] HF Pt Education booklet given and reviewed in detail with pt    PATIENT/CAREGIVER TEACHING:    Level of patient/caregiver understanding able to:   [x] Verbalize understanding   [] Demonstrate understanding       [x] Teach back        [] Needs reinforcement     []  Other:      TEACHING TIME:  35 minutes       Plan:       DISCHARGE PLAN:  Placement for patient upon discharge: home with support of children (x1 son lives next door as well as her grandsons and great grandsons. Other son lives close as well)  Hospice Care:  no  Code Status: Full Code  Discharge appointment scheduled: Yes     RECOMMENDATIONS:   [x]  Encourage to call Heart Failure Resource Line with any questions or concerns. [x]  Educate further Ms. Johns's on fluid restriction 48 oz- 64 oz during inpatient stay so she can understand how to measure intake at home. [x]  Continue to educate on S/S of Heart Failure. [x]  Emphasize daily weights, diet, and if changes, to call Heart Failure Resource Line  [x]  Other:  Lives in [place], [place].  Doesn't qualify for CR with EF >40%, declines WC          Electronically signed by Katie Ramsey RN, BSN CHFN  on 7/21/2022 at 4:49 PM

## 2022-07-22 VITALS
DIASTOLIC BLOOD PRESSURE: 55 MMHG | HEART RATE: 47 BPM | TEMPERATURE: 97.4 F | HEIGHT: 65 IN | OXYGEN SATURATION: 94 % | BODY MASS INDEX: 32.76 KG/M2 | WEIGHT: 196.65 LBS | SYSTOLIC BLOOD PRESSURE: 136 MMHG | RESPIRATION RATE: 18 BRPM

## 2022-07-22 LAB
ANION GAP SERPL CALCULATED.3IONS-SCNC: 13 MMOL/L (ref 3–16)
BUN BLDV-MCNC: 39 MG/DL (ref 7–20)
CALCIUM SERPL-MCNC: 8.4 MG/DL (ref 8.3–10.6)
CHLORIDE BLD-SCNC: 103 MMOL/L (ref 99–110)
CO2: 23 MMOL/L (ref 21–32)
CREAT SERPL-MCNC: 1.9 MG/DL (ref 0.6–1.2)
GFR AFRICAN AMERICAN: 31
GFR NON-AFRICAN AMERICAN: 25
GLUCOSE BLD-MCNC: 100 MG/DL (ref 70–99)
GLUCOSE BLD-MCNC: 143 MG/DL (ref 70–99)
GLUCOSE BLD-MCNC: 89 MG/DL (ref 70–99)
MAGNESIUM: 2.2 MG/DL (ref 1.8–2.4)
PERFORMED ON: ABNORMAL
PERFORMED ON: ABNORMAL
POTASSIUM SERPL-SCNC: 3.7 MMOL/L (ref 3.5–5.1)
PRO-BNP: 2947 PG/ML (ref 0–449)
SODIUM BLD-SCNC: 139 MMOL/L (ref 136–145)

## 2022-07-22 PROCEDURE — 6370000000 HC RX 637 (ALT 250 FOR IP): Performed by: NURSE PRACTITIONER

## 2022-07-22 PROCEDURE — 2580000003 HC RX 258: Performed by: INTERNAL MEDICINE

## 2022-07-22 PROCEDURE — 83880 ASSAY OF NATRIURETIC PEPTIDE: CPT

## 2022-07-22 PROCEDURE — 6370000000 HC RX 637 (ALT 250 FOR IP): Performed by: INTERNAL MEDICINE

## 2022-07-22 PROCEDURE — 6360000002 HC RX W HCPCS: Performed by: INTERNAL MEDICINE

## 2022-07-22 PROCEDURE — 80048 BASIC METABOLIC PNL TOTAL CA: CPT

## 2022-07-22 PROCEDURE — 83735 ASSAY OF MAGNESIUM: CPT

## 2022-07-22 PROCEDURE — 99233 SBSQ HOSP IP/OBS HIGH 50: CPT | Performed by: NURSE PRACTITIONER

## 2022-07-22 PROCEDURE — 36415 COLL VENOUS BLD VENIPUNCTURE: CPT

## 2022-07-22 RX ORDER — ISOSORBIDE MONONITRATE 30 MG/1
30 TABLET, EXTENDED RELEASE ORAL DAILY
Qty: 30 TABLET | Refills: 3 | Status: SHIPPED | OUTPATIENT
Start: 2022-07-23

## 2022-07-22 RX ORDER — TORSEMIDE 20 MG/1
10 TABLET ORAL DAILY
Status: DISCONTINUED | OUTPATIENT
Start: 2022-07-23 | End: 2022-07-22 | Stop reason: HOSPADM

## 2022-07-22 RX ORDER — TORSEMIDE 20 MG/1
10 TABLET ORAL DAILY
Qty: 30 TABLET | Refills: 3
Start: 2022-07-23 | End: 2022-08-18

## 2022-07-22 RX ORDER — CARVEDILOL 12.5 MG/1
12.5 TABLET ORAL 2 TIMES DAILY WITH MEALS
Qty: 60 TABLET | Refills: 3 | Status: SHIPPED | OUTPATIENT
Start: 2022-07-22

## 2022-07-22 RX ORDER — TORSEMIDE 20 MG/1
20 TABLET ORAL DAILY
Qty: 30 TABLET | Refills: 3 | Status: SHIPPED | OUTPATIENT
Start: 2022-07-23 | End: 2022-07-22 | Stop reason: SDUPTHER

## 2022-07-22 RX ORDER — NIFEDIPINE 30 MG/1
30 TABLET, EXTENDED RELEASE ORAL DAILY
Qty: 30 TABLET | Refills: 3 | Status: SHIPPED | OUTPATIENT
Start: 2022-07-23 | End: 2022-08-18

## 2022-07-22 RX ADMIN — ENOXAPARIN SODIUM 30 MG: 100 INJECTION SUBCUTANEOUS at 08:46

## 2022-07-22 RX ADMIN — NIFEDIPINE 30 MG: 30 TABLET, FILM COATED, EXTENDED RELEASE ORAL at 08:45

## 2022-07-22 RX ADMIN — LEVOTHYROXINE SODIUM 150 MCG: 0.07 TABLET ORAL at 06:59

## 2022-07-22 RX ADMIN — CARVEDILOL 12.5 MG: 12.5 TABLET, FILM COATED ORAL at 08:45

## 2022-07-22 RX ADMIN — SODIUM CHLORIDE, PRESERVATIVE FREE 10 ML: 5 INJECTION INTRAVENOUS at 07:52

## 2022-07-22 RX ADMIN — CLOPIDOGREL BISULFATE 75 MG: 75 TABLET ORAL at 08:46

## 2022-07-22 RX ADMIN — ISOSORBIDE MONONITRATE 30 MG: 30 TABLET, EXTENDED RELEASE ORAL at 08:46

## 2022-07-22 RX ADMIN — EZETIMIBE 10 MG: 10 TABLET ORAL at 08:45

## 2022-07-22 RX ADMIN — ALLOPURINOL 100 MG: 100 TABLET ORAL at 08:45

## 2022-07-22 RX ADMIN — INSULIN LISPRO 2 UNITS: 100 INJECTION, SOLUTION INTRAVENOUS; SUBCUTANEOUS at 12:04

## 2022-07-22 RX ADMIN — ASPIRIN 81 MG: 81 TABLET, COATED ORAL at 08:45

## 2022-07-22 RX ADMIN — TORSEMIDE 20 MG: 20 TABLET ORAL at 08:45

## 2022-07-22 NOTE — DISCHARGE SUMMARY
Hospital Medicine Discharge Summary    Patient ID: Mya Cancer      Patient's PCP: Hollie Joseph DO    Admit Date: 7/19/2022     Discharge Date:   7/22/22    Admitting Physician: Maliaka Garcia DO     Discharge Physician: Daniel Riedel, APRN - CNP       Discharge Diagnoses: Active Hospital Problems    Diagnosis Date Noted    Acute diastolic CHF (congestive heart failure) ([facility] [address] [I50.31] 11/20/2018       The patient was seen and examined on day of discharge and this discharge summary is in conjunction with any daily progress note from day of discharge. Disposition:  [] Home  [] Home with home health [] Rehab [] Psych [] SNF  [] LTAC  [] Long term nursing home or group home [] Transfer to ICU  [] Transfer to PCU [] Other:    Hospital Course: 80 y.o. female with PMH of CAD, diastolic CHF, DMII, PVD presented to [facility] for shortness of breath since last night. Patient states that she had some mild shortness of breath \"for a while\" that she describes as a few weeks but it seems to be getting worse. She states that she has noticed over the last couple of days that she is unable to lie flat without getting very short of breath. She also reports some dyspnea on exertion with activity. She denies any shortness of breath at rest.  She denies any associated chest pain heaviness pressure or tightness. She denies any fever or chills. She is had a slight cough but no productive sputum. No exposure to illness. No exposure to COVID. She does report some mild lower extremity edema but states that it is not any worse than usual.  She denies any weight gain. Medical history is significant for hypertension hyperlipidemia, diabetes, coronary artery disease, diastolic heart failure, hypothyroidism, pulmonary hypertension, NSTEMI, PCI in 2015 and 2018, mitral insufficiency, and peripheral vascular disease. She does take aspirin 81 mg daily. She denies any history of thromboembolic disease.   No recent   travel. No leg or calf pain. Patient is long-time patient of Dr. Jomar Reina  cardiology has been consulted for CHF. Started on bid IV lasix and diuresed while admitted. Now on demedex. Lexiscan stress today for anginal arm pain  per cardiology-- has partially reversible lesion if inferior wall, ischemia. Patient would like to hold off on angiogram at this time stating that her   on the table of an angiogram. She was quite tearful during my exam. Cardiology will adjust meds in the meantime first.  Continue carvedilol but increase to 25mg bid. Cont home dose demadex  Start Imdur 30mg daily       ok for discharge. Added imdur 30mg daily. Carvedilol 25mg bid (new)    Assessment/plan:  SOB 2/2 acute diastolic CHF  - she does have some orthopnea but CXR did not show pulm edema  - repeat ECHO  - consulted cardiology  - IV lasix 40 BID while admitted   - continue home BP meds while admitted  - asa, plavix, statin  - strict I&O  - bilat LE doppler neg for DVT  - VQ low prob for PE  - stress test with partially reversible inferior defect (medical mgmt only at this time per patient request)  - treatment will be for medication adjustment. Hold off on angiogram at this time per patient's request.   - imdur 30mg daily, torsemide 20mg daily and carvedilol 25mg bid home going meds.   - not on ACE/ARB/Aldactone d/t elevated creat (baseline)     Discharge Cardiac Meds:  ASA 81 mg daily  Carvedilol 12.5 mg BID  Plavix 75 mg daily  Zetia 10 mg daily  Imdur 30 mg daily  Nifedipine 30 mg daily  Torsemide 10 mg daily        CAD  - cont home meds     HTN  - cont home meds     HLD  - cont home statin     Gout  - no current flare      DMII  - ssi  - hold po meds in-pt  - poct ac/hs     CKD, w/out katelyn  - at baseline Cr.   - stable     Exam:     BP (!) 136/55   Pulse (!) 47   Temp 97.4 °F (36.3 °C) (Oral)   Resp 18   Ht 5' 5\" (1.651 m)   Wt 196 lb 10.4 oz (89.2 kg)   LMP  (LMP Unknown)   SpO2 94%   BMI 32.72 kg/m²   General appearance: No apparent distress, appears stated age and cooperative. HEENT: Pupils equal, round, and reactive to light. Conjunctivae/corneas clear. Neck: Supple, with full range of motion. No jugular venous distention. Trachea midline. Respiratory:  Normal respiratory effort. Clear to auscultation, bilaterally without Rales/Wheezes/Rhonchi. Cardiovascular: Regular rate and rhythm with normal S1/S2 without murmurs, rubs or gallops. Abdomen: Soft, non-tender, non-distended with normal bowel sounds. Musculoskelatal: No clubbing, cyanosis or edema bilaterally. Full range of motion without deformity. Skin: Skin color, texture, turgor normal.  No rashes or lesions. Neurologic:  Neurovascularly intact without any focal sensory/motor deficits. Cranial nerves: II-XII intact, grossly non-focal.  Psychiatric: Alert and oriented, thought content appropriate, normal insight      Consults:     IP CONSULT TO HOSPITALIST  IP CONSULT TO CARDIOLOGY  IP CONSULT TO HEART FAILURE NURSE/COORDINATOR  IP CONSULT TO DIETITIAN    Diagnostic tests:   Imaging:  NM Cardiac Stress Test Nuclear Imaging   Final Result       NM LUNG SCAN PERFUSION ONLY   Final Result   Low probability for pulmonary embolism. VL Extremity Venous Bilateral   Final Result       XR CHEST PORTABLE   Final Result   No radiographic evidence of acute pulmonary disease. Labs:  For convenience and continuity at follow-up the following most recent labs are provided:      CBC:    Lab Results   Component Value Date/Time    WBC 11.0 07/19/2022 11:38 AM    HGB 12.7 07/19/2022 11:38 AM    HCT 37.9 07/19/2022 11:38 AM     07/19/2022 11:38 AM       Renal:    Lab Results   Component Value Date/Time     07/22/2022 04:37 AM    K 3.7 07/22/2022 04:37 AM    K 3.9 03/26/2020 05:19 AM     07/22/2022 04:37 AM    CO2 23 07/22/2022 04:37 AM    BUN 39 07/22/2022 04:37 AM    CREATININE 1.9 07/22/2022 04:37 AM    CALCIUM 8.4 07/22/2022 04:37 AM    PHOS 3.0 12/09/2020 02:38 PM           Discharge Instructions/Follow-up:    Discharge Cardiac Meds:  ASA 81 mg daily  Carvedilol 12.5 mg BID  Plavix 75 mg daily  Zetia 10 mg daily  Imdur 30 mg daily  Nifedipine 30 mg daily  Torsemide 10 mg daily       Follow up with Dr. Maria T Nicole as scheduled on 8/18/2022 @ 3PM in [place] office     Check BMP 5-7 days     PCP/SNF to follow up: pcp 1 wk     D/C condition: stable    Code status: full        Discharge Medications:     Current Discharge Medication List             Details   isosorbide mononitrate (IMDUR) 30 MG extended release tablet Take 1 tablet by mouth in the morning. Qty: 30 tablet, Refills: 3      !! torsemide (DEMADEX) 20 MG tablet Take 0.5 tablets by mouth in the morning. Qty: 30 tablet, Refills: 3       !! - Potential duplicate medications found. Please discuss with provider. Details   carvedilol (COREG) 12.5 MG tablet Take 1 tablet by mouth in the morning and 1 tablet in the evening. Take with meals. Qty: 60 tablet, Refills: 3      !! NIFEdipine (PROCARDIA XL) 30 MG extended release tablet Take 1 tablet by mouth in the morning. Qty: 30 tablet, Refills: 3       !! - Potential duplicate medications found. Please discuss with provider. Details   levothyroxine (SYNTHROID) 150 MCG tablet TAKE ONE TABLET BY MOUTH DAILY  Qty: 30 tablet, Refills: 3      !! torsemide (DEMADEX) 20 MG tablet TAKE ONE TABLET BY MOUTH DAILY -MAY TAKE AN EXTRA TABLET IF NEEDED FOR SHORTNESS OF BREATH OR WEIGHT GAIN  Qty: 135 tablet, Refills: 2      glimepiride (AMARYL) 4 MG tablet TAKE ONE TABLET BY MOUTH TWICE A DAY BEFORE A MEAL  Qty: 180 tablet, Refills: 1      allopurinol (ZYLOPRIM) 100 MG tablet TAKE ONE TABLET BY MOUTH DAILY  Qty: 90 tablet, Refills: 1      ezetimibe (ZETIA) 10 MG tablet TAKE ONE TABLET BY MOUTH DAILY  Qty: 90 tablet, Refills: 3      !!  NIFEdipine (PROCARDIA XL) 30 MG extended release tablet TAKE ONE TABLET BY MOUTH

## 2022-07-22 NOTE — PROGRESS NOTES
Justin Dawson   Daily Progress Note      Admit Date:  7/19/2022    Reason for follow up visit: Chest pain and SOB    CC: \" I feel good. I haven't had any more chest pain. \"    81 y/o female with PMH significant for pulmonary HTN, PAD, HTN, CAD with prior PCI LAD/ISR with PTCA/stent x 3 to RCA/PTCA mid LCX,  chronic diastolic HF, CKDand diabetes mellitus, type II who was admitted to [facility] after presenting with several month H/O SOB improved after breathing treatment and L breast pain. Intial troponins slightly elevated and admitted for further evaluation. Lexiscan-Myoview on 7/21/2022 showed partially reversible inferior defect and placed on medical management after lengthy discussion with patient. She declines angiogram at present. Placed on Imdur and no recurrent chest pain. Interval History:  Pt. seen and examined; records reviewed  BP stable. Denies chest pain or SOB  Cr 1.9 today (chronically elevated)    Subjective:  Pt with no acute overnight cardiac events. Denies chest pain, SOB, cough, palpitations or dizziness    Review of Systems:   Constitutional: no unanticipated weight loss. There's been no change in energy level, sleep pattern, or activity level. No fevers, chills. Eyes: No visual changes or diplopia. No scleral icterus. ENT: No Headaches, hearing loss or vertigo. No mouth sores or sore throat. Cardiovascular: as reviewed in HPI  Respiratory: No cough or wheezing, no sputum production. No hematemesis. Gastrointestinal: No abdominal pain, appetite loss, blood in stools. No change in bowel or bladder habits. Genitourinary: No dysuria, trouble voiding, or hematuria. Musculoskeletal:  No gait disturbance, no joint complaints. Integumentary: No rash or pruritis. Neurological: No headache, diplopia, change in muscle strength, numbness or tingling. Psychiatric: No anxiety or depression. Endocrine: No temperature intolerance. No excessive thirst, fluid intake, or urination.  No tremor. Hematologic/Lymphatic: No abnormal bruising or bleeding, blood clots or swollen lymph nodes. Allergic/Immunologic: No nasal congestion or hives. Objective:   BP (!) 141/59   Pulse 59   Temp 98.2 °F (36.8 °C) (Oral)   Resp 18   Ht 5' 5\" (1.651 m)   Wt 196 lb 10.4 oz (89.2 kg)   LMP  (LMP Unknown)   SpO2 93%   BMI 32.72 kg/m²     Intake/Output Summary (Last 24 hours) at 7/22/2022 0941  Last data filed at 7/22/2022 0830  Gross per 24 hour   Intake 790 ml   Output --   Net 790 ml     Wt Readings from Last 3 Encounters:   07/22/22 196 lb 10.4 oz (89.2 kg)   07/14/22 200 lb (90.7 kg)   07/13/22 200 lb (90.7 kg)       Physical Exam:  General: In no acute distress. Awake, alert, and oriented X4. Resting in bed  Skin:  Warm and dry. No new appearing rashes or lesions. Neck:  Supple. No JVD or carotid bruit appreciated. Chest: Lungs clear to auscultation. No wheezes/rhonchi/rales  Cardiovascular:  RRR. Normal S1 and S2. No murmur/gallop or rub. Abdomen:  soft, nontender, nondistended, +bowel sounds. No hepatomegaly  Extremities:  No LE edema. No clubbing or cyanosis. 2+ bilateral radial/DP/PT pulses.  Cap refill brisk    Medications:    torsemide  20 mg Oral Daily    isosorbide mononitrate  30 mg Oral Daily    allopurinol  100 mg Oral Daily    aspirin  81 mg Oral Daily    carvedilol  12.5 mg Oral BID WC    clopidogrel  75 mg Oral Daily    ezetimibe  10 mg Oral Daily    levothyroxine  150 mcg Oral QAM AC    NIFEdipine  30 mg Oral Daily    sodium chloride flush  5-40 mL IntraVENous 2 times per day    insulin lispro  0-12 Units SubCUTAneous TID     insulin lispro  0-6 Units SubCUTAneous Nightly    enoxaparin  30 mg SubCUTAneous Daily      dextrose      sodium chloride       glucose, dextrose bolus **OR** dextrose bolus, glucagon (rDNA), dextrose, sodium chloride flush, sodium chloride, ondansetron **OR** ondansetron, polyethylene glycol    Lab Data:  CBC:   Recent Labs     07/19/22  1138   WBC 11.0 HGB 12.7        BMP:    Recent Labs     07/20/22  0643 07/21/22  0430 07/22/22  0437    140 139   K 4.0 3.7 3.7   CO2 25 24 23   BUN 34* 37* 39*   CREATININE 1.5* 1.7* 1.9*     LIVR:   Recent Labs     07/19/22  1138   AST 10*   ALT 9*      Latest Reference Range & Units 7/19/22 11:38 7/22/22 04:37   Pro-BNP 0 - 449 pg/mL 4,000 (H) 2,947 (H)   (H): Data is abnormally high     Latest Reference Range & Units 7/19/22 11:38 7/21/22 09:02   Troponin <0.01 ng/mL 0.08 (H) 0.06 (H)   (H): Data is abnormally high     Latest Reference Range & Units 7/20/22 06:43   CHOLESTEROL, TOTAL, 235138 0 - 199 mg/dL 148   HDL Cholesterol 40 - 60 mg/dL 52   LDL Calculated <100 mg/dL 84   Triglycerides 0 - 150 mg/dL 60   VLDL Cholesterol Calculated Not Established mg/dL 12     ECG 7/19/2022:  Sinus rhythm with septal infarct     V/Q scan:  Low probability of PE     Venous dopplers: negative for DVT     7/21/2022 Lexiscan-Myoview:  Pharmacological Stress/MPI Results:  1. Technically a satisfactory study. 2. Partially reversible defect involving the inferior wall suggestive of ischemia. Increase uptake due to adjacent bowel activity may affect  the accuracy of the study. 3. Gated Study shows normal LV size and Systolic function; EF is 01%. Echo 7/20/2022:  Left ventricular cavity size is normal.  There is mildly increased left ventricular wall thickness. Overall left ventricular systolic function appears normal.  Ejection fraction is visually estimated to be 55-60%. No regional wall motion abnormalities are noted. Grade II diastolic dysfunction with elevated LV filling pressures. The right ventricle is normal in size and function. The left atrium is mildly dilated. Moderate mitral regurgitation. Echo 5/12/2020:   Left ventricular cavity size is normal.   Normal left ventricular wall thickness. Ejection fraction is visually estimated to be 50-55%. Normal diastolic function.    The left atrium is mildly dilated. A bubble study was performed and fails to show evidence of shunting. Normal right ventricular size. 9/20/2018 LHC/PCI:  ANGIOGRAPHIC FINDINGS:  1. Left main is normal.  CHEMO 3 flow. No stenosis. 2.  Left anterior descending artery comes from the left main coronary. Mid  portion has 50% stenosis with a fractional flow reserve of 0.84. Diagonal  branch has a 75% stenosis. 3.  Left circumflex comes from the left main coronary. Obtuse marginal  branches are patent. Mid portion has 99% stenosis. 4.  Right coronary comes from the right coronary cuff, patent stents, and  is a right dominant system. LVEDP was 20 mmHg. In summary, balloon angioplasty of the mid left circumflex only. 1/14/2018 C/PCI:  ANGIOGRAPHIC FINDINGS:  1. Right coronary artery comes from the right coronary cusp giving rise to  right posterior descending artery and posterolateral branch, is a right  dominant system with 80% in-stent restenosis of the right coronary artery  stent and distally, there was an 80% stenosis present. 2.  The left main comes from the left coronary cusp giving rise to left  anterior descending and left circumflex arteries, has no significant  stenosis. 3.  Left anterior descending artery comes from left main, in the proximal  portion has stent present which has 90% in-stent restenosis and 99%  stenosis of the ostium of the first diagonal.  4.  This left circumflex is small caliber nondominant, has 80% stenosis  present. Obtuse marginal was patent. 5.  LVEDP was 15 mmHg. INTERVENTION PERFORMED:  1. We intervened on the left anterior descending artery. We only  performed balloon angioplasty of the left anterior descending artery  in-stent restenosis using a 3-mm noncompliant balloon catheter and at the  ostium of the first diagonal using a 2.5-mm noncompliant balloon. We  decided not to further JL the diagonal branch. 2.  We performed stent placement in the right coronary. Unfortunately, we  had to place three stents. The first one in the distal was 3.0 x 10 mm, in  the proximal midportion was 3.0 x 28 mm and right distal to the proximal  stent was 3.0 x 8 mm secondary to missing the lesion distal to the stent. Telemetry:Sinus bradycardia-sinus rhythm (rate 50-60's)  (Personally reviewed)    Assessment/Plan:    Acute on chronic diastolic HF  -NYHA class III; currently appears to be better compensated  -echo this admit: grade II diastolic dysfunction; LVEF 55-60%  -now on po diuretic and continue carvedilol  -not on ACE/ARB/aldactone d/t elevated Cr     2. Chest pain/Elevated troponins  -atypical for angina  -suspect CKD contributing to elevated troponin  -ECG without acute ischemic changes  -Lexiscan-Myoview results: partially reversible inferior defect. (Continue medical management)     3. CAD of native coronary artery with angina  -has had prior PCI's of RCA, LAD and LCX  -continue DAPT, carvedilol, zetia and Imdur  -risk factor modification discussed     4. Essential HTN  -goal BP < 130/80  -BP improving and better controlled  -continue medical management     5. Chronic kidney disease, stage 3  -Cr has increased slightly  -will decrease diuretic     6. Hyperlipidemia, goal LDL < 70  -on zetia  -intolerant to statins    Stable and okay to discharge    Check BMP in 5-7 days    Follow up with Dr. Sky Calle as scheduled on 8/18/2022 @ 3PM in [facility] office    Discussed low-fat/low sodium diet, monitoring of daily weights, fluid restriction, worsening signs and symptoms of heart failure and when to call, and the importance of regular exercise and activity. Discharge Cardiac Meds:  ASA 81 mg daily  Carvedilol 12.5 mg BID  Plavix 75 mg daily  Zetia 10 mg daily  Imdur 30 mg daily  Nifedipine 30 mg daily  Torsemide 10 mg daily    Thank yo udayor allowing us to participate in Mrs. Johns's care.     Electronically signed by ESTELLE Fam - CNP on 7/22/2022 at 9:41 AM

## 2022-07-22 NOTE — PROGRESS NOTES
Hospital Medicine Progress Note      Admit Date: 2022       CC: F/U for sob    HPI: 80 y.o. female with PMH of CAD, diastolic CHF, DMII, PVD presented to [facility] for shortness of breath since last night. Patient states that she had some mild shortness of breath \"for a while\" that she describes as a few weeks but it seems to be getting worse. She states that she has noticed over the last couple of days that she is unable to lie flat without getting very short of breath. She also reports some dyspnea on exertion with activity. She denies any shortness of breath at rest.  She denies any associated chest pain heaviness pressure or tightness. She denies any fever or chills. She is had a slight cough but no productive sputum. No exposure to illness. No exposure to COVID. She does report some mild lower extremity edema but states that it is not any worse than usual.  She denies any weight gain. Medical history is significant for hypertension hyperlipidemia, diabetes, coronary artery disease, diastolic heart failure, hypothyroidism, pulmonary hypertension, NSTEMI, PCI in  and 2018, mitral insufficiency, and peripheral vascular disease. She does take aspirin 81 mg daily. No other anticoagulants. She denies any history of thromboembolic disease. No recent   travel. No leg or calf pain. Patient is long-time patient of Dr. Senia Brice  cardiology has been consulted for CHF. Started on bid IV lasix and diuresed while admitted. Now on demedex. Lexiscan stress today for anginal arm pain  per cardiology-- has partially reversible lesion if inferior wall, ischemia. Continue carvedilol but increase to 25mg bid.  Cont home dose demadex  Start Imdur 30mg daily   Patient would like to hold off on angiogram at this time stating that her   on the table of an angiogram. She was quite tearful during my exam. Cardiology will adjust meds in the meantime first.    Interval History/Subjective: ok for discharge. Added imdur 30mg daily. Cont home diuretics. Carvedilol 25mg bid     Review of Systems:       The patient denied headaches, visual changes, LOC, SOB, CP, ABD pain, N/V/D, skin changes, new or worsening weakness or neuromuscular deficits. Comprehensive ROS negative except as mentioned above. Past Medical History:        Diagnosis Date    CAD (coronary artery disease)     CHF (congestive heart failure) ([facility])     Chronic kidney disease     DM2 (diabetes mellitus, type 2) ([facility])     HTN (hypertension)     Hypothyroidism     MI (mitral incompetence)     Over weight     Pulmonary HTN ([facility])     PVD (peripheral vascular disease) ([facility]     Uterine cancer ([facility]        Past Surgical History:        Procedure Laterality Date    CATARACT REMOVAL WITH IMPLANT Bilateral     CORONARY ANGIOPLASTY WITH STENT PLACEMENT Right 9/9/2015    At [address] [place] [address] Dirve WITH STENT PLACEMENT  01/2018    ILANA to RCA and POBA on ISR of LAD    HERNIA REPAIR      HYSTERECTOMY (CERVIX STATUS UNKNOWN)         Allergies:  Hydrocodone-acetaminophen, Pcn [penicillins], Rosuvastatin, and Morphine    Past medical and surgical history reviewed. Any changes have been noted. PHYSICAL EXAM:  BP (!) 141/59   Pulse 59   Temp 98.2 °F (36.8 °C) (Oral)   Resp 18   Ht 5' 5\" (1.651 m)   Wt 196 lb 10.4 oz (89.2 kg)   LMP  (LMP Unknown)   SpO2 93%   BMI 32.72 kg/m²       Intake/Output Summary (Last 24 hours) at 7/22/2022 8667  Last data filed at 7/22/2022 0830  Gross per 24 hour   Intake 790 ml   Output --   Net 790 ml        General appearance:   No apparent distress, appears stated age. Cooperative. HEENT:  Normocephalic, atraumatic. PERRLA. EOMi. Conjunctivae/corneas clear, no icterus, non-injected. Neck: Supple, with full range of motion. No jugular venous distention. Trachea midline. Respiratory:  Normal respiratory effort. Clear to auscultation, bilaterally without Rales/Wheezes/Rhonchi.   Cardiovascular:  Regular rate and rhythm without murmurs, rubs or gallops. Abdomen: Soft, non-tender, non-distended, without rebound or guarding. Normal bowel sounds. Musculoskeletal:  No clubbing, cyanosis or edema bilaterally. Full range of motion without deformity. Skin: Skin color, texture, turgor normal.  No rashes or lesions. Neurologic:  Neurovascularly intact without any focal sensory/motor deficits. Cranial nerves: II-XII intact, grossly intact. No facial asymmetry, tongue midline. Psychiatric:  Alert and oriented, thought content appropriate  Capillary Refill: Brisk,< 3 seconds   Peripheral Pulses: +2 palpable, equal bilaterally       LABS:    Lab Results   Component Value Date    WBC 11.0 07/19/2022    HGB 12.7 07/19/2022    HCT 37.9 07/19/2022    MCV 98.4 07/19/2022     07/19/2022    LYMPHOPCT 11.2 07/19/2022    RBC 3.85 (L) 07/19/2022    MCH 32.9 07/19/2022    MCHC 33.5 07/19/2022    RDW 14.5 07/19/2022       Lab Results   Component Value Date    CREATININE 1.9 (H) 07/22/2022    BUN 39 (H) 07/22/2022     07/22/2022    K 3.7 07/22/2022     07/22/2022    CO2 23 07/22/2022       Lab Results   Component Value Date/Time    MG 2.20 07/22/2022 04:37 AM       Lab Results   Component Value Date    ALT 9 (L) 07/19/2022    AST 10 (L) 07/19/2022    ALKPHOS 127 07/19/2022    BILITOT 0.4 07/19/2022        No flowsheet data found. Lab Results   Component Value Date    LABA1C 7.2 11/11/2020       Imaging:  NM Cardiac Stress Test Nuclear Imaging   Final Result      NM LUNG SCAN PERFUSION ONLY   Final Result   Low probability for pulmonary embolism. VL Extremity Venous Bilateral   Final Result      XR CHEST PORTABLE   Final Result   No radiographic evidence of acute pulmonary disease.              Scheduled and prn Medications:    Scheduled Meds:   torsemide  20 mg Oral Daily    isosorbide mononitrate  30 mg Oral Daily    allopurinol  100 mg Oral Daily    aspirin  81 mg Oral Daily    carvedilol  12.5 mg Oral BID WC clopidogrel  75 mg Oral Daily    ezetimibe  10 mg Oral Daily    levothyroxine  150 mcg Oral QAM AC    NIFEdipine  30 mg Oral Daily    sodium chloride flush  5-40 mL IntraVENous 2 times per day    insulin lispro  0-12 Units SubCUTAneous TID WC    insulin lispro  0-6 Units SubCUTAneous Nightly    enoxaparin  30 mg SubCUTAneous Daily     Continuous Infusions:   dextrose      sodium chloride       PRN Meds:.glucose, dextrose bolus **OR** dextrose bolus, glucagon (rDNA), dextrose, sodium chloride flush, sodium chloride, ondansetron **OR** ondansetron, polyethylene glycol    Assessment & Plan:          SOB 2/2 acute diastolic CHF  - she does have some orthopnea but CXR did not show pulm edema  - repeat ECHO  - consulted cardiology  - IV lasix 40 BID while admitted   - continue home BP meds  - asa, plavix, statin  - strict I&O  - bilat LE doppler neg for DVT  - VQ low prob for PE  - medication adjustment. Hold off on angiogram at this time per patient's request.   - imdur 30mg daily, torsemide 20mg daily and carvedilol 25mg bid home going meds. CAD  - cont home meds    HTN  - cont home meds    HLD  - cont home statin    Gout  - no current flare     DMII  - ssi  - hold po meds in-pt  - poct ac/hs       Continue current regimen/therapies. Monitor. Adjust medical regimen as appropriate. Body mass index is 32.72 kg/m². The patient and / or the family were informed of the results of any tests, a time was given to answer questions, a plan was proposed and they agreed with plan. DVT ppx: lovenox and plavix     Diet: ADULT DIET; Regular; Low Sodium (2 gm)    Consults:  IP CONSULT TO HOSPITALIST  IP CONSULT TO CARDIOLOGY  IP CONSULT TO HEART FAILURE NURSE/COORDINATOR  IP CONSULT TO DIETITIAN    DISPO/placement plan: pending.  Home today likely    Code Status: Full Code      ESTELLE Kauffman - CHELLE  07/22/22

## 2022-07-22 NOTE — PLAN OF CARE
Problem: Discharge Planning  Goal: Discharge to home or other facility with appropriate resources  Outcome: Completed     Problem: Safety - Adult  Goal: Free from fall injury  Outcome: Completed     Problem: ABCDS Injury Assessment  Goal: Absence of physical injury  Outcome: Completed     Problem: Cardiovascular - Adult  Goal: Maintains optimal cardiac output and hemodynamic stability  Outcome: Completed  Goal: Absence of cardiac dysrhythmias or at baseline  Outcome: Completed     Problem: Metabolic/Fluid and Electrolytes - Adult  Goal: Electrolytes maintained within normal limits  Outcome: Completed  Goal: Hemodynamic stability and optimal renal function maintained  Outcome: Completed  Goal: Glucose maintained within prescribed range  Outcome: Completed     Problem: Chronic Conditions and Co-morbidities  Goal: Patient's chronic conditions and co-morbidity symptoms are monitored and maintained or improved  Outcome: Completed     Problem: Pain  Goal: Verbalizes/displays adequate comfort level or baseline comfort level  Outcome: Completed

## 2022-07-22 NOTE — PROGRESS NOTES
Discharge instructions reviewed with patient. All questions answered, no further questions at this time. Medications delivered to patients room. Belongings packed per patient and PCA. Transport escorted patient out to granddaughter to transport home, no further needs at this time.

## 2022-07-22 NOTE — CARE COORDINATION
DISCHARGE SUMMARY     DATE OF DISCHARGE: 7/22/2022    DISCHARGE DESTINATION: Home with family    FACILITY: None    TRANSPORTATION: Family  Company Name: [employer]  Relationship: Daughter in law   Time: TBD by patient, family and/or medical staff. COMMENTS: SW met with patient alone at bedside today regarding discharge needs and she denies that she has any. No further needs noted unless indicated by patient, family and/or medical staff. Respectfully submitted,    EMELYN Arnold  [facility]   [phone]    Electronically signed by EMELYN Bradshaw on 7/22/2022 at 1:39 PM

## 2022-07-25 ENCOUNTER — FOLLOWUP TELEPHONE ENCOUNTER (OUTPATIENT)
Dept: ADMINISTRATIVE | Age: 82
End: 2022-07-25

## 2022-07-25 ENCOUNTER — CARE COORDINATION (OUTPATIENT)
Dept: CASE MANAGEMENT | Age: 82
End: 2022-07-25

## 2022-07-25 NOTE — PROGRESS NOTES
HF RN attempted to call pt x2 for a hospital 72 hour heart failure follow up call. Unable to leave a message as no answer/ no voicemail option. Care Transition nurse also attempted. Pt does have an upcoming cardiology hospital follow up appt in place which was on her AVS at time of dc.

## 2022-07-26 ENCOUNTER — CARE COORDINATION (OUTPATIENT)
Dept: CASE MANAGEMENT | Age: 82
End: 2022-07-26

## 2022-07-26 NOTE — CARE COORDINATION
[facility] Transitions Initial Follow Up Call    Call within 2 business days of discharge: Yes    Patient: Usha Jackson Patient : 1940   MRN: [id number]  Reason for Admission: Acute CHF  Discharge Date: 22 RARS: Readmission Risk Score: 14.6      Last Discharge [facility]       Date Complaint Diagnosis Description Type Department Provider    22 Shortness of Breath Acute congestive heart failure, unspecified heart failure type ([address] . .. ED to Hosp-Admission (Discharged) (ADMITTED) Tank Hull MD; RIGO Wood. Spoke with: no one    Facility: [facility]      2nd and final attempt at [facility] discharge phone call. Unable to reach patient. Unable to leave message. No answer - no voicemail.       Follow Up  Future Appointments   Date Time Provider Isidoro Black   2022 10:00  [employer]   2022  2:00 PM CARRILLO Fair [facility]   2022  3:15 PM Nelson Menendez [facility]   2022  3:00 PM MD Paloma Malave [facility]       Leonie Cool RN BSN  Care Transition Nurse  [phone]

## 2022-07-27 DIAGNOSIS — R49.0 DYSPHONIA: Primary | ICD-10-CM

## 2022-08-17 NOTE — PROGRESS NOTES
[facility]   Daily Progress Note    CC: \"my swelling is better\"     HPI: Mya Day is a 80 y.o. female with a past medical history significant for pulmonary hypertension, PAD, essential hypertension, CAD with prior PCI and chronic diastolic CHF. She presented to [facility] with chest pain 8/2016 and underwent cath with stent placement to the LAD. She underwent another catheterization with my partner Dr. Luz Yung on 1/10/18 and found to have in-stent restenosis in the LAD requiring balloon angioplasty. She also required stent placement x 3 to her RCA. Heart cath performed on 9/20/18 also completed by Dr. Luz Yung resulted in balloon angioplasty in the mid left circumflex. She was admitted 7/19-7/22/22 with worsening shortness of breath and left breast pain. Troponin assays were elevated and she completed stress perfusion showing partially reversible defect. She declined angiogram and was maintained on medical therapy. She presents today for follow up. She reports feeling well overall. Her lower extremity swelling has improved. She denies chest pain with rest or exertion. Her breathing has been comfortable without shortness of breath. Patient denies exertional chest pain/pressure, dyspnea at rest, HUMPHREY, PND, orthopnea, palpitations, lightheadedness, weight changes, changes in LE edema, and syncope. She reports medication compliance and is tolerating. She was a previous smoker but has never been diagnosed with any chronic lung disease. Review of Systems:  Constitutional: No fatigue, weakness, night sweats or fever. HEENT: No new vision difficulties or ringing in the ears. Respiratory: No new SOB, PND, orthopnea or cough. Cardiovascular: See HPI   GI: No n/v, diarrhea, constipation, abdominal pain or changes in bowel habits. No melena, no hematochezia  : No urinary frequency, urgency, incontinence, hematuria or dysuria. Skin: No cyanosis or skin lesions.   Musculoskeletal: No new muscle and breath sounds normal. No respiratory distress. No wheezes, rhonchi or rales. Abdominal: Soft, non-tender. Bowel sounds are normal. Exhibits no organomegaly, mass or bruit. Extremities: No edema. No cyanosis or clubbing. Pulses are 2+ radial and carotid bilaterally. Neurological: No gross cranial nerve deficit. Coordination normal.   Skin: Skin is warm and dry. There is no rash or diaphoresis. Psychiatric: Patient has a normal mood and affect. Speech is normal and behavior is normal.     Lab Results   Component Value Date/Time    CHOL 148 07/20/2022 06:43 AM    HDL 52 07/20/2022 06:43 AM    HDL 47 03/18/2010 10:07 AM    TRIG 60 07/20/2022 06:43 AM        LDL   84      Procedures:     Cath 8/24/16  1. Right dominant coronary arterial system with serial stents in the mid  right coronary artery. The more distal right coronary artery stent has 25%  restenosis. There is a focal 60% restenosis in the more proximal longer  stent. This is a dominant vessel. In the left system there is a 30% focal  osteal stenosis in the left main. In the left anterior descending artery  proximally there is an 80% stenosis that was intervened upon with a 3 mm x 23  mm Xience drug-eluting stent post dilated to 3.12 mm. Further in the  circumflex system the distal circumflex is a smaller vessel approximately 1.5  mm in diameter with a 99% focal stenosis. 2. Normal left ventricular systolic function with LV ejection fraction of  60%. 3. Normal left ventricular end-diastolic pressure at 13 mmHg. 4. No gradient across the aortic valve on pull back to suggest aortic  stenosis. Cath 1/10/18 (Dr. Miriam Jacinto)   1. We intervened on the left anterior descending artery. We only  performed balloon angioplasty of the left anterior descending artery  in-stent restenosis using a 3-mm noncompliant balloon catheter and at the  ostium of the first diagonal using a 2.5-mm noncompliant balloon. We  decided not to further JL the diagonal branch.   2. We performed stent placement in the right coronary. Unfortunately, we  had to place three stents. The first one in the distal was 3.0 x 10 mm, in  the proximal midportion was 3.0 x 28 mm and right distal to the proximal  stent was 3.0 x 8 mm secondary to missing the lesion distal to the stent. Cath 9/20/18 (Dr. Marti Brown)   1. Left main is normal.  CHEMO 3 flow. No stenosis. 2.  Left anterior descending artery comes from the left main coronary. Mid  portion has 50% stenosis with a fractional flow reserve of 0.84. Diagonal  branch has a 75% stenosis. 3.  Left circumflex comes from the left main coronary. Obtuse marginal  branches are patent. Mid portion has 99% stenosis. 4.  Right coronary comes from the right coronary cuff, patent stents, and  is a right dominant system. LVEDP was 20 mmHg. In summary, balloon angioplasty of the mid left circumflex only. Imaging:     Echo 10/2012  Technically difficult study. Left ventricle: The cavity   size was normal. There was mild concentric hypertrophy. Systolic function was normal. The estimated ejection   fraction was in the range of 60% to 65%. Wall motion was   normal; there were no regional wall motion abnormalities. Features are consistent with a pseudonormal left   ventricular filling pattern, with concomitant abnormal   relaxation and increased filling pressure (grade 2   diastolic dysfunction). Echo 1/12/18  Normal left ventricle size, wall thickness and systolic function with an  estimated ejection fraction of 50-55%. No regional wall motion abnormalities are seen. Mild aortic valve sclerosis without any evidence of aortic stenosis. Echo 4/12/19  Left ventricular cavity size is normal.  There is mild concentric left ventricular hypertrophy. Ejection fraction is visually estimated to be 50-55%. Normal right ventricular size. Echo 5/12/20  Left ventricular cavity size is normal.  Normal left ventricular wall thickness.   Ejection reviewed by me in its entirety. I, Dr. Tahira Javed personally performed the services described in this documentation as scribed by my RN in my presence, and I confirm that the note above accurately reflects all work, treatment, procedures, and medical decision making performed by me.

## 2022-08-18 ENCOUNTER — OFFICE VISIT (OUTPATIENT)
Dept: CARDIOLOGY CLINIC | Age: 82
End: 2022-08-18
Payer: MEDICARE

## 2022-08-18 VITALS
BODY MASS INDEX: 32.12 KG/M2 | OXYGEN SATURATION: 95 % | DIASTOLIC BLOOD PRESSURE: 68 MMHG | SYSTOLIC BLOOD PRESSURE: 158 MMHG | WEIGHT: 192.8 LBS | HEART RATE: 59 BPM | HEIGHT: 65 IN

## 2022-08-18 DIAGNOSIS — E78.5 HYPERLIPIDEMIA LDL GOAL <70: ICD-10-CM

## 2022-08-18 DIAGNOSIS — I50.32 CHRONIC DIASTOLIC CHF (CONGESTIVE HEART FAILURE), NYHA CLASS 2 (HCC): ICD-10-CM

## 2022-08-18 DIAGNOSIS — I25.10 CORONARY ARTERY DISEASE INVOLVING NATIVE CORONARY ARTERY OF NATIVE HEART WITHOUT ANGINA PECTORIS: Primary | ICD-10-CM

## 2022-08-18 DIAGNOSIS — I27.20 PULMONARY HYPERTENSION (HCC): ICD-10-CM

## 2022-08-18 DIAGNOSIS — N18.30 STAGE 3 CHRONIC KIDNEY DISEASE, UNSPECIFIED WHETHER STAGE 3A OR 3B CKD (HCC): ICD-10-CM

## 2022-08-18 PROCEDURE — 1111F DSCHRG MED/CURRENT MED MERGE: CPT | Performed by: INTERNAL MEDICINE

## 2022-08-18 PROCEDURE — 1036F TOBACCO NON-USER: CPT | Performed by: INTERNAL MEDICINE

## 2022-08-18 PROCEDURE — 1123F ACP DISCUSS/DSCN MKR DOCD: CPT | Performed by: INTERNAL MEDICINE

## 2022-08-18 PROCEDURE — 1090F PRES/ABSN URINE INCON ASSESS: CPT | Performed by: INTERNAL MEDICINE

## 2022-08-18 PROCEDURE — G8417 CALC BMI ABV UP PARAM F/U: HCPCS | Performed by: INTERNAL MEDICINE

## 2022-08-18 PROCEDURE — 99214 OFFICE O/P EST MOD 30 MIN: CPT | Performed by: INTERNAL MEDICINE

## 2022-08-18 PROCEDURE — G8427 DOCREV CUR MEDS BY ELIG CLIN: HCPCS | Performed by: INTERNAL MEDICINE

## 2022-08-18 PROCEDURE — G8400 PT W/DXA NO RESULTS DOC: HCPCS | Performed by: INTERNAL MEDICINE

## 2022-08-24 DIAGNOSIS — I50.32 CHRONIC DIASTOLIC CHF (CONGESTIVE HEART FAILURE), NYHA CLASS 2 (HCC): ICD-10-CM

## 2022-08-24 LAB
ANION GAP SERPL CALCULATED.3IONS-SCNC: 10 MMOL/L (ref 3–16)
BUN BLDV-MCNC: 36 MG/DL (ref 7–20)
CALCIUM SERPL-MCNC: 8.7 MG/DL (ref 8.3–10.6)
CHLORIDE BLD-SCNC: 102 MMOL/L (ref 99–110)
CO2: 26 MMOL/L (ref 21–32)
CREAT SERPL-MCNC: 1.9 MG/DL (ref 0.6–1.2)
GFR AFRICAN AMERICAN: 31
GFR NON-AFRICAN AMERICAN: 25
GLUCOSE BLD-MCNC: 202 MG/DL (ref 70–99)
POTASSIUM SERPL-SCNC: 4.1 MMOL/L (ref 3.5–5.1)
SODIUM BLD-SCNC: 138 MMOL/L (ref 136–145)

## 2022-11-04 ENCOUNTER — OFFICE VISIT (OUTPATIENT)
Dept: FAMILY MEDICINE CLINIC | Age: 82
End: 2022-11-04
Payer: MEDICARE

## 2022-11-04 VITALS
TEMPERATURE: 97.8 F | HEIGHT: 65 IN | BODY MASS INDEX: 32.09 KG/M2 | SYSTOLIC BLOOD PRESSURE: 128 MMHG | WEIGHT: 192.6 LBS | DIASTOLIC BLOOD PRESSURE: 76 MMHG

## 2022-11-04 DIAGNOSIS — E13.69 OTHER SPECIFIED DIABETES MELLITUS WITH OTHER SPECIFIED COMPLICATION, UNSPECIFIED WHETHER LONG TERM INSULIN USE (HCC): Primary | ICD-10-CM

## 2022-11-04 DIAGNOSIS — Z23 FLU VACCINE NEED: ICD-10-CM

## 2022-11-04 DIAGNOSIS — E03.9 ACQUIRED HYPOTHYROIDISM: ICD-10-CM

## 2022-11-04 DIAGNOSIS — R73.9 HYPERGLYCEMIA: ICD-10-CM

## 2022-11-04 DIAGNOSIS — E78.5 HYPERLIPIDEMIA LDL GOAL <70: ICD-10-CM

## 2022-11-04 DIAGNOSIS — I10 ESSENTIAL HYPERTENSION: ICD-10-CM

## 2022-11-04 PROCEDURE — 90694 VACC AIIV4 NO PRSRV 0.5ML IM: CPT | Performed by: INTERNAL MEDICINE

## 2022-11-04 PROCEDURE — 3074F SYST BP LT 130 MM HG: CPT | Performed by: INTERNAL MEDICINE

## 2022-11-04 PROCEDURE — 1123F ACP DISCUSS/DSCN MKR DOCD: CPT | Performed by: INTERNAL MEDICINE

## 2022-11-04 PROCEDURE — G8484 FLU IMMUNIZE NO ADMIN: HCPCS | Performed by: INTERNAL MEDICINE

## 2022-11-04 PROCEDURE — G8427 DOCREV CUR MEDS BY ELIG CLIN: HCPCS | Performed by: INTERNAL MEDICINE

## 2022-11-04 PROCEDURE — 99214 OFFICE O/P EST MOD 30 MIN: CPT | Performed by: INTERNAL MEDICINE

## 2022-11-04 PROCEDURE — G0008 ADMIN INFLUENZA VIRUS VAC: HCPCS | Performed by: INTERNAL MEDICINE

## 2022-11-04 PROCEDURE — 3078F DIAST BP <80 MM HG: CPT | Performed by: INTERNAL MEDICINE

## 2022-11-04 PROCEDURE — G8400 PT W/DXA NO RESULTS DOC: HCPCS | Performed by: INTERNAL MEDICINE

## 2022-11-04 PROCEDURE — G8417 CALC BMI ABV UP PARAM F/U: HCPCS | Performed by: INTERNAL MEDICINE

## 2022-11-04 PROCEDURE — 1036F TOBACCO NON-USER: CPT | Performed by: INTERNAL MEDICINE

## 2022-11-04 PROCEDURE — 1090F PRES/ABSN URINE INCON ASSESS: CPT | Performed by: INTERNAL MEDICINE

## 2022-11-04 RX ORDER — ALLOPURINOL 100 MG/1
TABLET ORAL
Qty: 90 TABLET | Refills: 1 | Status: SHIPPED | OUTPATIENT
Start: 2022-11-04

## 2022-11-04 RX ORDER — GLIMEPIRIDE 4 MG/1
TABLET ORAL
Qty: 180 TABLET | Refills: 1 | Status: SHIPPED | OUTPATIENT
Start: 2022-11-04

## 2022-11-04 RX ORDER — LEVOTHYROXINE SODIUM 0.15 MG/1
TABLET ORAL
Qty: 30 TABLET | Refills: 3 | Status: SHIPPED | OUTPATIENT
Start: 2022-11-04

## 2022-11-04 SDOH — ECONOMIC STABILITY: FOOD INSECURITY: WITHIN THE PAST 12 MONTHS, THE FOOD YOU BOUGHT JUST DIDN'T LAST AND YOU DIDN'T HAVE MONEY TO GET MORE.: NEVER TRUE

## 2022-11-04 SDOH — ECONOMIC STABILITY: FOOD INSECURITY: WITHIN THE PAST 12 MONTHS, YOU WORRIED THAT YOUR FOOD WOULD RUN OUT BEFORE YOU GOT MONEY TO BUY MORE.: NEVER TRUE

## 2022-11-04 ASSESSMENT — ENCOUNTER SYMPTOMS
SHORTNESS OF BREATH: 0
WHEEZING: 0
CONSTIPATION: 0
VOMITING: 0
COUGH: 0
RHINORRHEA: 0
ABDOMINAL PAIN: 0
NAUSEA: 0
SORE THROAT: 0
SINUS PAIN: 0
APNEA: 0
BLOOD IN STOOL: 0
DIARRHEA: 0

## 2022-11-04 ASSESSMENT — PATIENT HEALTH QUESTIONNAIRE - PHQ9
SUM OF ALL RESPONSES TO PHQ QUESTIONS 1-9: 0
SUM OF ALL RESPONSES TO PHQ QUESTIONS 1-9: 0
2. FEELING DOWN, DEPRESSED OR HOPELESS: 0
SUM OF ALL RESPONSES TO PHQ QUESTIONS 1-9: 0
1. LITTLE INTEREST OR PLEASURE IN DOING THINGS: 0
SUM OF ALL RESPONSES TO PHQ QUESTIONS 1-9: 0
SUM OF ALL RESPONSES TO PHQ9 QUESTIONS 1 & 2: 0

## 2022-11-04 ASSESSMENT — SOCIAL DETERMINANTS OF HEALTH (SDOH): HOW HARD IS IT FOR YOU TO PAY FOR THE VERY BASICS LIKE FOOD, HOUSING, MEDICAL CARE, AND HEATING?: NOT HARD AT ALL

## 2022-11-04 NOTE — PROGRESS NOTES
Juliana Wilder (:  1940) is a 80 y.o. female,Established patient, here for evaluation of the following chief complaint(s):  No chief complaint on file.   Chief Complaint   Patient presents with    Check-Up     Diabetes, hypertension, hyperlipidemia, hypothyroidism- patient request 80 day Rx refills: Allopurinol, Amaryl, Synthroid    Juliana Wilder is a 80 y.o. female with the following history as recorded in [facility] 60: never went to have tsh checked  Patient Active Problem List    Diagnosis Date Noted    Accelerated ventricular rhythm ([facility] 2018    Left hand weakness 2020    Essential hypertension 2020    Renal failure 2020    Hyperglycemia 2020    Abdominal pain 2020    Ventral hernia 2020    Nausea and vomiting 2020    Diarrhea 2020    Non-cardiac chest pain 2020    Respiratory failure with hypoxia ([address] 2020    Chronic diastolic congestive heart failure (HCC)     Diastolic CHF ([address]     Admitted for acute congestive heart failure ([address] 2019    Acute bacterial sinusitis     Acute diastolic CHF (congestive heart failure) ([address] 2018    Musculoskeletal back pain 2018    Uncontrolled hypertension 2018    Unstable angina (HCC)     CKD (chronic kidney disease) stage 3, GFR 30-59 ml/min ([address] 2018    Acute kidney injury ([address] 2018    Unstable angina pectoris (HCC)     Coronary artery disease involving native coronary artery of native heart with unstable angina pectoris (HCC)     NSTEMI (non-ST elevated myocardial infarction) ([address]     Hyperlipidemia LDL goal <70     Diabetes mellitus ([address] 10/10/2013    Hypothyroidism     MI (mitral incompetence)     Pulmonary hypertension (HCC)     Chronic diastolic CHF (congestive heart failure), NYHA class 2 ([address] 2012     Current Outpatient Medications   Medication Sig Dispense Refill    isosorbide mononitrate (IMDUR) 30 MG extended release tablet Take 1 tablet by mouth in the morning. 30 tablet 3    carvedilol (COREG) 12.5 MG tablet Take 1 tablet by mouth in the morning and 1 tablet in the evening. Take with meals. 60 tablet 3    levothyroxine (SYNTHROID) 150 MCG tablet TAKE ONE TABLET BY MOUTH DAILY 30 tablet 3    torsemide (DEMADEX) 20 MG tablet TAKE ONE TABLET BY MOUTH DAILY -MAY TAKE AN EXTRA TABLET IF NEEDED FOR SHORTNESS OF BREATH OR WEIGHT GAIN 135 tablet 2    glimepiride (AMARYL) 4 MG tablet TAKE ONE TABLET BY MOUTH TWICE A DAY BEFORE A MEAL 180 tablet 1    allopurinol (ZYLOPRIM) 100 MG tablet TAKE ONE TABLET BY MOUTH DAILY 90 tablet 1    ezetimibe (ZETIA) 10 MG tablet TAKE ONE TABLET BY MOUTH DAILY 90 tablet 3    NIFEdipine (PROCARDIA XL) 30 MG extended release tablet TAKE ONE TABLET BY MOUTH DAILY 90 tablet 3    clopidogrel (PLAVIX) 75 MG tablet TAKE ONE TABLET BY MOUTH DAILY 90 tablet 4    aspirin 81 MG EC tablet Take 1 tablet by mouth daily 30 tablet 0     No current facility-administered medications for this visit.      Allergies: Hydrocodone-acetaminophen, Pcn [penicillins], Rosuvastatin, and Morphine  Past Medical History:   Diagnosis Date    CAD (coronary artery disease)     CHF (congestive heart failure) (HCC)     Chronic kidney disease     DM2 (diabetes mellitus, type 2) ([facility])     HTN (hypertension)     Hypothyroidism     MI (mitral incompetence)     Over weight     Pulmonary HTN (HCC)     PVD (peripheral vascular disease) ([facility] [address]     Uterine cancer ([facility] [address]      Past Surgical History:   Procedure Laterality Date    CATARACT REMOVAL WITH IMPLANT Bilateral     CORONARY ANGIOPLASTY WITH STENT PLACEMENT Right 9/9/2015    At [address] [place] [address] [place] WITH STENT PLACEMENT  01/2018    ILANA to RCA and POBA on ISR of LAD    HERNIA REPAIR      HYSTERECTOMY (CERVIX STATUS UNKNOWN)       Family History   Problem Relation Age of Onset    Diabetes Mother     Heart Disease Father     Cancer Sister         uterine    Heart Disease Maternal Grandmother Heart Disease Maternal Grandfather     Heart Disease Paternal Grandmother     Heart Disease Paternal Grandfather      Social History     Tobacco Use    Smoking status: Former     Packs/day: 0.70     Years: 40.00     Pack years: 28.00     Types: Cigarettes     Quit date: 2004     Years since quittin.7    Smokeless tobacco: Never   Substance Use Topics    Alcohol use: No           ASSESSMENT/PLAN:  1. Other specified diabetes mellitus with other specified complication, unspecified whether long term insulin use ([facility] [address]  2. Essential hypertension  3. Hyperlipidemia LDL goal <70  4. Flu vaccine need   Diagnosis Orders   1. Other specified diabetes mellitus with other specified complication, unspecified whether long term insulin use ([facility] [address]        2. Essential hypertension        3. Hyperlipidemia LDL goal <70        4. Flu vaccine need        5. Acquired hypothyroidism         Above stable  Outpatient Encounter Medications as of 2022   Medication Sig Dispense Refill    isosorbide mononitrate (IMDUR) 30 MG extended release tablet Take 1 tablet by mouth in the morning. 30 tablet 3    carvedilol (COREG) 12.5 MG tablet Take 1 tablet by mouth in the morning and 1 tablet in the evening. Take with meals.  60 tablet 3    levothyroxine (SYNTHROID) 150 MCG tablet TAKE ONE TABLET BY MOUTH DAILY 30 tablet 3    torsemide (DEMADEX) 20 MG tablet TAKE ONE TABLET BY MOUTH DAILY -MAY TAKE AN EXTRA TABLET IF NEEDED FOR SHORTNESS OF BREATH OR WEIGHT GAIN 135 tablet 2    glimepiride (AMARYL) 4 MG tablet TAKE ONE TABLET BY MOUTH TWICE A DAY BEFORE A MEAL 180 tablet 1    allopurinol (ZYLOPRIM) 100 MG tablet TAKE ONE TABLET BY MOUTH DAILY 90 tablet 1    ezetimibe (ZETIA) 10 MG tablet TAKE ONE TABLET BY MOUTH DAILY 90 tablet 3    NIFEdipine (PROCARDIA XL) 30 MG extended release tablet TAKE ONE TABLET BY MOUTH DAILY 90 tablet 3    clopidogrel (PLAVIX) 75 MG tablet TAKE ONE TABLET BY MOUTH DAILY 90 tablet 4    aspirin 81 MG EC tablet Take 1 tablet by mouth daily 30 tablet 0     No facility-administered encounter medications on file as of 11/4/2022. Orders Placed This Encounter   Procedures    Influenza, FLUAD, (age 72 y+), IM, PF, 0.5 mL    Basic Metabolic Panel     Standing Status:   Future     Standing Expiration Date:   11/4/2023    Lipid Panel     Standing Status:   Future     Standing Expiration Date:   11/4/2023     Order Specific Question:   Is Patient Fasting?/# of Hours     Answer:   12    AST     Standing Status:   Future     Standing Expiration Date:   11/4/2023    ALT     Standing Status:   Future     Standing Expiration Date:   11/4/2023    Hemoglobin A1C     Standing Status:   Future     Standing Expiration Date:   11/4/2023      No follow-ups on file. Subjective   SUBJECTIVE/OBJECTIVE:  HPI    Review of Systems   Constitutional:  Negative for chills, diaphoresis, fatigue and fever. HENT:  Negative for congestion, postnasal drip, rhinorrhea, sinus pain and sore throat. Eyes:  Negative for visual disturbance. Respiratory:  Negative for apnea, cough, shortness of breath and wheezing. Cardiovascular:  Negative for chest pain and palpitations. Gastrointestinal:  Negative for abdominal pain, blood in stool, constipation, diarrhea, nausea and vomiting. Endocrine: Negative for polyuria. Genitourinary:  Negative for dysuria, frequency and hematuria. Musculoskeletal:  Negative for arthralgias and myalgias. Skin:  Negative for rash. Neurological:  Negative for dizziness, syncope, weakness, numbness and headaches. Hematological:  Negative for adenopathy. Objective   Physical Exam  Vitals and nursing note reviewed. Constitutional:       Appearance: Normal appearance. HENT:      Head: Normocephalic and atraumatic.       Right Ear: Tympanic membrane, ear canal and external ear normal.      Left Ear: Tympanic membrane, ear canal and external ear normal.   Eyes:      Extraocular Movements: Extraocular movements intact. Conjunctiva/sclera: Conjunctivae normal.      Pupils: Pupils are equal, round, and reactive to light. Cardiovascular:      Rate and Rhythm: Normal rate and regular rhythm. Heart sounds: No murmur heard. Pulmonary:      Effort: Pulmonary effort is normal. No respiratory distress. Breath sounds: Normal breath sounds. No wheezing or rhonchi. Abdominal:      General: There is no distension. Palpations: There is no mass. Tenderness: There is no abdominal tenderness. There is no guarding or rebound. Musculoskeletal:         General: No swelling. Cervical back: No rigidity. Lymphadenopathy:      Cervical: No cervical adenopathy. Skin:     Coloration: Skin is not jaundiced. Findings: No rash. Neurological:      General: No focal deficit present. Mental Status: She is alert and oriented to person, place, and time. Cranial Nerves: No cranial nerve deficit. Motor: No weakness. Psychiatric:         Mood and Affect: Mood normal.         Behavior: Behavior normal.         Thought Content:  Thought content normal.                An electronic signature was used to authenticate this note.    --Cassidy Garcia, DO

## 2022-11-04 NOTE — PATIENT INSTRUCTIONS
Thank you for choosing [facility]. Please bring a current list of medications to every appointment. Please contact your pharmacy for any prescription refill(s) that you are requesting.

## 2022-11-04 NOTE — PROGRESS NOTES
Immunization History   Administered Date(s) Administered    Influenza 10/10/2013    Influenza, FLUAD, (age 72 y+), Adjuvanted, 0.5mL 11/11/2020    Influenza, High Dose (Fluzone 65 yrs and older) 10/10/2014, 01/14/2016, 01/20/2017, 09/25/2017    Influenza, Shital Mejia, 6 mo and older, IM, PF (Flulaval, Fluarix) 09/21/2018    Pneumococcal Conjugate 13-valent ([id number]) 01/20/2017    Pneumococcal Polysaccharide ([id number]) 04/17/2019       Vaccine Information Sheet, \"Influenza - Inactivated\"  given to Kalie Ray, or parent/legal guardian of  Kalie Ray and verbalized understanding. Patient responses:    Have you ever had a reaction to a flu vaccine? No  Do you have any current illness? No  Have you ever had [place] Syndrome? No  Do you have a serious allergy to any of the follow: Neomycin, Polymyxin, Thimerosal, eggs or egg products? No    Flu vaccine given per order. Please see immunization tab. Risks and benefits explained. Current VIS given.

## 2022-11-09 DIAGNOSIS — R73.9 HYPERGLYCEMIA: ICD-10-CM

## 2022-11-09 DIAGNOSIS — E78.5 HYPERLIPIDEMIA LDL GOAL <70: ICD-10-CM

## 2022-11-09 DIAGNOSIS — I10 ESSENTIAL HYPERTENSION: ICD-10-CM

## 2022-11-10 LAB
ALT SERPL-CCNC: 10 U/L (ref 10–40)
ANION GAP SERPL CALCULATED.3IONS-SCNC: 14 MMOL/L (ref 3–16)
AST SERPL-CCNC: 13 U/L (ref 15–37)
BUN BLDV-MCNC: 33 MG/DL (ref 7–20)
CALCIUM SERPL-MCNC: 9.3 MG/DL (ref 8.3–10.6)
CHLORIDE BLD-SCNC: 101 MMOL/L (ref 99–110)
CHOLESTEROL, TOTAL: 201 MG/DL (ref 0–199)
CO2: 25 MMOL/L (ref 21–32)
CREAT SERPL-MCNC: 2 MG/DL (ref 0.6–1.2)
ESTIMATED AVERAGE GLUCOSE: 157.1 MG/DL
GFR SERPL CREATININE-BSD FRML MDRD: 24 ML/MIN/{1.73_M2}
GLUCOSE BLD-MCNC: 81 MG/DL (ref 70–99)
HBA1C MFR BLD: 7.1 %
HDLC SERPL-MCNC: 65 MG/DL (ref 40–60)
LDL CHOLESTEROL CALCULATED: 116 MG/DL
POTASSIUM SERPL-SCNC: 4.6 MMOL/L (ref 3.5–5.1)
SODIUM BLD-SCNC: 140 MMOL/L (ref 136–145)
TRIGL SERPL-MCNC: 101 MG/DL (ref 0–150)
VLDLC SERPL CALC-MCNC: 20 MG/DL

## 2023-01-10 DIAGNOSIS — I10 ESSENTIAL HYPERTENSION: ICD-10-CM

## 2023-01-10 RX ORDER — EZETIMIBE 10 MG/1
TABLET ORAL
Qty: 90 TABLET | Refills: 3 | Status: SHIPPED | OUTPATIENT
Start: 2023-01-10

## 2023-01-10 RX ORDER — CLOPIDOGREL BISULFATE 75 MG/1
TABLET ORAL
Qty: 90 TABLET | Refills: 3 | Status: SHIPPED | OUTPATIENT
Start: 2023-01-10

## 2023-01-10 RX ORDER — NIFEDIPINE 30 MG/1
TABLET, EXTENDED RELEASE ORAL
Qty: 90 TABLET | Refills: 3 | Status: SHIPPED | OUTPATIENT
Start: 2023-01-10

## 2023-03-08 ENCOUNTER — OFFICE VISIT (OUTPATIENT)
Dept: FAMILY MEDICINE CLINIC | Age: 83
End: 2023-03-08
Payer: MEDICARE

## 2023-03-08 VITALS
DIASTOLIC BLOOD PRESSURE: 66 MMHG | HEIGHT: 65 IN | WEIGHT: 192 LBS | BODY MASS INDEX: 31.99 KG/M2 | TEMPERATURE: 97.8 F | SYSTOLIC BLOOD PRESSURE: 126 MMHG

## 2023-03-08 DIAGNOSIS — I10 ESSENTIAL HYPERTENSION: Primary | ICD-10-CM

## 2023-03-08 DIAGNOSIS — E78.00 PURE HYPERCHOLESTEROLEMIA: ICD-10-CM

## 2023-03-08 DIAGNOSIS — N18.30 STAGE 3 CHRONIC KIDNEY DISEASE, UNSPECIFIED WHETHER STAGE 3A OR 3B CKD (HCC): ICD-10-CM

## 2023-03-08 DIAGNOSIS — E11.65 TYPE 2 DIABETES MELLITUS WITH HYPERGLYCEMIA, WITHOUT LONG-TERM CURRENT USE OF INSULIN (HCC): ICD-10-CM

## 2023-03-08 DIAGNOSIS — E03.9 ACQUIRED HYPOTHYROIDISM: ICD-10-CM

## 2023-03-08 PROBLEM — I50.9 ADMITTED FOR ACUTE CONGESTIVE HEART FAILURE (HCC): Status: RESOLVED | Noted: 2019-04-11 | Resolved: 2023-03-08

## 2023-03-08 PROBLEM — I50.31 ACUTE DIASTOLIC CHF (CONGESTIVE HEART FAILURE) (HCC): Status: RESOLVED | Noted: 2018-11-20 | Resolved: 2023-03-08

## 2023-03-08 PROBLEM — I50.30 DIASTOLIC CHF (HCC): Status: RESOLVED | Noted: 2019-04-11 | Resolved: 2023-03-08

## 2023-03-08 PROBLEM — I44.2 ACCELERATED VENTRICULAR RHYTHM (HCC): Status: RESOLVED | Noted: 2018-09-21 | Resolved: 2023-03-08

## 2023-03-08 PROCEDURE — 3078F DIAST BP <80 MM HG: CPT | Performed by: INTERNAL MEDICINE

## 2023-03-08 PROCEDURE — 1123F ACP DISCUSS/DSCN MKR DOCD: CPT | Performed by: INTERNAL MEDICINE

## 2023-03-08 PROCEDURE — 3074F SYST BP LT 130 MM HG: CPT | Performed by: INTERNAL MEDICINE

## 2023-03-08 PROCEDURE — 99214 OFFICE O/P EST MOD 30 MIN: CPT | Performed by: INTERNAL MEDICINE

## 2023-03-08 SDOH — ECONOMIC STABILITY: FOOD INSECURITY: WITHIN THE PAST 12 MONTHS, THE FOOD YOU BOUGHT JUST DIDN'T LAST AND YOU DIDN'T HAVE MONEY TO GET MORE.: NEVER TRUE

## 2023-03-08 SDOH — ECONOMIC STABILITY: FOOD INSECURITY: WITHIN THE PAST 12 MONTHS, YOU WORRIED THAT YOUR FOOD WOULD RUN OUT BEFORE YOU GOT MONEY TO BUY MORE.: NEVER TRUE

## 2023-03-08 SDOH — ECONOMIC STABILITY: HOUSING INSECURITY
IN THE LAST 12 MONTHS, WAS THERE A TIME WHEN YOU DID NOT HAVE A STEADY PLACE TO SLEEP OR SLEPT IN A SHELTER (INCLUDING NOW)?: NO

## 2023-03-08 SDOH — ECONOMIC STABILITY: INCOME INSECURITY: HOW HARD IS IT FOR YOU TO PAY FOR THE VERY BASICS LIKE FOOD, HOUSING, MEDICAL CARE, AND HEATING?: NOT HARD AT ALL

## 2023-03-08 ASSESSMENT — ENCOUNTER SYMPTOMS
VOMITING: 0
APNEA: 0
BLOOD IN STOOL: 0
WHEEZING: 0
DIARRHEA: 0
SHORTNESS OF BREATH: 0
COUGH: 0
SINUS PAIN: 0
CONSTIPATION: 0
RHINORRHEA: 0
ABDOMINAL PAIN: 0
SORE THROAT: 0
SINUS PRESSURE: 0

## 2023-03-08 ASSESSMENT — PATIENT HEALTH QUESTIONNAIRE - PHQ9
SUM OF ALL RESPONSES TO PHQ QUESTIONS 1-9: 0
2. FEELING DOWN, DEPRESSED OR HOPELESS: 0
SUM OF ALL RESPONSES TO PHQ QUESTIONS 1-9: 0
1. LITTLE INTEREST OR PLEASURE IN DOING THINGS: 0
SUM OF ALL RESPONSES TO PHQ9 QUESTIONS 1 & 2: 0
SUM OF ALL RESPONSES TO PHQ QUESTIONS 1-9: 0
SUM OF ALL RESPONSES TO PHQ QUESTIONS 1-9: 0

## 2023-03-08 NOTE — PROGRESS NOTES
Immunization History   Administered Date(s) Administered    Influenza 10/10/2013    Influenza, FLUAD, (age 72 y+), Adjuvanted, 0.5mL 11/11/2020, 11/04/2022    Influenza, High Dose (Fluzone 65 yrs and older) 10/10/2014, 01/14/2016, 01/20/2017, 09/25/2017    Influenza, Tracy Cordoba, 6 mo and older, IM, PF (Flulaval, Fluarix) 09/21/2018    Pneumococcal Conjugate 13-valent ([id number]) 01/20/2017    Pneumococcal Polysaccharide ([id number]) 04/17/2019

## 2023-03-08 NOTE — PROGRESS NOTES
Karolyn Johnson (:  1940) is a 80 y.o. female,Established patient, here for evaluation of the following chief complaint(s):  Check-Up (Diabetes, hypertension, hyperlipidemia, hypothyroidism- patient request fasting lab order) and Ear Problem (Patient c/o itching inside right ear \"for awhile\".   Patient denies pain)  Karolyn Johnson is a 80 y.o. female with the following history as recorded in [facility]:  Patient Active Problem List    Diagnosis Date Noted    Left hand weakness 2020    Essential hypertension 2020    Renal failure 2020    Hyperglycemia 2020    Abdominal pain 2020    Ventral hernia 2020    Nausea and vomiting 2020    Diarrhea 2020    Non-cardiac chest pain 2020    Respiratory failure with hypoxia ([facility] [address] 2020    Acute bacterial sinusitis 2018    Musculoskeletal back pain 2018    Uncontrolled hypertension 2018    Unstable angina (HCC)     CKD (chronic kidney disease) stage 3, GFR 30-59 ml/min ([facility]) 2018    Acute kidney injury ([facility] [address] 2018    Unstable angina pectoris ([facility])     NSTEMI (non-ST elevated myocardial infarction) ([facility] [address]     Hyperlipidemia LDL goal <70     Diabetes mellitus ([facility] [address] 10/10/2013    Hypothyroidism     MI (mitral incompetence)      Current Outpatient Medications   Medication Sig Dispense Refill    ezetimibe (ZETIA) 10 MG tablet TAKE ONE TABLET BY MOUTH DAILY 90 tablet 3    NIFEdipine (PROCARDIA XL) 30 MG extended release tablet TAKE ONE TABLET BY MOUTH DAILY 90 tablet 3    clopidogrel (PLAVIX) 75 MG tablet TAKE ONE TABLET BY MOUTH DAILY 90 tablet 3    levothyroxine (SYNTHROID) 150 MCG tablet TAKE ONE TABLET BY MOUTH DAILY 30 tablet 3    glimepiride (AMARYL) 4 MG tablet TAKE ONE TABLET BY MOUTH TWICE A DAY BEFORE A MEAL 180 tablet 1    allopurinol (ZYLOPRIM) 100 MG tablet TAKE ONE TABLET BY MOUTH DAILY 90 tablet 1    isosorbide mononitrate (IMDUR) 30 MG extended release tablet Take 1 tablet by mouth in the morning. 30 tablet 3    carvedilol (COREG) 12.5 MG tablet Take 1 tablet by mouth in the morning and 1 tablet in the evening. Take with meals. 60 tablet 3    torsemide (DEMADEX) 20 MG tablet TAKE ONE TABLET BY MOUTH DAILY -MAY TAKE AN EXTRA TABLET IF NEEDED FOR SHORTNESS OF BREATH OR WEIGHT GAIN 135 tablet 2    aspirin 81 MG EC tablet Take 1 tablet by mouth daily 30 tablet 0     No current facility-administered medications for this visit. Allergies: Hydrocodone-acetaminophen, Pcn [penicillins], Rosuvastatin, and Morphine  Past Medical History:   Diagnosis Date    CAD (coronary artery disease)     CHF (congestive heart failure) ([facility])     Chronic kidney disease     DM2 (diabetes mellitus, type 2) ([facility])     HTN (hypertension)     Hypothyroidism     MI (mitral incompetence)     Over weight     Pulmonary HTN (HCC)     PVD (peripheral vascular disease) ([facility] [address]     Uterine cancer ([facility] [address]      Past Surgical History:   Procedure Laterality Date    CATARACT REMOVAL WITH IMPLANT Bilateral     CORONARY ANGIOPLASTY WITH STENT PLACEMENT Right 2015    At [address] [place] [address] WITH STENT PLACEMENT  2018    ILANA to RCA and POBA on ISR of LAD    HERNIA REPAIR      HYSTERECTOMY (CERVIX STATUS UNKNOWN)       Family History   Problem Relation Age of Onset    Diabetes Mother     Heart Disease Father     Cancer Sister         uterine    Heart Disease Maternal Grandmother     Heart Disease Maternal Grandfather     Heart Disease Paternal Grandmother     Heart Disease Paternal Grandfather      Social History     Tobacco Use    Smoking status: Former     Packs/day: 0.70     Years: 40.00     Pack years: 28.00     Types: Cigarettes     Quit date: 2004     Years since quittin.1    Smokeless tobacco: Never   Substance Use Topics    Alcohol use: No           ASSESSMENT/PLAN:  1. Essential hypertension  2. Pure hypercholesterolemia  3. Acquired hypothyroidism  4.  Type 2 diabetes mellitus with hyperglycemia, without long-term current use of insulin (HCC)  5. Stage 3 chronic kidney disease, unspecified whether stage 3a or 3b CKD ([facility] [address]  Above are stable   Outpatient Encounter Medications as of 3/8/2023   Medication Sig Dispense Refill    ezetimibe (ZETIA) 10 MG tablet TAKE ONE TABLET BY MOUTH DAILY 90 tablet 3    NIFEdipine (PROCARDIA XL) 30 MG extended release tablet TAKE ONE TABLET BY MOUTH DAILY 90 tablet 3    clopidogrel (PLAVIX) 75 MG tablet TAKE ONE TABLET BY MOUTH DAILY 90 tablet 3    levothyroxine (SYNTHROID) 150 MCG tablet TAKE ONE TABLET BY MOUTH DAILY 30 tablet 3    glimepiride (AMARYL) 4 MG tablet TAKE ONE TABLET BY MOUTH TWICE A DAY BEFORE A MEAL 180 tablet 1    allopurinol (ZYLOPRIM) 100 MG tablet TAKE ONE TABLET BY MOUTH DAILY 90 tablet 1    isosorbide mononitrate (IMDUR) 30 MG extended release tablet Take 1 tablet by mouth in the morning. 30 tablet 3    carvedilol (COREG) 12.5 MG tablet Take 1 tablet by mouth in the morning and 1 tablet in the evening. Take with meals. 60 tablet 3    torsemide (DEMADEX) 20 MG tablet TAKE ONE TABLET BY MOUTH DAILY -MAY TAKE AN EXTRA TABLET IF NEEDED FOR SHORTNESS OF BREATH OR WEIGHT GAIN 135 tablet 2    aspirin 81 MG EC tablet Take 1 tablet by mouth daily 30 tablet 0     No facility-administered encounter medications on file as of 3/8/2023.      Orders Placed This Encounter   Procedures    Basic Metabolic Panel     Standing Status:   Future     Standing Expiration Date:   3/8/2024    Lipid Panel     Standing Status:   Future     Standing Expiration Date:   3/8/2024     Order Specific Question:   Is Patient Fasting?/# of Hours     Answer:   12    AST     Standing Status:   Future     Standing Expiration Date:   3/8/2024    ALT     Standing Status:   Future     Standing Expiration Date:   3/8/2024    Hemoglobin A1C     Standing Status:   Future     Standing Expiration Date:   3/8/2024    TSH     Standing Status:   Future     Standing Expiration Date:   3/8/2024      No follow-ups on file. Subjective   SUBJECTIVE/OBJECTIVE:  HPI    Review of Systems   Constitutional:  Negative for chills, diaphoresis, fatigue and fever. HENT:  Negative for congestion, postnasal drip, rhinorrhea, sinus pressure, sinus pain and sore throat. Eyes:  Negative for visual disturbance. Respiratory:  Negative for apnea, cough, shortness of breath and wheezing. Cardiovascular:  Negative for chest pain and palpitations. Gastrointestinal:  Negative for abdominal pain, blood in stool, constipation, diarrhea and vomiting. Endocrine: Negative for polyuria. Genitourinary:  Negative for dysuria, frequency, hematuria and urgency. Musculoskeletal:  Negative for arthralgias. Skin:  Negative for rash. Neurological:  Negative for dizziness, syncope, weakness, numbness and headaches. Hematological:  Negative for adenopathy. Objective   Physical Exam  Vitals and nursing note reviewed. Constitutional:       Appearance: Normal appearance. HENT:      Head: Normocephalic and atraumatic. Right Ear: Tympanic membrane, ear canal and external ear normal.      Left Ear: Tympanic membrane, ear canal and external ear normal.   Eyes:      Extraocular Movements: Extraocular movements intact. Conjunctiva/sclera: Conjunctivae normal.      Pupils: Pupils are equal, round, and reactive to light. Cardiovascular:      Rate and Rhythm: Normal rate and regular rhythm. Heart sounds: No murmur heard. Pulmonary:      Effort: Pulmonary effort is normal. No respiratory distress. Breath sounds: Normal breath sounds. No rhonchi. Abdominal:      General: There is no distension. Tenderness: There is no abdominal tenderness. There is no guarding or rebound. Musculoskeletal:         General: No swelling or deformity. Cervical back: No rigidity. Lymphadenopathy:      Cervical: No cervical adenopathy. Skin:     Coloration: Skin is not jaundiced. Findings: No rash. Comments: Dry skin inside R ear   Neurological:      General: No focal deficit present. Mental Status: She is alert. Cranial Nerves: No cranial nerve deficit. Motor: No weakness. Psychiatric:         Mood and Affect: Mood normal.         Thought Content:  Thought content normal.         Judgment: Judgment normal.                An electronic signature was used to authenticate this note.    --Reina Purcell DO

## 2023-03-15 DIAGNOSIS — E78.00 PURE HYPERCHOLESTEROLEMIA: ICD-10-CM

## 2023-03-15 DIAGNOSIS — I10 ESSENTIAL HYPERTENSION: ICD-10-CM

## 2023-03-15 DIAGNOSIS — N18.30 STAGE 3 CHRONIC KIDNEY DISEASE, UNSPECIFIED WHETHER STAGE 3A OR 3B CKD (HCC): ICD-10-CM

## 2023-03-15 DIAGNOSIS — E03.9 ACQUIRED HYPOTHYROIDISM: ICD-10-CM

## 2023-03-15 DIAGNOSIS — E11.65 TYPE 2 DIABETES MELLITUS WITH HYPERGLYCEMIA, WITHOUT LONG-TERM CURRENT USE OF INSULIN (HCC): ICD-10-CM

## 2023-03-16 LAB
ALT SERPL-CCNC: 11 U/L (ref 10–40)
ANION GAP SERPL CALCULATED.3IONS-SCNC: 11 MMOL/L (ref 3–16)
AST SERPL-CCNC: 11 U/L (ref 15–37)
BUN SERPL-MCNC: 42 MG/DL (ref 7–20)
CALCIUM SERPL-MCNC: 8.8 MG/DL (ref 8.3–10.6)
CHLORIDE SERPL-SCNC: 104 MMOL/L (ref 99–110)
CHOLEST SERPL-MCNC: 185 MG/DL (ref 0–199)
CO2 SERPL-SCNC: 27 MMOL/L (ref 21–32)
CREAT SERPL-MCNC: 1.9 MG/DL (ref 0.6–1.2)
EST. AVERAGE GLUCOSE BLD GHB EST-MCNC: 145.6 MG/DL
GFR SERPLBLD CREATININE-BSD FMLA CKD-EPI: 26 ML/MIN/{1.73_M2}
GLUCOSE SERPL-MCNC: 90 MG/DL (ref 70–99)
HBA1C MFR BLD: 6.7 %
HDLC SERPL-MCNC: 68 MG/DL (ref 40–60)
LDLC SERPL CALC-MCNC: 103 MG/DL
POTASSIUM SERPL-SCNC: 4.6 MMOL/L (ref 3.5–5.1)
SODIUM SERPL-SCNC: 142 MMOL/L (ref 136–145)
TRIGL SERPL-MCNC: 70 MG/DL (ref 0–150)
TSH SERPL DL<=0.005 MIU/L-ACNC: 1.38 UIU/ML (ref 0.27–4.2)
VLDLC SERPL CALC-MCNC: 14 MG/DL

## 2023-03-17 RX ORDER — TORSEMIDE 20 MG/1
TABLET ORAL
Qty: 60 TABLET | Refills: 0 | Status: SHIPPED | OUTPATIENT
Start: 2023-03-17

## 2023-03-17 NOTE — TELEPHONE ENCOUNTER
Last OV:  8/18/22  Next OV:  X  Last refill: 6/6/22  #135  2 R/F  Most recent Labs: 3/15/23  Last EKG (if needed): 7/19/22

## 2023-03-24 PROBLEM — N17.9 ACUTE KIDNEY INJURY (HCC): Status: RESOLVED | Noted: 2018-01-11 | Resolved: 2023-03-24

## 2023-03-24 PROBLEM — J96.91 RESPIRATORY FAILURE WITH HYPOXIA (HCC): Status: RESOLVED | Noted: 2020-03-25 | Resolved: 2023-03-24

## 2023-04-17 RX ORDER — TORSEMIDE 20 MG/1
TABLET ORAL
Qty: 60 TABLET | Refills: 0 | Status: SHIPPED | OUTPATIENT
Start: 2023-04-17

## 2023-04-17 NOTE — TELEPHONE ENCOUNTER
Last OV: 8/18/22  Next OV: X  Last refill: 3/17/23  #60  Most recent Labs: 3/15/23  Last EKG (if needed): 7/19/22    Called patient ph# just rang-no voice mail, I called her her daughter [place]-patient's alternate . Patient's daughter will let the patient know she needs to call office and set up her 6 month follow up.

## 2023-04-21 RX ORDER — ALLOPURINOL 100 MG/1
TABLET ORAL
Qty: 90 TABLET | Refills: 1 | Status: SHIPPED | OUTPATIENT
Start: 2023-04-21

## 2023-04-21 RX ORDER — LEVOTHYROXINE SODIUM 0.15 MG/1
TABLET ORAL
Qty: 30 TABLET | Refills: 3 | Status: SHIPPED | OUTPATIENT
Start: 2023-04-21

## 2023-04-21 RX ORDER — GLIMEPIRIDE 4 MG/1
TABLET ORAL
Qty: 180 TABLET | Refills: 1 | Status: SHIPPED | OUTPATIENT
Start: 2023-04-21

## 2023-04-24 ENCOUNTER — TELEPHONE (OUTPATIENT)
Dept: FAMILY MEDICINE CLINIC | Age: 83
End: 2023-04-24

## 2023-04-24 NOTE — TELEPHONE ENCOUNTER
Spoke to pt regarding a letter she received from office concerning call to discuss  recent lab results.     Please call pt at [phone]

## 2023-05-23 ENCOUNTER — OFFICE VISIT (OUTPATIENT)
Dept: CARDIOLOGY CLINIC | Age: 83
End: 2023-05-23

## 2023-05-23 VITALS
HEART RATE: 74 BPM | WEIGHT: 193 LBS | OXYGEN SATURATION: 97 % | BODY MASS INDEX: 32.15 KG/M2 | HEIGHT: 65 IN | SYSTOLIC BLOOD PRESSURE: 122 MMHG | DIASTOLIC BLOOD PRESSURE: 70 MMHG

## 2023-05-23 DIAGNOSIS — E78.5 HYPERLIPIDEMIA LDL GOAL <70: ICD-10-CM

## 2023-05-23 DIAGNOSIS — N18.30 STAGE 3 CHRONIC KIDNEY DISEASE, UNSPECIFIED WHETHER STAGE 3A OR 3B CKD (HCC): ICD-10-CM

## 2023-05-23 DIAGNOSIS — I50.32 CHRONIC DIASTOLIC CHF (CONGESTIVE HEART FAILURE), NYHA CLASS 2 (HCC): ICD-10-CM

## 2023-05-23 DIAGNOSIS — I25.10 CORONARY ARTERY DISEASE INVOLVING NATIVE CORONARY ARTERY OF NATIVE HEART WITHOUT ANGINA PECTORIS: Primary | ICD-10-CM

## 2023-05-23 DIAGNOSIS — I27.20 PULMONARY HYPERTENSION (HCC): ICD-10-CM

## 2023-05-23 RX ORDER — NITROGLYCERIN 0.4 MG/1
0.4 TABLET SUBLINGUAL EVERY 5 MIN PRN
COMMUNITY
Start: 2015-09-11

## 2023-05-23 RX ORDER — TORSEMIDE 20 MG/1
10 TABLET ORAL DAILY
Qty: 60 TABLET | Refills: 0
Start: 2023-05-23

## 2023-05-23 NOTE — PROGRESS NOTES
frequency, urgency, incontinence, hematuria or dysuria. Skin: No cyanosis or skin lesions. Musculoskeletal: No new muscle or joint pain. Neurological: No syncope or TIA-like symptoms. Psychiatric: No anxiety, insomnia or depression        Current Outpatient Medications   Medication Sig Dispense Refill    glimepiride (AMARYL) 4 MG tablet TAKE ONE TABLET BY MOUTH TWICE A DAY BEFORE A MEAL 180 tablet 1    allopurinol (ZYLOPRIM) 100 MG tablet TAKE ONE TABLET BY MOUTH DAILY 90 tablet 1    levothyroxine (SYNTHROID) 150 MCG tablet TAKE ONE TABLET BY MOUTH DAILY 30 tablet 3    torsemide (DEMADEX) 20 MG tablet TAKE ONE TABLET BY MOUTH DAILY; MAY TAKE AN EXTRA TABLET BY MOUTH AS NEEDED FOR SHORTNESS OF BREATH OR WEIGHT GAIN 60 tablet 0    ezetimibe (ZETIA) 10 MG tablet TAKE ONE TABLET BY MOUTH DAILY 90 tablet 3    NIFEdipine (PROCARDIA XL) 30 MG extended release tablet TAKE ONE TABLET BY MOUTH DAILY 90 tablet 3    clopidogrel (PLAVIX) 75 MG tablet TAKE ONE TABLET BY MOUTH DAILY 90 tablet 3    isosorbide mononitrate (IMDUR) 30 MG extended release tablet Take 1 tablet by mouth in the morning. 30 tablet 3    carvedilol (COREG) 12.5 MG tablet Take 1 tablet by mouth in the morning and 1 tablet in the evening. Take with meals. 60 tablet 3    aspirin 81 MG EC tablet Take 1 tablet by mouth daily 30 tablet 0     No current facility-administered medications for this visit. Objective:   /70   Pulse 74   Ht 5' 5\" (1.651 m)   Wt 193 lb (87.5 kg)   LMP  (LMP Unknown)   SpO2 97%   BMI 32.12 kg/m²      Physical Exam:  Constitutional: The patient is oriented to person, place, and time. Appears well-developed and well-nourished. In no acute distress. Head: Normocephalic and atraumatic. Pupils equal and round. Neck: Neck supple. No JVP or carotid bruit appreciated. No mass and no thyromegaly present. No lymphadenopathy present. Cardiovascular: Normal rate. Normal heart sounds.  Exam reveals no gallop and no friction
81.6

## 2023-06-05 PROBLEM — R07.9 CHEST PAIN: Status: ACTIVE | Noted: 2023-06-05

## 2023-06-15 DIAGNOSIS — N18.9 ACUTE KIDNEY INJURY SUPERIMPOSED ON CKD (HCC): ICD-10-CM

## 2023-06-15 DIAGNOSIS — N17.9 ACUTE KIDNEY INJURY SUPERIMPOSED ON CKD (HCC): ICD-10-CM

## 2023-06-15 LAB
ALBUMIN SERPL-MCNC: 3.9 G/DL (ref 3.4–5)
ANION GAP SERPL CALCULATED.3IONS-SCNC: 14 MMOL/L (ref 3–16)
BUN SERPL-MCNC: 78 MG/DL (ref 7–20)
CALCIUM SERPL-MCNC: 9.4 MG/DL (ref 8.3–10.6)
CHLORIDE SERPL-SCNC: 100 MMOL/L (ref 99–110)
CO2 SERPL-SCNC: 26 MMOL/L (ref 21–32)
CREAT SERPL-MCNC: 2.2 MG/DL (ref 0.6–1.2)
GFR SERPLBLD CREATININE-BSD FMLA CKD-EPI: 22 ML/MIN/{1.73_M2}
GLUCOSE SERPL-MCNC: 157 MG/DL (ref 70–99)
PHOSPHATE SERPL-MCNC: 4.1 MG/DL (ref 2.5–4.9)
POTASSIUM SERPL-SCNC: 4.5 MMOL/L (ref 3.5–5.1)
SODIUM SERPL-SCNC: 140 MMOL/L (ref 136–145)

## 2023-06-16 PROBLEM — N18.9 ACUTE KIDNEY INJURY SUPERIMPOSED ON CKD (HCC): Status: ACTIVE | Noted: 2020-03-25

## 2023-06-16 PROBLEM — N17.9 ACUTE KIDNEY INJURY SUPERIMPOSED ON CKD (HCC): Status: ACTIVE | Noted: 2020-03-25

## 2023-06-16 PROBLEM — Z09 HOSPITAL DISCHARGE FOLLOW-UP: Status: ACTIVE | Noted: 2023-06-16

## 2023-06-20 ENCOUNTER — APPOINTMENT (OUTPATIENT)
Dept: GENERAL RADIOLOGY | Age: 83
DRG: 291 | End: 2023-06-20
Payer: MEDICARE

## 2023-06-20 ENCOUNTER — APPOINTMENT (OUTPATIENT)
Dept: CT IMAGING | Age: 83
DRG: 291 | End: 2023-06-20
Payer: MEDICARE

## 2023-06-20 ENCOUNTER — HOSPITAL ENCOUNTER (INPATIENT)
Age: 83
LOS: 8 days | Discharge: HOSPICE | DRG: 291 | End: 2023-06-28
Attending: EMERGENCY MEDICINE | Admitting: STUDENT IN AN ORGANIZED HEALTH CARE EDUCATION/TRAINING PROGRAM
Payer: MEDICARE

## 2023-06-20 DIAGNOSIS — I50.9 ACUTE ON CHRONIC CONGESTIVE HEART FAILURE, UNSPECIFIED HEART FAILURE TYPE (HCC): Primary | ICD-10-CM

## 2023-06-20 DIAGNOSIS — I48.91 NEW ONSET ATRIAL FIBRILLATION (HCC): ICD-10-CM

## 2023-06-20 DIAGNOSIS — R09.02 HYPOXIA: ICD-10-CM

## 2023-06-20 DIAGNOSIS — J18.9 PNEUMONIA OF BOTH LOWER LOBES DUE TO INFECTIOUS ORGANISM: ICD-10-CM

## 2023-06-20 PROBLEM — I50.33 ACUTE ON CHRONIC DIASTOLIC HEART FAILURE (HCC): Status: ACTIVE | Noted: 2023-06-20

## 2023-06-20 LAB
ALBUMIN SERPL-MCNC: 3.3 G/DL (ref 3.4–5)
ALBUMIN/GLOB SERPL: 0.8 {RATIO} (ref 1.1–2.2)
ALP SERPL-CCNC: 113 U/L (ref 40–129)
ALT SERPL-CCNC: 11 U/L (ref 10–40)
ANION GAP SERPL CALCULATED.3IONS-SCNC: 15 MMOL/L (ref 3–16)
ANTI-XA UNFRAC HEPARIN: <0.1 IU/ML (ref 0.3–0.7)
AST SERPL-CCNC: 14 U/L (ref 15–37)
BASE EXCESS BLDV CALC-SCNC: 0 MMOL/L
BASOPHILS # BLD: 0.1 K/UL (ref 0–0.2)
BASOPHILS NFR BLD: 0.6 %
BILIRUB SERPL-MCNC: 0.6 MG/DL (ref 0–1)
BUN SERPL-MCNC: 54 MG/DL (ref 7–20)
CALCIUM SERPL-MCNC: 8.9 MG/DL (ref 8.3–10.6)
CHLORIDE SERPL-SCNC: 98 MMOL/L (ref 99–110)
CO2 BLDV-SCNC: 26 MMOL/L
CO2 SERPL-SCNC: 18 MMOL/L (ref 21–32)
COHGB MFR BLDV: 1.6 %
CREAT SERPL-MCNC: 2.2 MG/DL (ref 0.6–1.2)
DEPRECATED RDW RBC AUTO: 14.5 % (ref 12.4–15.4)
DEPRECATED RDW RBC AUTO: 14.6 % (ref 12.4–15.4)
EOSINOPHIL # BLD: 0.1 K/UL (ref 0–0.6)
EOSINOPHIL NFR BLD: 0.4 %
GFR SERPLBLD CREATININE-BSD FMLA CKD-EPI: 22 ML/MIN/{1.73_M2}
GLUCOSE SERPL-MCNC: 123 MG/DL (ref 70–99)
HCO3 BLDV-SCNC: 25 MMOL/L (ref 23–29)
HCT VFR BLD AUTO: 36.4 % (ref 36–48)
HCT VFR BLD AUTO: 38.4 % (ref 36–48)
HGB BLD-MCNC: 12.1 G/DL (ref 12–16)
HGB BLD-MCNC: 12.6 G/DL (ref 12–16)
LYMPHOCYTES # BLD: 1.4 K/UL (ref 1–5.1)
LYMPHOCYTES NFR BLD: 10.3 %
MCH RBC QN AUTO: 32.8 PG (ref 26–34)
MCH RBC QN AUTO: 32.8 PG (ref 26–34)
MCHC RBC AUTO-ENTMCNC: 32.9 G/DL (ref 31–36)
MCHC RBC AUTO-ENTMCNC: 33.3 G/DL (ref 31–36)
MCV RBC AUTO: 98.3 FL (ref 80–100)
MCV RBC AUTO: 99.8 FL (ref 80–100)
METHGB MFR BLDV: 0.2 %
MONOCYTES # BLD: 1.4 K/UL (ref 0–1.3)
MONOCYTES NFR BLD: 9.7 %
NEUTROPHILS # BLD: 11 K/UL (ref 1.7–7.7)
NEUTROPHILS NFR BLD: 79 %
NT-PROBNP SERPL-MCNC: 6252 PG/ML (ref 0–449)
O2 THERAPY: ABNORMAL
PCO2 BLDV: 38.7 MMHG (ref 40–50)
PH BLDV: 7.41 [PH] (ref 7.35–7.45)
PLATELET # BLD AUTO: 196 K/UL (ref 135–450)
PLATELET # BLD AUTO: 210 K/UL (ref 135–450)
PMV BLD AUTO: 7.6 FL (ref 5–10.5)
PMV BLD AUTO: 7.9 FL (ref 5–10.5)
PO2 BLDV: 57 MMHG
POTASSIUM SERPL-SCNC: 4.2 MMOL/L (ref 3.5–5.1)
PROCALCITONIN SERPL IA-MCNC: 0.13 NG/ML (ref 0–0.15)
PROT SERPL-MCNC: 7.4 G/DL (ref 6.4–8.2)
RBC # BLD AUTO: 3.7 M/UL (ref 4–5.2)
RBC # BLD AUTO: 3.84 M/UL (ref 4–5.2)
SAO2 % BLDV: 91 %
SARS-COV-2 RDRP RESP QL NAA+PROBE: NOT DETECTED
SODIUM SERPL-SCNC: 131 MMOL/L (ref 136–145)
T4 FREE SERPL-MCNC: 1.9 NG/DL (ref 0.9–1.8)
TROPONIN, HIGH SENSITIVITY: 125 NG/L (ref 0–14)
TROPONIN, HIGH SENSITIVITY: 126 NG/L (ref 0–14)
TROPONIN, HIGH SENSITIVITY: 166 NG/L (ref 0–14)
TSH SERPL DL<=0.005 MIU/L-ACNC: 0.22 UIU/ML (ref 0.27–4.2)
WBC # BLD AUTO: 13.4 K/UL (ref 4–11)
WBC # BLD AUTO: 14 K/UL (ref 4–11)

## 2023-06-20 PROCEDURE — 85027 COMPLETE CBC AUTOMATED: CPT

## 2023-06-20 PROCEDURE — 84145 PROCALCITONIN (PCT): CPT

## 2023-06-20 PROCEDURE — 84443 ASSAY THYROID STIM HORMONE: CPT

## 2023-06-20 PROCEDURE — 85025 COMPLETE CBC W/AUTO DIFF WBC: CPT

## 2023-06-20 PROCEDURE — 87635 SARS-COV-2 COVID-19 AMP PRB: CPT

## 2023-06-20 PROCEDURE — 2700000000 HC OXYGEN THERAPY PER DAY

## 2023-06-20 PROCEDURE — 6370000000 HC RX 637 (ALT 250 FOR IP): Performed by: STUDENT IN AN ORGANIZED HEALTH CARE EDUCATION/TRAINING PROGRAM

## 2023-06-20 PROCEDURE — 2580000003 HC RX 258: Performed by: STUDENT IN AN ORGANIZED HEALTH CARE EDUCATION/TRAINING PROGRAM

## 2023-06-20 PROCEDURE — 96375 TX/PRO/DX INJ NEW DRUG ADDON: CPT

## 2023-06-20 PROCEDURE — 85520 HEPARIN ASSAY: CPT

## 2023-06-20 PROCEDURE — 82803 BLOOD GASES ANY COMBINATION: CPT

## 2023-06-20 PROCEDURE — 6360000002 HC RX W HCPCS: Performed by: PHYSICIAN ASSISTANT

## 2023-06-20 PROCEDURE — 80053 COMPREHEN METABOLIC PANEL: CPT

## 2023-06-20 PROCEDURE — 94761 N-INVAS EAR/PLS OXIMETRY MLT: CPT

## 2023-06-20 PROCEDURE — 71250 CT THORAX DX C-: CPT

## 2023-06-20 PROCEDURE — 84484 ASSAY OF TROPONIN QUANT: CPT

## 2023-06-20 PROCEDURE — 71046 X-RAY EXAM CHEST 2 VIEWS: CPT

## 2023-06-20 PROCEDURE — 6360000002 HC RX W HCPCS: Performed by: STUDENT IN AN ORGANIZED HEALTH CARE EDUCATION/TRAINING PROGRAM

## 2023-06-20 PROCEDURE — 96365 THER/PROPH/DIAG IV INF INIT: CPT

## 2023-06-20 PROCEDURE — 99285 EMERGENCY DEPT VISIT HI MDM: CPT

## 2023-06-20 PROCEDURE — 2060000000 HC ICU INTERMEDIATE R&B

## 2023-06-20 PROCEDURE — 83880 ASSAY OF NATRIURETIC PEPTIDE: CPT

## 2023-06-20 PROCEDURE — 2580000003 HC RX 258: Performed by: PHYSICIAN ASSISTANT

## 2023-06-20 PROCEDURE — 94640 AIRWAY INHALATION TREATMENT: CPT

## 2023-06-20 PROCEDURE — 6370000000 HC RX 637 (ALT 250 FOR IP): Performed by: PHYSICIAN ASSISTANT

## 2023-06-20 PROCEDURE — 84439 ASSAY OF FREE THYROXINE: CPT

## 2023-06-20 PROCEDURE — 93005 ELECTROCARDIOGRAM TRACING: CPT | Performed by: EMERGENCY MEDICINE

## 2023-06-20 PROCEDURE — 36415 COLL VENOUS BLD VENIPUNCTURE: CPT

## 2023-06-20 PROCEDURE — 87040 BLOOD CULTURE FOR BACTERIA: CPT

## 2023-06-20 RX ORDER — SODIUM CHLORIDE 0.9 % (FLUSH) 0.9 %
5-40 SYRINGE (ML) INJECTION PRN
Status: DISCONTINUED | OUTPATIENT
Start: 2023-06-20 | End: 2023-06-28 | Stop reason: HOSPADM

## 2023-06-20 RX ORDER — POLYETHYLENE GLYCOL 3350 17 G/17G
17 POWDER, FOR SOLUTION ORAL DAILY PRN
Status: DISCONTINUED | OUTPATIENT
Start: 2023-06-20 | End: 2023-06-28 | Stop reason: HOSPADM

## 2023-06-20 RX ORDER — HEPARIN SODIUM 1000 [USP'U]/ML
4000 INJECTION, SOLUTION INTRAVENOUS; SUBCUTANEOUS ONCE
Status: COMPLETED | OUTPATIENT
Start: 2023-06-20 | End: 2023-06-20

## 2023-06-20 RX ORDER — ISOSORBIDE MONONITRATE 30 MG/1
15 TABLET, EXTENDED RELEASE ORAL DAILY
Status: DISCONTINUED | OUTPATIENT
Start: 2023-06-21 | End: 2023-06-22

## 2023-06-20 RX ORDER — FUROSEMIDE 10 MG/ML
40 INJECTION INTRAMUSCULAR; INTRAVENOUS DAILY
Status: DISCONTINUED | OUTPATIENT
Start: 2023-06-21 | End: 2023-06-21

## 2023-06-20 RX ORDER — ALLOPURINOL 100 MG/1
100 TABLET ORAL DAILY
Status: DISCONTINUED | OUTPATIENT
Start: 2023-06-21 | End: 2023-06-28 | Stop reason: HOSPADM

## 2023-06-20 RX ORDER — FUROSEMIDE 10 MG/ML
40 INJECTION INTRAMUSCULAR; INTRAVENOUS ONCE
Status: COMPLETED | OUTPATIENT
Start: 2023-06-20 | End: 2023-06-20

## 2023-06-20 RX ORDER — ACETAMINOPHEN 650 MG/1
650 SUPPOSITORY RECTAL EVERY 6 HOURS PRN
Status: DISCONTINUED | OUTPATIENT
Start: 2023-06-20 | End: 2023-06-28 | Stop reason: HOSPADM

## 2023-06-20 RX ORDER — HEPARIN SODIUM 10000 [USP'U]/100ML
0-4000 INJECTION, SOLUTION INTRAVENOUS CONTINUOUS
Status: DISPENSED | OUTPATIENT
Start: 2023-06-20 | End: 2023-06-21

## 2023-06-20 RX ORDER — EZETIMIBE 10 MG/1
10 TABLET ORAL DAILY
Status: DISCONTINUED | OUTPATIENT
Start: 2023-06-21 | End: 2023-06-28 | Stop reason: HOSPADM

## 2023-06-20 RX ORDER — SODIUM CHLORIDE 9 MG/ML
INJECTION, SOLUTION INTRAVENOUS PRN
Status: DISCONTINUED | OUTPATIENT
Start: 2023-06-20 | End: 2023-06-28 | Stop reason: HOSPADM

## 2023-06-20 RX ORDER — NIFEDIPINE 30 MG/1
60 TABLET, EXTENDED RELEASE ORAL DAILY
Status: DISCONTINUED | OUTPATIENT
Start: 2023-06-21 | End: 2023-06-22

## 2023-06-20 RX ORDER — HEPARIN SODIUM 1000 [USP'U]/ML
30 INJECTION, SOLUTION INTRAVENOUS; SUBCUTANEOUS PRN
Status: DISCONTINUED | OUTPATIENT
Start: 2023-06-20 | End: 2023-06-20

## 2023-06-20 RX ORDER — LEVOTHYROXINE SODIUM 0.07 MG/1
150 TABLET ORAL DAILY
Status: DISCONTINUED | OUTPATIENT
Start: 2023-06-21 | End: 2023-06-28 | Stop reason: HOSPADM

## 2023-06-20 RX ORDER — ONDANSETRON 2 MG/ML
4 INJECTION INTRAMUSCULAR; INTRAVENOUS EVERY 6 HOURS PRN
Status: DISCONTINUED | OUTPATIENT
Start: 2023-06-20 | End: 2023-06-28 | Stop reason: HOSPADM

## 2023-06-20 RX ORDER — IPRATROPIUM BROMIDE AND ALBUTEROL SULFATE 2.5; .5 MG/3ML; MG/3ML
2 SOLUTION RESPIRATORY (INHALATION) ONCE
Status: COMPLETED | OUTPATIENT
Start: 2023-06-20 | End: 2023-06-20

## 2023-06-20 RX ORDER — ACETAMINOPHEN 325 MG/1
650 TABLET ORAL EVERY 6 HOURS PRN
Status: DISCONTINUED | OUTPATIENT
Start: 2023-06-20 | End: 2023-06-28 | Stop reason: HOSPADM

## 2023-06-20 RX ORDER — ASPIRIN 81 MG/1
81 TABLET ORAL DAILY
Status: DISCONTINUED | OUTPATIENT
Start: 2023-06-21 | End: 2023-06-28 | Stop reason: HOSPADM

## 2023-06-20 RX ORDER — HYDRALAZINE HYDROCHLORIDE 50 MG/1
100 TABLET, FILM COATED ORAL EVERY 12 HOURS SCHEDULED
Status: DISCONTINUED | OUTPATIENT
Start: 2023-06-20 | End: 2023-06-22

## 2023-06-20 RX ORDER — ONDANSETRON 4 MG/1
4 TABLET, ORALLY DISINTEGRATING ORAL EVERY 8 HOURS PRN
Status: DISCONTINUED | OUTPATIENT
Start: 2023-06-20 | End: 2023-06-28 | Stop reason: HOSPADM

## 2023-06-20 RX ORDER — SODIUM CHLORIDE 0.9 % (FLUSH) 0.9 %
5-40 SYRINGE (ML) INJECTION EVERY 12 HOURS SCHEDULED
Status: DISCONTINUED | OUTPATIENT
Start: 2023-06-20 | End: 2023-06-28 | Stop reason: HOSPADM

## 2023-06-20 RX ORDER — HEPARIN SODIUM 1000 [USP'U]/ML
60 INJECTION, SOLUTION INTRAVENOUS; SUBCUTANEOUS PRN
Status: DISCONTINUED | OUTPATIENT
Start: 2023-06-20 | End: 2023-06-20

## 2023-06-20 RX ADMIN — IPRATROPIUM BROMIDE AND ALBUTEROL SULFATE 2 DOSE: 2.5; .5 SOLUTION RESPIRATORY (INHALATION) at 15:30

## 2023-06-20 RX ADMIN — HYDRALAZINE HYDROCHLORIDE 100 MG: 50 TABLET, FILM COATED ORAL at 21:17

## 2023-06-20 RX ADMIN — Medication 10 ML: at 21:19

## 2023-06-20 RX ADMIN — METOPROLOL TARTRATE 25 MG: 25 TABLET, FILM COATED ORAL at 21:17

## 2023-06-20 RX ADMIN — HEPARIN SODIUM 4000 UNITS: 1000 INJECTION INTRAVENOUS; SUBCUTANEOUS at 22:29

## 2023-06-20 RX ADMIN — FUROSEMIDE 40 MG: 10 INJECTION, SOLUTION INTRAMUSCULAR; INTRAVENOUS at 17:01

## 2023-06-20 RX ADMIN — PIPERACILLIN AND TAZOBACTAM 3375 MG: 3; .375 INJECTION, POWDER, LYOPHILIZED, FOR SOLUTION INTRAVENOUS at 18:39

## 2023-06-20 RX ADMIN — HEPARIN SODIUM 1000 UNITS/HR: 10000 INJECTION, SOLUTION INTRAVENOUS at 22:31

## 2023-06-20 ASSESSMENT — PAIN SCALES - GENERAL: PAINLEVEL_OUTOF10: 0

## 2023-06-20 ASSESSMENT — PAIN - FUNCTIONAL ASSESSMENT: PAIN_FUNCTIONAL_ASSESSMENT: NONE - DENIES PAIN

## 2023-06-20 ASSESSMENT — PAIN SCALES - WONG BAKER: WONGBAKER_NUMERICALRESPONSE: 0

## 2023-06-20 NOTE — PROGRESS NOTES
ESTELLE Rodriguez CNP  Attempted to call patient. Spoke with daughter Anuradha Brown per HIPAA. Relayed result message. [place] verbalized and confirmed understanding. Daughter said that she can not get patient scheduled every time she calls Dr Mason Vanegas office. She will try one more time but would like the name of another Nephrologist in another group that she can get in office to see patient.

## 2023-06-20 NOTE — PROGRESS NOTES
Notified Gaby ACKERMAN that patient went to [facility] ED. Rivka éPrez said that she saw and patient is still in ED.   OK to close encounter

## 2023-06-20 NOTE — PROGRESS NOTES
Attempted to call patient. Spoke with daughter Anuradha Brown per HIPAA. Relayed message to daughter. [place] said that they did get an appointment with Nephrologist.    She said that her Mom is on her way to [facility] ED now. She said that she did not feel quite right.

## 2023-06-21 ENCOUNTER — APPOINTMENT (OUTPATIENT)
Dept: GENERAL RADIOLOGY | Age: 83
DRG: 291 | End: 2023-06-21
Payer: MEDICARE

## 2023-06-21 PROBLEM — I48.19 PERSISTENT ATRIAL FIBRILLATION (HCC): Status: ACTIVE | Noted: 2023-06-21

## 2023-06-21 PROBLEM — N18.4 STAGE 4 CHRONIC KIDNEY DISEASE (HCC): Status: ACTIVE | Noted: 2023-06-21

## 2023-06-21 LAB
ANION GAP SERPL CALCULATED.3IONS-SCNC: 12 MMOL/L (ref 3–16)
ANTI-XA UNFRAC HEPARIN: 0.21 IU/ML (ref 0.3–0.7)
ANTI-XA UNFRAC HEPARIN: >1.1 IU/ML (ref 0.3–0.7)
BASE EXCESS BLDA CALC-SCNC: -0.6 MMOL/L (ref -3–3)
BASOPHILS # BLD: 0.1 K/UL (ref 0–0.2)
BASOPHILS NFR BLD: 0.7 %
BUN SERPL-MCNC: 54 MG/DL (ref 7–20)
CALCIUM SERPL-MCNC: 8.6 MG/DL (ref 8.3–10.6)
CHLORIDE SERPL-SCNC: 104 MMOL/L (ref 99–110)
CHOLEST SERPL-MCNC: 137 MG/DL (ref 0–199)
CO2 BLDA-SCNC: 25.2 MMOL/L
CO2 SERPL-SCNC: 24 MMOL/L (ref 21–32)
COHGB MFR BLDA: 1.5 % (ref 0–1.5)
CREAT SERPL-MCNC: 2.3 MG/DL (ref 0.6–1.2)
DEPRECATED RDW RBC AUTO: 14.6 % (ref 12.4–15.4)
EKG ATRIAL RATE: 62 BPM
EKG DIAGNOSIS: NORMAL
EKG DIAGNOSIS: NORMAL
EKG P-R INTERVAL: 150 MS
EKG Q-T INTERVAL: 380 MS
EKG Q-T INTERVAL: 480 MS
EKG QRS DURATION: 90 MS
EKG QRS DURATION: 98 MS
EKG QTC CALCULATION (BAZETT): 438 MS
EKG QTC CALCULATION (BAZETT): 487 MS
EKG R AXIS: -23 DEGREES
EKG R AXIS: -26 DEGREES
EKG T AXIS: 82 DEGREES
EKG T AXIS: 87 DEGREES
EKG VENTRICULAR RATE: 62 BPM
EKG VENTRICULAR RATE: 80 BPM
EOSINOPHIL # BLD: 0.1 K/UL (ref 0–0.6)
EOSINOPHIL NFR BLD: 0.6 %
GFR SERPLBLD CREATININE-BSD FMLA CKD-EPI: 21 ML/MIN/{1.73_M2}
GLUCOSE SERPL-MCNC: 136 MG/DL (ref 70–99)
HCO3 BLDA-SCNC: 24 MMOL/L (ref 21–29)
HCT VFR BLD AUTO: 35.7 % (ref 36–48)
HDLC SERPL-MCNC: 60 MG/DL (ref 40–60)
HGB BLD-MCNC: 11.9 G/DL (ref 12–16)
HGB BLDA-MCNC: 11.6 G/DL (ref 12–16)
LACTATE BLDV-SCNC: 0.9 MMOL/L (ref 0.4–1.9)
LDLC SERPL CALC-MCNC: 65 MG/DL
LYMPHOCYTES # BLD: 1.3 K/UL (ref 1–5.1)
LYMPHOCYTES NFR BLD: 9.4 %
MAGNESIUM SERPL-MCNC: 2.2 MG/DL (ref 1.8–2.4)
MCH RBC QN AUTO: 33.1 PG (ref 26–34)
MCHC RBC AUTO-ENTMCNC: 33.3 G/DL (ref 31–36)
MCV RBC AUTO: 99.4 FL (ref 80–100)
METHGB MFR BLDA: 0.4 %
MONOCYTES # BLD: 1.4 K/UL (ref 0–1.3)
MONOCYTES NFR BLD: 10.9 %
NEUTROPHILS # BLD: 10.4 K/UL (ref 1.7–7.7)
NEUTROPHILS NFR BLD: 78.4 %
O2 THERAPY: ABNORMAL
PCO2 BLDA: 38.5 MMHG (ref 35–45)
PH BLDA: 7.4 [PH] (ref 7.35–7.45)
PLATELET # BLD AUTO: 177 K/UL (ref 135–450)
PMV BLD AUTO: 7.8 FL (ref 5–10.5)
PO2 BLDA: 142 MMHG (ref 75–108)
POTASSIUM SERPL-SCNC: 4.3 MMOL/L (ref 3.5–5.1)
RBC # BLD AUTO: 3.6 M/UL (ref 4–5.2)
SAO2 % BLDA: 99.6 %
SODIUM SERPL-SCNC: 140 MMOL/L (ref 136–145)
TRIGL SERPL-MCNC: 58 MG/DL (ref 0–150)
TROPONIN, HIGH SENSITIVITY: 188 NG/L (ref 0–14)
VLDLC SERPL CALC-MCNC: 12 MG/DL
WBC # BLD AUTO: 13.3 K/UL (ref 4–11)

## 2023-06-21 PROCEDURE — 2580000003 HC RX 258: Performed by: INTERNAL MEDICINE

## 2023-06-21 PROCEDURE — 85025 COMPLETE CBC W/AUTO DIFF WBC: CPT

## 2023-06-21 PROCEDURE — 97166 OT EVAL MOD COMPLEX 45 MIN: CPT

## 2023-06-21 PROCEDURE — 93010 ELECTROCARDIOGRAM REPORT: CPT | Performed by: INTERNAL MEDICINE

## 2023-06-21 PROCEDURE — 6370000000 HC RX 637 (ALT 250 FOR IP): Performed by: STUDENT IN AN ORGANIZED HEALTH CARE EDUCATION/TRAINING PROGRAM

## 2023-06-21 PROCEDURE — 85520 HEPARIN ASSAY: CPT

## 2023-06-21 PROCEDURE — 94760 N-INVAS EAR/PLS OXIMETRY 1: CPT

## 2023-06-21 PROCEDURE — 6360000002 HC RX W HCPCS: Performed by: INTERNAL MEDICINE

## 2023-06-21 PROCEDURE — 2700000000 HC OXYGEN THERAPY PER DAY

## 2023-06-21 PROCEDURE — 2060000000 HC ICU INTERMEDIATE R&B

## 2023-06-21 PROCEDURE — 6370000000 HC RX 637 (ALT 250 FOR IP): Performed by: INTERNAL MEDICINE

## 2023-06-21 PROCEDURE — 97530 THERAPEUTIC ACTIVITIES: CPT

## 2023-06-21 PROCEDURE — 6360000002 HC RX W HCPCS: Performed by: HOSPITALIST

## 2023-06-21 PROCEDURE — 94640 AIRWAY INHALATION TREATMENT: CPT

## 2023-06-21 PROCEDURE — 83735 ASSAY OF MAGNESIUM: CPT

## 2023-06-21 PROCEDURE — 36415 COLL VENOUS BLD VENIPUNCTURE: CPT

## 2023-06-21 PROCEDURE — 6360000002 HC RX W HCPCS: Performed by: STUDENT IN AN ORGANIZED HEALTH CARE EDUCATION/TRAINING PROGRAM

## 2023-06-21 PROCEDURE — 99223 1ST HOSP IP/OBS HIGH 75: CPT | Performed by: INTERNAL MEDICINE

## 2023-06-21 PROCEDURE — 2500000003 HC RX 250 WO HCPCS: Performed by: INTERNAL MEDICINE

## 2023-06-21 PROCEDURE — 73502 X-RAY EXAM HIP UNI 2-3 VIEWS: CPT

## 2023-06-21 PROCEDURE — 80048 BASIC METABOLIC PNL TOTAL CA: CPT

## 2023-06-21 PROCEDURE — 2580000003 HC RX 258: Performed by: HOSPITALIST

## 2023-06-21 PROCEDURE — 93005 ELECTROCARDIOGRAM TRACING: CPT | Performed by: INTERNAL MEDICINE

## 2023-06-21 PROCEDURE — 83605 ASSAY OF LACTIC ACID: CPT

## 2023-06-21 PROCEDURE — 82803 BLOOD GASES ANY COMBINATION: CPT

## 2023-06-21 PROCEDURE — 80061 LIPID PANEL: CPT

## 2023-06-21 PROCEDURE — 87040 BLOOD CULTURE FOR BACTERIA: CPT

## 2023-06-21 PROCEDURE — 2580000003 HC RX 258: Performed by: STUDENT IN AN ORGANIZED HEALTH CARE EDUCATION/TRAINING PROGRAM

## 2023-06-21 PROCEDURE — 36600 WITHDRAWAL OF ARTERIAL BLOOD: CPT

## 2023-06-21 RX ORDER — FUROSEMIDE 10 MG/ML
40 INJECTION INTRAMUSCULAR; INTRAVENOUS ONCE
Status: COMPLETED | OUTPATIENT
Start: 2023-06-21 | End: 2023-06-21

## 2023-06-21 RX ORDER — HEPARIN SODIUM 1000 [USP'U]/ML
2000 INJECTION, SOLUTION INTRAVENOUS; SUBCUTANEOUS ONCE
Status: COMPLETED | OUTPATIENT
Start: 2023-06-21 | End: 2023-06-21

## 2023-06-21 RX ADMIN — EZETIMIBE 10 MG: 10 TABLET ORAL at 08:43

## 2023-06-21 RX ADMIN — RIVAROXABAN 15 MG: 15 TABLET, FILM COATED ORAL at 09:21

## 2023-06-21 RX ADMIN — ALLOPURINOL 100 MG: 100 TABLET ORAL at 08:43

## 2023-06-21 RX ADMIN — TIOTROPIUM BROMIDE AND OLODATEROL 2 PUFF: 3.124; 2.736 SPRAY, METERED RESPIRATORY (INHALATION) at 16:23

## 2023-06-21 RX ADMIN — ISOSORBIDE MONONITRATE 15 MG: 30 TABLET, EXTENDED RELEASE ORAL at 08:43

## 2023-06-21 RX ADMIN — LEVOTHYROXINE SODIUM 150 MCG: 0.07 TABLET ORAL at 05:28

## 2023-06-21 RX ADMIN — AMIODARONE HYDROCHLORIDE 150 MG: 1.5 INJECTION, SOLUTION INTRAVENOUS at 10:04

## 2023-06-21 RX ADMIN — HEPARIN SODIUM 1170 UNITS/HR: 10000 INJECTION, SOLUTION INTRAVENOUS at 05:27

## 2023-06-21 RX ADMIN — PIPERACILLIN AND TAZOBACTAM 3375 MG: 3; .375 INJECTION, POWDER, LYOPHILIZED, FOR SOLUTION INTRAVENOUS at 02:44

## 2023-06-21 RX ADMIN — ASPIRIN 81 MG: 81 TABLET, COATED ORAL at 08:43

## 2023-06-21 RX ADMIN — AMIODARONE HYDROCHLORIDE 1 MG/MIN: 50 INJECTION, SOLUTION INTRAVENOUS at 10:31

## 2023-06-21 RX ADMIN — FUROSEMIDE 40 MG: 10 INJECTION, SOLUTION INTRAMUSCULAR; INTRAVENOUS at 14:57

## 2023-06-21 RX ADMIN — HYDRALAZINE HYDROCHLORIDE 100 MG: 50 TABLET, FILM COATED ORAL at 08:43

## 2023-06-21 RX ADMIN — Medication 10 ML: at 20:08

## 2023-06-21 RX ADMIN — Medication 10 ML: at 08:47

## 2023-06-21 RX ADMIN — NIFEDIPINE 60 MG: 30 TABLET, EXTENDED RELEASE ORAL at 08:43

## 2023-06-21 RX ADMIN — HEPARIN SODIUM 2000 UNITS: 1000 INJECTION INTRAVENOUS; SUBCUTANEOUS at 05:26

## 2023-06-21 RX ADMIN — AMIODARONE HYDROCHLORIDE 0.5 MG/MIN: 50 INJECTION, SOLUTION INTRAVENOUS at 15:07

## 2023-06-21 RX ADMIN — AMIODARONE HYDROCHLORIDE 0.5 MG/MIN: 50 INJECTION, SOLUTION INTRAVENOUS at 20:07

## 2023-06-21 RX ADMIN — PIPERACILLIN AND TAZOBACTAM 3375 MG: 3; .375 INJECTION, POWDER, LYOPHILIZED, FOR SOLUTION INTRAVENOUS at 15:04

## 2023-06-21 RX ADMIN — ACETAMINOPHEN 650 MG: 325 TABLET ORAL at 16:23

## 2023-06-21 ASSESSMENT — PAIN SCALES - WONG BAKER: WONGBAKER_NUMERICALRESPONSE: 0

## 2023-06-21 ASSESSMENT — PAIN SCALES - GENERAL: PAINLEVEL_OUTOF10: 0

## 2023-06-22 ENCOUNTER — APPOINTMENT (OUTPATIENT)
Dept: GENERAL RADIOLOGY | Age: 83
DRG: 291 | End: 2023-06-22
Payer: MEDICARE

## 2023-06-22 LAB
ANION GAP SERPL CALCULATED.3IONS-SCNC: 12 MMOL/L (ref 3–16)
BUN SERPL-MCNC: 64 MG/DL (ref 7–20)
CALCIUM SERPL-MCNC: 8.4 MG/DL (ref 8.3–10.6)
CHLORIDE SERPL-SCNC: 98 MMOL/L (ref 99–110)
CO2 SERPL-SCNC: 22 MMOL/L (ref 21–32)
CREAT SERPL-MCNC: 2.8 MG/DL (ref 0.6–1.2)
DEPRECATED RDW RBC AUTO: 14.6 % (ref 12.4–15.4)
GFR SERPLBLD CREATININE-BSD FMLA CKD-EPI: 16 ML/MIN/{1.73_M2}
GLUCOSE SERPL-MCNC: 89 MG/DL (ref 70–99)
HCT VFR BLD AUTO: 32.2 % (ref 36–48)
HGB BLD-MCNC: 10.8 G/DL (ref 12–16)
MAGNESIUM SERPL-MCNC: 2.3 MG/DL (ref 1.8–2.4)
MCH RBC QN AUTO: 33.1 PG (ref 26–34)
MCHC RBC AUTO-ENTMCNC: 33.6 G/DL (ref 31–36)
MCV RBC AUTO: 98.5 FL (ref 80–100)
PLATELET # BLD AUTO: 170 K/UL (ref 135–450)
PLATELET BLD QL SMEAR: ADEQUATE
PMV BLD AUTO: 8.1 FL (ref 5–10.5)
POTASSIUM SERPL-SCNC: 4.1 MMOL/L (ref 3.5–5.1)
RBC # BLD AUTO: 3.27 M/UL (ref 4–5.2)
SLIDE REVIEW: ABNORMAL
SODIUM SERPL-SCNC: 132 MMOL/L (ref 136–145)
WBC # BLD AUTO: 12.6 K/UL (ref 4–11)

## 2023-06-22 PROCEDURE — 97535 SELF CARE MNGMENT TRAINING: CPT

## 2023-06-22 PROCEDURE — 2580000003 HC RX 258: Performed by: STUDENT IN AN ORGANIZED HEALTH CARE EDUCATION/TRAINING PROGRAM

## 2023-06-22 PROCEDURE — 6360000002 HC RX W HCPCS: Performed by: HOSPITALIST

## 2023-06-22 PROCEDURE — 97530 THERAPEUTIC ACTIVITIES: CPT

## 2023-06-22 PROCEDURE — 6370000000 HC RX 637 (ALT 250 FOR IP): Performed by: STUDENT IN AN ORGANIZED HEALTH CARE EDUCATION/TRAINING PROGRAM

## 2023-06-22 PROCEDURE — 6370000000 HC RX 637 (ALT 250 FOR IP): Performed by: INTERNAL MEDICINE

## 2023-06-22 PROCEDURE — 6370000000 HC RX 637 (ALT 250 FOR IP): Performed by: HOSPITALIST

## 2023-06-22 PROCEDURE — 71046 X-RAY EXAM CHEST 2 VIEWS: CPT

## 2023-06-22 PROCEDURE — 36415 COLL VENOUS BLD VENIPUNCTURE: CPT

## 2023-06-22 PROCEDURE — 97116 GAIT TRAINING THERAPY: CPT

## 2023-06-22 PROCEDURE — 99233 SBSQ HOSP IP/OBS HIGH 50: CPT | Performed by: INTERNAL MEDICINE

## 2023-06-22 PROCEDURE — 85027 COMPLETE CBC AUTOMATED: CPT

## 2023-06-22 PROCEDURE — 2060000000 HC ICU INTERMEDIATE R&B

## 2023-06-22 PROCEDURE — 83735 ASSAY OF MAGNESIUM: CPT

## 2023-06-22 PROCEDURE — 2700000000 HC OXYGEN THERAPY PER DAY

## 2023-06-22 PROCEDURE — 2580000003 HC RX 258: Performed by: HOSPITALIST

## 2023-06-22 PROCEDURE — 94640 AIRWAY INHALATION TREATMENT: CPT

## 2023-06-22 PROCEDURE — 80048 BASIC METABOLIC PNL TOTAL CA: CPT

## 2023-06-22 PROCEDURE — 94760 N-INVAS EAR/PLS OXIMETRY 1: CPT

## 2023-06-22 PROCEDURE — 99232 SBSQ HOSP IP/OBS MODERATE 35: CPT | Performed by: INTERNAL MEDICINE

## 2023-06-22 PROCEDURE — 97162 PT EVAL MOD COMPLEX 30 MIN: CPT

## 2023-06-22 RX ORDER — OXYCODONE HYDROCHLORIDE AND ACETAMINOPHEN 5; 325 MG/1; MG/1
1 TABLET ORAL EVERY 4 HOURS PRN
Status: DISCONTINUED | OUTPATIENT
Start: 2023-06-22 | End: 2023-06-27

## 2023-06-22 RX ORDER — AMIODARONE HYDROCHLORIDE 200 MG/1
200 TABLET ORAL 2 TIMES DAILY
Status: DISCONTINUED | OUTPATIENT
Start: 2023-06-22 | End: 2023-06-26

## 2023-06-22 RX ADMIN — AMIODARONE HYDROCHLORIDE 200 MG: 200 TABLET ORAL at 20:07

## 2023-06-22 RX ADMIN — RIVAROXABAN 15 MG: 15 TABLET, FILM COATED ORAL at 18:22

## 2023-06-22 RX ADMIN — Medication 10 ML: at 08:30

## 2023-06-22 RX ADMIN — EZETIMIBE 10 MG: 10 TABLET ORAL at 08:30

## 2023-06-22 RX ADMIN — PIPERACILLIN AND TAZOBACTAM 3375 MG: 3; .375 INJECTION, POWDER, LYOPHILIZED, FOR SOLUTION INTRAVENOUS at 03:03

## 2023-06-22 RX ADMIN — ACETAMINOPHEN 650 MG: 325 TABLET ORAL at 18:22

## 2023-06-22 RX ADMIN — ASPIRIN 81 MG: 81 TABLET, COATED ORAL at 08:30

## 2023-06-22 RX ADMIN — LEVOTHYROXINE SODIUM 150 MCG: 0.07 TABLET ORAL at 05:28

## 2023-06-22 RX ADMIN — ACETAMINOPHEN 650 MG: 325 TABLET ORAL at 11:33

## 2023-06-22 RX ADMIN — AMIODARONE HYDROCHLORIDE 200 MG: 200 TABLET ORAL at 11:32

## 2023-06-22 RX ADMIN — TIOTROPIUM BROMIDE AND OLODATEROL 2 PUFF: 3.124; 2.736 SPRAY, METERED RESPIRATORY (INHALATION) at 08:16

## 2023-06-22 RX ADMIN — ACETAMINOPHEN 650 MG: 325 TABLET ORAL at 05:30

## 2023-06-22 RX ADMIN — ALLOPURINOL 100 MG: 100 TABLET ORAL at 08:30

## 2023-06-22 RX ADMIN — OXYCODONE AND ACETAMINOPHEN 1 TABLET: 5; 325 TABLET ORAL at 20:07

## 2023-06-22 RX ADMIN — PIPERACILLIN AND TAZOBACTAM 3375 MG: 3; .375 INJECTION, POWDER, LYOPHILIZED, FOR SOLUTION INTRAVENOUS at 16:54

## 2023-06-22 ASSESSMENT — PAIN DESCRIPTION - LOCATION
LOCATION: BACK
LOCATION: BREAST;NECK
LOCATION: BACK

## 2023-06-22 ASSESSMENT — PAIN DESCRIPTION - ORIENTATION
ORIENTATION: LEFT

## 2023-06-22 ASSESSMENT — ENCOUNTER SYMPTOMS
SORE THROAT: 0
TROUBLE SWALLOWING: 0
BACK PAIN: 0
NAUSEA: 0
WHEEZING: 1
VOMITING: 0
EYE PAIN: 0
COUGH: 0
ABDOMINAL PAIN: 0
SHORTNESS OF BREATH: 1

## 2023-06-22 ASSESSMENT — PAIN DESCRIPTION - DESCRIPTORS
DESCRIPTORS: ACHING;DULL
DESCRIPTORS: DULL
DESCRIPTORS: ACHING
DESCRIPTORS: ACHING;DULL
DESCRIPTORS: DULL;ACHING

## 2023-06-22 ASSESSMENT — PAIN - FUNCTIONAL ASSESSMENT
PAIN_FUNCTIONAL_ASSESSMENT: ACTIVITIES ARE NOT PREVENTED
PAIN_FUNCTIONAL_ASSESSMENT: PREVENTS OR INTERFERES SOME ACTIVE ACTIVITIES AND ADLS
PAIN_FUNCTIONAL_ASSESSMENT: ACTIVITIES ARE NOT PREVENTED
PAIN_FUNCTIONAL_ASSESSMENT: ACTIVITIES ARE NOT PREVENTED

## 2023-06-22 ASSESSMENT — PAIN SCALES - GENERAL
PAINLEVEL_OUTOF10: 3
PAINLEVEL_OUTOF10: 7
PAINLEVEL_OUTOF10: 3

## 2023-06-22 ASSESSMENT — PAIN DESCRIPTION - ONSET: ONSET: ON-GOING

## 2023-06-22 ASSESSMENT — PAIN DESCRIPTION - FREQUENCY: FREQUENCY: INTERMITTENT

## 2023-06-22 ASSESSMENT — PAIN DESCRIPTION - PAIN TYPE: TYPE: ACUTE PAIN

## 2023-06-23 ENCOUNTER — APPOINTMENT (OUTPATIENT)
Dept: GENERAL RADIOLOGY | Age: 83
DRG: 291 | End: 2023-06-23
Payer: MEDICARE

## 2023-06-23 LAB
ANION GAP SERPL CALCULATED.3IONS-SCNC: 13 MMOL/L (ref 3–16)
BACTERIA URNS QL MICRO: ABNORMAL /HPF
BILIRUB UR QL STRIP.AUTO: NEGATIVE
BUDDING YEAST: PRESENT
BUN SERPL-MCNC: 69 MG/DL (ref 7–20)
CALCIUM SERPL-MCNC: 8.7 MG/DL (ref 8.3–10.6)
CHLORIDE SERPL-SCNC: 99 MMOL/L (ref 99–110)
CLARITY UR: ABNORMAL
CO2 SERPL-SCNC: 22 MMOL/L (ref 21–32)
COLOR UR: YELLOW
CREAT SERPL-MCNC: 3 MG/DL (ref 0.6–1.2)
EKG ATRIAL RATE: 66 BPM
EKG DIAGNOSIS: NORMAL
EKG P AXIS: -10 DEGREES
EKG P-R INTERVAL: 140 MS
EKG Q-T INTERVAL: 460 MS
EKG QRS DURATION: 102 MS
EKG QTC CALCULATION (BAZETT): 482 MS
EKG R AXIS: -18 DEGREES
EKG T AXIS: 68 DEGREES
EKG VENTRICULAR RATE: 66 BPM
EPI CELLS #/AREA URNS AUTO: 1 /HPF (ref 0–5)
GFR SERPLBLD CREATININE-BSD FMLA CKD-EPI: 15 ML/MIN/{1.73_M2}
GLUCOSE SERPL-MCNC: 67 MG/DL (ref 70–99)
GLUCOSE UR STRIP.AUTO-MCNC: NEGATIVE MG/DL
HGB UR QL STRIP.AUTO: NEGATIVE
HYALINE CASTS #/AREA URNS AUTO: 5 /LPF (ref 0–8)
KETONES UR STRIP.AUTO-MCNC: NEGATIVE MG/DL
LEUKOCYTE ESTERASE UR QL STRIP.AUTO: NEGATIVE
LV EF: 58 %
LVEF MODALITY: NORMAL
MAGNESIUM SERPL-MCNC: 2.4 MG/DL (ref 1.8–2.4)
NITRITE UR QL STRIP.AUTO: NEGATIVE
NT-PROBNP SERPL-MCNC: ABNORMAL PG/ML (ref 0–449)
PH UR STRIP.AUTO: 5 [PH] (ref 5–8)
POTASSIUM SERPL-SCNC: 4.1 MMOL/L (ref 3.5–5.1)
PROT UR STRIP.AUTO-MCNC: 100 MG/DL
RBC CLUMPS #/AREA URNS AUTO: 0 /HPF (ref 0–4)
SODIUM SERPL-SCNC: 134 MMOL/L (ref 136–145)
SP GR UR STRIP.AUTO: 1.02 (ref 1–1.03)
TROPONIN, HIGH SENSITIVITY: 379 NG/L (ref 0–14)
UA DIPSTICK W REFLEX MICRO PNL UR: YES
URN SPEC COLLECT METH UR: ABNORMAL
UROBILINOGEN UR STRIP-ACNC: 0.2 E.U./DL
WBC #/AREA URNS AUTO: 1 /HPF (ref 0–5)

## 2023-06-23 PROCEDURE — 6370000000 HC RX 637 (ALT 250 FOR IP): Performed by: INTERNAL MEDICINE

## 2023-06-23 PROCEDURE — 93306 TTE W/DOPPLER COMPLETE: CPT

## 2023-06-23 PROCEDURE — 97530 THERAPEUTIC ACTIVITIES: CPT

## 2023-06-23 PROCEDURE — 74018 RADEX ABDOMEN 1 VIEW: CPT

## 2023-06-23 PROCEDURE — 2700000000 HC OXYGEN THERAPY PER DAY

## 2023-06-23 PROCEDURE — 94761 N-INVAS EAR/PLS OXIMETRY MLT: CPT

## 2023-06-23 PROCEDURE — 93005 ELECTROCARDIOGRAM TRACING: CPT | Performed by: INTERNAL MEDICINE

## 2023-06-23 PROCEDURE — 6370000000 HC RX 637 (ALT 250 FOR IP): Performed by: HOSPITALIST

## 2023-06-23 PROCEDURE — 99232 SBSQ HOSP IP/OBS MODERATE 35: CPT | Performed by: INTERNAL MEDICINE

## 2023-06-23 PROCEDURE — 6360000002 HC RX W HCPCS: Performed by: HOSPITALIST

## 2023-06-23 PROCEDURE — 81001 URINALYSIS AUTO W/SCOPE: CPT

## 2023-06-23 PROCEDURE — 2580000003 HC RX 258: Performed by: HOSPITALIST

## 2023-06-23 PROCEDURE — 80048 BASIC METABOLIC PNL TOTAL CA: CPT

## 2023-06-23 PROCEDURE — 36415 COLL VENOUS BLD VENIPUNCTURE: CPT

## 2023-06-23 PROCEDURE — 83880 ASSAY OF NATRIURETIC PEPTIDE: CPT

## 2023-06-23 PROCEDURE — 93010 ELECTROCARDIOGRAM REPORT: CPT | Performed by: INTERNAL MEDICINE

## 2023-06-23 PROCEDURE — 6370000000 HC RX 637 (ALT 250 FOR IP): Performed by: STUDENT IN AN ORGANIZED HEALTH CARE EDUCATION/TRAINING PROGRAM

## 2023-06-23 PROCEDURE — 6360000002 HC RX W HCPCS: Performed by: STUDENT IN AN ORGANIZED HEALTH CARE EDUCATION/TRAINING PROGRAM

## 2023-06-23 PROCEDURE — 94640 AIRWAY INHALATION TREATMENT: CPT

## 2023-06-23 PROCEDURE — 2060000000 HC ICU INTERMEDIATE R&B

## 2023-06-23 PROCEDURE — 83735 ASSAY OF MAGNESIUM: CPT

## 2023-06-23 PROCEDURE — 6360000002 HC RX W HCPCS: Performed by: INTERNAL MEDICINE

## 2023-06-23 PROCEDURE — 99233 SBSQ HOSP IP/OBS HIGH 50: CPT | Performed by: INTERNAL MEDICINE

## 2023-06-23 PROCEDURE — 2580000003 HC RX 258: Performed by: STUDENT IN AN ORGANIZED HEALTH CARE EDUCATION/TRAINING PROGRAM

## 2023-06-23 PROCEDURE — 84484 ASSAY OF TROPONIN QUANT: CPT

## 2023-06-23 RX ORDER — POLYETHYLENE GLYCOL 3350 17 G/17G
17 POWDER, FOR SOLUTION ORAL 2 TIMES DAILY
Status: DISCONTINUED | OUTPATIENT
Start: 2023-06-23 | End: 2023-06-28 | Stop reason: HOSPADM

## 2023-06-23 RX ORDER — HYDRALAZINE HYDROCHLORIDE 20 MG/ML
10 INJECTION INTRAMUSCULAR; INTRAVENOUS EVERY 6 HOURS PRN
Status: DISCONTINUED | OUTPATIENT
Start: 2023-06-23 | End: 2023-06-28 | Stop reason: HOSPADM

## 2023-06-23 RX ORDER — FUROSEMIDE 10 MG/ML
40 INJECTION INTRAMUSCULAR; INTRAVENOUS ONCE
Status: COMPLETED | OUTPATIENT
Start: 2023-06-23 | End: 2023-06-23

## 2023-06-23 RX ADMIN — POLYETHYLENE GLYCOL 3350 17 G: 17 POWDER, FOR SOLUTION ORAL at 19:35

## 2023-06-23 RX ADMIN — PIPERACILLIN AND TAZOBACTAM 3375 MG: 3; .375 INJECTION, POWDER, LYOPHILIZED, FOR SOLUTION INTRAVENOUS at 02:59

## 2023-06-23 RX ADMIN — ALLOPURINOL 100 MG: 100 TABLET ORAL at 09:40

## 2023-06-23 RX ADMIN — HYDRALAZINE HYDROCHLORIDE 10 MG: 20 INJECTION INTRAMUSCULAR; INTRAVENOUS at 16:50

## 2023-06-23 RX ADMIN — TIOTROPIUM BROMIDE AND OLODATEROL 2 PUFF: 3.124; 2.736 SPRAY, METERED RESPIRATORY (INHALATION) at 08:25

## 2023-06-23 RX ADMIN — OXYCODONE AND ACETAMINOPHEN 1 TABLET: 5; 325 TABLET ORAL at 08:55

## 2023-06-23 RX ADMIN — ONDANSETRON 4 MG: 2 INJECTION INTRAMUSCULAR; INTRAVENOUS at 19:32

## 2023-06-23 RX ADMIN — LEVOTHYROXINE SODIUM 150 MCG: 0.07 TABLET ORAL at 05:05

## 2023-06-23 RX ADMIN — RIVAROXABAN 15 MG: 15 TABLET, FILM COATED ORAL at 17:40

## 2023-06-23 RX ADMIN — AMIODARONE HYDROCHLORIDE 200 MG: 200 TABLET ORAL at 09:40

## 2023-06-23 RX ADMIN — OXYCODONE AND ACETAMINOPHEN 1 TABLET: 5; 325 TABLET ORAL at 15:42

## 2023-06-23 RX ADMIN — EZETIMIBE 10 MG: 10 TABLET ORAL at 09:40

## 2023-06-23 RX ADMIN — ASPIRIN 81 MG: 81 TABLET, COATED ORAL at 09:40

## 2023-06-23 RX ADMIN — OXYCODONE AND ACETAMINOPHEN 1 TABLET: 5; 325 TABLET ORAL at 19:42

## 2023-06-23 RX ADMIN — FUROSEMIDE 40 MG: 10 INJECTION, SOLUTION INTRAMUSCULAR; INTRAVENOUS at 15:42

## 2023-06-23 RX ADMIN — NITROGLYCERIN 0.5 INCH: 20 OINTMENT TOPICAL at 15:42

## 2023-06-23 RX ADMIN — Medication 10 ML: at 09:41

## 2023-06-23 RX ADMIN — AMIODARONE HYDROCHLORIDE 200 MG: 200 TABLET ORAL at 19:36

## 2023-06-23 ASSESSMENT — PAIN DESCRIPTION - DESCRIPTORS
DESCRIPTORS: ACHING

## 2023-06-23 ASSESSMENT — PAIN - FUNCTIONAL ASSESSMENT
PAIN_FUNCTIONAL_ASSESSMENT: PREVENTS OR INTERFERES SOME ACTIVE ACTIVITIES AND ADLS
PAIN_FUNCTIONAL_ASSESSMENT: ACTIVITIES ARE NOT PREVENTED

## 2023-06-23 ASSESSMENT — PAIN DESCRIPTION - LOCATION
LOCATION: BREAST
LOCATION: OTHER (COMMENT)

## 2023-06-23 ASSESSMENT — PAIN SCALES - GENERAL
PAINLEVEL_OUTOF10: 7

## 2023-06-23 ASSESSMENT — PAIN DESCRIPTION - ORIENTATION
ORIENTATION: LEFT
ORIENTATION: LEFT;POSTERIOR

## 2023-06-24 ENCOUNTER — APPOINTMENT (OUTPATIENT)
Dept: GENERAL RADIOLOGY | Age: 83
DRG: 291 | End: 2023-06-24
Payer: MEDICARE

## 2023-06-24 LAB
ANION GAP SERPL CALCULATED.3IONS-SCNC: 10 MMOL/L (ref 3–16)
BACTERIA BLD CULT ORG #2: NORMAL
BACTERIA BLD CULT: NORMAL
BUN SERPL-MCNC: 72 MG/DL (ref 7–20)
CALCIUM SERPL-MCNC: 8.6 MG/DL (ref 8.3–10.6)
CHLORIDE SERPL-SCNC: 100 MMOL/L (ref 99–110)
CO2 SERPL-SCNC: 25 MMOL/L (ref 21–32)
CREAT SERPL-MCNC: 2.9 MG/DL (ref 0.6–1.2)
DEPRECATED RDW RBC AUTO: 14.5 % (ref 12.4–15.4)
EKG ATRIAL RATE: 67 BPM
EKG DIAGNOSIS: NORMAL
EKG P-R INTERVAL: 150 MS
EKG Q-T INTERVAL: 418 MS
EKG QRS DURATION: 102 MS
EKG QTC CALCULATION (BAZETT): 441 MS
EKG R AXIS: -22 DEGREES
EKG T AXIS: 145 DEGREES
EKG VENTRICULAR RATE: 67 BPM
GFR SERPLBLD CREATININE-BSD FMLA CKD-EPI: 16 ML/MIN/{1.73_M2}
GLUCOSE BLD-MCNC: 108 MG/DL (ref 70–99)
GLUCOSE BLD-MCNC: 120 MG/DL (ref 70–99)
GLUCOSE BLD-MCNC: 137 MG/DL (ref 70–99)
GLUCOSE BLD-MCNC: 144 MG/DL (ref 70–99)
GLUCOSE BLD-MCNC: 166 MG/DL (ref 70–99)
GLUCOSE BLD-MCNC: 38 MG/DL (ref 70–99)
GLUCOSE BLD-MCNC: 42 MG/DL (ref 70–99)
GLUCOSE BLD-MCNC: 70 MG/DL (ref 70–99)
GLUCOSE SERPL-MCNC: 35 MG/DL (ref 70–99)
HCT VFR BLD AUTO: 30.8 % (ref 36–48)
HGB BLD-MCNC: 10.5 G/DL (ref 12–16)
LACTATE BLDV-SCNC: 0.9 MMOL/L (ref 0.4–2)
MAGNESIUM SERPL-MCNC: 2.7 MG/DL (ref 1.8–2.4)
MCH RBC QN AUTO: 33.6 PG (ref 26–34)
MCHC RBC AUTO-ENTMCNC: 34.1 G/DL (ref 31–36)
MCV RBC AUTO: 98.6 FL (ref 80–100)
PERFORMED ON: ABNORMAL
PERFORMED ON: NORMAL
PLATELET # BLD AUTO: 198 K/UL (ref 135–450)
PMV BLD AUTO: 8 FL (ref 5–10.5)
POTASSIUM SERPL-SCNC: 4.1 MMOL/L (ref 3.5–5.1)
RBC # BLD AUTO: 3.13 M/UL (ref 4–5.2)
SODIUM SERPL-SCNC: 135 MMOL/L (ref 136–145)
TROPONIN, HIGH SENSITIVITY: 341 NG/L (ref 0–14)
WBC # BLD AUTO: 8.7 K/UL (ref 4–11)

## 2023-06-24 PROCEDURE — 94640 AIRWAY INHALATION TREATMENT: CPT

## 2023-06-24 PROCEDURE — 93005 ELECTROCARDIOGRAM TRACING: CPT | Performed by: NURSE PRACTITIONER

## 2023-06-24 PROCEDURE — 83735 ASSAY OF MAGNESIUM: CPT

## 2023-06-24 PROCEDURE — 6370000000 HC RX 637 (ALT 250 FOR IP): Performed by: HOSPITALIST

## 2023-06-24 PROCEDURE — 99233 SBSQ HOSP IP/OBS HIGH 50: CPT | Performed by: NURSE PRACTITIONER

## 2023-06-24 PROCEDURE — 6370000000 HC RX 637 (ALT 250 FOR IP): Performed by: NURSE PRACTITIONER

## 2023-06-24 PROCEDURE — 36415 COLL VENOUS BLD VENIPUNCTURE: CPT

## 2023-06-24 PROCEDURE — 6360000002 HC RX W HCPCS: Performed by: NURSE PRACTITIONER

## 2023-06-24 PROCEDURE — 6370000000 HC RX 637 (ALT 250 FOR IP): Performed by: INTERNAL MEDICINE

## 2023-06-24 PROCEDURE — 93010 ELECTROCARDIOGRAM REPORT: CPT | Performed by: INTERNAL MEDICINE

## 2023-06-24 PROCEDURE — 6370000000 HC RX 637 (ALT 250 FOR IP): Performed by: STUDENT IN AN ORGANIZED HEALTH CARE EDUCATION/TRAINING PROGRAM

## 2023-06-24 PROCEDURE — 84484 ASSAY OF TROPONIN QUANT: CPT

## 2023-06-24 PROCEDURE — 2580000003 HC RX 258

## 2023-06-24 PROCEDURE — 99233 SBSQ HOSP IP/OBS HIGH 50: CPT | Performed by: INTERNAL MEDICINE

## 2023-06-24 PROCEDURE — 83605 ASSAY OF LACTIC ACID: CPT

## 2023-06-24 PROCEDURE — 2700000000 HC OXYGEN THERAPY PER DAY

## 2023-06-24 PROCEDURE — 2060000000 HC ICU INTERMEDIATE R&B

## 2023-06-24 PROCEDURE — 2580000003 HC RX 258: Performed by: STUDENT IN AN ORGANIZED HEALTH CARE EDUCATION/TRAINING PROGRAM

## 2023-06-24 PROCEDURE — 71045 X-RAY EXAM CHEST 1 VIEW: CPT

## 2023-06-24 PROCEDURE — 80048 BASIC METABOLIC PNL TOTAL CA: CPT

## 2023-06-24 PROCEDURE — 85027 COMPLETE CBC AUTOMATED: CPT

## 2023-06-24 PROCEDURE — 6360000002 HC RX W HCPCS: Performed by: HOSPITALIST

## 2023-06-24 PROCEDURE — 94761 N-INVAS EAR/PLS OXIMETRY MLT: CPT

## 2023-06-24 RX ORDER — ISOSORBIDE MONONITRATE 30 MG/1
15 TABLET, EXTENDED RELEASE ORAL DAILY
Status: DISCONTINUED | OUTPATIENT
Start: 2023-06-24 | End: 2023-06-26

## 2023-06-24 RX ORDER — DEXTROSE MONOHYDRATE 50 MG/ML
INJECTION, SOLUTION INTRAVENOUS
Status: DISCONTINUED
Start: 2023-06-24 | End: 2023-06-24

## 2023-06-24 RX ORDER — HYDRALAZINE HYDROCHLORIDE 10 MG/1
10 TABLET, FILM COATED ORAL EVERY 12 HOURS SCHEDULED
Status: DISCONTINUED | OUTPATIENT
Start: 2023-06-24 | End: 2023-06-25

## 2023-06-24 RX ORDER — FUROSEMIDE 10 MG/ML
20 INJECTION INTRAMUSCULAR; INTRAVENOUS ONCE
Status: COMPLETED | OUTPATIENT
Start: 2023-06-24 | End: 2023-06-24

## 2023-06-24 RX ORDER — LEVOFLOXACIN 5 MG/ML
500 INJECTION, SOLUTION INTRAVENOUS
Status: DISCONTINUED | OUTPATIENT
Start: 2023-06-26 | End: 2023-06-28 | Stop reason: HOSPADM

## 2023-06-24 RX ORDER — LEVOFLOXACIN 5 MG/ML
500 INJECTION, SOLUTION INTRAVENOUS EVERY 24 HOURS
Status: DISCONTINUED | OUTPATIENT
Start: 2023-06-24 | End: 2023-06-24 | Stop reason: DRUGHIGH

## 2023-06-24 RX ORDER — DEXTROSE MONOHYDRATE 100 MG/ML
INJECTION, SOLUTION INTRAVENOUS
Status: COMPLETED
Start: 2023-06-24 | End: 2023-06-24

## 2023-06-24 RX ORDER — LEVOFLOXACIN 5 MG/ML
750 INJECTION, SOLUTION INTRAVENOUS ONCE
Status: COMPLETED | OUTPATIENT
Start: 2023-06-24 | End: 2023-06-24

## 2023-06-24 RX ORDER — DEXTROSE MONOHYDRATE 100 MG/ML
INJECTION, SOLUTION INTRAVENOUS CONTINUOUS PRN
Status: DISCONTINUED | OUTPATIENT
Start: 2023-06-24 | End: 2023-06-28 | Stop reason: HOSPADM

## 2023-06-24 RX ORDER — IPRATROPIUM BROMIDE AND ALBUTEROL SULFATE 2.5; .5 MG/3ML; MG/3ML
1 SOLUTION RESPIRATORY (INHALATION) EVERY 4 HOURS PRN
Status: DISCONTINUED | OUTPATIENT
Start: 2023-06-24 | End: 2023-06-28 | Stop reason: HOSPADM

## 2023-06-24 RX ADMIN — HYDRALAZINE HYDROCHLORIDE 10 MG: 10 TABLET, FILM COATED ORAL at 21:17

## 2023-06-24 RX ADMIN — LEVOTHYROXINE SODIUM 150 MCG: 0.07 TABLET ORAL at 05:09

## 2023-06-24 RX ADMIN — LEVOFLOXACIN 750 MG: 5 INJECTION, SOLUTION INTRAVENOUS at 14:18

## 2023-06-24 RX ADMIN — ASPIRIN 81 MG: 81 TABLET, COATED ORAL at 09:26

## 2023-06-24 RX ADMIN — ALLOPURINOL 100 MG: 100 TABLET ORAL at 09:27

## 2023-06-24 RX ADMIN — POLYETHYLENE GLYCOL 3350 17 G: 17 POWDER, FOR SOLUTION ORAL at 09:27

## 2023-06-24 RX ADMIN — OXYCODONE AND ACETAMINOPHEN 1 TABLET: 5; 325 TABLET ORAL at 14:41

## 2023-06-24 RX ADMIN — AMIODARONE HYDROCHLORIDE 200 MG: 200 TABLET ORAL at 21:17

## 2023-06-24 RX ADMIN — TIOTROPIUM BROMIDE AND OLODATEROL 2 PUFF: 3.124; 2.736 SPRAY, METERED RESPIRATORY (INHALATION) at 08:37

## 2023-06-24 RX ADMIN — FUROSEMIDE 20 MG: 10 INJECTION, SOLUTION INTRAMUSCULAR; INTRAVENOUS at 10:59

## 2023-06-24 RX ADMIN — Medication 10 ML: at 21:22

## 2023-06-24 RX ADMIN — Medication 10 ML: at 09:28

## 2023-06-24 RX ADMIN — AMIODARONE HYDROCHLORIDE 200 MG: 200 TABLET ORAL at 09:26

## 2023-06-24 RX ADMIN — EZETIMIBE 10 MG: 10 TABLET ORAL at 09:26

## 2023-06-24 RX ADMIN — POLYETHYLENE GLYCOL 3350 17 G: 17 POWDER, FOR SOLUTION ORAL at 21:17

## 2023-06-24 RX ADMIN — DEXTROSE MONOHYDRATE 1000 ML: 100 INJECTION, SOLUTION INTRAVENOUS at 08:14

## 2023-06-24 RX ADMIN — ISOSORBIDE MONONITRATE 15 MG: 30 TABLET, EXTENDED RELEASE ORAL at 13:01

## 2023-06-24 RX ADMIN — ACETAMINOPHEN 650 MG: 325 TABLET ORAL at 21:17

## 2023-06-24 RX ADMIN — OXYCODONE AND ACETAMINOPHEN 1 TABLET: 5; 325 TABLET ORAL at 18:40

## 2023-06-24 RX ADMIN — RIVAROXABAN 15 MG: 15 TABLET, FILM COATED ORAL at 18:06

## 2023-06-24 RX ADMIN — SODIUM CHLORIDE 25 ML: 9 INJECTION, SOLUTION INTRAVENOUS at 14:16

## 2023-06-24 RX ADMIN — IPRATROPIUM BROMIDE AND ALBUTEROL SULFATE 1 DOSE: 2.5; .5 SOLUTION RESPIRATORY (INHALATION) at 11:29

## 2023-06-24 ASSESSMENT — PAIN SCALES - GENERAL
PAINLEVEL_OUTOF10: 7
PAINLEVEL_OUTOF10: 0
PAINLEVEL_OUTOF10: 7
PAINLEVEL_OUTOF10: 7
PAINLEVEL_OUTOF10: 4
PAINLEVEL_OUTOF10: 0

## 2023-06-24 ASSESSMENT — PAIN DESCRIPTION - DESCRIPTORS
DESCRIPTORS: ACHING
DESCRIPTORS: ACHING

## 2023-06-24 ASSESSMENT — PAIN - FUNCTIONAL ASSESSMENT
PAIN_FUNCTIONAL_ASSESSMENT: ACTIVITIES ARE NOT PREVENTED
PAIN_FUNCTIONAL_ASSESSMENT: ACTIVITIES ARE NOT PREVENTED

## 2023-06-24 ASSESSMENT — PAIN DESCRIPTION - ORIENTATION
ORIENTATION: LEFT
ORIENTATION: LEFT;MID

## 2023-06-24 ASSESSMENT — PAIN DESCRIPTION - LOCATION
LOCATION: BREAST;BACK
LOCATION: BREAST;BACK

## 2023-06-25 ENCOUNTER — APPOINTMENT (OUTPATIENT)
Dept: CT IMAGING | Age: 83
DRG: 291 | End: 2023-06-25
Payer: MEDICARE

## 2023-06-25 ENCOUNTER — APPOINTMENT (OUTPATIENT)
Dept: GENERAL RADIOLOGY | Age: 83
DRG: 291 | End: 2023-06-25
Payer: MEDICARE

## 2023-06-25 PROBLEM — I95.9 HYPOTENSION: Status: ACTIVE | Noted: 2023-06-25

## 2023-06-25 PROBLEM — I48.91 NEW ONSET ATRIAL FIBRILLATION (HCC): Status: ACTIVE | Noted: 2023-06-21

## 2023-06-25 LAB
AMMONIA PLAS-SCNC: 15 UMOL/L (ref 11–51)
ANION GAP SERPL CALCULATED.3IONS-SCNC: 10 MMOL/L (ref 3–16)
BACTERIA BLD CULT ORG #2: NORMAL
BACTERIA BLD CULT: NORMAL
BASE EXCESS BLDV CALC-SCNC: 1.3 MMOL/L
BASOPHILS # BLD: 0 K/UL (ref 0–0.2)
BASOPHILS NFR BLD: 0.4 %
BUN SERPL-MCNC: 69 MG/DL (ref 7–20)
CALCIUM SERPL-MCNC: 8.1 MG/DL (ref 8.3–10.6)
CHLORIDE SERPL-SCNC: 98 MMOL/L (ref 99–110)
CO2 BLDV-SCNC: 30 MMOL/L
CO2 SERPL-SCNC: 25 MMOL/L (ref 21–32)
COHGB MFR BLDV: 1.8 %
CREAT SERPL-MCNC: 2.7 MG/DL (ref 0.6–1.2)
DEPRECATED RDW RBC AUTO: 14.2 % (ref 12.4–15.4)
EOSINOPHIL # BLD: 0.3 K/UL (ref 0–0.6)
EOSINOPHIL NFR BLD: 5.1 %
GFR SERPLBLD CREATININE-BSD FMLA CKD-EPI: 17 ML/MIN/{1.73_M2}
GLUCOSE BLD-MCNC: 138 MG/DL (ref 70–99)
GLUCOSE BLD-MCNC: 140 MG/DL (ref 70–99)
GLUCOSE BLD-MCNC: 181 MG/DL (ref 70–99)
GLUCOSE BLD-MCNC: 52 MG/DL (ref 70–99)
GLUCOSE BLD-MCNC: 70 MG/DL (ref 70–99)
GLUCOSE BLD-MCNC: 91 MG/DL (ref 70–99)
GLUCOSE BLD-MCNC: 93 MG/DL (ref 70–99)
GLUCOSE BLD-MCNC: 98 MG/DL (ref 70–99)
GLUCOSE SERPL-MCNC: 60 MG/DL (ref 70–99)
HCO3 BLDV-SCNC: 29 MMOL/L (ref 23–29)
HCT VFR BLD AUTO: 28.5 % (ref 36–48)
HGB BLD-MCNC: 9.6 G/DL (ref 12–16)
LACTATE BLDV-SCNC: 0.8 MMOL/L (ref 0.4–2)
LYMPHOCYTES # BLD: 1.3 K/UL (ref 1–5.1)
LYMPHOCYTES NFR BLD: 20.1 %
MAGNESIUM SERPL-MCNC: 2.6 MG/DL (ref 1.8–2.4)
MCH RBC QN AUTO: 33.4 PG (ref 26–34)
MCHC RBC AUTO-ENTMCNC: 33.8 G/DL (ref 31–36)
MCV RBC AUTO: 98.9 FL (ref 80–100)
METHGB MFR BLDV: 0.3 %
MONOCYTES # BLD: 0.8 K/UL (ref 0–1.3)
MONOCYTES NFR BLD: 12.8 %
NEUTROPHILS # BLD: 3.9 K/UL (ref 1.7–7.7)
NEUTROPHILS NFR BLD: 61.6 %
O2 THERAPY: ABNORMAL
PCO2 BLDV: 58.4 MMHG (ref 40–50)
PERFORMED ON: ABNORMAL
PERFORMED ON: NORMAL
PH BLDV: 7.3 [PH] (ref 7.35–7.45)
PLATELET # BLD AUTO: 188 K/UL (ref 135–450)
PMV BLD AUTO: 8.3 FL (ref 5–10.5)
PO2 BLDV: 39 MMHG
POTASSIUM SERPL-SCNC: 4.6 MMOL/L (ref 3.5–5.1)
RBC # BLD AUTO: 2.88 M/UL (ref 4–5.2)
SAO2 % BLDV: 70 %
SODIUM SERPL-SCNC: 133 MMOL/L (ref 136–145)
URATE SERPL-MCNC: 7.1 MG/DL (ref 2.6–6)
WBC # BLD AUTO: 6.4 K/UL (ref 4–11)

## 2023-06-25 PROCEDURE — 2580000003 HC RX 258: Performed by: HOSPITALIST

## 2023-06-25 PROCEDURE — 6360000002 HC RX W HCPCS: Performed by: NURSE PRACTITIONER

## 2023-06-25 PROCEDURE — 6370000000 HC RX 637 (ALT 250 FOR IP): Performed by: INTERNAL MEDICINE

## 2023-06-25 PROCEDURE — 6360000002 HC RX W HCPCS: Performed by: HOSPITALIST

## 2023-06-25 PROCEDURE — 70450 CT HEAD/BRAIN W/O DYE: CPT

## 2023-06-25 PROCEDURE — 80048 BASIC METABOLIC PNL TOTAL CA: CPT

## 2023-06-25 PROCEDURE — 6370000000 HC RX 637 (ALT 250 FOR IP): Performed by: NURSE PRACTITIONER

## 2023-06-25 PROCEDURE — 82140 ASSAY OF AMMONIA: CPT

## 2023-06-25 PROCEDURE — 6370000000 HC RX 637 (ALT 250 FOR IP): Performed by: HOSPITALIST

## 2023-06-25 PROCEDURE — 99232 SBSQ HOSP IP/OBS MODERATE 35: CPT | Performed by: INTERNAL MEDICINE

## 2023-06-25 PROCEDURE — 2060000000 HC ICU INTERMEDIATE R&B

## 2023-06-25 PROCEDURE — 94640 AIRWAY INHALATION TREATMENT: CPT

## 2023-06-25 PROCEDURE — 87449 NOS EACH ORGANISM AG IA: CPT

## 2023-06-25 PROCEDURE — 99233 SBSQ HOSP IP/OBS HIGH 50: CPT | Performed by: NURSE PRACTITIONER

## 2023-06-25 PROCEDURE — 93005 ELECTROCARDIOGRAM TRACING: CPT | Performed by: INTERNAL MEDICINE

## 2023-06-25 PROCEDURE — 94761 N-INVAS EAR/PLS OXIMETRY MLT: CPT

## 2023-06-25 PROCEDURE — 83605 ASSAY OF LACTIC ACID: CPT

## 2023-06-25 PROCEDURE — 85025 COMPLETE CBC W/AUTO DIFF WBC: CPT

## 2023-06-25 PROCEDURE — 84550 ASSAY OF BLOOD/URIC ACID: CPT

## 2023-06-25 PROCEDURE — 6370000000 HC RX 637 (ALT 250 FOR IP): Performed by: STUDENT IN AN ORGANIZED HEALTH CARE EDUCATION/TRAINING PROGRAM

## 2023-06-25 PROCEDURE — 83735 ASSAY OF MAGNESIUM: CPT

## 2023-06-25 PROCEDURE — 2700000000 HC OXYGEN THERAPY PER DAY

## 2023-06-25 PROCEDURE — 82803 BLOOD GASES ANY COMBINATION: CPT

## 2023-06-25 PROCEDURE — 36415 COLL VENOUS BLD VENIPUNCTURE: CPT

## 2023-06-25 PROCEDURE — 2580000003 HC RX 258: Performed by: STUDENT IN AN ORGANIZED HEALTH CARE EDUCATION/TRAINING PROGRAM

## 2023-06-25 PROCEDURE — 71045 X-RAY EXAM CHEST 1 VIEW: CPT

## 2023-06-25 RX ORDER — HYDRALAZINE HYDROCHLORIDE 25 MG/1
25 TABLET, FILM COATED ORAL EVERY 12 HOURS SCHEDULED
Status: DISCONTINUED | OUTPATIENT
Start: 2023-06-25 | End: 2023-06-27

## 2023-06-25 RX ORDER — LANOLIN ALCOHOL/MO/W.PET/CERES
3 CREAM (GRAM) TOPICAL ONCE
Status: COMPLETED | OUTPATIENT
Start: 2023-06-25 | End: 2023-06-25

## 2023-06-25 RX ORDER — FUROSEMIDE 10 MG/ML
20 INJECTION INTRAMUSCULAR; INTRAVENOUS 2 TIMES DAILY
Status: DISCONTINUED | OUTPATIENT
Start: 2023-06-25 | End: 2023-06-26

## 2023-06-25 RX ADMIN — HYDRALAZINE HYDROCHLORIDE 10 MG: 10 TABLET, FILM COATED ORAL at 08:02

## 2023-06-25 RX ADMIN — Medication 3 MG: at 23:31

## 2023-06-25 RX ADMIN — Medication 10 ML: at 20:28

## 2023-06-25 RX ADMIN — RIVAROXABAN 15 MG: 15 TABLET, FILM COATED ORAL at 17:47

## 2023-06-25 RX ADMIN — DEXTROSE MONOHYDRATE 250 ML: 100 INJECTION, SOLUTION INTRAVENOUS at 01:07

## 2023-06-25 RX ADMIN — AMIODARONE HYDROCHLORIDE 200 MG: 200 TABLET ORAL at 08:02

## 2023-06-25 RX ADMIN — FUROSEMIDE 20 MG: 10 INJECTION, SOLUTION INTRAMUSCULAR; INTRAVENOUS at 17:47

## 2023-06-25 RX ADMIN — EZETIMIBE 10 MG: 10 TABLET ORAL at 08:02

## 2023-06-25 RX ADMIN — ALLOPURINOL 100 MG: 100 TABLET ORAL at 08:01

## 2023-06-25 RX ADMIN — LEVOTHYROXINE SODIUM 150 MCG: 0.07 TABLET ORAL at 06:28

## 2023-06-25 RX ADMIN — ACETAMINOPHEN 650 MG: 325 TABLET ORAL at 20:25

## 2023-06-25 RX ADMIN — POLYETHYLENE GLYCOL 3350 17 G: 17 POWDER, FOR SOLUTION ORAL at 20:25

## 2023-06-25 RX ADMIN — ASPIRIN 81 MG: 81 TABLET, COATED ORAL at 08:02

## 2023-06-25 RX ADMIN — OXYCODONE AND ACETAMINOPHEN 1 TABLET: 5; 325 TABLET ORAL at 03:39

## 2023-06-25 RX ADMIN — TIOTROPIUM BROMIDE AND OLODATEROL 2 PUFF: 3.124; 2.736 SPRAY, METERED RESPIRATORY (INHALATION) at 08:48

## 2023-06-25 RX ADMIN — AMIODARONE HYDROCHLORIDE 200 MG: 200 TABLET ORAL at 20:25

## 2023-06-25 RX ADMIN — HYDRALAZINE HYDROCHLORIDE 10 MG: 20 INJECTION INTRAMUSCULAR; INTRAVENOUS at 16:24

## 2023-06-25 RX ADMIN — OXYCODONE AND ACETAMINOPHEN 1 TABLET: 5; 325 TABLET ORAL at 17:49

## 2023-06-25 RX ADMIN — POLYETHYLENE GLYCOL 3350 17 G: 17 POWDER, FOR SOLUTION ORAL at 08:02

## 2023-06-25 RX ADMIN — HYDRALAZINE HYDROCHLORIDE 25 MG: 25 TABLET, FILM COATED ORAL at 20:25

## 2023-06-25 RX ADMIN — Medication 10 ML: at 08:09

## 2023-06-25 RX ADMIN — OXYCODONE AND ACETAMINOPHEN 1 TABLET: 5; 325 TABLET ORAL at 23:31

## 2023-06-25 RX ADMIN — IPRATROPIUM BROMIDE AND ALBUTEROL SULFATE 1 DOSE: 2.5; .5 SOLUTION RESPIRATORY (INHALATION) at 08:48

## 2023-06-25 RX ADMIN — OXYCODONE AND ACETAMINOPHEN 1 TABLET: 5; 325 TABLET ORAL at 08:17

## 2023-06-25 RX ADMIN — ISOSORBIDE MONONITRATE 15 MG: 30 TABLET, EXTENDED RELEASE ORAL at 08:02

## 2023-06-25 RX ADMIN — FUROSEMIDE 20 MG: 10 INJECTION, SOLUTION INTRAMUSCULAR; INTRAVENOUS at 10:38

## 2023-06-25 ASSESSMENT — PAIN DESCRIPTION - DESCRIPTORS
DESCRIPTORS: ACHING
DESCRIPTORS: ACHING

## 2023-06-25 ASSESSMENT — PAIN DESCRIPTION - LOCATION: LOCATION: BACK;BREAST

## 2023-06-25 ASSESSMENT — PAIN DESCRIPTION - ONSET: ONSET: ON-GOING

## 2023-06-25 ASSESSMENT — PAIN - FUNCTIONAL ASSESSMENT
PAIN_FUNCTIONAL_ASSESSMENT: PREVENTS OR INTERFERES SOME ACTIVE ACTIVITIES AND ADLS
PAIN_FUNCTIONAL_ASSESSMENT: PREVENTS OR INTERFERES SOME ACTIVE ACTIVITIES AND ADLS

## 2023-06-25 ASSESSMENT — PAIN SCALES - GENERAL
PAINLEVEL_OUTOF10: 7
PAINLEVEL_OUTOF10: 0
PAINLEVEL_OUTOF10: 0
PAINLEVEL_OUTOF10: 7
PAINLEVEL_OUTOF10: 7
PAINLEVEL_OUTOF10: 3

## 2023-06-25 ASSESSMENT — PAIN SCALES - WONG BAKER: WONGBAKER_NUMERICALRESPONSE: 0

## 2023-06-25 ASSESSMENT — PAIN DESCRIPTION - PAIN TYPE: TYPE: ACUTE PAIN

## 2023-06-25 ASSESSMENT — PAIN DESCRIPTION - FREQUENCY: FREQUENCY: CONTINUOUS

## 2023-06-25 ASSESSMENT — PAIN DESCRIPTION - ORIENTATION
ORIENTATION: LEFT;MID;POSTERIOR
ORIENTATION: LEFT;POSTERIOR

## 2023-06-26 LAB
ALBUMIN SERPL-MCNC: 2.9 G/DL (ref 3.4–5)
ANION GAP SERPL CALCULATED.3IONS-SCNC: 12 MMOL/L (ref 3–16)
BUN SERPL-MCNC: 60 MG/DL (ref 7–20)
CALCIUM SERPL-MCNC: 8.4 MG/DL (ref 8.3–10.6)
CHLORIDE SERPL-SCNC: 96 MMOL/L (ref 99–110)
CO2 SERPL-SCNC: 24 MMOL/L (ref 21–32)
CORTIS AM PEAK SERPL-MCNC: 20.1 UG/DL (ref 4.3–22.4)
CREAT SERPL-MCNC: 2.6 MG/DL (ref 0.6–1.2)
DEPRECATED RDW RBC AUTO: 14.4 % (ref 12.4–15.4)
EKG ATRIAL RATE: 64 BPM
EKG DIAGNOSIS: NORMAL
EKG Q-T INTERVAL: 452 MS
EKG QRS DURATION: 94 MS
EKG QTC CALCULATION (BAZETT): 466 MS
EKG R AXIS: -19 DEGREES
EKG T AXIS: 57 DEGREES
EKG VENTRICULAR RATE: 64 BPM
GFR SERPLBLD CREATININE-BSD FMLA CKD-EPI: 18 ML/MIN/{1.73_M2}
GLUCOSE BLD-MCNC: 101 MG/DL (ref 70–99)
GLUCOSE BLD-MCNC: 106 MG/DL (ref 70–99)
GLUCOSE BLD-MCNC: 118 MG/DL (ref 70–99)
GLUCOSE BLD-MCNC: 82 MG/DL (ref 70–99)
GLUCOSE BLD-MCNC: 90 MG/DL (ref 70–99)
GLUCOSE SERPL-MCNC: 104 MG/DL (ref 70–99)
HCT VFR BLD AUTO: 29.4 % (ref 36–48)
HGB BLD-MCNC: 9.9 G/DL (ref 12–16)
LEGIONELLA AG UR QL: NORMAL
MCH RBC QN AUTO: 33.2 PG (ref 26–34)
MCHC RBC AUTO-ENTMCNC: 33.6 G/DL (ref 31–36)
MCV RBC AUTO: 98.8 FL (ref 80–100)
NT-PROBNP SERPL-MCNC: ABNORMAL PG/ML (ref 0–449)
PERFORMED ON: ABNORMAL
PERFORMED ON: NORMAL
PERFORMED ON: NORMAL
PHOSPHATE SERPL-MCNC: 3.9 MG/DL (ref 2.5–4.9)
PLATELET # BLD AUTO: 202 K/UL (ref 135–450)
PMV BLD AUTO: 7.6 FL (ref 5–10.5)
POTASSIUM SERPL-SCNC: 4.9 MMOL/L (ref 3.5–5.1)
RBC # BLD AUTO: 2.98 M/UL (ref 4–5.2)
SODIUM SERPL-SCNC: 132 MMOL/L (ref 136–145)
WBC # BLD AUTO: 6.1 K/UL (ref 4–11)

## 2023-06-26 PROCEDURE — 87070 CULTURE OTHR SPECIMN AEROBIC: CPT

## 2023-06-26 PROCEDURE — 97116 GAIT TRAINING THERAPY: CPT

## 2023-06-26 PROCEDURE — 6370000000 HC RX 637 (ALT 250 FOR IP): Performed by: STUDENT IN AN ORGANIZED HEALTH CARE EDUCATION/TRAINING PROGRAM

## 2023-06-26 PROCEDURE — 6360000002 HC RX W HCPCS: Performed by: NURSE PRACTITIONER

## 2023-06-26 PROCEDURE — 97530 THERAPEUTIC ACTIVITIES: CPT

## 2023-06-26 PROCEDURE — 85027 COMPLETE CBC AUTOMATED: CPT

## 2023-06-26 PROCEDURE — 6370000000 HC RX 637 (ALT 250 FOR IP): Performed by: INTERNAL MEDICINE

## 2023-06-26 PROCEDURE — 93971 EXTREMITY STUDY: CPT

## 2023-06-26 PROCEDURE — 82533 TOTAL CORTISOL: CPT

## 2023-06-26 PROCEDURE — 6360000002 HC RX W HCPCS: Performed by: INTERNAL MEDICINE

## 2023-06-26 PROCEDURE — 93010 ELECTROCARDIOGRAM REPORT: CPT | Performed by: INTERNAL MEDICINE

## 2023-06-26 PROCEDURE — 87205 SMEAR GRAM STAIN: CPT

## 2023-06-26 PROCEDURE — 94640 AIRWAY INHALATION TREATMENT: CPT

## 2023-06-26 PROCEDURE — 36415 COLL VENOUS BLD VENIPUNCTURE: CPT

## 2023-06-26 PROCEDURE — 6370000000 HC RX 637 (ALT 250 FOR IP): Performed by: NURSE PRACTITIONER

## 2023-06-26 PROCEDURE — 99233 SBSQ HOSP IP/OBS HIGH 50: CPT | Performed by: INTERNAL MEDICINE

## 2023-06-26 PROCEDURE — 83880 ASSAY OF NATRIURETIC PEPTIDE: CPT

## 2023-06-26 PROCEDURE — 2580000003 HC RX 258: Performed by: STUDENT IN AN ORGANIZED HEALTH CARE EDUCATION/TRAINING PROGRAM

## 2023-06-26 PROCEDURE — 80069 RENAL FUNCTION PANEL: CPT

## 2023-06-26 PROCEDURE — 6370000000 HC RX 637 (ALT 250 FOR IP): Performed by: HOSPITALIST

## 2023-06-26 PROCEDURE — 6360000002 HC RX W HCPCS: Performed by: HOSPITALIST

## 2023-06-26 PROCEDURE — 94760 N-INVAS EAR/PLS OXIMETRY 1: CPT

## 2023-06-26 PROCEDURE — 94150 VITAL CAPACITY TEST: CPT

## 2023-06-26 PROCEDURE — 97535 SELF CARE MNGMENT TRAINING: CPT

## 2023-06-26 PROCEDURE — 2060000000 HC ICU INTERMEDIATE R&B

## 2023-06-26 PROCEDURE — 99232 SBSQ HOSP IP/OBS MODERATE 35: CPT | Performed by: INTERNAL MEDICINE

## 2023-06-26 RX ORDER — AMIODARONE HYDROCHLORIDE 200 MG/1
200 TABLET ORAL DAILY
Status: DISCONTINUED | OUTPATIENT
Start: 2023-06-27 | End: 2023-06-28 | Stop reason: HOSPADM

## 2023-06-26 RX ORDER — ISOSORBIDE MONONITRATE 30 MG/1
30 TABLET, EXTENDED RELEASE ORAL DAILY
Status: DISCONTINUED | OUTPATIENT
Start: 2023-06-27 | End: 2023-06-28 | Stop reason: HOSPADM

## 2023-06-26 RX ORDER — FUROSEMIDE 10 MG/ML
40 INJECTION INTRAMUSCULAR; INTRAVENOUS ONCE
Status: COMPLETED | OUTPATIENT
Start: 2023-06-26 | End: 2023-06-26

## 2023-06-26 RX ADMIN — OXYCODONE AND ACETAMINOPHEN 1 TABLET: 5; 325 TABLET ORAL at 22:37

## 2023-06-26 RX ADMIN — FUROSEMIDE 40 MG: 10 INJECTION, SOLUTION INTRAMUSCULAR; INTRAVENOUS at 16:56

## 2023-06-26 RX ADMIN — LEVOFLOXACIN 500 MG: 5 INJECTION, SOLUTION INTRAVENOUS at 09:28

## 2023-06-26 RX ADMIN — TIOTROPIUM BROMIDE AND OLODATEROL 2 PUFF: 3.124; 2.736 SPRAY, METERED RESPIRATORY (INHALATION) at 09:51

## 2023-06-26 RX ADMIN — Medication 10 ML: at 08:22

## 2023-06-26 RX ADMIN — FUROSEMIDE 20 MG: 10 INJECTION, SOLUTION INTRAMUSCULAR; INTRAVENOUS at 08:21

## 2023-06-26 RX ADMIN — Medication 10 ML: at 20:50

## 2023-06-26 RX ADMIN — OXYCODONE AND ACETAMINOPHEN 1 TABLET: 5; 325 TABLET ORAL at 18:32

## 2023-06-26 RX ADMIN — RIVAROXABAN 15 MG: 15 TABLET, FILM COATED ORAL at 16:56

## 2023-06-26 RX ADMIN — HYDRALAZINE HYDROCHLORIDE 25 MG: 25 TABLET, FILM COATED ORAL at 20:50

## 2023-06-26 RX ADMIN — ALLOPURINOL 100 MG: 100 TABLET ORAL at 08:22

## 2023-06-26 RX ADMIN — ASPIRIN 81 MG: 81 TABLET, COATED ORAL at 08:22

## 2023-06-26 RX ADMIN — HYDRALAZINE HYDROCHLORIDE 25 MG: 25 TABLET, FILM COATED ORAL at 08:21

## 2023-06-26 RX ADMIN — LEVOTHYROXINE SODIUM 150 MCG: 0.07 TABLET ORAL at 06:28

## 2023-06-26 RX ADMIN — EZETIMIBE 10 MG: 10 TABLET ORAL at 08:21

## 2023-06-26 RX ADMIN — AMIODARONE HYDROCHLORIDE 200 MG: 200 TABLET ORAL at 08:22

## 2023-06-26 RX ADMIN — ISOSORBIDE MONONITRATE 15 MG: 30 TABLET, EXTENDED RELEASE ORAL at 08:22

## 2023-06-26 RX ADMIN — OXYCODONE AND ACETAMINOPHEN 1 TABLET: 5; 325 TABLET ORAL at 10:37

## 2023-06-26 RX ADMIN — POLYETHYLENE GLYCOL 3350 17 G: 17 POWDER, FOR SOLUTION ORAL at 20:50

## 2023-06-26 RX ADMIN — POLYETHYLENE GLYCOL 3350 17 G: 17 POWDER, FOR SOLUTION ORAL at 08:22

## 2023-06-26 ASSESSMENT — PAIN SCALES - GENERAL: PAINLEVEL_OUTOF10: 7

## 2023-06-26 ASSESSMENT — PAIN DESCRIPTION - DESCRIPTORS: DESCRIPTORS: ACHING;DISCOMFORT

## 2023-06-26 ASSESSMENT — PAIN DESCRIPTION - LOCATION: LOCATION: HIP

## 2023-06-26 ASSESSMENT — PAIN DESCRIPTION - ORIENTATION: ORIENTATION: LEFT

## 2023-06-27 LAB
ALBUMIN SERPL-MCNC: 3 G/DL (ref 3.4–5)
ANION GAP SERPL CALCULATED.3IONS-SCNC: 11 MMOL/L (ref 3–16)
BUN SERPL-MCNC: 62 MG/DL (ref 7–20)
CALCIUM SERPL-MCNC: 8.6 MG/DL (ref 8.3–10.6)
CHLORIDE SERPL-SCNC: 98 MMOL/L (ref 99–110)
CO2 SERPL-SCNC: 27 MMOL/L (ref 21–32)
CREAT SERPL-MCNC: 2.6 MG/DL (ref 0.6–1.2)
GFR SERPLBLD CREATININE-BSD FMLA CKD-EPI: 18 ML/MIN/{1.73_M2}
GLUCOSE BLD-MCNC: 76 MG/DL (ref 70–99)
GLUCOSE BLD-MCNC: 84 MG/DL (ref 70–99)
GLUCOSE BLD-MCNC: 87 MG/DL (ref 70–99)
GLUCOSE BLD-MCNC: 95 MG/DL (ref 70–99)
GLUCOSE SERPL-MCNC: 79 MG/DL (ref 70–99)
IRON SATN MFR SERPL: 36 % (ref 15–50)
IRON SERPL-MCNC: 70 UG/DL (ref 37–145)
MAGNESIUM SERPL-MCNC: 2.4 MG/DL (ref 1.8–2.4)
PERFORMED ON: NORMAL
PHOSPHATE SERPL-MCNC: 3.5 MG/DL (ref 2.5–4.9)
POTASSIUM SERPL-SCNC: 4.3 MMOL/L (ref 3.5–5.1)
SODIUM SERPL-SCNC: 136 MMOL/L (ref 136–145)
TIBC SERPL-MCNC: 195 UG/DL (ref 260–445)

## 2023-06-27 PROCEDURE — 6370000000 HC RX 637 (ALT 250 FOR IP): Performed by: HOSPITALIST

## 2023-06-27 PROCEDURE — 83735 ASSAY OF MAGNESIUM: CPT

## 2023-06-27 PROCEDURE — 6360000002 HC RX W HCPCS: Performed by: HOSPITALIST

## 2023-06-27 PROCEDURE — 6370000000 HC RX 637 (ALT 250 FOR IP): Performed by: NURSE PRACTITIONER

## 2023-06-27 PROCEDURE — 80069 RENAL FUNCTION PANEL: CPT

## 2023-06-27 PROCEDURE — 36415 COLL VENOUS BLD VENIPUNCTURE: CPT

## 2023-06-27 PROCEDURE — 99232 SBSQ HOSP IP/OBS MODERATE 35: CPT | Performed by: NURSE PRACTITIONER

## 2023-06-27 PROCEDURE — 83540 ASSAY OF IRON: CPT

## 2023-06-27 PROCEDURE — 6370000000 HC RX 637 (ALT 250 FOR IP): Performed by: INTERNAL MEDICINE

## 2023-06-27 PROCEDURE — 94640 AIRWAY INHALATION TREATMENT: CPT

## 2023-06-27 PROCEDURE — 83550 IRON BINDING TEST: CPT

## 2023-06-27 PROCEDURE — 2580000003 HC RX 258: Performed by: STUDENT IN AN ORGANIZED HEALTH CARE EDUCATION/TRAINING PROGRAM

## 2023-06-27 PROCEDURE — 6370000000 HC RX 637 (ALT 250 FOR IP): Performed by: STUDENT IN AN ORGANIZED HEALTH CARE EDUCATION/TRAINING PROGRAM

## 2023-06-27 PROCEDURE — 94760 N-INVAS EAR/PLS OXIMETRY 1: CPT

## 2023-06-27 PROCEDURE — 2700000000 HC OXYGEN THERAPY PER DAY

## 2023-06-27 PROCEDURE — 2060000000 HC ICU INTERMEDIATE R&B

## 2023-06-27 RX ORDER — LORAZEPAM 0.5 MG/1
0.5 TABLET ORAL EVERY 4 HOURS PRN
Status: DISCONTINUED | OUTPATIENT
Start: 2023-06-27 | End: 2023-06-28 | Stop reason: HOSPADM

## 2023-06-27 RX ORDER — OXYCODONE HYDROCHLORIDE AND ACETAMINOPHEN 5; 325 MG/1; MG/1
2 TABLET ORAL EVERY 4 HOURS PRN
Status: DISCONTINUED | OUTPATIENT
Start: 2023-06-27 | End: 2023-06-28 | Stop reason: HOSPADM

## 2023-06-27 RX ORDER — HYDRALAZINE HYDROCHLORIDE 50 MG/1
50 TABLET, FILM COATED ORAL EVERY 12 HOURS SCHEDULED
Status: DISCONTINUED | OUTPATIENT
Start: 2023-06-27 | End: 2023-06-28 | Stop reason: HOSPADM

## 2023-06-27 RX ORDER — MORPHINE SULFATE 2 MG/ML
1 INJECTION, SOLUTION INTRAMUSCULAR; INTRAVENOUS ONCE
Status: DISCONTINUED | OUTPATIENT
Start: 2023-06-27 | End: 2023-06-28 | Stop reason: HOSPADM

## 2023-06-27 RX ORDER — HYDRALAZINE HYDROCHLORIDE 25 MG/1
25 TABLET, FILM COATED ORAL ONCE
Status: COMPLETED | OUTPATIENT
Start: 2023-06-27 | End: 2023-06-27

## 2023-06-27 RX ORDER — TORSEMIDE 20 MG/1
40 TABLET ORAL DAILY
Status: DISCONTINUED | OUTPATIENT
Start: 2023-06-28 | End: 2023-06-28 | Stop reason: HOSPADM

## 2023-06-27 RX ADMIN — POLYETHYLENE GLYCOL 3350 17 G: 17 POWDER, FOR SOLUTION ORAL at 20:17

## 2023-06-27 RX ADMIN — HYDRALAZINE HYDROCHLORIDE 10 MG: 20 INJECTION INTRAMUSCULAR; INTRAVENOUS at 05:21

## 2023-06-27 RX ADMIN — ALLOPURINOL 100 MG: 100 TABLET ORAL at 07:57

## 2023-06-27 RX ADMIN — Medication 10 ML: at 07:58

## 2023-06-27 RX ADMIN — OXYCODONE AND ACETAMINOPHEN 1 TABLET: 5; 325 TABLET ORAL at 05:22

## 2023-06-27 RX ADMIN — OXYCODONE AND ACETAMINOPHEN 2 TABLET: 5; 325 TABLET ORAL at 15:36

## 2023-06-27 RX ADMIN — TIOTROPIUM BROMIDE AND OLODATEROL 2 PUFF: 3.124; 2.736 SPRAY, METERED RESPIRATORY (INHALATION) at 08:27

## 2023-06-27 RX ADMIN — ASPIRIN 81 MG: 81 TABLET, COATED ORAL at 07:57

## 2023-06-27 RX ADMIN — LORAZEPAM 0.5 MG: 0.5 TABLET ORAL at 13:50

## 2023-06-27 RX ADMIN — ISOSORBIDE MONONITRATE 30 MG: 30 TABLET, EXTENDED RELEASE ORAL at 07:57

## 2023-06-27 RX ADMIN — RIVAROXABAN 15 MG: 15 TABLET, FILM COATED ORAL at 17:38

## 2023-06-27 RX ADMIN — EZETIMIBE 10 MG: 10 TABLET ORAL at 07:57

## 2023-06-27 RX ADMIN — HYDRALAZINE HYDROCHLORIDE 50 MG: 50 TABLET, FILM COATED ORAL at 20:17

## 2023-06-27 RX ADMIN — OXYCODONE AND ACETAMINOPHEN 1 TABLET: 5; 325 TABLET ORAL at 10:36

## 2023-06-27 RX ADMIN — LEVOTHYROXINE SODIUM 150 MCG: 0.07 TABLET ORAL at 05:22

## 2023-06-27 RX ADMIN — HYDRALAZINE HYDROCHLORIDE 25 MG: 25 TABLET, FILM COATED ORAL at 07:57

## 2023-06-27 RX ADMIN — HYDRALAZINE HYDROCHLORIDE 25 MG: 25 TABLET, FILM COATED ORAL at 10:28

## 2023-06-27 RX ADMIN — Medication 10 ML: at 20:17

## 2023-06-27 RX ADMIN — AMIODARONE HYDROCHLORIDE 200 MG: 200 TABLET ORAL at 07:57

## 2023-06-27 ASSESSMENT — PAIN DESCRIPTION - PAIN TYPE: TYPE: ACUTE PAIN

## 2023-06-27 ASSESSMENT — PAIN DESCRIPTION - ORIENTATION: ORIENTATION: LEFT

## 2023-06-27 ASSESSMENT — ENCOUNTER SYMPTOMS
SHORTNESS OF BREATH: 1
GASTROINTESTINAL NEGATIVE: 1

## 2023-06-27 ASSESSMENT — PAIN SCALES - GENERAL
PAINLEVEL_OUTOF10: 7
PAINLEVEL_OUTOF10: 7

## 2023-06-27 ASSESSMENT — PAIN - FUNCTIONAL ASSESSMENT: PAIN_FUNCTIONAL_ASSESSMENT: PREVENTS OR INTERFERES SOME ACTIVE ACTIVITIES AND ADLS

## 2023-06-27 ASSESSMENT — PAIN SCALES - WONG BAKER: WONGBAKER_NUMERICALRESPONSE: 2

## 2023-06-27 ASSESSMENT — PAIN DESCRIPTION - FREQUENCY: FREQUENCY: CONTINUOUS

## 2023-06-27 ASSESSMENT — PAIN DESCRIPTION - LOCATION: LOCATION: HIP

## 2023-06-27 ASSESSMENT — PAIN DESCRIPTION - ONSET: ONSET: ON-GOING

## 2023-06-27 ASSESSMENT — PAIN DESCRIPTION - DESCRIPTORS: DESCRIPTORS: ACHING;DISCOMFORT

## 2023-06-28 VITALS
DIASTOLIC BLOOD PRESSURE: 58 MMHG | RESPIRATION RATE: 16 BRPM | WEIGHT: 186.29 LBS | TEMPERATURE: 97.4 F | HEART RATE: 53 BPM | SYSTOLIC BLOOD PRESSURE: 173 MMHG | OXYGEN SATURATION: 96 % | BODY MASS INDEX: 31.04 KG/M2 | HEIGHT: 65 IN

## 2023-06-28 LAB
ALBUMIN SERPL-MCNC: 3.1 G/DL (ref 3.4–5)
ANION GAP SERPL CALCULATED.3IONS-SCNC: 12 MMOL/L (ref 3–16)
BACTERIA SPEC RESP CULT: NORMAL
BUN SERPL-MCNC: 53 MG/DL (ref 7–20)
CALCIUM SERPL-MCNC: 8.3 MG/DL (ref 8.3–10.6)
CHLORIDE SERPL-SCNC: 98 MMOL/L (ref 99–110)
CO2 SERPL-SCNC: 25 MMOL/L (ref 21–32)
CREAT SERPL-MCNC: 2.4 MG/DL (ref 0.6–1.2)
DEPRECATED RDW RBC AUTO: 14.4 % (ref 12.4–15.4)
GFR SERPLBLD CREATININE-BSD FMLA CKD-EPI: 20 ML/MIN/{1.73_M2}
GLUCOSE SERPL-MCNC: 86 MG/DL (ref 70–99)
GRAM STN SPEC: NORMAL
HCT VFR BLD AUTO: 30.3 % (ref 36–48)
HGB BLD-MCNC: 10.3 G/DL (ref 12–16)
MAGNESIUM SERPL-MCNC: 2.4 MG/DL (ref 1.8–2.4)
MCH RBC QN AUTO: 33.3 PG (ref 26–34)
MCHC RBC AUTO-ENTMCNC: 34 G/DL (ref 31–36)
MCV RBC AUTO: 97.9 FL (ref 80–100)
PHOSPHATE SERPL-MCNC: 3.4 MG/DL (ref 2.5–4.9)
PLATELET # BLD AUTO: 241 K/UL (ref 135–450)
PMV BLD AUTO: 7.2 FL (ref 5–10.5)
POTASSIUM SERPL-SCNC: 4.3 MMOL/L (ref 3.5–5.1)
RBC # BLD AUTO: 3.09 M/UL (ref 4–5.2)
SODIUM SERPL-SCNC: 135 MMOL/L (ref 136–145)
WBC # BLD AUTO: 6.7 K/UL (ref 4–11)

## 2023-06-28 PROCEDURE — 6370000000 HC RX 637 (ALT 250 FOR IP): Performed by: INTERNAL MEDICINE

## 2023-06-28 PROCEDURE — 6370000000 HC RX 637 (ALT 250 FOR IP): Performed by: FAMILY MEDICINE

## 2023-06-28 PROCEDURE — 94640 AIRWAY INHALATION TREATMENT: CPT

## 2023-06-28 PROCEDURE — 6370000000 HC RX 637 (ALT 250 FOR IP): Performed by: HOSPITALIST

## 2023-06-28 PROCEDURE — 6360000002 HC RX W HCPCS: Performed by: HOSPITALIST

## 2023-06-28 PROCEDURE — 80069 RENAL FUNCTION PANEL: CPT

## 2023-06-28 PROCEDURE — 6370000000 HC RX 637 (ALT 250 FOR IP): Performed by: NURSE PRACTITIONER

## 2023-06-28 PROCEDURE — 83735 ASSAY OF MAGNESIUM: CPT

## 2023-06-28 PROCEDURE — 6370000000 HC RX 637 (ALT 250 FOR IP): Performed by: STUDENT IN AN ORGANIZED HEALTH CARE EDUCATION/TRAINING PROGRAM

## 2023-06-28 PROCEDURE — 36415 COLL VENOUS BLD VENIPUNCTURE: CPT

## 2023-06-28 PROCEDURE — 85027 COMPLETE CBC AUTOMATED: CPT

## 2023-06-28 PROCEDURE — 2580000003 HC RX 258: Performed by: STUDENT IN AN ORGANIZED HEALTH CARE EDUCATION/TRAINING PROGRAM

## 2023-06-28 PROCEDURE — 94760 N-INVAS EAR/PLS OXIMETRY 1: CPT

## 2023-06-28 RX ORDER — NIFEDIPINE 30 MG/1
30 TABLET, EXTENDED RELEASE ORAL DAILY
Qty: 30 TABLET | Refills: 2 | Status: SHIPPED | OUTPATIENT
Start: 2023-06-28

## 2023-06-28 RX ORDER — HYDRALAZINE HYDROCHLORIDE 50 MG/1
50 TABLET, FILM COATED ORAL EVERY 12 HOURS SCHEDULED
Qty: 90 TABLET | Refills: 3 | Status: SHIPPED | OUTPATIENT
Start: 2023-06-28

## 2023-06-28 RX ORDER — AMIODARONE HYDROCHLORIDE 200 MG/1
200 TABLET ORAL DAILY
Qty: 30 TABLET | Refills: 0 | Status: SHIPPED | OUTPATIENT
Start: 2023-06-29

## 2023-06-28 RX ORDER — ISOSORBIDE MONONITRATE 30 MG/1
30 TABLET, EXTENDED RELEASE ORAL DAILY
Qty: 30 TABLET | Refills: 3 | Status: SHIPPED | OUTPATIENT
Start: 2023-06-29

## 2023-06-28 RX ADMIN — POLYETHYLENE GLYCOL 3350 17 G: 17 POWDER, FOR SOLUTION ORAL at 08:18

## 2023-06-28 RX ADMIN — TORSEMIDE 40 MG: 20 TABLET ORAL at 08:19

## 2023-06-28 RX ADMIN — EZETIMIBE 10 MG: 10 TABLET ORAL at 08:18

## 2023-06-28 RX ADMIN — OXYCODONE AND ACETAMINOPHEN 2 TABLET: 5; 325 TABLET ORAL at 05:52

## 2023-06-28 RX ADMIN — Medication 10 ML: at 08:18

## 2023-06-28 RX ADMIN — ISOSORBIDE MONONITRATE 30 MG: 30 TABLET, EXTENDED RELEASE ORAL at 08:19

## 2023-06-28 RX ADMIN — ASPIRIN 81 MG: 81 TABLET, COATED ORAL at 08:19

## 2023-06-28 RX ADMIN — HYDRALAZINE HYDROCHLORIDE 10 MG: 20 INJECTION INTRAMUSCULAR; INTRAVENOUS at 00:06

## 2023-06-28 RX ADMIN — HYDRALAZINE HYDROCHLORIDE 50 MG: 50 TABLET, FILM COATED ORAL at 08:19

## 2023-06-28 RX ADMIN — LEVOTHYROXINE SODIUM 150 MCG: 0.07 TABLET ORAL at 05:52

## 2023-06-28 RX ADMIN — AMIODARONE HYDROCHLORIDE 200 MG: 200 TABLET ORAL at 08:19

## 2023-06-28 RX ADMIN — LORAZEPAM 0.5 MG: 0.5 TABLET ORAL at 05:52

## 2023-06-28 RX ADMIN — ALLOPURINOL 100 MG: 100 TABLET ORAL at 08:19

## 2023-06-28 RX ADMIN — OXYCODONE AND ACETAMINOPHEN 2 TABLET: 5; 325 TABLET ORAL at 10:20

## 2023-06-28 RX ADMIN — TIOTROPIUM BROMIDE AND OLODATEROL 2 PUFF: 3.124; 2.736 SPRAY, METERED RESPIRATORY (INHALATION) at 07:45

## 2023-06-28 ASSESSMENT — PAIN DESCRIPTION - FREQUENCY
FREQUENCY: INTERMITTENT
FREQUENCY: CONTINUOUS

## 2023-06-28 ASSESSMENT — PAIN DESCRIPTION - LOCATION
LOCATION: HIP
LOCATION: GENERALIZED

## 2023-06-28 ASSESSMENT — PAIN DESCRIPTION - DESCRIPTORS
DESCRIPTORS: ACHING
DESCRIPTORS: ACHING;DISCOMFORT

## 2023-06-28 ASSESSMENT — PAIN SCALES - GENERAL
PAINLEVEL_OUTOF10: 0
PAINLEVEL_OUTOF10: 7
PAINLEVEL_OUTOF10: 7

## 2023-06-28 ASSESSMENT — ENCOUNTER SYMPTOMS
GASTROINTESTINAL NEGATIVE: 1
SHORTNESS OF BREATH: 1

## 2023-06-28 ASSESSMENT — PAIN DESCRIPTION - ONSET
ONSET: AWAKENED FROM SLEEP
ONSET: ON-GOING

## 2023-06-28 ASSESSMENT — PAIN DESCRIPTION - PAIN TYPE
TYPE: ACUTE PAIN
TYPE: ACUTE PAIN

## 2023-06-28 ASSESSMENT — PAIN DESCRIPTION - ORIENTATION: ORIENTATION: LEFT

## 2023-06-28 ASSESSMENT — PAIN SCALES - WONG BAKER: WONGBAKER_NUMERICALRESPONSE: 0

## 2023-06-29 ENCOUNTER — CARE COORDINATION (OUTPATIENT)
Dept: CASE MANAGEMENT | Age: 83
End: 2023-06-29

## 2023-07-05 NOTE — PROGRESS NOTES
Physician Progress Note      PATIENT:               Kemal Wilde  CSN #:                  [id number]  :                       1940  ADMIT DATE:       2023 2:15 PM  [address] [place] [address] DATE:        2023 11:40 AM  RESPONDING  PROVIDER #:        Nigel Garcia MD          QUERY TEXT:    Pt admitted with CHF. Pt noted to have possible PNA. If possible, please   document in the progress notes and discharge summary if you are evaluating   and/or treating any of the following:      Note: CAP and HCAP indicate where the pneumonia was acquired, not a specific   type. The medical record reflects the following:  Risk Factors: hx of CHF, CKD  Clinical Indicators: presented for SOB - concern for PNA in ED - started on   zosyn Procalcitonin 0.13 CT of chest- Nonspecific patchy ground-glass   opacification throughout the lungs,  possibly edema or infiltrate. No focal consolidation identified. Treatment: IV Zosyn, IV Levaquin, pulm consult, CT of chest  Options provided:  -- Possible Gram negative pneumonia  -- Possible Viral pneumonia  -- Possible Aspiration pneumonia  -- Other - I will add my own diagnosis  -- Disagree - Not applicable / Not valid  -- Disagree - Clinically unable to determine / Unknown  -- Refer to Clinical Documentation Reviewer    PROVIDER RESPONSE TEXT:    This patient has possible gram negative pneumonia. Query created by: Ashley Siu on 2023 6:54 AM      QUERY TEXT:    Pt admitted with CHF. Pt noted to have possible PNA with leukocytosis,   tachycardia, tachypnea and acute resp failure. If possible, please document in   the progress notes and discharge summary if you are evaluating and /or   treating any of the following: The medical record reflects the following:  Risk Factors: possible PNA  Clinical Indicators:  On admission, WBC 14.0. Resp-24-30, HR- 85-91- acute resp   failure- mild resp distress - appears chronically ill  Treatment: IVF, Tylenol, IV ABX, Blood Cultures, CT of chest,

## 2023-07-16 PROBLEM — Z09 HOSPITAL DISCHARGE FOLLOW-UP: Status: RESOLVED | Noted: 2023-06-16 | Resolved: 2023-07-16

## 2023-09-18 ENCOUNTER — TELEPHONE (OUTPATIENT)
Dept: CARDIOLOGY CLINIC | Age: 83
End: 2023-09-18

## 2023-09-18 NOTE — TELEPHONE ENCOUNTER
Dr. Peña Course, it appears patient was discharged on hospice 6/28/23. Does she still need to follow as outpatient? She is scheduled in [place] 9/21/23 at 10:30 a.m.

## 2024-08-30 NOTE — PROGRESS NOTES
Patient is in the supine position.   The body was positioned using the following devices: safety strap and gel pad mattress.  The head was positioned using the following devices: regular pillow.  The left arm was positioned using the following devices: safety strap, arm board and gel arm pads.  The right arm was positioned using the following devices: safety strap, arm board and gel arm pads.  The left leg was positioned using the follo wing devices: safety strap and heel suspension cushion.  The right leg was positioned using the following devices: safety strap and heel suspension cushion.  The patient was positioned by Jaki Salinas RN, Janel Barrientos Gonzales, Allison Subjective:      Patient ID: Patricia Costello is a 68 y.o. female. HPI   Chief Complaint   Patient presents with    Sinusitis     patient c/o sinus pressure and pain, sore throat, productive cough since 12/8/2017; head congestion. patient denies fever      Patricia Costello is a 68 y.o. female with the following history as recorded in [facility]:  Patient Active Problem List    Diagnosis Date Noted    Morbid obesity due to excess calories ([facility] 01/20/2017    NSTEMI (non-ST elevated myocardial infarction) ([facility]     Hyperlipidemia LDL goal <70     Essential hypertension 01/14/2016    Diabetes mellitus ([facility] 10/10/2013    Hypothyroidism     PVD (peripheral vascular disease) ([facility]     MI (mitral incompetence)     Pulmonary hypertension     Chronic diastolic CHF (congestive heart failure), NYHA class 2 ([facility]) 09/06/2012     Current Outpatient Prescriptions   Medication Sig Dispense Refill    furosemide (LASIX) 80 MG tablet Take 1 tablet by mouth 2 times daily 60 tablet 0    lisinopril (PRINIVIL;ZESTRIL) 5 MG tablet Take 1 tablet by mouth daily 90 tablet 0    carvedilol (COREG) 3.125 MG tablet Take 1 tablet by mouth 2 times daily (with meals) 60 tablet 0    clopidogrel (PLAVIX) 75 MG tablet TAKE ONE TABLET BY MOUTH ONCE DAILY 90 tablet 3    metFORMIN (GLUCOPHAGE) 850 MG tablet Take 850 mg by mouth 2 times daily (with meals)       allopurinol (ZYLOPRIM) 100 MG tablet Take one tablet by mouth once daily 90 tablet 1    glimepiride (AMARYL) 4 MG tablet Take 1 tablet by mouth twice daily 180 tablet 1    glucose blood VI test strips (ASCENSIA AUTODISC VI;ONE TOUCH ULTRA TEST VI) strip 1 each by In Vitro route daily As needed. 100 each 3    levothyroxine (SYNTHROID) 125 MCG tablet Take 1 tablet by mouth Daily 90 tablet 3    aspirin EC 81 MG EC tablet Take 1 tablet by mouth daily 30 tablet 5     No current facility-administered medications for this visit.       Allergies: Morphine; Rosuvastatin; and Vicodin